# Patient Record
Sex: MALE | Race: WHITE | NOT HISPANIC OR LATINO | Employment: OTHER | URBAN - NONMETROPOLITAN AREA
[De-identification: names, ages, dates, MRNs, and addresses within clinical notes are randomized per-mention and may not be internally consistent; named-entity substitution may affect disease eponyms.]

---

## 2017-01-04 RX ORDER — CARVEDILOL 12.5 MG/1
12.5 TABLET ORAL 2 TIMES DAILY WITH MEALS
Qty: 60 TABLET | Refills: 3 | Status: SHIPPED | OUTPATIENT
Start: 2017-01-04 | End: 2017-04-30 | Stop reason: SDUPTHER

## 2017-01-12 RX ORDER — LINAGLIPTIN 5 MG/1
TABLET, FILM COATED ORAL
Qty: 30 TABLET | Refills: 5 | Status: SHIPPED | OUTPATIENT
Start: 2017-01-12 | End: 2017-01-24 | Stop reason: ALTCHOICE

## 2017-01-13 RX ORDER — PROBENECID 500 MG/1
TABLET, FILM COATED ORAL
Qty: 60 TABLET | Refills: 5 | Status: SHIPPED | OUTPATIENT
Start: 2017-01-13 | End: 2018-01-03 | Stop reason: SDUPTHER

## 2017-01-13 RX ORDER — FENOFIBRATE 145 MG/1
TABLET, COATED ORAL
Qty: 30 TABLET | Refills: 5 | Status: SHIPPED | OUTPATIENT
Start: 2017-01-13 | End: 2017-03-30 | Stop reason: SDUPTHER

## 2017-01-13 RX ORDER — GLIPIZIDE 5 MG/1
TABLET ORAL
Qty: 60 TABLET | Refills: 5 | Status: SHIPPED | OUTPATIENT
Start: 2017-01-13 | End: 2017-01-24 | Stop reason: SDUPTHER

## 2017-01-13 RX ORDER — CLOPIDOGREL BISULFATE 75 MG/1
TABLET ORAL
Qty: 30 TABLET | Refills: 5 | Status: SHIPPED | OUTPATIENT
Start: 2017-01-13 | End: 2018-01-04 | Stop reason: SDUPTHER

## 2017-01-16 RX ORDER — LOVASTATIN 20 MG/1
20 TABLET ORAL NIGHTLY
Qty: 30 TABLET | Refills: 5 | Status: SHIPPED | OUTPATIENT
Start: 2017-01-16 | End: 2017-03-30 | Stop reason: SINTOL

## 2017-01-27 ENCOUNTER — TELEPHONE (OUTPATIENT)
Dept: FAMILY MEDICINE CLINIC | Facility: CLINIC | Age: 72
End: 2017-01-27

## 2017-01-27 NOTE — TELEPHONE ENCOUNTER
Requested Refills Sent    ----- Message from Elif Reno sent at 1/27/2017 11:19 AM CST -----  Contact: [phone]  Eileen from [facility] [address] called and left message that patient needs new script for his Levemir because patient said the units increased. Pharmacy's number is [phone].

## 2017-02-27 RX ORDER — PEN NEEDLE, DIABETIC 30 GX5/16"
100 NEEDLE, DISPOSABLE MISCELLANEOUS DAILY
Qty: 100 EACH | Refills: 3 | Status: SHIPPED | OUTPATIENT
Start: 2017-02-27 | End: 2020-04-21 | Stop reason: SDUPTHER

## 2017-03-22 ENCOUNTER — LAB (OUTPATIENT)
Dept: LAB | Facility: HOSPITAL | Age: 72
End: 2017-03-22

## 2017-03-22 DIAGNOSIS — E11.8 TYPE 2 DIABETES MELLITUS WITH COMPLICATION, WITH LONG-TERM CURRENT USE OF INSULIN (HCC): Chronic | ICD-10-CM

## 2017-03-22 DIAGNOSIS — Z79.4 TYPE 2 DIABETES MELLITUS WITH COMPLICATION, WITH LONG-TERM CURRENT USE OF INSULIN (HCC): Chronic | ICD-10-CM

## 2017-03-22 DIAGNOSIS — E78.2 MIXED HYPERLIPIDEMIA: Chronic | ICD-10-CM

## 2017-03-22 LAB
ALBUMIN SERPL-MCNC: 4.4 G/DL (ref 3.4–4.8)
ALBUMIN/GLOB SERPL: 1.5 G/DL (ref 1.1–1.8)
ALP SERPL-CCNC: 79 U/L (ref 38–126)
ALT SERPL W P-5'-P-CCNC: 49 U/L (ref 21–72)
ANION GAP SERPL CALCULATED.3IONS-SCNC: 12 MMOL/L (ref 5–15)
ARTICHOKE IGE QN: 140 MG/DL (ref 1–129)
AST SERPL-CCNC: 48 U/L (ref 17–59)
BILIRUB SERPL-MCNC: 1 MG/DL (ref 0.2–1.3)
BUN BLD-MCNC: 26 MG/DL (ref 7–21)
BUN/CREAT SERPL: 19 (ref 7–25)
CALCIUM SPEC-SCNC: 9.3 MG/DL (ref 8.4–10.2)
CHLORIDE SERPL-SCNC: 105 MMOL/L (ref 95–110)
CHOLEST SERPL-MCNC: 236 MG/DL (ref 0–199)
CO2 SERPL-SCNC: 26 MMOL/L (ref 22–31)
CREAT BLD-MCNC: 1.37 MG/DL (ref 0.7–1.3)
GFR SERPL CREATININE-BSD FRML MDRD: 51 ML/MIN/1.73 (ref 42–98)
GLOBULIN UR ELPH-MCNC: 2.9 GM/DL (ref 2.3–3.5)
GLUCOSE BLD-MCNC: 195 MG/DL (ref 60–100)
HBA1C MFR BLD: 8.6 % (ref 4–5.6)
HDLC SERPL-MCNC: 25 MG/DL (ref 60–200)
LDLC/HDLC SERPL: 5.66 {RATIO} (ref 0–3.55)
POTASSIUM BLD-SCNC: 4.5 MMOL/L (ref 3.5–5.1)
PROT SERPL-MCNC: 7.3 G/DL (ref 6.3–8.6)
SODIUM BLD-SCNC: 143 MMOL/L (ref 137–145)
TRIGL SERPL-MCNC: 348 MG/DL (ref 20–199)

## 2017-03-22 PROCEDURE — 80061 LIPID PANEL: CPT | Performed by: GENERAL PRACTICE

## 2017-03-22 PROCEDURE — 36415 COLL VENOUS BLD VENIPUNCTURE: CPT

## 2017-03-22 PROCEDURE — 80053 COMPREHEN METABOLIC PANEL: CPT | Performed by: GENERAL PRACTICE

## 2017-03-22 PROCEDURE — 83036 HEMOGLOBIN GLYCOSYLATED A1C: CPT | Performed by: GENERAL PRACTICE

## 2017-03-27 RX ORDER — PRAVASTATIN SODIUM 40 MG
TABLET ORAL
Qty: 90 TABLET | Refills: 1 | Status: SHIPPED | OUTPATIENT
Start: 2017-03-27 | End: 2017-03-30 | Stop reason: SINTOL

## 2017-03-30 ENCOUNTER — OFFICE VISIT (OUTPATIENT)
Dept: FAMILY MEDICINE CLINIC | Facility: CLINIC | Age: 72
End: 2017-03-30

## 2017-03-30 VITALS
HEIGHT: 74 IN | SYSTOLIC BLOOD PRESSURE: 132 MMHG | WEIGHT: 199.3 LBS | HEART RATE: 74 BPM | DIASTOLIC BLOOD PRESSURE: 64 MMHG | OXYGEN SATURATION: 94 % | BODY MASS INDEX: 25.58 KG/M2

## 2017-03-30 DIAGNOSIS — E78.2 MIXED HYPERLIPIDEMIA: ICD-10-CM

## 2017-03-30 DIAGNOSIS — I10 ESSENTIAL HYPERTENSION: ICD-10-CM

## 2017-03-30 DIAGNOSIS — E11.8 TYPE 2 DIABETES MELLITUS WITH COMPLICATION, WITH LONG-TERM CURRENT USE OF INSULIN (HCC): Primary | ICD-10-CM

## 2017-03-30 DIAGNOSIS — Z79.4 TYPE 2 DIABETES MELLITUS WITH COMPLICATION, WITH LONG-TERM CURRENT USE OF INSULIN (HCC): Primary | ICD-10-CM

## 2017-03-30 DIAGNOSIS — N28.9 RENAL IMPAIRMENT: ICD-10-CM

## 2017-03-30 PROCEDURE — 99214 OFFICE O/P EST MOD 30 MIN: CPT | Performed by: GENERAL PRACTICE

## 2017-03-30 NOTE — PROGRESS NOTES
Subjective   Zachary Galindo is a 71 y.o. male.     Chief Complaint   Patient presents with   • Diabetes   • Hypertension     Diabetes   He presents for his follow-up diabetic visit. He has type 2 diabetes mellitus. His disease course has been improving. There are no hypoglycemic associated symptoms. Pertinent negatives for hypoglycemia include no dizziness, headaches or nervousness/anxiousness. There are no diabetic associated symptoms. Pertinent negatives for diabetes include no chest pain, no fatigue and no weakness. There are no hypoglycemic complications. Diabetic complications include nephropathy. Risk factors for coronary artery disease include diabetes mellitus, dyslipidemia, hypertension and male sex. Current diabetic treatment includes oral agent (dual therapy) and insulin injections. He is compliant with treatment all of the time. His weight is stable. He is following a generally healthy diet. Meal planning includes ADA exchanges. He participates in exercise intermittently. There is no change in his home blood glucose trend. An ACE inhibitor/angiotensin II receptor blocker is being taken. Eye exam is current.   Hypertension   The current episode started more than 1 year ago. The problem is unchanged. The problem is controlled. Pertinent negatives include no chest pain, headaches, neck pain, palpitations or shortness of breath. Risk factors for coronary artery disease include diabetes mellitus and dyslipidemia. Past treatments include angiotensin blockers. The current treatment provides significant improvement. There are no compliance problems.    Hyperlipidemia   This is a chronic problem. The current episode started more than 1 year ago. The problem is uncontrolled. Recent lipid tests were reviewed and are high. Exacerbating diseases include diabetes. There are no known factors aggravating his hyperlipidemia. Pertinent negatives include no chest pain, myalgias or shortness of breath. The current  "treatment provides mild improvement of lipids. There are no compliance problems.       The following portions of the patient's history were reviewed and updated as appropriate: allergies, current medications, past social history and problem list.    Current Outpatient Prescriptions:   •  amLODIPine (NORVASC) 10 MG tablet, Take 10 mg by mouth Daily., Disp: , Rfl:   •  aspirin 325 MG tablet, Take 325 mg by mouth every day., Disp: , Rfl:   •  carvedilol (COREG) 12.5 MG tablet, Take 1 tablet by mouth 2 (Two) Times a Day With Meals., Disp: 60 tablet, Rfl: 3  •  clopidogrel (PLAVIX) 75 MG tablet, TAKE 1 TABLET BY MOUTH EVERY DAY, Disp: 30 tablet, Rfl: 5  •  glipiZIDE (GLUCOTROL) 5 MG tablet, Take 1 tablet by mouth 2 (Two) Times a Day Before Meals., Disp: 180 tablet, Rfl: 3  •  glucose blood test strip, OneTouch Ultra Test strips  -  Test sugars 3-4 times a day as directed by provider., Disp: , Rfl:   •  insulin detemir (LEVEMIR FLEXTOUCH) 100 UNIT/ML injection, Inject 40 Units under the skin Every Night., Disp: 15 mL, Rfl: 5  •  Insulin Pen Needle (PEN NEEDLES 3/16\") 31G X 5 MM misc, 100 each Daily., Disp: 100 each, Rfl: 3  •  Lancets (ONETOUCH ULTRASOFT) lancets, Test sugars 3-4 times daily as instructed by physician., Disp: , Rfl:   •  losartan (COZAAR) 50 MG tablet, Take 50 mg by mouth Daily., Disp: , Rfl:   •  magnesium oxide (MAG-OX) 400 MG tablet, Take 1 tablet by mouth 2 (Two) Times a Day., Disp: 180 tablet, Rfl: 3  •  probenecid (BENEMID) 500 MG tablet, TAKE 1 TABLET BY MOUTH TWICE DAILY, Disp: 60 tablet, Rfl: 5  •  SITagliptin (JANUVIA) 100 MG tablet, Take 1 tablet by mouth Daily., Disp: 90 tablet, Rfl: 3  •  Unable to find, Pharmacy, Disp: , Rfl:     Review of Systems   Constitutional: Negative.  Negative for activity change, appetite change, chills, fatigue, fever and unexpected weight change.   HENT: Negative.  Negative for congestion, ear pain, hearing loss, nosebleeds, rhinorrhea, sinus pressure, sneezing, " "sore throat, tinnitus and trouble swallowing.    Eyes: Negative.  Negative for pain, discharge, redness, itching and visual disturbance.   Respiratory: Negative.  Negative for apnea, cough, chest tightness, shortness of breath and wheezing.    Cardiovascular: Negative.  Negative for chest pain, palpitations and leg swelling.   Gastrointestinal: Negative.  Negative for abdominal distention, abdominal pain, constipation, diarrhea, nausea and vomiting.   Endocrine: Negative.    Genitourinary: Negative.  Negative for dysuria, frequency and urgency.   Musculoskeletal: Negative.  Negative for arthralgias, back pain, gait problem, joint swelling, myalgias, neck pain and neck stiffness.   Skin: Negative.  Negative for color change and rash.   Allergic/Immunologic: Negative.    Neurological: Negative.  Negative for dizziness, weakness, light-headedness, numbness and headaches.   Hematological: Negative.  Negative for adenopathy.   Psychiatric/Behavioral: Negative.  Negative for dysphoric mood and sleep disturbance. The patient is not nervous/anxious.      Objective     Visit Vitals   • /64   • Pulse 74   • Ht 74\" (188 cm)   • Wt 199 lb 4.8 oz (90.4 kg)   • SpO2 94%   • BMI 25.59 kg/m2     Physical Exam   Constitutional: He is oriented to person, place, and time. He appears well-developed and well-nourished. No distress.   HENT:   Head: Normocephalic.   Nose: Nose normal.   Mouth/Throat: Oropharynx is clear and moist.   Eyes: Conjunctivae and EOM are normal. Pupils are equal, round, and reactive to light. Right eye exhibits no discharge. Left eye exhibits no discharge.   Neck: No thyromegaly present.   Cardiovascular: Normal rate, regular rhythm, normal heart sounds and intact distal pulses.    No murmur heard.  Pulmonary/Chest: Effort normal and breath sounds normal.   Musculoskeletal: He exhibits no edema.   Lymphadenopathy:     He has no cervical adenopathy.   Neurological: He is alert and oriented to person, place, " and time.   Skin: Skin is warm and dry.   Psychiatric: He has a normal mood and affect.   Nursing note and vitals reviewed.    Assessment/Plan     Problem List Items Addressed This Visit        Cardiovascular and Mediastinum    Essential hypertension    Hyperlipidemia    Relevant Orders    Comprehensive Metabolic Panel    Lipid Panel       Endocrine    Type 2 diabetes mellitus - Primary    Relevant Orders    Comprehensive Metabolic Panel    Hemoglobin A1c       Genitourinary    Renal impairment        Increase Levemir by 3 units.    No orders of the defined types were placed in this encounter.

## 2017-04-14 ENCOUNTER — TRANSCRIBE ORDERS (OUTPATIENT)
Dept: LAB | Facility: HOSPITAL | Age: 72
End: 2017-04-14

## 2017-04-14 ENCOUNTER — APPOINTMENT (OUTPATIENT)
Dept: LAB | Facility: HOSPITAL | Age: 72
End: 2017-04-14

## 2017-04-14 DIAGNOSIS — M25.512 LEFT SHOULDER PAIN, UNSPECIFIED CHRONICITY: ICD-10-CM

## 2017-04-14 DIAGNOSIS — I10 ESSENTIAL HYPERTENSION, MALIGNANT: ICD-10-CM

## 2017-04-14 DIAGNOSIS — I25.9 CHRONIC ISCHEMIC HEART DISEASE, UNSPECIFIED: ICD-10-CM

## 2017-04-14 DIAGNOSIS — E78.5 HYPERLIPIDEMIA, UNSPECIFIED HYPERLIPIDEMIA TYPE: ICD-10-CM

## 2017-04-14 DIAGNOSIS — E11.9 TYPE 2 DIABETES MELLITUS WITHOUT COMPLICATION, UNSPECIFIED LONG TERM INSULIN USE STATUS: ICD-10-CM

## 2017-04-14 DIAGNOSIS — N18.30 CHRONIC KIDNEY DISEASE, STAGE III (MODERATE) (HCC): Primary | ICD-10-CM

## 2017-04-14 DIAGNOSIS — M10.9 GOUT, UNSPECIFIED: ICD-10-CM

## 2017-04-14 DIAGNOSIS — M54.2 NECK PAIN: ICD-10-CM

## 2017-04-14 LAB
ALBUMIN SERPL-MCNC: 4.5 G/DL (ref 3.4–4.8)
ALBUMIN/GLOB SERPL: 1.4 G/DL (ref 1.1–1.8)
ALP SERPL-CCNC: 107 U/L (ref 38–126)
ALT SERPL W P-5'-P-CCNC: 44 U/L (ref 21–72)
ANION GAP SERPL CALCULATED.3IONS-SCNC: 14 MMOL/L (ref 5–15)
AST SERPL-CCNC: 40 U/L (ref 17–59)
BILIRUB SERPL-MCNC: 0.7 MG/DL (ref 0.2–1.3)
BUN BLD-MCNC: 25 MG/DL (ref 7–21)
BUN/CREAT SERPL: 20.2 (ref 7–25)
CALCIUM SPEC-SCNC: 9.8 MG/DL (ref 8.4–10.2)
CHLORIDE SERPL-SCNC: 101 MMOL/L (ref 95–110)
CO2 SERPL-SCNC: 26 MMOL/L (ref 22–31)
CREAT BLD-MCNC: 1.24 MG/DL (ref 0.7–1.3)
GFR SERPL CREATININE-BSD FRML MDRD: 57 ML/MIN/1.73 (ref 42–98)
GLOBULIN UR ELPH-MCNC: 3.2 GM/DL (ref 2.3–3.5)
GLUCOSE BLD-MCNC: 215 MG/DL (ref 60–100)
POTASSIUM BLD-SCNC: 4.2 MMOL/L (ref 3.5–5.1)
PROT SERPL-MCNC: 7.7 G/DL (ref 6.3–8.6)
SODIUM BLD-SCNC: 141 MMOL/L (ref 137–145)

## 2017-04-14 PROCEDURE — 36415 COLL VENOUS BLD VENIPUNCTURE: CPT | Performed by: INTERNAL MEDICINE

## 2017-04-14 PROCEDURE — 80053 COMPREHEN METABOLIC PANEL: CPT | Performed by: INTERNAL MEDICINE

## 2017-05-01 RX ORDER — CARVEDILOL 12.5 MG/1
TABLET ORAL
Qty: 60 TABLET | Refills: 5 | Status: SHIPPED | OUTPATIENT
Start: 2017-05-01 | End: 2017-10-02 | Stop reason: SDUPTHER

## 2017-06-26 ENCOUNTER — LAB (OUTPATIENT)
Dept: LAB | Facility: HOSPITAL | Age: 72
End: 2017-06-26

## 2017-06-26 DIAGNOSIS — E78.2 MIXED HYPERLIPIDEMIA: ICD-10-CM

## 2017-06-26 DIAGNOSIS — E11.8 TYPE 2 DIABETES MELLITUS WITH COMPLICATION, WITH LONG-TERM CURRENT USE OF INSULIN (HCC): ICD-10-CM

## 2017-06-26 DIAGNOSIS — Z79.4 TYPE 2 DIABETES MELLITUS WITH COMPLICATION, WITH LONG-TERM CURRENT USE OF INSULIN (HCC): ICD-10-CM

## 2017-06-26 LAB
ALBUMIN SERPL-MCNC: 4.3 G/DL (ref 3.4–4.8)
ALBUMIN/GLOB SERPL: 1.3 G/DL (ref 1.1–1.8)
ALP SERPL-CCNC: 95 U/L (ref 38–126)
ALT SERPL W P-5'-P-CCNC: 56 U/L (ref 21–72)
ANION GAP SERPL CALCULATED.3IONS-SCNC: 11 MMOL/L (ref 5–15)
ARTICHOKE IGE QN: 175 MG/DL (ref 1–129)
AST SERPL-CCNC: 30 U/L (ref 17–59)
BILIRUB SERPL-MCNC: 0.7 MG/DL (ref 0.2–1.3)
BUN BLD-MCNC: 21 MG/DL (ref 7–21)
BUN/CREAT SERPL: 17.2 (ref 7–25)
CALCIUM SPEC-SCNC: 9.5 MG/DL (ref 8.4–10.2)
CHLORIDE SERPL-SCNC: 103 MMOL/L (ref 95–110)
CHOLEST SERPL-MCNC: 233 MG/DL (ref 0–199)
CO2 SERPL-SCNC: 27 MMOL/L (ref 22–31)
CREAT BLD-MCNC: 1.22 MG/DL (ref 0.7–1.3)
GFR SERPL CREATININE-BSD FRML MDRD: 59 ML/MIN/1.73 (ref 42–98)
GLOBULIN UR ELPH-MCNC: 3.4 GM/DL (ref 2.3–3.5)
GLUCOSE BLD-MCNC: 162 MG/DL (ref 60–100)
HBA1C MFR BLD: 8.75 % (ref 4–5.6)
HDLC SERPL-MCNC: 32 MG/DL (ref 60–200)
LDLC/HDLC SERPL: 4.76 {RATIO} (ref 0–3.55)
POTASSIUM BLD-SCNC: 5 MMOL/L (ref 3.5–5.1)
PROT SERPL-MCNC: 7.7 G/DL (ref 6.3–8.6)
SODIUM BLD-SCNC: 141 MMOL/L (ref 137–145)
TRIGL SERPL-MCNC: 243 MG/DL (ref 20–199)

## 2017-06-26 PROCEDURE — 83036 HEMOGLOBIN GLYCOSYLATED A1C: CPT | Performed by: GENERAL PRACTICE

## 2017-06-26 PROCEDURE — 36415 COLL VENOUS BLD VENIPUNCTURE: CPT

## 2017-06-26 PROCEDURE — 80061 LIPID PANEL: CPT | Performed by: GENERAL PRACTICE

## 2017-06-26 PROCEDURE — 80053 COMPREHEN METABOLIC PANEL: CPT | Performed by: GENERAL PRACTICE

## 2017-07-03 ENCOUNTER — OFFICE VISIT (OUTPATIENT)
Dept: FAMILY MEDICINE CLINIC | Facility: CLINIC | Age: 72
End: 2017-07-03

## 2017-07-03 VITALS
HEIGHT: 74 IN | BODY MASS INDEX: 24.68 KG/M2 | WEIGHT: 192.3 LBS | SYSTOLIC BLOOD PRESSURE: 132 MMHG | HEART RATE: 62 BPM | OXYGEN SATURATION: 94 % | DIASTOLIC BLOOD PRESSURE: 70 MMHG

## 2017-07-03 DIAGNOSIS — Z79.4 TYPE 2 DIABETES MELLITUS WITH COMPLICATION, WITH LONG-TERM CURRENT USE OF INSULIN (HCC): Chronic | ICD-10-CM

## 2017-07-03 DIAGNOSIS — I10 ESSENTIAL HYPERTENSION: Chronic | ICD-10-CM

## 2017-07-03 DIAGNOSIS — E11.8 TYPE 2 DIABETES MELLITUS WITH COMPLICATION, WITH LONG-TERM CURRENT USE OF INSULIN (HCC): Chronic | ICD-10-CM

## 2017-07-03 DIAGNOSIS — E78.2 MIXED HYPERLIPIDEMIA: Chronic | ICD-10-CM

## 2017-07-03 DIAGNOSIS — Z00.00 MEDICARE ANNUAL WELLNESS VISIT, INITIAL: Primary | ICD-10-CM

## 2017-07-03 DIAGNOSIS — Z13.6 SCREENING FOR ABDOMINAL AORTIC ANEURYSM: ICD-10-CM

## 2017-07-03 PROCEDURE — 99214 OFFICE O/P EST MOD 30 MIN: CPT | Performed by: GENERAL PRACTICE

## 2017-07-03 PROCEDURE — G0438 PPPS, INITIAL VISIT: HCPCS | Performed by: GENERAL PRACTICE

## 2017-07-03 RX ORDER — ATORVASTATIN CALCIUM 10 MG/1
10 TABLET, FILM COATED ORAL 3 TIMES WEEKLY
Qty: 40 TABLET | Refills: 3 | Status: SHIPPED | OUTPATIENT
Start: 2017-07-03 | End: 2018-01-09 | Stop reason: SDUPTHER

## 2017-07-03 NOTE — PATIENT INSTRUCTIONS
Check on tetanus vaccine at pharmacy or with insurance.      Decrease glipizide to 1/2 tab twice daily.    Continue levemir same dose for 3 days then if fasting sugar is above 150 then increase 3 units, if after 3 days your fasting sugar is still above 150 then increase another 3 units. Keep doing same until fasting sugar is below 150.     Medicare Wellness  Personal Prevention Plan of Service     Date of Office Visit:  2017  Encounter Provider:  Halley Raymond MD  Place of Service:  [facility]  Patient Name: Zachary Gailndo  :  1945    As part of the Medicare Wellness portion of your visit today, we are providing you with this personalized preventive plan of services (PPPS). This plan is based upon recommendations of the [place] Preventive Services Task Force (USPSTF) and the Advisory Committee on Immunization Practices (ACIP).    This lists the preventive care services that should be considered, and provides dates of when you are due. Items listed as completed are up-to-date and do not require any further intervention.    Health Maintenance   Topic Date Due   • INFLUENZA VACCINE  2017   • DIABETIC FOOT EXAM  2017   • DIABETIC EYE EXAM  2017   • URINE MICROALBUMIN  2017   • HEMOGLOBIN A1C  2017   • LIPID PANEL  2018   • MEDICARE ANNUAL WELLNESS  2018   • COLONOSCOPY  2020   • TDAP/TD VACCINES (2 - Td) 2027   • HEPATITIS C SCREENING  Completed   • PNEUMOCOCCAL VACCINES (65+ LOW/MEDIUM RISK)  Completed   • AAA SCREEN (ONE-TIME)  Addressed   • ZOSTER VACCINE  Addressed       Orders Placed This Encounter   Procedures   • US AAA Screen Limited     Standing Status:   Future     Standing Expiration Date:   7/3/2018     Order Specific Question:   Reason for Exam:     Answer:   screen       Return in about 3 months (around 10/3/2017) for Recheck.

## 2017-07-03 NOTE — PROGRESS NOTES
Subjective   Zachary Galindo is a 71 y.o. male.   Chief Complaint   Patient presents with   • Diabetes     labs MEDICARE WELLNESS   • Hypertension     For review and evaluation of management of chronic medical problems. Labs reviewed.   Diabetes   He presents for his follow-up diabetic visit. He has type 2 diabetes mellitus. His disease course has been improving. There are no hypoglycemic associated symptoms. Pertinent negatives for hypoglycemia include no dizziness, headaches or nervousness/anxiousness. There are no diabetic associated symptoms. Pertinent negatives for diabetes include no chest pain, no fatigue and no weakness. There are no hypoglycemic complications. Diabetic complications include nephropathy. Risk factors for coronary artery disease include diabetes mellitus, dyslipidemia, hypertension and male sex. Current diabetic treatment includes oral agent (dual therapy) and insulin injections. He is compliant with treatment all of the time. His weight is stable. He is following a generally healthy diet. Meal planning includes ADA exchanges. He participates in exercise intermittently. There is no change in his home blood glucose trend. An ACE inhibitor/angiotensin II receptor blocker is being taken. Eye exam is current.   Hypertension   The current episode started more than 1 year ago. The problem is unchanged. The problem is controlled. Pertinent negatives include no chest pain, headaches, neck pain, palpitations or shortness of breath. Risk factors for coronary artery disease include diabetes mellitus and dyslipidemia. Past treatments include angiotensin blockers. The current treatment provides significant improvement. There are no compliance problems.    Hyperlipidemia   This is a chronic problem. The current episode started more than 1 year ago. The problem is uncontrolled. Recent lipid tests were reviewed and are high. Exacerbating diseases include diabetes. There are no known factors  "aggravating his hyperlipidemia. Pertinent negatives include no chest pain, myalgias or shortness of breath. The current treatment provides mild improvement of lipids. There are no compliance problems.       The following portions of the patient's history were reviewed and updated as appropriate: allergies, current medications, past social history and problem list.    Outpatient Medications Prior to Visit   Medication Sig Dispense Refill   • amLODIPine (NORVASC) 10 MG tablet Take 10 mg by mouth Daily.     • aspirin 325 MG tablet Take 325 mg by mouth every day.     • carvedilol (COREG) 12.5 MG tablet TAKE 1 TABLET BY MOUTH TWICE DAILY WITH MEALS 60 tablet 5   • clopidogrel (PLAVIX) 75 MG tablet TAKE 1 TABLET BY MOUTH EVERY DAY 30 tablet 5   • glipiZIDE (GLUCOTROL) 5 MG tablet Take 1 tablet by mouth 2 (Two) Times a Day Before Meals. 180 tablet 3   • glucose blood test strip OneTouch Ultra Test strips  -  Test sugars 3-4 times a day as directed by provider.     • insulin detemir (LEVEMIR FLEXTOUCH) 100 UNIT/ML injection Inject 40 Units under the skin Every Night. 15 mL 5   • Insulin Pen Needle (PEN NEEDLES 3/16\") 31G X 5 MM misc 100 each Daily. 100 each 3   • Lancets (ONETOUCH ULTRASOFT) lancets Test sugars 3-4 times daily as instructed by physician.     • magnesium oxide (MAG-OX) 400 MG tablet Take 1 tablet by mouth 2 (Two) Times a Day. 180 tablet 3   • probenecid (BENEMID) 500 MG tablet TAKE 1 TABLET BY MOUTH TWICE DAILY 60 tablet 5   • SITagliptin (JANUVIA) 100 MG tablet Take 1 tablet by mouth Daily. 90 tablet 3   • losartan (COZAAR) 50 MG tablet Take 50 mg by mouth Daily.     • Unable to find Pharmacy       No facility-administered medications prior to visit.      Review of Systems   Constitutional: Negative.  Negative for activity change, appetite change, chills, fatigue, fever and unexpected weight change.   HENT: Negative.  Negative for congestion, ear pain, hearing loss, nosebleeds, rhinorrhea, sinus pressure, " "sneezing, sore throat, tinnitus and trouble swallowing.    Eyes: Negative.  Negative for pain, discharge, redness, itching and visual disturbance.   Respiratory: Negative.  Negative for apnea, cough, chest tightness, shortness of breath and wheezing.    Cardiovascular: Negative.  Negative for chest pain, palpitations and leg swelling.   Gastrointestinal: Negative.  Negative for abdominal distention, abdominal pain, constipation, diarrhea, nausea and vomiting.   Endocrine: Negative.    Genitourinary: Negative.  Negative for dysuria, frequency and urgency.   Musculoskeletal: Negative.  Negative for arthralgias, back pain, gait problem, joint swelling, myalgias, neck pain and neck stiffness.   Skin: Negative.  Negative for color change and rash.   Allergic/Immunologic: Negative.    Neurological: Negative.  Negative for dizziness, weakness, light-headedness, numbness and headaches.   Hematological: Negative.  Negative for adenopathy.   Psychiatric/Behavioral: Negative.  Negative for dysphoric mood and sleep disturbance. The patient is not nervous/anxious.      Objective   Visit Vitals   • /70   • Pulse 62   • Ht 74\" (188 cm)   • Wt 192 lb 4.8 oz (87.2 kg)   • SpO2 94%   • BMI 24.69 kg/m2     Physical Exam   Constitutional: He is oriented to person, place, and time. He appears well-developed and well-nourished. No distress.   HENT:   Head: Normocephalic.   Nose: Nose normal.   Mouth/Throat: Oropharynx is clear and moist.   Eyes: Conjunctivae and EOM are normal. Pupils are equal, round, and reactive to light. Right eye exhibits no discharge. Left eye exhibits no discharge.   Neck: No thyromegaly present.   Cardiovascular: Normal rate, regular rhythm, normal heart sounds and intact distal pulses.    No murmur heard.  Pulmonary/Chest: Effort normal and breath sounds normal.   Musculoskeletal: He exhibits no edema.   Lymphadenopathy:     He has no cervical adenopathy.   Neurological: He is alert and oriented to person, " place, and time.   Skin: Skin is warm and dry.   Psychiatric: He has a normal mood and affect.   Nursing note and vitals reviewed.    Assessment/Plan   Problem List Items Addressed This Visit        Cardiovascular and Mediastinum    Hyperlipidemia (Chronic)    Relevant Medications    atorvastatin (LIPITOR) 10 MG tablet    Essential hypertension       Endocrine    Type 2 diabetes mellitus (Chronic)      Other Visit Diagnoses     Medicare annual wellness visit, initial    -  Primary        Decrease glipizide to 1/2 tab bid. Increase levemir. Start atorvastatin 3 times a week.     New Medications Ordered This Visit   Medications   • atorvastatin (LIPITOR) 10 MG tablet     Sig: Take 1 tablet by mouth 3 (Three) Times a Week.     Dispense:  40 tablet     Refill:  3     Return in about 3 months (around 10/3/2017) for Recheck.

## 2017-07-03 NOTE — PROGRESS NOTES
QUICK REFERENCE INFORMATION:  The ABCs of the Annual Wellness Visit    Initial Medicare Wellness Visit    HEALTH RISK ASSESSMENT    1945    Recent Hospitalizations:  No hospitalization(s) within the last year..    Current Medical Providers:  Patient Care Team:  Halley Raymond MD as PCP - General  ROCCO Krause as PCP - Claims Attributed  Aime Stiles MD as Consulting Physician (Cardiology)  Vicente Hsu MD as Consulting Physician (Nephrology)    Smoking Status:  History   Smoking Status   • Former Smoker   Smokeless Tobacco   • Never Used     Comment: Quit 1979     Alcohol Consumption:  History   Alcohol Use No     Depression Screen:   PHQ-9 Depression Screening 7/3/2017   Little interest or pleasure in doing things 0   Feeling down, depressed, or hopeless 0   Trouble falling or staying asleep, or sleeping too much 0   Feeling tired or having little energy 0   Poor appetite or overeating 0   Feeling bad about yourself - or that you are a failure or have let yourself or your family down 0   Trouble concentrating on things, such as reading the newspaper or watching television 0   Moving or speaking so slowly that other people could have noticed. Or the opposite - being so fidgety or restless that you have been moving around a lot more than usual 0   Thoughts that you would be better off dead, or of hurting yourself in some way 0   PHQ-9 Total Score 0     Health Habits and Functional and Cognitive Screening:  Functional & Cognitive Status 7/3/2017   Do you have difficulty preparing food and eating? No   Do you have difficulty bathing yourself? No   Do you have difficulty getting dressed? No   Do you have difficulty using the toilet? No   Do you have difficulty moving around from place to place? No   In the past year have you fallen or experienced a near fall? No   Do you need help using the phone?  No   Are you deaf or do you have serious difficulty hearing?  No   Do you need help with  transportation? No   Do you need help shopping? No   Do you need help preparing meals?  No   Do you need help with housework?  No   Do you need help with laundry? No   Do you need help taking your medications? No   Do you need help managing money? No   Do you have difficulty concentrating, remembering or making decisions? No     Health Habits  Current Diet: Well Balanced Diet  Dental Exam: Up to date  Eye Exam: Up to date  Exercise (times per week): 0 times per week  Current Exercise Activities Include: None    Does the patient have evidence of cognitive impairment? No    Asiprin use counseling: Taking ASA appropriately as indicated    Recent Lab Results:    Visual Acuity:  No exam data present    Age-appropriate Screening Schedule:  Refer to the list below for future screening recommendations based on patient's age, sex and/or medical conditions. Orders for these recommended tests are listed in the plan section. The patient has been provided with a written plan.    Health Maintenance   Topic Date Due   • INFLUENZA VACCINE  08/01/2017   • DIABETIC FOOT EXAM  09/29/2017   • DIABETIC EYE EXAM  12/13/2017   • URINE MICROALBUMIN  12/20/2017   • HEMOGLOBIN A1C  12/26/2017   • LIPID PANEL  06/26/2018   • COLONOSCOPY  04/07/2020   • TDAP/TD VACCINES (2 - Td) 07/03/2027   • PNEUMOCOCCAL VACCINES (65+ LOW/MEDIUM RISK)  Completed   • ZOSTER VACCINE  Addressed        Subjective   History of Present Illness    Zachary Galindo is a 71 y.o. male who presents for an Annual Wellness Visit.    The following portions of the patient's history were reviewed and updated as appropriate: allergies, current medications, past family history, past medical history, past social history, past surgical history and problem list.    Outpatient Medications Prior to Visit   Medication Sig Dispense Refill   • amLODIPine (NORVASC) 10 MG tablet Take 10 mg by mouth Daily.     • aspirin 325 MG tablet Take 325 mg by mouth every day.     •  "carvedilol (COREG) 12.5 MG tablet TAKE 1 TABLET BY MOUTH TWICE DAILY WITH MEALS 60 tablet 5   • clopidogrel (PLAVIX) 75 MG tablet TAKE 1 TABLET BY MOUTH EVERY DAY 30 tablet 5   • glipiZIDE (GLUCOTROL) 5 MG tablet Take 1 tablet by mouth 2 (Two) Times a Day Before Meals. 180 tablet 3   • glucose blood test strip OneTouch Ultra Test strips  -  Test sugars 3-4 times a day as directed by provider.     • insulin detemir (LEVEMIR FLEXTOUCH) 100 UNIT/ML injection Inject 40 Units under the skin Every Night. 15 mL 5   • Insulin Pen Needle (PEN NEEDLES 3/16\") 31G X 5 MM misc 100 each Daily. 100 each 3   • Lancets (ONETOUCH ULTRASOFT) lancets Test sugars 3-4 times daily as instructed by physician.     • magnesium oxide (MAG-OX) 400 MG tablet Take 1 tablet by mouth 2 (Two) Times a Day. 180 tablet 3   • probenecid (BENEMID) 500 MG tablet TAKE 1 TABLET BY MOUTH TWICE DAILY 60 tablet 5   • SITagliptin (JANUVIA) 100 MG tablet Take 1 tablet by mouth Daily. 90 tablet 3   • losartan (COZAAR) 50 MG tablet Take 50 mg by mouth Daily.     • Unable to find Pharmacy       No facility-administered medications prior to visit.        Patient Active Problem List   Diagnosis   • Essential hypertension   • Gout   • Hyperlipidemia   • Type 2 diabetes mellitus   • Renal impairment       Advance Care Planning:  has NO advance directive - not interested in additional information    Identification of Risk Factors:  Risk factors include: unhealthy diet.    Review of Systems   Constitutional: Negative.  Negative for activity change, appetite change, chills, fatigue, fever and unexpected weight change.   HENT: Negative.  Negative for congestion, ear pain, hearing loss, nosebleeds, rhinorrhea, sinus pressure, sneezing, sore throat, tinnitus and trouble swallowing.    Eyes: Negative.  Negative for pain, discharge, redness, itching and visual disturbance.   Respiratory: Negative.  Negative for apnea, cough, chest tightness, shortness of breath and wheezing.  "   Cardiovascular: Negative.  Negative for chest pain, palpitations and leg swelling.   Gastrointestinal: Negative.  Negative for abdominal distention, abdominal pain, constipation, diarrhea, nausea and vomiting.   Endocrine: Negative.    Genitourinary: Negative.  Negative for dysuria, frequency and urgency.   Musculoskeletal: Negative.  Negative for arthralgias, back pain, gait problem, joint swelling, myalgias, neck pain and neck stiffness.   Skin: Negative.  Negative for color change and rash.   Allergic/Immunologic: Negative.    Neurological: Negative.  Negative for dizziness, weakness, light-headedness, numbness and headaches.   Hematological: Negative.  Negative for adenopathy.   Psychiatric/Behavioral: Negative.  Negative for dysphoric mood and sleep disturbance. The patient is not nervous/anxious.        Compared to one year ago, the patient feels his physical health is better.  Compared to one year ago, the patient feels his mental health is the same.    Objective     Physical Exam   Constitutional: He is oriented to person, place, and time. He appears well-developed and well-nourished. No distress.   HENT:   Head: Normocephalic.   Nose: Nose normal.   Mouth/Throat: Oropharynx is clear and moist.   Eyes: Conjunctivae and EOM are normal. Pupils are equal, round, and reactive to light. Right eye exhibits no discharge. Left eye exhibits no discharge.   Neck: No thyromegaly present.   Cardiovascular: Normal rate, regular rhythm, normal heart sounds and intact distal pulses.    No murmur heard.  Pulmonary/Chest: Effort normal and breath sounds normal.   Musculoskeletal: He exhibits no edema.   Lymphadenopathy:     He has no cervical adenopathy.   Neurological: He is alert and oriented to person, place, and time.   Skin: Skin is warm and dry.   Psychiatric: He has a normal mood and affect.   Nursing note and vitals reviewed.      Vitals:    07/03/17 0833   BP: 132/70   Pulse: 62   SpO2: 94%   Weight: 192 lb 4.8 oz  "(87.2 kg)   Height: 74\" (188 cm)   PainSc: 0-No pain       Body mass index is 24.69 kg/(m^2).  Discussed the patient's BMI with him. The BMI is in the acceptable range.    Assessment/Plan   Patient Self-Management and Personalized Health Advice  The patient has been provided with information about: diet and exercise and preventive services including:   · Fall Risk assessment done, Screening for AAA, referral for ultrasound placed, TdaP vaccine.    Visit Diagnoses:    ICD-10-CM ICD-9-CM   1. Medicare annual wellness visit, initial Z00.00 V70.0   2. Type 2 diabetes mellitus with complication, with long-term current use of insulin E11.8 250.90    Z79.4 V58.67   3. Mixed hyperlipidemia E78.2 272.2   4. Essential hypertension I10 401.9   5. Screening for abdominal aortic aneurysm Z13.6 V81.2       Orders Placed This Encounter   Procedures   • US AAA Screen Limited     Standing Status:   Future     Standing Expiration Date:   7/3/2018     Order Specific Question:   Reason for Exam:     Answer:   screen       Outpatient Encounter Prescriptions as of 7/3/2017   Medication Sig Dispense Refill   • amLODIPine (NORVASC) 10 MG tablet Take 10 mg by mouth Daily.     • aspirin 325 MG tablet Take 325 mg by mouth every day.     • carvedilol (COREG) 12.5 MG tablet TAKE 1 TABLET BY MOUTH TWICE DAILY WITH MEALS 60 tablet 5   • clopidogrel (PLAVIX) 75 MG tablet TAKE 1 TABLET BY MOUTH EVERY DAY 30 tablet 5   • glipiZIDE (GLUCOTROL) 5 MG tablet Take 1 tablet by mouth 2 (Two) Times a Day Before Meals. 180 tablet 3   • glucose blood test strip OneTouch Ultra Test strips  -  Test sugars 3-4 times a day as directed by provider.     • insulin detemir (LEVEMIR FLEXTOUCH) 100 UNIT/ML injection Inject 40 Units under the skin Every Night. 15 mL 5   • Insulin Pen Needle (PEN NEEDLES 3/16\") 31G X 5 MM misc 100 each Daily. 100 each 3   • Lancets (ONETOUCH ULTRASOFT) lancets Test sugars 3-4 times daily as instructed by physician.     • magnesium oxide " (MAG-OX) 400 MG tablet Take 1 tablet by mouth 2 (Two) Times a Day. 180 tablet 3   • probenecid (BENEMID) 500 MG tablet TAKE 1 TABLET BY MOUTH TWICE DAILY 60 tablet 5   • SITagliptin (JANUVIA) 100 MG tablet Take 1 tablet by mouth Daily. 90 tablet 3   • atorvastatin (LIPITOR) 10 MG tablet Take 1 tablet by mouth 3 (Three) Times a Week. 40 tablet 3   • losartan (COZAAR) 50 MG tablet Take 50 mg by mouth Daily.     • Unable to find Pharmacy       No facility-administered encounter medications on file as of 7/3/2017.        Reviewed use of high risk medication in the elderly: yes  Reviewed for potential of harmful drug interactions in the elderly: not applicable    Follow Up:  Return in about 3 months (around 10/3/2017) for Recheck.     An After Visit Summary and PPPS with all of these plans were given to the patient.   Information has been scanned into chart.  Discussed importance of taking medications as prescribed. Encouraged healthy eating habits with low fat, low salt choices and working towards maintaining a healthy weight. Recommended regular exercise if able as well as care to prevent falls.

## 2017-07-06 ENCOUNTER — HOSPITAL ENCOUNTER (OUTPATIENT)
Dept: ULTRASOUND IMAGING | Facility: HOSPITAL | Age: 72
Discharge: HOME | End: 2017-07-06
Admitting: GENERAL PRACTICE

## 2017-07-06 DIAGNOSIS — Z13.6 SCREENING FOR ABDOMINAL AORTIC ANEURYSM: ICD-10-CM

## 2017-07-06 PROCEDURE — 76706 US ABDL AORTA SCREEN AAA: CPT

## 2017-08-10 RX ORDER — INSULIN DETEMIR 100 [IU]/ML
INJECTION, SOLUTION SUBCUTANEOUS
Qty: 15 ML | Refills: 5 | Status: SHIPPED | OUTPATIENT
Start: 2017-08-10 | End: 2017-09-15 | Stop reason: SDUPTHER

## 2017-09-28 ENCOUNTER — LAB (OUTPATIENT)
Dept: LAB | Facility: HOSPITAL | Age: 72
End: 2017-09-28

## 2017-09-28 DIAGNOSIS — Z79.4 TYPE 2 DIABETES MELLITUS WITH COMPLICATION, WITH LONG-TERM CURRENT USE OF INSULIN (HCC): Primary | Chronic | ICD-10-CM

## 2017-09-28 DIAGNOSIS — Z79.4 TYPE 2 DIABETES MELLITUS WITH COMPLICATION, WITH LONG-TERM CURRENT USE OF INSULIN (HCC): Chronic | ICD-10-CM

## 2017-09-28 DIAGNOSIS — E11.8 TYPE 2 DIABETES MELLITUS WITH COMPLICATION, WITH LONG-TERM CURRENT USE OF INSULIN (HCC): Primary | Chronic | ICD-10-CM

## 2017-09-28 DIAGNOSIS — E11.8 TYPE 2 DIABETES MELLITUS WITH COMPLICATION, WITH LONG-TERM CURRENT USE OF INSULIN (HCC): Chronic | ICD-10-CM

## 2017-09-28 LAB
ALBUMIN SERPL-MCNC: 4.4 G/DL (ref 3.4–4.8)
ALBUMIN/GLOB SERPL: 1.4 G/DL (ref 1.1–1.8)
ALP SERPL-CCNC: 78 U/L (ref 38–126)
ALT SERPL W P-5'-P-CCNC: 54 U/L (ref 21–72)
ANION GAP SERPL CALCULATED.3IONS-SCNC: 14 MMOL/L (ref 5–15)
AST SERPL-CCNC: 44 U/L (ref 17–59)
BILIRUB SERPL-MCNC: 0.9 MG/DL (ref 0.2–1.3)
BUN BLD-MCNC: 28 MG/DL (ref 7–21)
BUN/CREAT SERPL: 21.1 (ref 7–25)
CALCIUM SPEC-SCNC: 9.3 MG/DL (ref 8.4–10.2)
CHLORIDE SERPL-SCNC: 104 MMOL/L (ref 95–110)
CO2 SERPL-SCNC: 25 MMOL/L (ref 22–31)
CREAT BLD-MCNC: 1.33 MG/DL (ref 0.7–1.3)
GFR SERPL CREATININE-BSD FRML MDRD: 53 ML/MIN/1.73 (ref 42–98)
GLOBULIN UR ELPH-MCNC: 3.2 GM/DL (ref 2.3–3.5)
GLUCOSE BLD-MCNC: 139 MG/DL (ref 60–100)
HBA1C MFR BLD: 8.6 % (ref 4–5.6)
POTASSIUM BLD-SCNC: 4.3 MMOL/L (ref 3.5–5.1)
PROT SERPL-MCNC: 7.6 G/DL (ref 6.3–8.6)
SODIUM BLD-SCNC: 143 MMOL/L (ref 137–145)

## 2017-09-28 PROCEDURE — 83036 HEMOGLOBIN GLYCOSYLATED A1C: CPT | Performed by: GENERAL PRACTICE

## 2017-09-28 PROCEDURE — 80053 COMPREHEN METABOLIC PANEL: CPT | Performed by: GENERAL PRACTICE

## 2017-09-28 PROCEDURE — 36415 COLL VENOUS BLD VENIPUNCTURE: CPT

## 2017-10-02 ENCOUNTER — OFFICE VISIT (OUTPATIENT)
Dept: CARDIOLOGY | Facility: CLINIC | Age: 72
End: 2017-10-02

## 2017-10-02 VITALS
HEART RATE: 50 BPM | HEIGHT: 74 IN | WEIGHT: 194 LBS | SYSTOLIC BLOOD PRESSURE: 128 MMHG | BODY MASS INDEX: 24.9 KG/M2 | DIASTOLIC BLOOD PRESSURE: 70 MMHG

## 2017-10-02 DIAGNOSIS — I10 ESSENTIAL HYPERTENSION: ICD-10-CM

## 2017-10-02 DIAGNOSIS — I25.810 CORONARY ARTERY DISEASE INVOLVING CORONARY BYPASS GRAFT OF NATIVE HEART WITHOUT ANGINA PECTORIS: Primary | ICD-10-CM

## 2017-10-02 DIAGNOSIS — E78.00 PURE HYPERCHOLESTEROLEMIA: ICD-10-CM

## 2017-10-02 DIAGNOSIS — I20.9 ANGINA PECTORIS (HCC): ICD-10-CM

## 2017-10-02 DIAGNOSIS — E11.9 TYPE 2 DIABETES MELLITUS WITHOUT COMPLICATION, WITH LONG-TERM CURRENT USE OF INSULIN (HCC): ICD-10-CM

## 2017-10-02 DIAGNOSIS — R42 DIZZINESS: ICD-10-CM

## 2017-10-02 DIAGNOSIS — Z79.4 TYPE 2 DIABETES MELLITUS WITHOUT COMPLICATION, WITH LONG-TERM CURRENT USE OF INSULIN (HCC): ICD-10-CM

## 2017-10-02 PROCEDURE — 93000 ELECTROCARDIOGRAM COMPLETE: CPT | Performed by: INTERNAL MEDICINE

## 2017-10-02 PROCEDURE — 99204 OFFICE O/P NEW MOD 45 MIN: CPT | Performed by: INTERNAL MEDICINE

## 2017-10-02 RX ORDER — CARVEDILOL 6.25 MG/1
6.25 TABLET ORAL 2 TIMES DAILY WITH MEALS
Qty: 180 TABLET | Refills: 6 | Status: SHIPPED | OUTPATIENT
Start: 2017-10-02 | End: 2017-11-13 | Stop reason: SDUPTHER

## 2017-10-02 NOTE — PROGRESS NOTES
[facility]  OFFICE NOTE    Zachary Galindo  71 y.o. male    10/02/2017  1. Coronary artery disease involving coronary bypass graft of native heart without angina pectoris    2. Type 2 diabetes mellitus without complication, with long-term current use of insulin    3. Pure hypercholesterolemia    4. Essential hypertension    5. Dizziness    6. Angina pectoris         Chief complaint -History of CAD has been having some dizziness      History of present Illness- 71-year-old gentleman with history of coronary disease status post CABG in 1998 subsequently had stent placement about 3 years ago.  He has been feeling dizziness occasionally.  He is able to exercise reasonably well without any problems.  He is been a diabetic since the age of 35.  He denies any smoking he exercises regularly.  He denies any headache or visual complaints.  He had his labs checked by Dr. Radha Raymond.              Allergies   Allergen Reactions   • Advicor [Niacin-Lovastatin Er]      Swelling   • Lisinopril      Cough         Past Medical History:   Diagnosis Date   • Allergic rhinitis    • Ankle joint pain    • Artificial lens present     Left   • Astigmatism    • Benign prostatic hyperplasia    • Cataract    • Closed fracture of medial malleolus    • Coronary arteriosclerosis    • Diabetes mellitus     no retinopathy   • Elevated levels of transaminase & lactic acid dehydrogenase     improving    • Encounter for health maintenance examination     Individual health examination     • Encounter for health maintenance examination in adult     Adult health examination    • Essential hypertension    • Essential hypertension     Unspecified   • Flank pain     probably muscular    • GERD (gastroesophageal reflux disease)    • Gout    • Gout     unspecified     • Hemorrhoids    • History of artificial eye lens     Artificial lens in position - right    • History of colonoscopy 04/04/2010    Colon endoscopy   (77339)  (1)    • History of kidney stones    • Hyperlipidemia    • Hypermetropia    • Low blood pressure    • Malaise and fatigue    • Need for influenza vaccination     Needs influenza immunization    • Nuclear cataract of left eye    • Posterior subcapsular polar senile cataract     Left   • Renal impairment    • Special screening for malignant neoplasm of prostate    • Type 2 diabetes mellitus     improving   • 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 edema     without macular edema - mild OS    • Upper respiratory infection    • Urticaria     Drug-aggravated angioedema-urticaria   • Urticaria          Past Surgical History:   Procedure Laterality Date   • CARDIAC CATHETERIZATION  03/24/2014    (46255)  (2)  Reduction of stenoisis from 95% to less than 0% stenosis with evidence of good JENNY 3-flow. Distal RCA beyond the touchdown was seen filling the circumflex system   • CATARACT EXTRACTION  10/2015    Remove cataract, insert lens (2)    • CORONARY ARTERY BYPASS GRAFT      Arterial, two  (1)   -  3 - 12/1998   • HERNIA REPAIR      w/mesh  (1)   • INJECTION OF MEDICATION  04/11/2013    B12  (1)  - RAYMOND Raymond   • INJECTION OF MEDICATION  07/03/2013    Benadryl  (1) - RAYMOND Raymond   • INJECTION OF MEDICATION  10/22/2014    Kenalog  (2)   • OTHER SURGICAL HISTORY  07/10/2002    Fragmenting of kidney stone  -   ESWL, 2010 shocks. 1cm UP stone,right. Diabetes mellitus         Family History   Problem Relation Age of Onset   • Diabetes Other    • Hypertension Other    • Cancer Mother    • Heart disease Mother    • Hypertension Mother    • Hyperlipidemia Mother    • Diabetes Brother    • Diabetes Maternal Aunt          Social History     Social History   • Marital status:      Spouse name: N/A   • Number of children: N/A   • Years of education: N/A     Occupational History   • Not on file.     Social History Main Topics   • Smoking status: Former Smoker   • Smokeless  "tobacco: Never Used      Comment: Quit 1979   • Alcohol use No   • Drug use: Not on file   • Sexual activity: Not on file     Other Topics Concern   • Not on file     Social History Narrative         Current Outpatient Prescriptions   Medication Sig Dispense Refill   • amLODIPine (NORVASC) 10 MG tablet Take 10 mg by mouth Daily.     • aspirin 325 MG tablet Take 325 mg by mouth every day.     • atorvastatin (LIPITOR) 10 MG tablet Take 1 tablet by mouth 3 (Three) Times a Week. 40 tablet 3   • carvedilol (COREG) 6.25 MG tablet Take 1 tablet by mouth 2 (Two) Times a Day With Meals. 180 tablet 6   • clopidogrel (PLAVIX) 75 MG tablet TAKE 1 TABLET BY MOUTH EVERY DAY 30 tablet 5   • glipiZIDE (GLUCOTROL) 5 MG tablet Take 1 tablet by mouth 2 (Two) Times a Day Before Meals. 180 tablet 3   • glucose blood test strip OneTouch Ultra Test strips  -  Test sugars 3-4 times a day as directed by provider.     • insulin detemir (LEVEMIR FLEXTOUCH) 100 UNIT/ML injection Inject 78 Units under the skin Every Night. 30 mL 5   • Insulin Pen Needle (PEN NEEDLES 3/16\") 31G X 5 MM misc 100 each Daily. 100 each 3   • Lancets (ONETOUCH ULTRASOFT) lancets Test sugars 3-4 times daily as instructed by physician.     • losartan (COZAAR) 50 MG tablet Take 50 mg by mouth Daily.     • magnesium oxide (MAG-OX) 400 MG tablet Take 1 tablet by mouth 2 (Two) Times a Day. 180 tablet 3   • probenecid (BENEMID) 500 MG tablet TAKE 1 TABLET BY MOUTH TWICE DAILY 60 tablet 5   • SITagliptin (JANUVIA) 100 MG tablet Take 1 tablet by mouth Daily. 90 tablet 3   • Unable to find Pharmacy       No current facility-administered medications for this visit.          Review of Systems     Constitution: Denies any fatigue, fever or chills    HENT: Denies any headache, hearing impairment,     Eyes: Denies any blurring of vision, or photophobia     Cardivascular - As per history of present illness     Respiratory system-denies any COPD, shortness of breath,   sleep apnea.   " "  Endocrine:   history of hyperlipidemia, diabetes,                             Musculoskeletal:  history of arthritis with musculoskeletal problems    Gastrointestinal: No nausea, vomiting, or melena    Genitourinary: No dysuria or hematuria    Neurological:   No history of seizure disorder, stroke, memory problems    Psychiatric/Behavioral:        No history of depression,  No history of bipolar disorder or schizophrenia     Hematological- no history of easy bruising or any bleeding diathesis            OBJECTIVE    /70  Pulse 50  Ht 74\" (188 cm)  Wt 194 lb (88 kg)  BMI 24.91 kg/m2      Physical Exam     Constitutional: is oriented to person, place, and time.     Skin-warm and dry    Well developed and nourished in no acute distress      Head: Normocephalic and atraumatic.     Eyes: Pupils are equal, round, and reactive to light.     Neck: Neck supple. No bruit in the carotids    Cardiovascular: [place] in the fifth intercostal space   Regular rate, and  Rhythm,    S1 greater than S2,    Pulmonary/Chest:   Air  Entry is equal on both sides  No wheezing or crackles,      Abdominal: Soft.  No hepatosplenomegaly,     Musculoskeletal: No kyphoscoliosis, no significant thickening of the joints    Neurological: is alert and oriented to person, place, and time.    cranial nerve are intact .   No motor or sensory deficit    Extremities-no edema, no radial femoral delay      Psychiatric: He has a normal mood and affect.                  His behavior is normal.             ECG 12 Lead  Date/Time: 10/2/2017 9:19 AM  Performed by: TING MORGAN  Authorized by: TING MORGAN   Comparison: not compared with previous ECG   Rhythm: sinus bradycardia  Clinical impression: non-specific ECG              Lab Results   Component Value Date    WBC 3.8 12/29/2015    HGB 14.9 10/10/2016    HCT 45.0 10/10/2016    MCV 87.8 12/29/2015     12/29/2015     Lab Results   Component Value Date    GLUCOSE 139 (H) " 09/28/2017    BUN 28 (H) 09/28/2017    CREATININE 1.33 (H) 09/28/2017    EGFRIFNONA 53 09/28/2017    BCR 21.1 09/28/2017    CO2 25.0 09/28/2017    CALCIUM 9.3 09/28/2017    ALBUMIN 4.40 09/28/2017    LABIL2 1.4 09/28/2017    AST 44 09/28/2017    ALT 54 09/28/2017     Lab Results   Component Value Date    CHOL 233 (H) 06/26/2017    CHOL 236 (H) 03/22/2017     Lab Results   Component Value Date    TRIG 243 (H) 06/26/2017    TRIG 348 (H) 03/22/2017    TRIG 277 (H) 12/20/2016     Lab Results   Component Value Date    HDL 32 (L) 06/26/2017    HDL 25 (L) 03/22/2017    HDL 28 (L) 12/20/2016     Lab Results   Component Value Date    LDLCALC 182 (H) 12/20/2016    LDLCALC 113 12/29/2015    LDLCALC 105 12/05/2014     No results found for: LDL  No results found for: HDLLDLRATIO  No components found for: CHOLHDL  Lab Results   Component Value Date    HGBA1C 8.6 (H) 09/28/2017     No results found for: TSH, [id number]               A/P    CAD status post CABG with prior stent placement to native arteries, doing well he had stent placement 3 years ago.  Denies any angina he does have some shortness of breath with activity.    Bradycardia with some dizziness, I decreased the dose of Coreg to 6.25 mg twice a day and will check a carotid duplex.  He had no abdominal aortic aneurysm by ultrasound recently.    Diabetes on insulin and other medications followed by Dr. Radha Raymond.    Hyperlipidemia on atorvastatin 10 mg doing okay.    Follow-up in 6 months will do exercise Cardilate stress test 1-2 weeks prior to that            This document has been electronically signed by Khadar Ruiz MD on October 2, 2017 9:17 AM       EMR Dragon/Transcription disclaimer:   Some of this note may be an electronic transcription/translation of spoken language to printed text. The electronic translation of spoken language may permit erroneous, or at times, nonsensical words or phrases to be inadvertently transcribed; Although I have reviewed  the note for such errors, some may still exist.

## 2017-10-03 ENCOUNTER — OFFICE VISIT (OUTPATIENT)
Dept: FAMILY MEDICINE CLINIC | Facility: CLINIC | Age: 72
End: 2017-10-03

## 2017-10-03 VITALS
OXYGEN SATURATION: 99 % | HEIGHT: 74 IN | DIASTOLIC BLOOD PRESSURE: 80 MMHG | HEART RATE: 57 BPM | BODY MASS INDEX: 25.08 KG/M2 | WEIGHT: 195.4 LBS | SYSTOLIC BLOOD PRESSURE: 150 MMHG

## 2017-10-03 DIAGNOSIS — Z79.4 TYPE 2 DIABETES MELLITUS WITH COMPLICATION, WITH LONG-TERM CURRENT USE OF INSULIN (HCC): Primary | Chronic | ICD-10-CM

## 2017-10-03 DIAGNOSIS — Z23 NEED FOR VACCINATION: ICD-10-CM

## 2017-10-03 DIAGNOSIS — Z12.5 ENCOUNTER FOR PROSTATE CANCER SCREENING: ICD-10-CM

## 2017-10-03 DIAGNOSIS — E11.8 TYPE 2 DIABETES MELLITUS WITH COMPLICATION, WITH LONG-TERM CURRENT USE OF INSULIN (HCC): Primary | Chronic | ICD-10-CM

## 2017-10-03 DIAGNOSIS — I10 ESSENTIAL HYPERTENSION: Chronic | ICD-10-CM

## 2017-10-03 DIAGNOSIS — E78.2 MIXED HYPERLIPIDEMIA: Chronic | ICD-10-CM

## 2017-10-03 DIAGNOSIS — I25.810 CORONARY ARTERY DISEASE INVOLVING CORONARY BYPASS GRAFT OF NATIVE HEART WITHOUT ANGINA PECTORIS: ICD-10-CM

## 2017-10-03 PROCEDURE — 99214 OFFICE O/P EST MOD 30 MIN: CPT | Performed by: GENERAL PRACTICE

## 2017-10-03 PROCEDURE — 90662 IIV NO PRSV INCREASED AG IM: CPT | Performed by: GENERAL PRACTICE

## 2017-10-03 PROCEDURE — G0008 ADMIN INFLUENZA VIRUS VAC: HCPCS | Performed by: GENERAL PRACTICE

## 2017-10-03 RX ORDER — AMLODIPINE BESYLATE 5 MG/1
5 TABLET ORAL DAILY
COMMUNITY
End: 2018-01-03 | Stop reason: SDUPTHER

## 2017-10-03 NOTE — PROGRESS NOTES
Subjective   Zachary Galindo is a 71 y.o. male.   Chief Complaint   Patient presents with   • Follow-up   • Hyperlipidemia   • Hypertension   • Diabetes     For review and evaluation of management of chronic medical problems. Labs reviewed. Carvedilol was decreased by Dr. Mendez as was bradycardic.   Hypertension   This is a chronic problem. The current episode started more than 1 year ago. The problem is unchanged. The problem is controlled. Pertinent negatives include no headaches, neck pain or palpitations. There are no associated agents to hypertension. Risk factors for coronary artery disease include diabetes mellitus and dyslipidemia. Past treatments include angiotensin blockers, calcium channel blockers and beta blockers. The current treatment provides significant improvement. There are no compliance problems.    Diabetes   He presents for his follow-up diabetic visit. He has type 2 diabetes mellitus. His disease course has been improving. There are no hypoglycemic associated symptoms. Pertinent negatives for hypoglycemia include no dizziness, headaches or nervousness/anxiousness. There are no diabetic associated symptoms. Pertinent negatives for diabetes include no fatigue and no weakness. There are no hypoglycemic complications. Diabetic complications include nephropathy. Risk factors for coronary artery disease include diabetes mellitus, dyslipidemia, hypertension and male sex. Current diabetic treatment includes oral agent (dual therapy) and insulin injections. He is compliant with treatment all of the time. His weight is stable. He is following a generally healthy diet. Meal planning includes ADA exchanges. He participates in exercise intermittently. There is no change in his home blood glucose trend. An ACE inhibitor/angiotensin II receptor blocker is being taken. Eye exam is current.      The following portions of the patient's history were reviewed and updated as appropriate: allergies,  "current medications, past social history and problem list.    Outpatient Medications Prior to Visit   Medication Sig Dispense Refill   • aspirin 325 MG tablet Take 325 mg by mouth every day.     • atorvastatin (LIPITOR) 10 MG tablet Take 1 tablet by mouth 3 (Three) Times a Week. 40 tablet 3   • carvedilol (COREG) 6.25 MG tablet Take 1 tablet by mouth 2 (Two) Times a Day With Meals. 180 tablet 6   • clopidogrel (PLAVIX) 75 MG tablet TAKE 1 TABLET BY MOUTH EVERY DAY 30 tablet 5   • glipiZIDE (GLUCOTROL) 5 MG tablet Take 1 tablet by mouth 2 (Two) Times a Day Before Meals. 180 tablet 3   • glucose blood test strip OneTouch Ultra Test strips  -  Test sugars 3-4 times a day as directed by provider.     • insulin detemir (LEVEMIR FLEXTOUCH) 100 UNIT/ML injection Inject 78 Units under the skin Every Night. 30 mL 5   • Insulin Pen Needle (PEN NEEDLES 3/16\") 31G X 5 MM misc 100 each Daily. 100 each 3   • Lancets (ONETOUCH ULTRASOFT) lancets Test sugars 3-4 times daily as instructed by physician.     • losartan (COZAAR) 50 MG tablet Take 50 mg by mouth Daily.     • magnesium oxide (MAG-OX) 400 MG tablet Take 1 tablet by mouth 2 (Two) Times a Day. 180 tablet 3   • probenecid (BENEMID) 500 MG tablet TAKE 1 TABLET BY MOUTH TWICE DAILY 60 tablet 5   • SITagliptin (JANUVIA) 100 MG tablet Take 1 tablet by mouth Daily. 90 tablet 3   • Unable to find Pharmacy     • amLODIPine (NORVASC) 10 MG tablet Take 10 mg by mouth Daily.       No facility-administered medications prior to visit.        Review of Systems   Constitutional: Negative.  Negative for chills, fatigue, fever and unexpected weight change.   HENT: Negative.  Negative for congestion, ear pain, hearing loss, nosebleeds, rhinorrhea, sneezing, sore throat and tinnitus.    Eyes: Negative.  Negative for discharge.   Respiratory: Negative.  Negative for cough and wheezing.    Cardiovascular: Negative.  Negative for palpitations.   Gastrointestinal: Negative.  Negative for " "abdominal pain, constipation, diarrhea, nausea and vomiting.   Endocrine: Negative.    Genitourinary: Negative.  Negative for dysuria, frequency and urgency.   Musculoskeletal: Negative.  Negative for arthralgias, back pain, joint swelling and neck pain.   Skin: Negative.  Negative for rash.   Allergic/Immunologic: Negative.    Neurological: Negative.  Negative for dizziness, weakness, numbness and headaches.   Hematological: Negative.  Negative for adenopathy.   Psychiatric/Behavioral: Negative.  Negative for dysphoric mood and sleep disturbance. The patient is not nervous/anxious.        Objective   Visit Vitals   • /80 (BP Location: Left arm, Patient Position: Sitting, Cuff Size: Adult)   • Pulse 57   • Ht 74\" (188 cm)   • Wt 195 lb 6.4 oz (88.6 kg)   • SpO2 99%   • BMI 25.09 kg/m2     Physical Exam   Constitutional: He is oriented to person, place, and time. He appears well-developed and well-nourished. No distress.   HENT:   Head: Normocephalic.   Nose: Nose normal.   Mouth/Throat: Oropharynx is clear and moist.   Eyes: Conjunctivae and EOM are normal. Pupils are equal, round, and reactive to light. Right eye exhibits no discharge. Left eye exhibits no discharge.   Neck: No thyromegaly present.   Cardiovascular: Normal rate, regular rhythm, normal heart sounds and intact distal pulses.    No murmur heard.  Pulmonary/Chest: Effort normal and breath sounds normal.   Musculoskeletal: He exhibits no edema.    Zachary had a diabetic foot exam performed today.   During the foot exam he had a monofilament test performed (normal).    Vascular Status -  His exam exhibits right foot vasculature normal. His exam exhibits no right foot edema. His exam exhibits left foot vasculature normal. His exam exhibits no left foot edema.   Skin Integrity  -  His right foot skin is intact.     Zachary 's left foot skin is intact. .  Lymphadenopathy:     He has no cervical adenopathy.   Neurological: He is alert and oriented to " person, place, and time.   Skin: Skin is warm and dry.   Psychiatric: He has a normal mood and affect.   Nursing note and vitals reviewed.    Results for orders placed or performed in visit on 09/28/17   Hemoglobin A1c   Result Value Ref Range    Hemoglobin A1C 8.6 (H) 4 - 5.6 %   Comprehensive Metabolic Panel   Result Value Ref Range    Glucose 139 (H) 60 - 100 mg/dL    BUN 28 (H) 7 - 21 mg/dL    Creatinine 1.33 (H) 0.70 - 1.30 mg/dL    Sodium 143 137 - 145 mmol/L    Potassium 4.3 3.5 - 5.1 mmol/L    Chloride 104 95 - 110 mmol/L    CO2 25.0 22.0 - 31.0 mmol/L    Calcium 9.3 8.4 - 10.2 mg/dL    Total Protein 7.6 6.3 - 8.6 g/dL    Albumin 4.40 3.40 - 4.80 g/dL    ALT (SGPT) 54 21 - 72 U/L    AST (SGOT) 44 17 - 59 U/L    Alkaline Phosphatase 78 38 - 126 U/L    Total Bilirubin 0.9 0.2 - 1.3 mg/dL    eGFR Non African Amer 53 42 - 98 mL/min/1.73    Globulin 3.2 2.3 - 3.5 gm/dL    A/G Ratio 1.4 1.1 - 1.8 g/dL    BUN/Creatinine Ratio 21.1 7.0 - 25.0    Anion Gap 14.0 5.0 - 15.0 mmol/L        Assessment/Plan   Problem List Items Addressed This Visit        Cardiovascular and Mediastinum    Essential hypertension (Chronic)    Relevant Medications    amLODIPine (NORVASC) 5 MG tablet    Other Relevant Orders    Comprehensive Metabolic Panel    Urinalysis With Microscopic - Urine, Clean Catch    Hyperlipidemia (Chronic)    Relevant Orders    Comprehensive Metabolic Panel    Lipid Panel    Coronary artery disease involving coronary bypass graft of native heart without angina pectoris    Relevant Orders    Comprehensive Metabolic Panel    Lipid Panel    CBC & Differential       Endocrine    Type 2 diabetes mellitus - Primary (Chronic)    Relevant Orders    Comprehensive Metabolic Panel    Hemoglobin A1c    Lipid Panel    MicroAlbumin, Urine, Random - Urine, Clean Catch    Urinalysis With Microscopic - Urine, Clean Catch      Other Visit Diagnoses     Need for vaccination        Relevant Orders    Flu Vaccine High Dose PF 65YR+  (Completed)    Encounter for prostate cancer screening        Relevant Orders    PSA Screen         Increase levemir 2 units    New Medications Ordered This Visit   Medications   • amLODIPine (NORVASC) 5 MG tablet     Sig: Take 5 mg by mouth Daily.     Return in about 3 months (around 1/3/2018) for Annual physical.

## 2017-10-09 ENCOUNTER — HOSPITAL ENCOUNTER (OUTPATIENT)
Dept: NUCLEAR MEDICINE | Facility: HOSPITAL | Age: 72
Discharge: HOME | End: 2017-10-09

## 2017-10-09 ENCOUNTER — HOSPITAL ENCOUNTER (OUTPATIENT)
Dept: CARDIOLOGY | Facility: HOSPITAL | Age: 72
Discharge: HOME | End: 2017-10-09

## 2017-10-09 DIAGNOSIS — I25.810 CORONARY ARTERY DISEASE INVOLVING CORONARY BYPASS GRAFT OF NATIVE HEART WITHOUT ANGINA PECTORIS: ICD-10-CM

## 2017-10-09 DIAGNOSIS — Z79.4 TYPE 2 DIABETES MELLITUS WITHOUT COMPLICATION, WITH LONG-TERM CURRENT USE OF INSULIN (HCC): ICD-10-CM

## 2017-10-09 DIAGNOSIS — I10 ESSENTIAL HYPERTENSION: ICD-10-CM

## 2017-10-09 DIAGNOSIS — E78.00 PURE HYPERCHOLESTEROLEMIA: ICD-10-CM

## 2017-10-09 DIAGNOSIS — R42 DIZZINESS: ICD-10-CM

## 2017-10-09 DIAGNOSIS — I20.9 ANGINA PECTORIS (HCC): ICD-10-CM

## 2017-10-09 DIAGNOSIS — E11.9 TYPE 2 DIABETES MELLITUS WITHOUT COMPLICATION, WITH LONG-TERM CURRENT USE OF INSULIN (HCC): ICD-10-CM

## 2017-10-09 LAB
BH CV STRESS BP STAGE 1: NORMAL
BH CV STRESS BP STAGE 2: NORMAL
BH CV STRESS BP STAGE 3: NORMAL
BH CV STRESS DURATION MIN STAGE 1: 3
BH CV STRESS DURATION MIN STAGE 2: 3
BH CV STRESS DURATION MIN STAGE 3: 2
BH CV STRESS DURATION SEC STAGE 1: 0
BH CV STRESS DURATION SEC STAGE 2: 0
BH CV STRESS DURATION SEC STAGE 3: 31
BH CV STRESS GRADE STAGE 1: 10
BH CV STRESS GRADE STAGE 2: 12
BH CV STRESS GRADE STAGE 3: 14
BH CV STRESS HR STAGE 1: 94
BH CV STRESS HR STAGE 2: 113
BH CV STRESS HR STAGE 3: 127
BH CV STRESS METS STAGE 1: 5
BH CV STRESS METS STAGE 2: 7.5
BH CV STRESS METS STAGE 3: 10
BH CV STRESS PROTOCOL 1: NORMAL
BH CV STRESS RECOVERY BP: NORMAL MMHG
BH CV STRESS RECOVERY HR: 73 BPM
BH CV STRESS SPEED STAGE 1: 1.7
BH CV STRESS SPEED STAGE 2: 2.5
BH CV STRESS SPEED STAGE 3: 3.4
BH CV STRESS STAGE 1: 1
BH CV STRESS STAGE 2: 2
BH CV STRESS STAGE 3: 3
LV EF NUC BP: 52 %
MAXIMAL PREDICTED HEART RATE: 149 BPM
PERCENT MAX PREDICTED HR: 86.58 %
STRESS BASELINE BP: NORMAL MMHG
STRESS BASELINE HR: 62 BPM
STRESS PERCENT HR: 102 %
STRESS POST ESTIMATED WORKLOAD: 10.4 METS
STRESS POST EXERCISE DUR MIN: 8 MIN
STRESS POST EXERCISE DUR SEC: 30 SEC
STRESS POST PEAK BP: NORMAL MMHG
STRESS POST PEAK HR: 129 BPM
STRESS TARGET HR: 127 BPM

## 2017-10-09 PROCEDURE — A9500 TC99M SESTAMIBI: HCPCS | Performed by: INTERNAL MEDICINE

## 2017-10-09 PROCEDURE — 93017 CV STRESS TEST TRACING ONLY: CPT

## 2017-10-09 PROCEDURE — 0 TECHNETIUM SESTAMIBI: Performed by: INTERNAL MEDICINE

## 2017-10-09 PROCEDURE — 93016 CV STRESS TEST SUPVJ ONLY: CPT | Performed by: INTERNAL MEDICINE

## 2017-10-09 PROCEDURE — 78452 HT MUSCLE IMAGE SPECT MULT: CPT

## 2017-10-09 PROCEDURE — 78452 HT MUSCLE IMAGE SPECT MULT: CPT | Performed by: INTERNAL MEDICINE

## 2017-10-09 PROCEDURE — 93018 CV STRESS TEST I&R ONLY: CPT | Performed by: INTERNAL MEDICINE

## 2017-10-09 RX ADMIN — TECHNETIUM TC-99M SESTAMIBI 1 DOSE: 1 INJECTION INTRAVENOUS at 08:48

## 2017-10-09 RX ADMIN — TECHNETIUM TC-99M SESTAMIBI 1 DOSE: 1 INJECTION INTRAVENOUS at 07:35

## 2017-10-11 ENCOUNTER — TELEPHONE (OUTPATIENT)
Dept: CARDIOLOGY | Facility: CLINIC | Age: 72
End: 2017-10-11

## 2017-10-16 LAB
BH CV XLRA MEAS LEFT BULB EDV: -36.9 CM/SEC
BH CV XLRA MEAS LEFT BULB PSV: -150 CM/SEC
BH CV XLRA MEAS LEFT CCA RATIO VEL: -113 CM/SEC
BH CV XLRA MEAS LEFT DIST CCA EDV: -22.8 CM/SEC
BH CV XLRA MEAS LEFT DIST CCA PSV: -113 CM/SEC
BH CV XLRA MEAS LEFT DIST ICA EDV: -26.2 CM/SEC
BH CV XLRA MEAS LEFT DIST ICA PSV: -81 CM/SEC
BH CV XLRA MEAS LEFT ICA RATIO VEL: -109 CM/SEC
BH CV XLRA MEAS LEFT ICA/CCA RATIO: 0.96
BH CV XLRA MEAS LEFT MID CCA EDV: 15.7 CM/SEC
BH CV XLRA MEAS LEFT MID CCA PSV: 64.4 CM/SEC
BH CV XLRA MEAS LEFT MID ICA EDV: -26.2 CM/SEC
BH CV XLRA MEAS LEFT MID ICA PSV: -96 CM/SEC
BH CV XLRA MEAS LEFT PROX CCA EDV: 13.4 CM/SEC
BH CV XLRA MEAS LEFT PROX CCA PSV: 70.8 CM/SEC
BH CV XLRA MEAS LEFT PROX ECA EDV: -10.2 CM/SEC
BH CV XLRA MEAS LEFT PROX ECA PSV: -94.4 CM/SEC
BH CV XLRA MEAS LEFT PROX ICA EDV: -35.5 CM/SEC
BH CV XLRA MEAS LEFT PROX ICA PSV: -109 CM/SEC
BH CV XLRA MEAS LEFT PROX SCLA PSV: 119 CM/SEC
BH CV XLRA MEAS LEFT VERTEBRAL A EDV: 12.6 CM/SEC
BH CV XLRA MEAS LEFT VERTEBRAL A PSV: 48.7 CM/SEC
BH CV XLRA MEAS RIGHT BULB EDV: -15.7 CM/SEC
BH CV XLRA MEAS RIGHT BULB PSV: -45.6 CM/SEC
BH CV XLRA MEAS RIGHT CCA RATIO VEL: -76.2 CM/SEC
BH CV XLRA MEAS RIGHT DIST CCA EDV: -14.1 CM/SEC
BH CV XLRA MEAS RIGHT DIST CCA PSV: -52.6 CM/SEC
BH CV XLRA MEAS RIGHT DIST ICA EDV: -22.3 CM/SEC
BH CV XLRA MEAS RIGHT DIST ICA PSV: -60.5 CM/SEC
BH CV XLRA MEAS RIGHT ICA RATIO VEL: -67.6 CM/SEC
BH CV XLRA MEAS RIGHT ICA/CCA RATIO: 0.89
BH CV XLRA MEAS RIGHT MID CCA EDV: 14.1 CM/SEC
BH CV XLRA MEAS RIGHT MID CCA PSV: 58.1 CM/SEC
BH CV XLRA MEAS RIGHT MID ICA EDV: -24.4 CM/SEC
BH CV XLRA MEAS RIGHT MID ICA PSV: -67.6 CM/SEC
BH CV XLRA MEAS RIGHT PROX CCA EDV: -13.4 CM/SEC
BH CV XLRA MEAS RIGHT PROX CCA PSV: -76.2 CM/SEC
BH CV XLRA MEAS RIGHT PROX ECA EDV: -12.6 CM/SEC
BH CV XLRA MEAS RIGHT PROX ECA PSV: -87.2 CM/SEC
BH CV XLRA MEAS RIGHT PROX ICA EDV: -14.1 CM/SEC
BH CV XLRA MEAS RIGHT PROX ICA PSV: -51.9 CM/SEC
BH CV XLRA MEAS RIGHT PROX SCLA PSV: -89.6 CM/SEC
BH CV XLRA MEAS RIGHT VERTEBRAL A EDV: 13.4 CM/SEC
BH CV XLRA MEAS RIGHT VERTEBRAL A PSV: 47.9 CM/SEC

## 2017-10-16 RX ORDER — CARVEDILOL 12.5 MG/1
TABLET ORAL
Qty: 60 TABLET | Refills: 0 | Status: SHIPPED | OUTPATIENT
Start: 2017-10-16 | End: 2017-11-13 | Stop reason: DRUGHIGH

## 2017-10-20 ENCOUNTER — TELEPHONE (OUTPATIENT)
Dept: CARDIOLOGY | Facility: CLINIC | Age: 72
End: 2017-10-20

## 2017-11-13 RX ORDER — CARVEDILOL 12.5 MG/1
TABLET ORAL
Qty: 60 TABLET | Refills: 0 | OUTPATIENT
Start: 2017-11-13

## 2017-11-13 RX ORDER — CARVEDILOL 6.25 MG/1
6.25 TABLET ORAL 2 TIMES DAILY WITH MEALS
Qty: 180 TABLET | Refills: 6 | Status: SHIPPED | OUTPATIENT
Start: 2017-11-13 | End: 2018-01-09 | Stop reason: SDUPTHER

## 2017-11-28 RX ORDER — GLIPIZIDE 5 MG/1
TABLET ORAL
Qty: 180 TABLET | Refills: 2 | Status: SHIPPED | OUTPATIENT
Start: 2017-11-28 | End: 2018-01-09 | Stop reason: SDUPTHER

## 2017-12-07 RX ORDER — SITAGLIPTIN 100 MG/1
TABLET, FILM COATED ORAL
Qty: 90 TABLET | Refills: 1 | Status: SHIPPED | OUTPATIENT
Start: 2017-12-07 | End: 2018-01-09 | Stop reason: SDUPTHER

## 2017-12-26 RX ORDER — CLOPIDOGREL BISULFATE 75 MG/1
TABLET ORAL
Qty: 90 TABLET | Refills: 0 | Status: SHIPPED | OUTPATIENT
Start: 2017-12-26 | End: 2018-01-04 | Stop reason: SDUPTHER

## 2018-01-02 ENCOUNTER — LAB (OUTPATIENT)
Dept: LAB | Facility: HOSPITAL | Age: 73
End: 2018-01-02

## 2018-01-02 DIAGNOSIS — I25.810 CORONARY ARTERY DISEASE INVOLVING CORONARY BYPASS GRAFT OF NATIVE HEART WITHOUT ANGINA PECTORIS: ICD-10-CM

## 2018-01-02 DIAGNOSIS — E78.2 MIXED HYPERLIPIDEMIA: Chronic | ICD-10-CM

## 2018-01-02 DIAGNOSIS — I10 ESSENTIAL HYPERTENSION: ICD-10-CM

## 2018-01-02 DIAGNOSIS — Z12.5 ENCOUNTER FOR PROSTATE CANCER SCREENING: ICD-10-CM

## 2018-01-02 DIAGNOSIS — E11.8 TYPE 2 DIABETES MELLITUS WITH COMPLICATION, WITH LONG-TERM CURRENT USE OF INSULIN (HCC): ICD-10-CM

## 2018-01-02 DIAGNOSIS — Z79.4 TYPE 2 DIABETES MELLITUS WITH COMPLICATION, WITH LONG-TERM CURRENT USE OF INSULIN (HCC): ICD-10-CM

## 2018-01-02 LAB
ALBUMIN SERPL-MCNC: 4.3 G/DL (ref 3.4–4.8)
ALBUMIN UR-MCNC: 7.9 MG/L
ALBUMIN/GLOB SERPL: 1.2 G/DL (ref 1.1–1.8)
ALP SERPL-CCNC: 99 U/L (ref 38–126)
ALT SERPL W P-5'-P-CCNC: 51 U/L (ref 21–72)
ANION GAP SERPL CALCULATED.3IONS-SCNC: 10 MMOL/L (ref 5–15)
ARTICHOKE IGE QN: 119 MG/DL (ref 1–129)
AST SERPL-CCNC: 45 U/L (ref 17–59)
BACTERIA UR QL AUTO: ABNORMAL /HPF
BASOPHILS # BLD AUTO: 0.01 10*3/MM3 (ref 0–0.2)
BASOPHILS NFR BLD AUTO: 0.3 % (ref 0–2)
BILIRUB SERPL-MCNC: 0.5 MG/DL (ref 0.2–1.3)
BILIRUB UR QL STRIP: NEGATIVE
BUN BLD-MCNC: 24 MG/DL (ref 7–21)
BUN/CREAT SERPL: 20.7 (ref 7–25)
CALCIUM SPEC-SCNC: 9.5 MG/DL (ref 8.4–10.2)
CHLORIDE SERPL-SCNC: 103 MMOL/L (ref 95–110)
CHOLEST SERPL-MCNC: 169 MG/DL (ref 0–199)
CLARITY UR: ABNORMAL
CO2 SERPL-SCNC: 28 MMOL/L (ref 22–31)
COLOR UR: YELLOW
CREAT BLD-MCNC: 1.16 MG/DL (ref 0.7–1.3)
DEPRECATED RDW RBC AUTO: 42.3 FL (ref 35.1–43.9)
EOSINOPHIL # BLD AUTO: 0.15 10*3/MM3 (ref 0–0.7)
EOSINOPHIL NFR BLD AUTO: 3.8 % (ref 0–7)
ERYTHROCYTE [DISTWIDTH] IN BLOOD BY AUTOMATED COUNT: 13.4 % (ref 11.5–14.5)
GFR SERPL CREATININE-BSD FRML MDRD: 62 ML/MIN/1.73 (ref 42–98)
GLOBULIN UR ELPH-MCNC: 3.5 GM/DL (ref 2.3–3.5)
GLUCOSE BLD-MCNC: 240 MG/DL (ref 60–100)
GLUCOSE UR STRIP-MCNC: ABNORMAL MG/DL
HCT VFR BLD AUTO: 45.2 % (ref 39–49)
HDLC SERPL-MCNC: 28 MG/DL (ref 60–200)
HGB BLD-MCNC: 15.3 G/DL (ref 13.7–17.3)
HGB UR QL STRIP.AUTO: ABNORMAL
HYALINE CASTS UR QL AUTO: ABNORMAL /LPF
IMM GRANULOCYTES # BLD: 0 10*3/MM3 (ref 0–0.02)
IMM GRANULOCYTES NFR BLD: 0 % (ref 0–0.5)
KETONES UR QL STRIP: NEGATIVE
LDLC/HDLC SERPL: 3.5 {RATIO} (ref 0–3.55)
LEUKOCYTE ESTERASE UR QL STRIP.AUTO: NEGATIVE
LYMPHOCYTES # BLD AUTO: 0.96 10*3/MM3 (ref 0.6–4.2)
LYMPHOCYTES NFR BLD AUTO: 24.2 % (ref 10–50)
MCH RBC QN AUTO: 29.2 PG (ref 26.5–34)
MCHC RBC AUTO-ENTMCNC: 33.8 G/DL (ref 31.5–36.3)
MCV RBC AUTO: 86.3 FL (ref 80–98)
MONOCYTES # BLD AUTO: 0.33 10*3/MM3 (ref 0–0.9)
MONOCYTES NFR BLD AUTO: 8.3 % (ref 0–12)
NEUTROPHILS # BLD AUTO: 2.51 10*3/MM3 (ref 2–8.6)
NEUTROPHILS NFR BLD AUTO: 63.4 % (ref 37–80)
NITRITE UR QL STRIP: NEGATIVE
PH UR STRIP.AUTO: <=5 [PH] (ref 5–9)
PLATELET # BLD AUTO: 131 10*3/MM3 (ref 150–450)
PMV BLD AUTO: 12 FL (ref 8–12)
POTASSIUM BLD-SCNC: 4.5 MMOL/L (ref 3.5–5.1)
PROT SERPL-MCNC: 7.8 G/DL (ref 6.3–8.6)
PROT UR QL STRIP: ABNORMAL
PSA SERPL-MCNC: 0.8 NG/ML (ref 0–4)
RBC # BLD AUTO: 5.24 10*6/MM3 (ref 4.37–5.74)
RBC # UR: ABNORMAL /HPF
REF LAB TEST METHOD: ABNORMAL
SODIUM BLD-SCNC: 141 MMOL/L (ref 137–145)
SP GR UR STRIP: 1.03 (ref 1–1.03)
SQUAMOUS #/AREA URNS HPF: ABNORMAL /HPF
TRIGL SERPL-MCNC: 215 MG/DL (ref 20–199)
UROBILINOGEN UR QL STRIP: ABNORMAL
WBC NRBC COR # BLD: 3.96 10*3/MM3 (ref 3.2–9.8)
WBC UR QL AUTO: ABNORMAL /HPF

## 2018-01-02 PROCEDURE — 82043 UR ALBUMIN QUANTITATIVE: CPT

## 2018-01-02 PROCEDURE — 80061 LIPID PANEL: CPT

## 2018-01-02 PROCEDURE — G0103 PSA SCREENING: HCPCS

## 2018-01-02 PROCEDURE — 36415 COLL VENOUS BLD VENIPUNCTURE: CPT

## 2018-01-02 PROCEDURE — 85025 COMPLETE CBC W/AUTO DIFF WBC: CPT

## 2018-01-02 PROCEDURE — 83036 HEMOGLOBIN GLYCOSYLATED A1C: CPT

## 2018-01-02 PROCEDURE — 80053 COMPREHEN METABOLIC PANEL: CPT

## 2018-01-02 PROCEDURE — 81001 URINALYSIS AUTO W/SCOPE: CPT

## 2018-01-03 LAB — HBA1C MFR BLD: 9.5 % (ref 4–5.6)

## 2018-01-03 RX ORDER — PROBENECID 500 MG/1
500 TABLET, FILM COATED ORAL 2 TIMES DAILY
Qty: 60 TABLET | Refills: 5 | Status: SHIPPED | OUTPATIENT
Start: 2018-01-03 | End: 2018-01-09 | Stop reason: SDUPTHER

## 2018-01-03 RX ORDER — LOSARTAN POTASSIUM 50 MG/1
50 TABLET ORAL DAILY
Qty: 30 TABLET | Refills: 5 | Status: SHIPPED | OUTPATIENT
Start: 2018-01-03 | End: 2018-01-09 | Stop reason: SDUPTHER

## 2018-01-03 RX ORDER — AMLODIPINE BESYLATE 5 MG/1
5 TABLET ORAL DAILY
Qty: 30 TABLET | Refills: 5 | Status: SHIPPED | OUTPATIENT
Start: 2018-01-03 | End: 2018-01-09 | Stop reason: SDUPTHER

## 2018-01-04 RX ORDER — CLOPIDOGREL BISULFATE 75 MG/1
75 TABLET ORAL DAILY
Qty: 90 TABLET | Refills: 2 | Status: SHIPPED | OUTPATIENT
Start: 2018-01-04 | End: 2018-01-09 | Stop reason: SDUPTHER

## 2018-01-09 ENCOUNTER — OFFICE VISIT (OUTPATIENT)
Dept: FAMILY MEDICINE CLINIC | Facility: CLINIC | Age: 73
End: 2018-01-09

## 2018-01-09 VITALS
OXYGEN SATURATION: 98 % | BODY MASS INDEX: 25.29 KG/M2 | WEIGHT: 197.1 LBS | SYSTOLIC BLOOD PRESSURE: 125 MMHG | HEART RATE: 57 BPM | HEIGHT: 74 IN | DIASTOLIC BLOOD PRESSURE: 70 MMHG

## 2018-01-09 DIAGNOSIS — E11.8 TYPE 2 DIABETES MELLITUS WITH COMPLICATION, WITH LONG-TERM CURRENT USE OF INSULIN (HCC): Primary | ICD-10-CM

## 2018-01-09 DIAGNOSIS — E78.2 MIXED HYPERLIPIDEMIA: ICD-10-CM

## 2018-01-09 DIAGNOSIS — Z79.4 TYPE 2 DIABETES MELLITUS WITH COMPLICATION, WITH LONG-TERM CURRENT USE OF INSULIN (HCC): Primary | ICD-10-CM

## 2018-01-09 DIAGNOSIS — I10 ESSENTIAL HYPERTENSION: ICD-10-CM

## 2018-01-09 PROCEDURE — 99214 OFFICE O/P EST MOD 30 MIN: CPT | Performed by: GENERAL PRACTICE

## 2018-01-09 RX ORDER — PROBENECID 500 MG/1
500 TABLET, FILM COATED ORAL 2 TIMES DAILY
Qty: 180 TABLET | Refills: 3 | Status: SHIPPED | OUTPATIENT
Start: 2018-01-09 | End: 2019-02-21 | Stop reason: SDUPTHER

## 2018-01-09 RX ORDER — CARVEDILOL 6.25 MG/1
6.25 TABLET ORAL 2 TIMES DAILY WITH MEALS
Qty: 180 TABLET | Refills: 3 | Status: SHIPPED | OUTPATIENT
Start: 2018-01-09 | End: 2019-02-18 | Stop reason: SDUPTHER

## 2018-01-09 RX ORDER — CLOPIDOGREL BISULFATE 75 MG/1
75 TABLET ORAL DAILY
Qty: 90 TABLET | Refills: 3 | Status: SHIPPED | OUTPATIENT
Start: 2018-01-09 | End: 2019-02-21 | Stop reason: SDUPTHER

## 2018-01-09 RX ORDER — GLIPIZIDE 5 MG/1
5 TABLET ORAL
Qty: 180 TABLET | Refills: 3 | Status: SHIPPED | OUTPATIENT
Start: 2018-01-09 | End: 2019-02-21 | Stop reason: SDUPTHER

## 2018-01-09 RX ORDER — AMLODIPINE BESYLATE 5 MG/1
5 TABLET ORAL DAILY
Qty: 90 TABLET | Refills: 3 | Status: SHIPPED | OUTPATIENT
Start: 2018-01-09 | End: 2018-04-09

## 2018-01-09 RX ORDER — ATORVASTATIN CALCIUM 10 MG/1
10 TABLET, FILM COATED ORAL NIGHTLY
Qty: 90 TABLET | Refills: 3 | Status: SHIPPED | OUTPATIENT
Start: 2018-01-09 | End: 2019-02-21 | Stop reason: SDUPTHER

## 2018-01-09 RX ORDER — LOSARTAN POTASSIUM 50 MG/1
50 TABLET ORAL DAILY
Qty: 90 TABLET | Refills: 3 | Status: SHIPPED | OUTPATIENT
Start: 2018-01-09 | End: 2019-02-21 | Stop reason: SDUPTHER

## 2018-01-09 NOTE — PROGRESS NOTES
Subjective   Zachary Galindo is a 72 y.o. male.     Chief Complaint   Patient presents with   • Annual Exam   • Hypertension   • Hyperlipidemia   • Diabetes     For review and evaluation of management of chronic medical problems. Labs reviewed.   Hypertension   The current episode started more than 1 year ago. The problem is unchanged. The problem is controlled. Pertinent negatives include no chest pain, headaches, neck pain, palpitations or shortness of breath. Risk factors for coronary artery disease include diabetes mellitus and dyslipidemia. Past treatments include angiotensin blockers. The current treatment provides significant improvement. There are no compliance problems.    Hyperlipidemia   This is a chronic problem. The current episode started more than 1 year ago. The problem is uncontrolled. Recent lipid tests were reviewed and are high. Exacerbating diseases include diabetes. There are no known factors aggravating his hyperlipidemia. Pertinent negatives include no chest pain, myalgias or shortness of breath. The current treatment provides mild improvement of lipids. There are no compliance problems.    Diabetes   He presents for his follow-up diabetic visit. He has type 2 diabetes mellitus. His disease course has been improving. There are no hypoglycemic associated symptoms. Pertinent negatives for hypoglycemia include no dizziness, headaches or nervousness/anxiousness. There are no diabetic associated symptoms. Pertinent negatives for diabetes include no chest pain, no fatigue and no weakness. There are no hypoglycemic complications. Diabetic complications include nephropathy. Risk factors for coronary artery disease include diabetes mellitus, dyslipidemia, hypertension and male sex. Current diabetic treatment includes oral agent (dual therapy) and insulin injections. He is compliant with treatment all of the time. His weight is stable. He is following a generally healthy diet. Meal planning  "includes ADA exchanges. He participates in exercise intermittently. There is no change in his home blood glucose trend. An ACE inhibitor/angiotensin II receptor blocker is being taken. Eye exam is current.        The following portions of the patient's history were reviewed and updated as appropriate: allergies, current medications, past family and social history and problem list.    Outpatient Medications Prior to Visit   Medication Sig Dispense Refill   • aspirin 325 MG tablet Take 325 mg by mouth every day.     • glucose blood test strip OneTouch Ultra Test strips  -  Test sugars 3-4 times a day as directed by provider.     • Insulin Pen Needle (PEN NEEDLES 3/16\") 31G X 5 MM misc 100 each Daily. 100 each 3   • Lancets (ONETOUCH ULTRASOFT) lancets Test sugars 3-4 times daily as instructed by physician.     • magnesium oxide (MAG-OX) 400 MG tablet Take 1 tablet by mouth 2 (Two) Times a Day. 180 tablet 3   • Unable to find Pharmacy     • amLODIPine (NORVASC) 5 MG tablet Take 1 tablet by mouth Daily. 30 tablet 5   • atorvastatin (LIPITOR) 10 MG tablet Take 1 tablet by mouth 3 (Three) Times a Week. 40 tablet 3   • carvedilol (COREG) 6.25 MG tablet Take 1 tablet by mouth 2 (Two) Times a Day With Meals. 180 tablet 6   • clopidogrel (PLAVIX) 75 MG tablet Take 1 tablet by mouth Daily. 90 tablet 2   • glipiZIDE (GLUCOTROL) 5 MG tablet TAKE 1 TABLET BY MOUTH TWICE DAILY BEFORE MEALS 180 tablet 2   • insulin detemir (LEVEMIR FLEXTOUCH) 100 UNIT/ML injection Inject 78 Units under the skin Every Night. 30 mL 5   • JANUVIA 100 MG tablet TAKE 1 TABLET BY MOUTH DAILY 90 tablet 1   • losartan (COZAAR) 50 MG tablet Take 1 tablet by mouth Daily. 30 tablet 5   • probenecid (BENEMID) 500 MG tablet Take 1 tablet by mouth 2 (Two) Times a Day. 60 tablet 5   • SITagliptin (JANUVIA) 100 MG tablet Take 1 tablet by mouth Daily. 28 tablet 0     No facility-administered medications prior to visit.        Review of Systems   Constitutional: " "Negative.  Negative for chills, fatigue, fever and unexpected weight change.   HENT: Negative.  Negative for congestion, ear pain, hearing loss, nosebleeds, rhinorrhea, sneezing, sore throat and tinnitus.    Eyes: Negative.  Negative for discharge.   Respiratory: Negative.  Negative for cough, shortness of breath and wheezing.    Cardiovascular: Negative.  Negative for chest pain and palpitations.   Gastrointestinal: Negative.  Negative for abdominal pain, constipation, diarrhea, nausea and vomiting.   Endocrine: Negative.    Genitourinary: Negative.  Negative for dysuria, frequency and urgency.   Musculoskeletal: Negative.  Negative for arthralgias, back pain, joint swelling, myalgias and neck pain.   Skin: Negative.  Negative for rash.   Allergic/Immunologic: Negative.    Neurological: Negative.  Negative for dizziness, weakness, numbness and headaches.   Hematological: Negative.  Negative for adenopathy.   Psychiatric/Behavioral: Negative.  Negative for dysphoric mood and sleep disturbance. The patient is not nervous/anxious.        Objective     Visit Vitals   • /70 (BP Location: Left arm, Patient Position: Sitting, Cuff Size: Adult)   • Pulse 57   • Ht 188 cm (74\")   • Wt 89.4 kg (197 lb 1.6 oz)   • SpO2 98%   • BMI 25.31 kg/m2     Physical Exam   Constitutional: He is oriented to person, place, and time. He appears well-developed and well-nourished. No distress.   HENT:   Head: Normocephalic and atraumatic.   Nose: Nose normal.   Mouth/Throat: Oropharynx is clear and moist.   Eyes: Conjunctivae and EOM are normal. Pupils are equal, round, and reactive to light. Right eye exhibits no discharge. Left eye exhibits no discharge.   Neck: Normal range of motion. No thyromegaly present.   Cardiovascular: Normal rate, regular rhythm, normal heart sounds and intact distal pulses.    No murmur heard.  Pulmonary/Chest: Effort normal and breath sounds normal. No respiratory distress. He has no wheezes. He has no " rales. He exhibits no tenderness.   Abdominal: Soft. Bowel sounds are normal. He exhibits no distension and no mass. There is no tenderness. No hernia.   Musculoskeletal: Normal range of motion. He exhibits no deformity.   Lymphadenopathy:     He has no cervical adenopathy.   Neurological: He is alert and oriented to person, place, and time. He has normal reflexes.   Skin: Skin is warm and dry. No rash noted. No pallor.   Psychiatric: He has a normal mood and affect. His behavior is normal. Judgment and thought content normal.     Results for orders placed or performed in visit on 01/02/18   Comprehensive Metabolic Panel   Result Value Ref Range    Glucose 240 (H) 60 - 100 mg/dL    BUN 24 (H) 7 - 21 mg/dL    Creatinine 1.16 0.70 - 1.30 mg/dL    Sodium 141 137 - 145 mmol/L    Potassium 4.5 3.5 - 5.1 mmol/L    Chloride 103 95 - 110 mmol/L    CO2 28.0 22.0 - 31.0 mmol/L    Calcium 9.5 8.4 - 10.2 mg/dL    Total Protein 7.8 6.3 - 8.6 g/dL    Albumin 4.30 3.40 - 4.80 g/dL    ALT (SGPT) 51 21 - 72 U/L    AST (SGOT) 45 17 - 59 U/L    Alkaline Phosphatase 99 38 - 126 U/L    Total Bilirubin 0.5 0.2 - 1.3 mg/dL    eGFR Non African Amer 62 42 - 98 mL/min/1.73    Globulin 3.5 2.3 - 3.5 gm/dL    A/G Ratio 1.2 1.1 - 1.8 g/dL    BUN/Creatinine Ratio 20.7 7.0 - 25.0    Anion Gap 10.0 5.0 - 15.0 mmol/L   Hemoglobin A1c   Result Value Ref Range    Hemoglobin A1C 9.5 (H) 4 - 5.6 %   Lipid Panel   Result Value Ref Range    Total Cholesterol 169 0 - 199 mg/dL    Triglycerides 215 (H) 20 - 199 mg/dL    HDL Cholesterol 28 (L) 60 - 200 mg/dL    LDL Cholesterol  119 1 - 129 mg/dL    LDL/HDL Ratio 3.50 0.00 - 3.55   MicroAlbumin, Urine, Random - Urine, Clean Catch   Result Value Ref Range    Microalbumin, Urine 7.9 mg/L   PSA Screen   Result Value Ref Range    PSA 0.800 0.000 - 4.000 ng/mL   Urinalysis - Urine, Clean Catch   Result Value Ref Range    Color, UA Yellow Yellow, Straw, Dark Yellow, Brianda    Appearance, UA Cloudy (A) Clear    pH,  UA <=5.0 5.0 - 9.0    Specific Gravity, UA 1.027 1.003 - 1.030    Glucose, UA >=1000 mg/dL (3+) (A) Negative    Ketones, UA Negative Negative    Bilirubin, UA Negative Negative    Blood, UA Moderate (2+) (A) Negative    Protein, UA Trace (A) Negative    Leuk Esterase, UA Negative Negative    Nitrite, UA Negative Negative    Urobilinogen, UA 0.2 E.U./dL 0.2 - 1.0 E.U./dL   Urinalysis, Microscopic Only - Urine, Clean Catch   Result Value Ref Range    RBC, UA Too Numerous to Count (A) None Seen /HPF    WBC, UA 3-5 None Seen, 0-2, 3-5 /HPF    Bacteria, UA None Seen None Seen /HPF    Squamous Epithelial Cells, UA None Seen None Seen, 0-2 /HPF    Hyaline Casts, UA None Seen None Seen /LPF    Methodology Automated Microscopy    CBC Auto Differential   Result Value Ref Range    WBC 3.96 3.20 - 9.80 10*3/mm3    RBC 5.24 4.37 - 5.74 10*6/mm3    Hemoglobin 15.3 13.7 - 17.3 g/dL    Hematocrit 45.2 39.0 - 49.0 %    MCV 86.3 80.0 - 98.0 fL    MCH 29.2 26.5 - 34.0 pg    MCHC 33.8 31.5 - 36.3 g/dL    RDW 13.4 11.5 - 14.5 %    RDW-SD 42.3 35.1 - 43.9 fl    MPV 12.0 8.0 - 12.0 fL    Platelets 131 (L) 150 - 450 10*3/mm3    Neutrophil % 63.4 37.0 - 80.0 %    Lymphocyte % 24.2 10.0 - 50.0 %    Monocyte % 8.3 0.0 - 12.0 %    Eosinophil % 3.8 0.0 - 7.0 %    Basophil % 0.3 0.0 - 2.0 %    Immature Grans % 0.0 0.0 - 0.5 %    Neutrophils, Absolute 2.51 2.00 - 8.60 10*3/mm3    Lymphocytes, Absolute 0.96 0.60 - 4.20 10*3/mm3    Monocytes, Absolute 0.33 0.00 - 0.90 10*3/mm3    Eosinophils, Absolute 0.15 0.00 - 0.70 10*3/mm3    Basophils, Absolute 0.01 0.00 - 0.20 10*3/mm3    Immature Grans, Absolute 0.00 0.00 - 0.02 10*3/mm3      Assessment/Plan   Problem List Items Addressed This Visit        Cardiovascular and Mediastinum    Essential hypertension (Chronic)    Relevant Medications    amLODIPine (NORVASC) 5 MG tablet    carvedilol (COREG) 6.25 MG tablet    losartan (COZAAR) 50 MG tablet       Endocrine    Type 2 diabetes mellitus - Primary  (Chronic)    Relevant Medications    insulin detemir (LEVEMIR FLEXTOUCH) 100 UNIT/ML injection    glipiZIDE (GLUCOTROL) 5 MG tablet    SITagliptin (JANUVIA) 100 MG tablet    SITagliptin (JANUVIA) 100 MG tablet    Other Relevant Orders    Comprehensive Metabolic Panel    Hemoglobin A1c    LDL Cholesterol, Direct      Other Visit Diagnoses     Mixed hyperlipidemia        Relevant Medications    atorvastatin (LIPITOR) 10 MG tablet         Increase levemir.     New Medications Ordered This Visit   Medications   • insulin detemir (LEVEMIR FLEXTOUCH) 100 UNIT/ML injection     Sig: 10 units qam and 70 units qhs     Dispense:  90 mL     Refill:  3   • amLODIPine (NORVASC) 5 MG tablet     Sig: Take 1 tablet by mouth Daily.     Dispense:  90 tablet     Refill:  3   • atorvastatin (LIPITOR) 10 MG tablet     Sig: Take 1 tablet by mouth Every Night.     Dispense:  90 tablet     Refill:  3   • carvedilol (COREG) 6.25 MG tablet     Sig: Take 1 tablet by mouth 2 (Two) Times a Day With Meals.     Dispense:  180 tablet     Refill:  3   • clopidogrel (PLAVIX) 75 MG tablet     Sig: Take 1 tablet by mouth Daily.     Dispense:  90 tablet     Refill:  3   • glipiZIDE (GLUCOTROL) 5 MG tablet     Sig: Take 1 tablet by mouth 2 (Two) Times a Day Before Meals.     Dispense:  180 tablet     Refill:  3   • losartan (COZAAR) 50 MG tablet     Sig: Take 1 tablet by mouth Daily.     Dispense:  90 tablet     Refill:  3   • probenecid (BENEMID) 500 MG tablet     Sig: Take 1 tablet by mouth 2 (Two) Times a Day.     Dispense:  180 tablet     Refill:  3   • SITagliptin (JANUVIA) 100 MG tablet     Sig: Take 1 tablet by mouth Daily.     Dispense:  90 tablet     Refill:  3   • SITagliptin (JANUVIA) 100 MG tablet     Sig: Take 1 tablet by mouth Daily.     Dispense:  90 tablet     Refill:  3     Return in about 3 months (around 4/9/2018) for Recheck.

## 2018-01-17 ENCOUNTER — APPOINTMENT (OUTPATIENT)
Dept: LAB | Facility: HOSPITAL | Age: 73
End: 2018-01-17

## 2018-01-17 ENCOUNTER — TRANSCRIBE ORDERS (OUTPATIENT)
Dept: LAB | Facility: HOSPITAL | Age: 73
End: 2018-01-17

## 2018-01-17 DIAGNOSIS — M10.9 GOUT, UNSPECIFIED CAUSE, UNSPECIFIED CHRONICITY, UNSPECIFIED SITE: ICD-10-CM

## 2018-01-17 DIAGNOSIS — E11.9 DIABETES MELLITUS WITHOUT COMPLICATION (HCC): ICD-10-CM

## 2018-01-17 DIAGNOSIS — M25.512 LEFT SHOULDER PAIN, UNSPECIFIED CHRONICITY: ICD-10-CM

## 2018-01-17 DIAGNOSIS — N18.30 CHRONIC KIDNEY DISEASE, STAGE III (MODERATE) (HCC): Primary | ICD-10-CM

## 2018-01-17 DIAGNOSIS — M54.2 NECK PAIN: ICD-10-CM

## 2018-01-17 DIAGNOSIS — I25.9 CHRONIC ISCHEMIC HEART DISEASE: ICD-10-CM

## 2018-01-17 DIAGNOSIS — E78.5 HYPERLIPIDEMIA, UNSPECIFIED HYPERLIPIDEMIA TYPE: ICD-10-CM

## 2018-01-17 DIAGNOSIS — I10 ESSENTIAL (PRIMARY) HYPERTENSION: ICD-10-CM

## 2018-01-17 LAB
25(OH)D3 SERPL-MCNC: 37.6 NG/ML (ref 30–100)
ALBUMIN SERPL-MCNC: 4.1 G/DL (ref 3.4–4.8)
ANION GAP SERPL CALCULATED.3IONS-SCNC: 9 MMOL/L (ref 5–15)
BUN BLD-MCNC: 26 MG/DL (ref 7–21)
BUN/CREAT SERPL: 19.3 (ref 7–25)
CALCIUM SPEC-SCNC: 9.5 MG/DL (ref 8.4–10.2)
CHLORIDE SERPL-SCNC: 104 MMOL/L (ref 95–110)
CO2 SERPL-SCNC: 27 MMOL/L (ref 22–31)
CREAT BLD-MCNC: 1.35 MG/DL (ref 0.7–1.3)
GFR SERPL CREATININE-BSD FRML MDRD: 52 ML/MIN/1.73 (ref 42–98)
GLUCOSE BLD-MCNC: 165 MG/DL (ref 60–100)
HCT VFR BLD AUTO: 47.1 % (ref 39–49)
HGB BLD-MCNC: 16.1 G/DL (ref 13.7–17.3)
PHOSPHATE SERPL-MCNC: 3.1 MG/DL (ref 2.4–4.4)
POTASSIUM BLD-SCNC: 4.8 MMOL/L (ref 3.5–5.1)
PTH-INTACT SERPL-MCNC: 45.8 PG/ML (ref 10–65)
SODIUM BLD-SCNC: 140 MMOL/L (ref 137–145)

## 2018-01-17 PROCEDURE — 80069 RENAL FUNCTION PANEL: CPT | Performed by: INTERNAL MEDICINE

## 2018-01-17 PROCEDURE — 36415 COLL VENOUS BLD VENIPUNCTURE: CPT | Performed by: INTERNAL MEDICINE

## 2018-01-17 PROCEDURE — 85014 HEMATOCRIT: CPT | Performed by: INTERNAL MEDICINE

## 2018-01-17 PROCEDURE — 82306 VITAMIN D 25 HYDROXY: CPT | Performed by: INTERNAL MEDICINE

## 2018-01-17 PROCEDURE — 85018 HEMOGLOBIN: CPT | Performed by: INTERNAL MEDICINE

## 2018-01-17 PROCEDURE — 83970 ASSAY OF PARATHORMONE: CPT | Performed by: INTERNAL MEDICINE

## 2018-02-05 ENCOUNTER — OFFICE VISIT (OUTPATIENT)
Dept: FAMILY MEDICINE CLINIC | Facility: CLINIC | Age: 73
End: 2018-02-05

## 2018-02-05 VITALS
SYSTOLIC BLOOD PRESSURE: 140 MMHG | DIASTOLIC BLOOD PRESSURE: 70 MMHG | WEIGHT: 198 LBS | BODY MASS INDEX: 25.41 KG/M2 | HEIGHT: 74 IN | HEART RATE: 61 BPM | OXYGEN SATURATION: 96 %

## 2018-02-05 DIAGNOSIS — Z79.4 TYPE 2 DIABETES MELLITUS WITH COMPLICATION, WITH LONG-TERM CURRENT USE OF INSULIN (HCC): Primary | ICD-10-CM

## 2018-02-05 DIAGNOSIS — E11.8 TYPE 2 DIABETES MELLITUS WITH COMPLICATION, WITH LONG-TERM CURRENT USE OF INSULIN (HCC): Primary | ICD-10-CM

## 2018-02-05 PROCEDURE — 99213 OFFICE O/P EST LOW 20 MIN: CPT | Performed by: GENERAL PRACTICE

## 2018-02-05 NOTE — PROGRESS NOTES
Subjective   Zachary Galindo is a 72 y.o. male.   Chief Complaint   Patient presents with   • Diabetes     sugars running high 272 this mornling     Diabetes   He presents for his follow-up diabetic visit. He has type 2 diabetes mellitus. His disease course has been improving. There are no hypoglycemic associated symptoms. Pertinent negatives for hypoglycemia include no dizziness, headaches or nervousness/anxiousness. There are no diabetic associated symptoms. Pertinent negatives for diabetes include no chest pain, no fatigue and no weakness. There are no hypoglycemic complications. Diabetic complications include nephropathy. Risk factors for coronary artery disease include diabetes mellitus, dyslipidemia, hypertension and male sex. Current diabetic treatment includes oral agent (dual therapy) and insulin injections. He is compliant with treatment all of the time. His weight is stable. He is following a generally healthy diet. Meal planning includes ADA exchanges. He participates in exercise intermittently. There is no change in his home blood glucose trend. An ACE inhibitor/angiotensin II receptor blocker is being taken. Eye exam is current.   Fasting sugars are running in the mid-high 200's.    The following portions of the patient's history were reviewed and updated as appropriate: allergies, current medications, past social history and problem list.    Outpatient Medications Prior to Visit   Medication Sig Dispense Refill   • amLODIPine (NORVASC) 5 MG tablet Take 1 tablet by mouth Daily. 90 tablet 3   • aspirin 325 MG tablet Take 325 mg by mouth every day.     • atorvastatin (LIPITOR) 10 MG tablet Take 1 tablet by mouth Every Night. 90 tablet 3   • carvedilol (COREG) 6.25 MG tablet Take 1 tablet by mouth 2 (Two) Times a Day With Meals. 180 tablet 3   • clopidogrel (PLAVIX) 75 MG tablet Take 1 tablet by mouth Daily. 90 tablet 3   • glipiZIDE (GLUCOTROL) 5 MG tablet Take 1 tablet by mouth 2 (Two) Times a  "Day Before Meals. 180 tablet 3   • glucose blood test strip OneTouch Ultra Test strips  -  Test sugars 3-4 times a day as directed by provider.     • Lancets (ONETOUCH ULTRASOFT) lancets Test sugars 3-4 times daily as instructed by physician.     • losartan (COZAAR) 50 MG tablet Take 1 tablet by mouth Daily. 90 tablet 3   • magnesium oxide (MAG-OX) 400 MG tablet Take 1 tablet by mouth 2 (Two) Times a Day. 180 tablet 3   • probenecid (BENEMID) 500 MG tablet Take 1 tablet by mouth 2 (Two) Times a Day. 180 tablet 3   • SITagliptin (JANUVIA) 100 MG tablet Take 1 tablet by mouth Daily. 90 tablet 3   • insulin detemir (LEVEMIR FLEXTOUCH) 100 UNIT/ML injection 10 units qam and 70 units qhs 90 mL 3   • Insulin Pen Needle (PEN NEEDLES 3/16\") 31G X 5 MM misc 100 each Daily. 100 each 3   • SITagliptin (JANUVIA) 100 MG tablet Take 1 tablet by mouth Daily. 90 tablet 3   • Unable to find Pharmacy       No facility-administered medications prior to visit.        Review of Systems   Constitutional: Negative.  Negative for chills, fatigue, fever and unexpected weight change.   HENT: Negative.  Negative for congestion, ear pain, hearing loss, nosebleeds, rhinorrhea, sneezing, sore throat and tinnitus.    Eyes: Negative.  Negative for discharge.   Respiratory: Negative.  Negative for cough, shortness of breath and wheezing.    Cardiovascular: Negative.  Negative for chest pain and palpitations.   Gastrointestinal: Negative.  Negative for abdominal pain, constipation, diarrhea, nausea and vomiting.   Endocrine: Negative.    Genitourinary: Negative.  Negative for dysuria, frequency and urgency.   Musculoskeletal: Negative.  Negative for arthralgias, back pain, joint swelling, myalgias and neck pain.   Skin: Negative.  Negative for rash.   Allergic/Immunologic: Negative.    Neurological: Negative.  Negative for dizziness, weakness, numbness and headaches.   Hematological: Negative.  Negative for adenopathy.   Psychiatric/Behavioral: " "Negative.  Negative for dysphoric mood and sleep disturbance. The patient is not nervous/anxious.      Objective   Visit Vitals   • /70   • Pulse 61   • Ht 188 cm (74\")   • Wt 89.8 kg (198 lb)   • SpO2 96%   • BMI 25.42 kg/m2     Physical Exam   Constitutional: He is oriented to person, place, and time. He appears well-developed and well-nourished. No distress.   HENT:   Head: Normocephalic.   Nose: Nose normal.   Mouth/Throat: Oropharynx is clear and moist.   Eyes: Conjunctivae and EOM are normal. Pupils are equal, round, and reactive to light. Right eye exhibits no discharge. Left eye exhibits no discharge.   Neck: No thyromegaly present.   Cardiovascular: Normal rate, regular rhythm, normal heart sounds and intact distal pulses.    No murmur heard.  Pulmonary/Chest: Effort normal and breath sounds normal.   Musculoskeletal: He exhibits no edema.   Lymphadenopathy:     He has no cervical adenopathy.   Neurological: He is alert and oriented to person, place, and time.   Skin: Skin is warm and dry.   Psychiatric: He has a normal mood and affect.   Nursing note and vitals reviewed.    Assessment/Plan   Problem List Items Addressed This Visit     None      Visit Diagnoses     Type 2 diabetes mellitus with complication, with long-term current use of insulin    -  Primary    Relevant Medications    insulin detemir (LEVEMIR FLEXTOUCH) 100 UNIT/ML injection          Increase Levimir in morning 3 units every 3 days if fasting sugar is not below 150     New Medications Ordered This Visit   Medications   • insulin detemir (LEVEMIR FLEXTOUCH) 100 UNIT/ML injection     Si units qam and 80 units qhs     Dispense:  100 mL     Refill:  3     To replace previous prescription     Return for Next scheduled follow up.  "

## 2018-03-23 RX ORDER — CLOPIDOGREL BISULFATE 75 MG/1
TABLET ORAL
Qty: 90 TABLET | Refills: 3 | Status: SHIPPED | OUTPATIENT
Start: 2018-03-23 | End: 2018-04-09 | Stop reason: SDUPTHER

## 2018-04-05 ENCOUNTER — LAB (OUTPATIENT)
Dept: LAB | Facility: HOSPITAL | Age: 73
End: 2018-04-05

## 2018-04-05 DIAGNOSIS — E11.8 TYPE 2 DIABETES MELLITUS WITH COMPLICATION, WITH LONG-TERM CURRENT USE OF INSULIN (HCC): ICD-10-CM

## 2018-04-05 DIAGNOSIS — Z79.4 TYPE 2 DIABETES MELLITUS WITH COMPLICATION, WITH LONG-TERM CURRENT USE OF INSULIN (HCC): ICD-10-CM

## 2018-04-05 LAB
ALBUMIN SERPL-MCNC: 4.2 G/DL (ref 3.4–4.8)
ALBUMIN/GLOB SERPL: 1.3 G/DL (ref 1.1–1.8)
ALP SERPL-CCNC: 86 U/L (ref 38–126)
ALT SERPL W P-5'-P-CCNC: 49 U/L (ref 21–72)
ANION GAP SERPL CALCULATED.3IONS-SCNC: 14 MMOL/L (ref 5–15)
ARTICHOKE IGE QN: 95 MG/DL (ref 1–129)
AST SERPL-CCNC: 49 U/L (ref 17–59)
BILIRUB SERPL-MCNC: 0.5 MG/DL (ref 0.2–1.3)
BUN BLD-MCNC: 25 MG/DL (ref 7–21)
BUN/CREAT SERPL: 20.2 (ref 7–25)
CALCIUM SPEC-SCNC: 9.1 MG/DL (ref 8.4–10.2)
CHLORIDE SERPL-SCNC: 103 MMOL/L (ref 95–110)
CO2 SERPL-SCNC: 29 MMOL/L (ref 22–31)
CREAT BLD-MCNC: 1.24 MG/DL (ref 0.7–1.3)
GFR SERPL CREATININE-BSD FRML MDRD: 57 ML/MIN/1.73 (ref 42–98)
GLOBULIN UR ELPH-MCNC: 3.2 GM/DL (ref 2.3–3.5)
GLUCOSE BLD-MCNC: 182 MG/DL (ref 60–100)
HBA1C MFR BLD: 9.2 % (ref 4–5.6)
POTASSIUM BLD-SCNC: 4.8 MMOL/L (ref 3.5–5.1)
PROT SERPL-MCNC: 7.4 G/DL (ref 6.3–8.6)
SODIUM BLD-SCNC: 146 MMOL/L (ref 137–145)

## 2018-04-05 PROCEDURE — 83036 HEMOGLOBIN GLYCOSYLATED A1C: CPT

## 2018-04-05 PROCEDURE — 83721 ASSAY OF BLOOD LIPOPROTEIN: CPT

## 2018-04-05 PROCEDURE — 80053 COMPREHEN METABOLIC PANEL: CPT

## 2018-04-05 PROCEDURE — 36415 COLL VENOUS BLD VENIPUNCTURE: CPT

## 2018-04-09 ENCOUNTER — OFFICE VISIT (OUTPATIENT)
Dept: FAMILY MEDICINE CLINIC | Facility: CLINIC | Age: 73
End: 2018-04-09

## 2018-04-09 VITALS
WEIGHT: 202 LBS | DIASTOLIC BLOOD PRESSURE: 80 MMHG | HEART RATE: 67 BPM | SYSTOLIC BLOOD PRESSURE: 150 MMHG | BODY MASS INDEX: 25.93 KG/M2 | HEIGHT: 74 IN | OXYGEN SATURATION: 94 %

## 2018-04-09 DIAGNOSIS — E78.2 MIXED HYPERLIPIDEMIA: Chronic | ICD-10-CM

## 2018-04-09 DIAGNOSIS — I10 ESSENTIAL HYPERTENSION: Chronic | ICD-10-CM

## 2018-04-09 DIAGNOSIS — E11.8 TYPE 2 DIABETES MELLITUS WITH COMPLICATION, WITH LONG-TERM CURRENT USE OF INSULIN (HCC): Primary | Chronic | ICD-10-CM

## 2018-04-09 DIAGNOSIS — Z79.4 TYPE 2 DIABETES MELLITUS WITH COMPLICATION, WITH LONG-TERM CURRENT USE OF INSULIN (HCC): Primary | Chronic | ICD-10-CM

## 2018-04-09 PROCEDURE — 99214 OFFICE O/P EST MOD 30 MIN: CPT | Performed by: GENERAL PRACTICE

## 2018-04-09 RX ORDER — AMLODIPINE BESYLATE 10 MG/1
10 TABLET ORAL DAILY
Qty: 90 TABLET | Refills: 3 | Status: SHIPPED | OUTPATIENT
Start: 2018-04-09 | End: 2018-05-10 | Stop reason: DRUGHIGH

## 2018-04-09 NOTE — PROGRESS NOTES
Subjective   Zachary Galindo is a 72 y.o. male.   Chief Complaint   Patient presents with   • Diabetes     labs   • Hypertension     For review and evaluation of management of chronic medical problems. Labs reviewed.   Diabetes   He presents for his follow-up diabetic visit. He has type 2 diabetes mellitus. His disease course has been improving. There are no hypoglycemic associated symptoms. Pertinent negatives for hypoglycemia include no dizziness, headaches or nervousness/anxiousness. There are no diabetic associated symptoms. Pertinent negatives for diabetes include no chest pain, no fatigue and no weakness. There are no hypoglycemic complications. Diabetic complications include nephropathy. Risk factors for coronary artery disease include diabetes mellitus, dyslipidemia, hypertension and male sex. Current diabetic treatment includes oral agent (dual therapy) and insulin injections. He is compliant with treatment all of the time. His weight is stable. He is following a generally healthy diet. Meal planning includes ADA exchanges. He participates in exercise intermittently. There is no change in his home blood glucose trend. An ACE inhibitor/angiotensin II receptor blocker is being taken. Eye exam is current.   Hypertension   This is a chronic problem. The current episode started more than 1 year ago. The problem is unchanged. The problem is controlled. Pertinent negatives include no chest pain, headaches, neck pain, palpitations or shortness of breath. There are no associated agents to hypertension. Risk factors for coronary artery disease include diabetes mellitus and dyslipidemia. Current antihypertension treatment includes angiotensin blockers, calcium channel blockers and beta blockers. The current treatment provides significant improvement. There are no compliance problems.    Hyperlipidemia   This is a chronic problem. The current episode started more than 1 year ago. The problem is uncontrolled.  "Recent lipid tests were reviewed and are high. Exacerbating diseases include diabetes. There are no known factors aggravating his hyperlipidemia. Pertinent negatives include no chest pain, myalgias or shortness of breath. The current treatment provides mild improvement of lipids. There are no compliance problems.       The following portions of the patient's history were reviewed and updated as appropriate: allergies, current medications, past social history and problem list.    Outpatient Medications Prior to Visit   Medication Sig Dispense Refill   • aspirin 325 MG tablet Take 325 mg by mouth every day.     • atorvastatin (LIPITOR) 10 MG tablet Take 1 tablet by mouth Every Night. 90 tablet 3   • carvedilol (COREG) 6.25 MG tablet Take 1 tablet by mouth 2 (Two) Times a Day With Meals. 180 tablet 3   • clopidogrel (PLAVIX) 75 MG tablet Take 1 tablet by mouth Daily. 90 tablet 3   • glipiZIDE (GLUCOTROL) 5 MG tablet Take 1 tablet by mouth 2 (Two) Times a Day Before Meals. 180 tablet 3   • glucose blood test strip OneTouch Ultra Test strips  -  Test sugars 3-4 times a day as directed by provider.     • insulin detemir (LEVEMIR FLEXTOUCH) 100 UNIT/ML injection 20 units qam and 80 units qhs 100 mL 3   • Insulin Pen Needle (PEN NEEDLES 3/16\") 31G X 5 MM misc 100 each Daily. 100 each 3   • Lancets (ONETOUCH ULTRASOFT) lancets Test sugars 3-4 times daily as instructed by physician.     • losartan (COZAAR) 50 MG tablet Take 1 tablet by mouth Daily. 90 tablet 3   • magnesium oxide (MAG-OX) 400 MG tablet Take 1 tablet by mouth 2 (Two) Times a Day. 180 tablet 3   • probenecid (BENEMID) 500 MG tablet Take 1 tablet by mouth 2 (Two) Times a Day. 180 tablet 3   • SITagliptin (JANUVIA) 100 MG tablet Take 1 tablet by mouth Daily. 90 tablet 3   • amLODIPine (NORVASC) 5 MG tablet Take 1 tablet by mouth Daily. 90 tablet 3   • clopidogrel (PLAVIX) 75 MG tablet TAKE 1 TABLET BY MOUTH DAILY 90 tablet 3     No facility-administered " "medications prior to visit.        Review of Systems   Constitutional: Negative.  Negative for chills, fatigue, fever and unexpected weight change.   HENT: Negative.  Negative for congestion, ear pain, hearing loss, nosebleeds, rhinorrhea, sneezing, sore throat and tinnitus.    Eyes: Negative.  Negative for discharge.   Respiratory: Negative.  Negative for cough, shortness of breath and wheezing.    Cardiovascular: Negative.  Negative for chest pain and palpitations.   Gastrointestinal: Negative.  Negative for abdominal pain, constipation, diarrhea, nausea and vomiting.   Endocrine: Negative.    Genitourinary: Negative.  Negative for dysuria, frequency and urgency.   Musculoskeletal: Negative.  Negative for arthralgias, back pain, joint swelling, myalgias and neck pain.   Skin: Negative.  Negative for rash.   Allergic/Immunologic: Negative.    Neurological: Negative.  Negative for dizziness, weakness, numbness and headaches.   Hematological: Negative.  Negative for adenopathy.   Psychiatric/Behavioral: Negative.  Negative for dysphoric mood and sleep disturbance. The patient is not nervous/anxious.        Objective   Visit Vitals  /80   Pulse 67   Ht 188 cm (74\")   Wt 91.6 kg (202 lb)   SpO2 94%   BMI 25.94 kg/m²     Physical Exam   Constitutional: He is oriented to person, place, and time. He appears well-developed and well-nourished. No distress.   HENT:   Head: Normocephalic.   Nose: Nose normal.   Mouth/Throat: Oropharynx is clear and moist.   Eyes: Conjunctivae and EOM are normal. Pupils are equal, round, and reactive to light. Right eye exhibits no discharge. Left eye exhibits no discharge.   Neck: No thyromegaly present.   Cardiovascular: Normal rate, regular rhythm, normal heart sounds and intact distal pulses.    No murmur heard.  Pulmonary/Chest: Effort normal and breath sounds normal.   Musculoskeletal: He exhibits no edema.   Lymphadenopathy:     He has no cervical adenopathy.   Neurological: He is " alert and oriented to person, place, and time.   Skin: Skin is warm and dry.   Psychiatric: He has a normal mood and affect.   Nursing note and vitals reviewed.    Results for orders placed or performed in visit on 04/05/18   Comprehensive Metabolic Panel   Result Value Ref Range    Glucose 182 (H) 60 - 100 mg/dL    BUN 25 (H) 7 - 21 mg/dL    Creatinine 1.24 0.70 - 1.30 mg/dL    Sodium 146 (H) 137 - 145 mmol/L    Potassium 4.8 3.5 - 5.1 mmol/L    Chloride 103 95 - 110 mmol/L    CO2 29.0 22.0 - 31.0 mmol/L    Calcium 9.1 8.4 - 10.2 mg/dL    Total Protein 7.4 6.3 - 8.6 g/dL    Albumin 4.20 3.40 - 4.80 g/dL    ALT (SGPT) 49 21 - 72 U/L    AST (SGOT) 49 17 - 59 U/L    Alkaline Phosphatase 86 38 - 126 U/L    Total Bilirubin 0.5 0.2 - 1.3 mg/dL    eGFR Non African Amer 57 42 - 98 mL/min/1.73    Globulin 3.2 2.3 - 3.5 gm/dL    A/G Ratio 1.3 1.1 - 1.8 g/dL    BUN/Creatinine Ratio 20.2 7.0 - 25.0    Anion Gap 14.0 5.0 - 15.0 mmol/L   Hemoglobin A1c   Result Value Ref Range    Hemoglobin A1C 9.2 (H) 4 - 5.6 %   LDL Cholesterol, Direct   Result Value Ref Range    LDL Cholesterol  95 1 - 129 mg/dL      Assessment/Plan   Problem List Items Addressed This Visit        Cardiovascular and Mediastinum    Essential hypertension (Chronic)    Relevant Medications    amLODIPine (NORVASC) 10 MG tablet    Hyperlipidemia (Chronic)       Endocrine    Type 2 diabetes mellitus with complication, with long-term current use of insulin - Primary (Chronic)    Relevant Orders    Comprehensive Metabolic Panel    Hemoglobin A1c      Other Visit Diagnoses    None.         Increase levemir to 25 units in morning  Increase amlodipine to 10 mg   Bring blood pressure monitor to next visit     New Medications Ordered This Visit   Medications   • amLODIPine (NORVASC) 10 MG tablet     Sig: Take 1 tablet by mouth Daily.     Dispense:  90 tablet     Refill:  3     Return in about 3 months (around 7/9/2018) for Recheck.

## 2018-04-09 NOTE — PATIENT INSTRUCTIONS
Increase levemir to 25 units in morning  Increase amlodipine to 10 mg   Bring blood pressure monitor to next visit

## 2018-05-09 ENCOUNTER — TELEPHONE (OUTPATIENT)
Dept: FAMILY MEDICINE CLINIC | Facility: CLINIC | Age: 73
End: 2018-05-09

## 2018-05-10 RX ORDER — AMLODIPINE BESYLATE 5 MG/1
5 TABLET ORAL DAILY
Qty: 90 TABLET | Refills: 1 | Status: SHIPPED | OUTPATIENT
Start: 2018-05-10 | End: 2019-02-21 | Stop reason: SDUPTHER

## 2018-06-08 ENCOUNTER — OFFICE VISIT (OUTPATIENT)
Dept: CARDIOLOGY | Facility: CLINIC | Age: 73
End: 2018-06-08

## 2018-06-08 VITALS
OXYGEN SATURATION: 98 % | SYSTOLIC BLOOD PRESSURE: 140 MMHG | WEIGHT: 197 LBS | HEIGHT: 74 IN | HEART RATE: 73 BPM | DIASTOLIC BLOOD PRESSURE: 72 MMHG | BODY MASS INDEX: 25.28 KG/M2

## 2018-06-08 DIAGNOSIS — E78.2 MIXED HYPERLIPIDEMIA: Chronic | ICD-10-CM

## 2018-06-08 DIAGNOSIS — E11.9 TYPE 2 DIABETES MELLITUS WITHOUT COMPLICATION, WITH LONG-TERM CURRENT USE OF INSULIN (HCC): ICD-10-CM

## 2018-06-08 DIAGNOSIS — Z79.4 TYPE 2 DIABETES MELLITUS WITHOUT COMPLICATION, WITH LONG-TERM CURRENT USE OF INSULIN (HCC): ICD-10-CM

## 2018-06-08 DIAGNOSIS — I25.810 CORONARY ARTERY DISEASE INVOLVING CORONARY BYPASS GRAFT OF NATIVE HEART WITHOUT ANGINA PECTORIS: Primary | ICD-10-CM

## 2018-06-08 DIAGNOSIS — I10 ESSENTIAL HYPERTENSION: Chronic | ICD-10-CM

## 2018-06-08 PROCEDURE — 99214 OFFICE O/P EST MOD 30 MIN: CPT | Performed by: INTERNAL MEDICINE

## 2018-06-08 NOTE — PROGRESS NOTES
[facility]  OFFICE NOTE    Zachary Galindo  72 y.o. male    06/08/2018  1. Coronary artery disease involving coronary bypass graft of native heart without angina pectoris    2. Mixed hyperlipidemia    3. Essential hypertension    4. Type 2 diabetes mellitus without complication, with long-term current use of insulin        Chief complaint -History of CAD       History of present Illness- 72-year-old gentleman with history of coronary disease status post CABG in 1998 subsequently had stent placement about 3 years ago.    He is able to exercise reasonably well without any problems.  He is been a diabetic since the age of 35.  He denies any smoking ,  He denies any headache or visual complaints.  He had his labs checked by Dr. Radha Raymond.His most recent A1c was 9.3 and he is not exercising much asked him to exercise regularly walking for 45 minutes daily.  He had carotid duplex which was unremarkable and he had a nuclear stress test last year that was also unremarkable.  He denies any GI symptoms or CNS symptoms.  His lipids are okay.              Allergies   Allergen Reactions   • Advicor [Niacin-Lovastatin Er]      Swelling   • Lisinopril      Cough         Past Medical History:   Diagnosis Date   • Allergic rhinitis    • Ankle joint pain    • Artificial lens present     Left   • Astigmatism    • Benign prostatic hyperplasia    • Cataract    • Closed fracture of medial malleolus    • Coronary arteriosclerosis    • Diabetes mellitus     no retinopathy   • Elevated levels of transaminase & lactic acid dehydrogenase     improving    • Encounter for health maintenance examination     Individual health examination     • Encounter for health maintenance examination in adult     Adult health examination    • Essential hypertension    • Essential hypertension     Unspecified   • Flank pain     probably muscular    • GERD (gastroesophageal reflux disease)    • Gout    • Gout      unspecified     • Hemorrhoids    • History of artificial eye lens     Artificial lens in position - right    • History of colonoscopy 04/04/2010    Colon endoscopy  ([id number])  (1)    • History of kidney stones    • Hyperlipidemia    • Hypermetropia    • Low blood pressure    • Malaise and fatigue    • Need for influenza vaccination     Needs influenza immunization    • Nuclear cataract of left eye    • Posterior subcapsular polar senile cataract     Left   • Renal impairment    • Special screening for malignant neoplasm of prostate    • Type 2 diabetes mellitus     improving   • 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 Distal RCA beyond the touchdown was seen filling the circumflex system   • CATARACT EXTRACTION  10/2015    Remove cataract, insert lens (2)    • CORONARY ARTERY BYPASS GRAFT      Arterial, two  (1)   -  3 - 12/1998   • HERNIA REPAIR      w/mesh  (1)   • INJECTION OF MEDICATION  04/11/2013    B12  (1)  - RAYMOND Raymond   • INJECTION OF MEDICATION  07/03/2013    Benadryl  (1) - DMajor Raymond   • INJECTION OF MEDICATION  10/22/2014    Kenalog  (2)   • OTHER SURGICAL HISTORY  07/10/2002    Fragmenting of kidney stone  -   ES[facility], 2010 shocks. 1cm UP stone,right. Diabetes mellitus         Family History   Problem Relation Age of Onset   • Diabetes Other    • Hypertension Other    • Cancer Mother    • Heart disease Mother    • Hypertension Mother    • Hyperlipidemia Mother    • Diabetes Brother    • Diabetes Maternal Aunt          Social History     Social History   • Marital status:      Spouse name: N/A   • Number  "of children: N/A   • Years of education: N/A     Occupational History   • Not on file.     Social History Main Topics   • Smoking status: Former Smoker   • Smokeless tobacco: Never Used      Comment: Quit 1979   • Alcohol use No   • Drug use: Unknown   • Sexual activity: Not on file     Other Topics Concern   • Not on file     Social History Narrative   • No narrative on file         Current Outpatient Prescriptions   Medication Sig Dispense Refill   • amLODIPine (NORVASC) 5 MG tablet Take 1 tablet by mouth Daily. 90 tablet 1   • atorvastatin (LIPITOR) 10 MG tablet Take 1 tablet by mouth Every Night. 90 tablet 3   • carvedilol (COREG) 6.25 MG tablet Take 1 tablet by mouth 2 (Two) Times a Day With Meals. 180 tablet 3   • clopidogrel (PLAVIX) 75 MG tablet Take 1 tablet by mouth Daily. 90 tablet 3   • glipiZIDE (GLUCOTROL) 5 MG tablet Take 1 tablet by mouth 2 (Two) Times a Day Before Meals. 180 tablet 3   • glucose blood test strip OneTouch Ultra Test strips  -  Test sugars 3-4 times a day as directed by provider.     • insulin detemir (LEVEMIR) 100 UNIT/ML injection Inject 25 Units under the skin Daily. 25 units bid     • Insulin Pen Needle (PEN NEEDLES 3/16\") 31G X 5 MM misc 100 each Daily. 100 each 3   • Lancets (ONETOUCH ULTRASOFT) lancets Test sugars 3-4 times daily as instructed by physician.     • losartan (COZAAR) 50 MG tablet Take 1 tablet by mouth Daily. 90 tablet 3   • magnesium oxide (MAG-OX) 400 MG tablet Take 1 tablet by mouth 2 (Two) Times a Day. 180 tablet 3   • probenecid (BENEMID) 500 MG tablet Take 1 tablet by mouth 2 (Two) Times a Day. 180 tablet 3   • SITagliptin (JANUVIA) 100 MG tablet Take 1 tablet by mouth Daily. 90 tablet 3   • aspirin 81 MG tablet Take 1 tablet by mouth Daily. 30 tablet 11     No current facility-administered medications for this visit.          Review of Systems     Constitution: Denies any fatigue, fever or chills    HENT: Denies any headache, hearing impairment,     Eyes: " "Denies any blurring of vision, or photophobia     Cardivascular - As per history of present illness     Respiratory system-denies any COPD, shortness of breath,   sleep apnea.     Endocrine:   history of hyperlipidemia, diabetes,                             Musculoskeletal:  history of arthritis with musculoskeletal problems    Gastrointestinal: No nausea, vomiting, or melena    Genitourinary: No dysuria or hematuria    Neurological:   No history of seizure disorder, stroke, memory problems    Psychiatric/Behavioral:        No history of depression,      Hematological- no history of easy bruising             OBJECTIVE    /72   Pulse 73   Ht 188 cm (74.02\")   Wt 89.4 kg (197 lb)   SpO2 98%   BMI 25.28 kg/m²       Physical Exam     Constitutional: is oriented to person, place, and time.     Skin-warm and dry    Well developed and nourished in no acute distress      Head: Normocephalic and atraumatic.     Eyes: Pupils are equal, round, and reactive to light.     Neck: Neck supple. No bruit in the carotids    Cardiovascular: Hematite in the fifth intercostal space   Regular rate, and  Rhythm,    S1 greater than S2,    Pulmonary/Chest:   Air  Entry is equal on both sides  No wheezing or crackles,      Abdominal: Soft.  No hepatosplenomegaly,     Musculoskeletal: No kyphoscoliosis, no significant thickening of the joints    Neurological: is alert and oriented to person, place, and time.    cranial nerve are intact .   No motor or sensory deficit    Extremities-no edema, no radial femoral delay      Psychiatric: He has a normal mood and affect.                  His behavior is normal.           Procedures      Lab Results   Component Value Date    WBC 3.96 01/02/2018    HGB 16.1 01/17/2018    HCT 47.1 01/17/2018    MCV 86.3 01/02/2018     (L) 01/02/2018     Lab Results   Component Value Date    GLUCOSE 182 (H) 04/05/2018    BUN 25 (H) 04/05/2018    CREATININE 1.24 04/05/2018    EGFRIFNONA 57 04/05/2018    BCR " 20.2 04/05/2018    CO2 29.0 04/05/2018    CALCIUM 9.1 04/05/2018    ALBUMIN 4.20 04/05/2018    LABIL2 1.3 04/05/2018    AST 49 04/05/2018    ALT 49 04/05/2018     Lab Results   Component Value Date    CHOL 169 01/02/2018    CHOL 233 (H) 06/26/2017    CHOL 236 (H) 03/22/2017     Lab Results   Component Value Date    TRIG 215 (H) 01/02/2018    TRIG 243 (H) 06/26/2017    TRIG 348 (H) 03/22/2017     Lab Results   Component Value Date    HDL 28 (L) 01/02/2018    HDL 32 (L) 06/26/2017    HDL 25 (L) 03/22/2017     No components found for: LDLCALC  Lab Results   Component Value Date    LDL 95 04/05/2018     01/02/2018     (H) 06/26/2017     No results found for: HDLLDLRATIO  No components found for: CHOLHDL  Lab Results   Component Value Date    HGBA1C 9.2 (H) 04/05/2018     No results found for: TSH, [id number]               A/P    CAD status post CABG with prior stent placement to native arteries, doing well he had stent placement 3 years ago.  Denies any angina Talked to him about regular exercise at least 45 minutes daily 6 days a week and is on dual antiplatelet therapy with aspirin and Plavix    Bradycardia - resolved after decreasing the dose of Coreg to 6.25 mg twice a day and he does not have any more dizziness    Diabetes on insulin and other medications followed by Dr. Radha Raymond.  His most recent A1c was 9.3    Hyperlipidemia on atorvastatin 10 mg doing okay.    Follow-up in 8 months             This document has been electronically signed by Khadar Ruiz MD on June 8, 2018 9:30 AM       EMR Dragon/Transcription disclaimer:   Some of this note may be an electronic transcription/translation of spoken language to printed text. The electronic translation of spoken language may permit erroneous, or at times, nonsensical words or phrases to be inadvertently transcribed; Although I have reviewed the note for such errors, some may still exist.

## 2018-06-26 ENCOUNTER — HOSPITAL ENCOUNTER (EMERGENCY)
Facility: HOSPITAL | Age: 73
Discharge: HOME | End: 2018-06-26
Attending: FAMILY MEDICINE | Admitting: FAMILY MEDICINE

## 2018-06-26 ENCOUNTER — APPOINTMENT (OUTPATIENT)
Dept: CT IMAGING | Facility: HOSPITAL | Age: 73
End: 2018-06-26

## 2018-06-26 VITALS
TEMPERATURE: 98.4 F | HEIGHT: 73 IN | WEIGHT: 195 LBS | DIASTOLIC BLOOD PRESSURE: 80 MMHG | OXYGEN SATURATION: 98 % | SYSTOLIC BLOOD PRESSURE: 163 MMHG | RESPIRATION RATE: 16 BRPM | HEART RATE: 68 BPM | BODY MASS INDEX: 25.84 KG/M2

## 2018-06-26 DIAGNOSIS — N20.1 URETEROLITHIASIS: Primary | ICD-10-CM

## 2018-06-26 DIAGNOSIS — N40.0 PROSTATIC HYPERTROPHY: ICD-10-CM

## 2018-06-26 LAB
ALBUMIN SERPL-MCNC: 4.5 G/DL (ref 3.4–4.8)
ALBUMIN/GLOB SERPL: 1.4 G/DL (ref 1.1–1.8)
ALP SERPL-CCNC: 95 U/L (ref 38–126)
ALT SERPL W P-5'-P-CCNC: 51 U/L (ref 21–72)
AMYLASE SERPL-CCNC: 83 U/L (ref 50–130)
ANION GAP SERPL CALCULATED.3IONS-SCNC: 11 MMOL/L (ref 5–15)
AST SERPL-CCNC: 35 U/L (ref 17–59)
BACTERIA UR QL AUTO: ABNORMAL /HPF
BASOPHILS # BLD AUTO: 0.02 10*3/MM3 (ref 0–0.2)
BASOPHILS NFR BLD AUTO: 0.4 % (ref 0–2)
BILIRUB SERPL-MCNC: 0.6 MG/DL (ref 0.2–1.3)
BILIRUB UR QL STRIP: NEGATIVE
BUN BLD-MCNC: 29 MG/DL (ref 7–21)
BUN/CREAT SERPL: 24.2 (ref 7–25)
CALCIUM SPEC-SCNC: 9.5 MG/DL (ref 8.4–10.2)
CHLORIDE SERPL-SCNC: 103 MMOL/L (ref 95–110)
CLARITY UR: CLEAR
CO2 SERPL-SCNC: 26 MMOL/L (ref 22–31)
COLOR UR: YELLOW
CREAT BLD-MCNC: 1.2 MG/DL (ref 0.7–1.3)
DEPRECATED RDW RBC AUTO: 44 FL (ref 35.1–43.9)
EOSINOPHIL # BLD AUTO: 0.13 10*3/MM3 (ref 0–0.7)
EOSINOPHIL NFR BLD AUTO: 2.7 % (ref 0–7)
ERYTHROCYTE [DISTWIDTH] IN BLOOD BY AUTOMATED COUNT: 13.4 % (ref 11.5–14.5)
GFR SERPL CREATININE-BSD FRML MDRD: 60 ML/MIN/1.73 (ref 42–98)
GLOBULIN UR ELPH-MCNC: 3.2 GM/DL (ref 2.3–3.5)
GLUCOSE BLD-MCNC: 251 MG/DL (ref 60–100)
GLUCOSE UR STRIP-MCNC: ABNORMAL MG/DL
HCT VFR BLD AUTO: 45.2 % (ref 39–49)
HGB BLD-MCNC: 15.6 G/DL (ref 13.7–17.3)
HGB UR QL STRIP.AUTO: ABNORMAL
HOLD SPECIMEN: NORMAL
HOLD SPECIMEN: NORMAL
HYALINE CASTS UR QL AUTO: ABNORMAL /LPF
IMM GRANULOCYTES # BLD: 0.02 10*3/MM3 (ref 0–0.02)
IMM GRANULOCYTES NFR BLD: 0.4 % (ref 0–0.5)
KETONES UR QL STRIP: NEGATIVE
LEUKOCYTE ESTERASE UR QL STRIP.AUTO: NEGATIVE
LIPASE SERPL-CCNC: 231 U/L (ref 23–300)
LYMPHOCYTES # BLD AUTO: 0.81 10*3/MM3 (ref 0.6–4.2)
LYMPHOCYTES NFR BLD AUTO: 17.1 % (ref 10–50)
MCH RBC QN AUTO: 30.6 PG (ref 26.5–34)
MCHC RBC AUTO-ENTMCNC: 34.5 G/DL (ref 31.5–36.3)
MCV RBC AUTO: 88.6 FL (ref 80–98)
MONOCYTES # BLD AUTO: 0.36 10*3/MM3 (ref 0–0.9)
MONOCYTES NFR BLD AUTO: 7.6 % (ref 0–12)
NEUTROPHILS # BLD AUTO: 3.39 10*3/MM3 (ref 2–8.6)
NEUTROPHILS NFR BLD AUTO: 71.8 % (ref 37–80)
NITRITE UR QL STRIP: NEGATIVE
PH UR STRIP.AUTO: 5.5 [PH] (ref 5–9)
PLATELET # BLD AUTO: 115 10*3/MM3 (ref 150–450)
PMV BLD AUTO: 12.4 FL (ref 8–12)
POTASSIUM BLD-SCNC: 4.7 MMOL/L (ref 3.5–5.1)
PROT SERPL-MCNC: 7.7 G/DL (ref 6.3–8.6)
PROT UR QL STRIP: ABNORMAL
RBC # BLD AUTO: 5.1 10*6/MM3 (ref 4.37–5.74)
RBC # UR: ABNORMAL /HPF
REF LAB TEST METHOD: ABNORMAL
SODIUM BLD-SCNC: 140 MMOL/L (ref 137–145)
SP GR UR STRIP: 1.02 (ref 1–1.03)
SQUAMOUS #/AREA URNS HPF: ABNORMAL /HPF
UROBILINOGEN UR QL STRIP: ABNORMAL
WBC NRBC COR # BLD: 4.73 10*3/MM3 (ref 3.2–9.8)
WBC UR QL AUTO: ABNORMAL /HPF
WHOLE BLOOD HOLD SPECIMEN: NORMAL
WHOLE BLOOD HOLD SPECIMEN: NORMAL

## 2018-06-26 PROCEDURE — 96374 THER/PROPH/DIAG INJ IV PUSH: CPT

## 2018-06-26 PROCEDURE — 25010000002 ONDANSETRON PER 1 MG: Performed by: FAMILY MEDICINE

## 2018-06-26 PROCEDURE — 81001 URINALYSIS AUTO W/SCOPE: CPT | Performed by: FAMILY MEDICINE

## 2018-06-26 PROCEDURE — 82150 ASSAY OF AMYLASE: CPT | Performed by: FAMILY MEDICINE

## 2018-06-26 PROCEDURE — 0 DIATRIZOATE MEGLUMINE & SODIUM PER 1 ML: Performed by: FAMILY MEDICINE

## 2018-06-26 PROCEDURE — 96361 HYDRATE IV INFUSION ADD-ON: CPT

## 2018-06-26 PROCEDURE — 80053 COMPREHEN METABOLIC PANEL: CPT | Performed by: FAMILY MEDICINE

## 2018-06-26 PROCEDURE — 99284 EMERGENCY DEPT VISIT MOD MDM: CPT

## 2018-06-26 PROCEDURE — 74177 CT ABD & PELVIS W/CONTRAST: CPT

## 2018-06-26 PROCEDURE — 83690 ASSAY OF LIPASE: CPT | Performed by: FAMILY MEDICINE

## 2018-06-26 PROCEDURE — 25010000002 MORPHINE PER 10 MG: Performed by: FAMILY MEDICINE

## 2018-06-26 PROCEDURE — 0 IOPAMIDOL PER 1 ML: Performed by: FAMILY MEDICINE

## 2018-06-26 PROCEDURE — 96375 TX/PRO/DX INJ NEW DRUG ADDON: CPT

## 2018-06-26 PROCEDURE — 96376 TX/PRO/DX INJ SAME DRUG ADON: CPT

## 2018-06-26 PROCEDURE — 85025 COMPLETE CBC W/AUTO DIFF WBC: CPT | Performed by: FAMILY MEDICINE

## 2018-06-26 PROCEDURE — 25010000002 HYDRALAZINE PER 20 MG: Performed by: FAMILY MEDICINE

## 2018-06-26 RX ORDER — HYDROCODONE BITARTRATE AND ACETAMINOPHEN 10; 325 MG/1; MG/1
1 TABLET ORAL EVERY 6 HOURS PRN
Qty: 12 TABLET | Refills: 0 | Status: SHIPPED | OUTPATIENT
Start: 2018-06-26 | End: 2018-07-09

## 2018-06-26 RX ORDER — SODIUM CHLORIDE 0.9 % (FLUSH) 0.9 %
10 SYRINGE (ML) INJECTION AS NEEDED
Status: DISCONTINUED | OUTPATIENT
Start: 2018-06-26 | End: 2018-06-26 | Stop reason: HOSPADM

## 2018-06-26 RX ORDER — ONDANSETRON 2 MG/ML
4 INJECTION INTRAMUSCULAR; INTRAVENOUS ONCE
Status: COMPLETED | OUTPATIENT
Start: 2018-06-26 | End: 2018-06-26

## 2018-06-26 RX ORDER — CEPHALEXIN 500 MG/1
500 CAPSULE ORAL 4 TIMES DAILY
Qty: 28 CAPSULE | Refills: 0 | Status: SHIPPED | OUTPATIENT
Start: 2018-06-26 | End: 2018-07-09

## 2018-06-26 RX ORDER — TAMSULOSIN HYDROCHLORIDE 0.4 MG/1
1 CAPSULE ORAL DAILY
Qty: 7 CAPSULE | Refills: 0 | Status: SHIPPED | OUTPATIENT
Start: 2018-06-26 | End: 2018-11-20

## 2018-06-26 RX ORDER — HYDRALAZINE HYDROCHLORIDE 20 MG/ML
20 INJECTION INTRAMUSCULAR; INTRAVENOUS ONCE
Status: COMPLETED | OUTPATIENT
Start: 2018-06-26 | End: 2018-06-26

## 2018-06-26 RX ORDER — HYDROCODONE BITARTRATE AND ACETAMINOPHEN 7.5; 325 MG/1; MG/1
1 TABLET ORAL EVERY 6 HOURS PRN
Qty: 12 TABLET | Refills: 0 | Status: SHIPPED | OUTPATIENT
Start: 2018-06-26 | End: 2018-06-26

## 2018-06-26 RX ORDER — ONDANSETRON 4 MG/1
4 TABLET, ORALLY DISINTEGRATING ORAL EVERY 6 HOURS PRN
Qty: 10 TABLET | Refills: 0 | Status: SHIPPED | OUTPATIENT
Start: 2018-06-26 | End: 2019-07-08 | Stop reason: SDUPTHER

## 2018-06-26 RX ORDER — TAMSULOSIN HYDROCHLORIDE 0.4 MG/1
0.4 CAPSULE ORAL ONCE
Status: COMPLETED | OUTPATIENT
Start: 2018-06-26 | End: 2018-06-26

## 2018-06-26 RX ADMIN — MORPHINE SULFATE 4 MG: 4 INJECTION INTRAVENOUS at 08:05

## 2018-06-26 RX ADMIN — Medication 10 ML: at 08:05

## 2018-06-26 RX ADMIN — SODIUM CHLORIDE 1000 ML: 900 INJECTION, SOLUTION INTRAVENOUS at 08:08

## 2018-06-26 RX ADMIN — HYDRALAZINE HYDROCHLORIDE 20 MG: 20 INJECTION INTRAMUSCULAR; INTRAVENOUS at 11:27

## 2018-06-26 RX ADMIN — DIATRIZOATE MEGLUMINE AND DIATRIZOATE SODIUM 30 ML: 660; 100 LIQUID ORAL; RECTAL at 08:00

## 2018-06-26 RX ADMIN — IOPAMIDOL 90 ML: 755 INJECTION, SOLUTION INTRAVENOUS at 09:48

## 2018-06-26 RX ADMIN — MORPHINE SULFATE 4 MG: 4 INJECTION INTRAVENOUS at 13:17

## 2018-06-26 RX ADMIN — TAMSULOSIN HYDROCHLORIDE 0.4 MG: 0.4 CAPSULE ORAL at 13:12

## 2018-06-26 RX ADMIN — ONDANSETRON 4 MG: 2 INJECTION INTRAMUSCULAR; INTRAVENOUS at 08:05

## 2018-06-26 RX ADMIN — MORPHINE SULFATE 4 MG: 4 INJECTION INTRAVENOUS at 10:08

## 2018-07-03 ENCOUNTER — LAB (OUTPATIENT)
Dept: LAB | Facility: HOSPITAL | Age: 73
End: 2018-07-03

## 2018-07-03 DIAGNOSIS — E11.8 TYPE 2 DIABETES MELLITUS WITH COMPLICATION, WITH LONG-TERM CURRENT USE OF INSULIN (HCC): Chronic | ICD-10-CM

## 2018-07-03 DIAGNOSIS — Z79.4 TYPE 2 DIABETES MELLITUS WITH COMPLICATION, WITH LONG-TERM CURRENT USE OF INSULIN (HCC): Chronic | ICD-10-CM

## 2018-07-03 LAB
ALBUMIN SERPL-MCNC: 4.2 G/DL (ref 3.4–4.8)
ALBUMIN/GLOB SERPL: 1.2 G/DL (ref 1.1–1.8)
ALP SERPL-CCNC: 85 U/L (ref 38–126)
ALT SERPL W P-5'-P-CCNC: 39 U/L (ref 21–72)
ANION GAP SERPL CALCULATED.3IONS-SCNC: 11 MMOL/L (ref 5–15)
AST SERPL-CCNC: 40 U/L (ref 17–59)
BILIRUB SERPL-MCNC: 0.4 MG/DL (ref 0.2–1.3)
BUN BLD-MCNC: 23 MG/DL (ref 7–21)
BUN/CREAT SERPL: 20.7 (ref 7–25)
CALCIUM SPEC-SCNC: 9.3 MG/DL (ref 8.4–10.2)
CHLORIDE SERPL-SCNC: 103 MMOL/L (ref 95–110)
CO2 SERPL-SCNC: 27 MMOL/L (ref 22–31)
CREAT BLD-MCNC: 1.11 MG/DL (ref 0.7–1.3)
GFR SERPL CREATININE-BSD FRML MDRD: 65 ML/MIN/1.73 (ref 42–98)
GLOBULIN UR ELPH-MCNC: 3.4 GM/DL (ref 2.3–3.5)
GLUCOSE BLD-MCNC: 241 MG/DL (ref 60–100)
HBA1C MFR BLD: 7.9 % (ref 4–5.6)
POTASSIUM BLD-SCNC: 5.3 MMOL/L (ref 3.5–5.1)
PROT SERPL-MCNC: 7.6 G/DL (ref 6.3–8.6)
SODIUM BLD-SCNC: 141 MMOL/L (ref 137–145)

## 2018-07-03 PROCEDURE — 80053 COMPREHEN METABOLIC PANEL: CPT

## 2018-07-03 PROCEDURE — 36415 COLL VENOUS BLD VENIPUNCTURE: CPT

## 2018-07-03 PROCEDURE — 83036 HEMOGLOBIN GLYCOSYLATED A1C: CPT

## 2018-07-09 ENCOUNTER — OFFICE VISIT (OUTPATIENT)
Dept: FAMILY MEDICINE CLINIC | Facility: CLINIC | Age: 73
End: 2018-07-09

## 2018-07-09 VITALS
DIASTOLIC BLOOD PRESSURE: 70 MMHG | HEIGHT: 73 IN | BODY MASS INDEX: 25.18 KG/M2 | WEIGHT: 190 LBS | SYSTOLIC BLOOD PRESSURE: 140 MMHG | HEART RATE: 76 BPM | OXYGEN SATURATION: 97 %

## 2018-07-09 DIAGNOSIS — I10 ESSENTIAL HYPERTENSION: Chronic | ICD-10-CM

## 2018-07-09 DIAGNOSIS — E11.9 TYPE 2 DIABETES MELLITUS WITHOUT COMPLICATION, WITH LONG-TERM CURRENT USE OF INSULIN (HCC): Primary | Chronic | ICD-10-CM

## 2018-07-09 DIAGNOSIS — Z79.4 TYPE 2 DIABETES MELLITUS WITHOUT COMPLICATION, WITH LONG-TERM CURRENT USE OF INSULIN (HCC): Primary | Chronic | ICD-10-CM

## 2018-07-09 PROCEDURE — 99213 OFFICE O/P EST LOW 20 MIN: CPT | Performed by: GENERAL PRACTICE

## 2018-07-09 NOTE — PROGRESS NOTES
Subjective   Zachary Galindo is a 72 y.o. male.   Chief Complaint   Patient presents with   • Diabetes     For review and evaluation of management of chronic medical problems. Labs reviewed.   Diabetes   He presents for his follow-up diabetic visit. He has type 2 diabetes mellitus. His disease course has been improving. There are no hypoglycemic associated symptoms. Pertinent negatives for hypoglycemia include no dizziness, headaches or nervousness/anxiousness. There are no diabetic associated symptoms. Pertinent negatives for diabetes include no chest pain, no fatigue and no weakness. There are no hypoglycemic complications. Diabetic complications include nephropathy. Risk factors for coronary artery disease include diabetes mellitus, dyslipidemia, hypertension and male sex. Current diabetic treatment includes oral agent (dual therapy) and insulin injections. He is compliant with treatment all of the time. His weight is stable. He is following a generally healthy diet. Meal planning includes ADA exchanges. He participates in exercise intermittently. There is no change in his home blood glucose trend. An ACE inhibitor/angiotensin II receptor blocker is being taken. Eye exam is current.   Hypertension   This is a chronic problem. The current episode started more than 1 year ago. The problem is unchanged. The problem is controlled. Pertinent negatives include no chest pain, headaches, neck pain, palpitations or shortness of breath. There are no associated agents to hypertension. Risk factors for coronary artery disease include diabetes mellitus and dyslipidemia. Current antihypertension treatment includes angiotensin blockers, calcium channel blockers and beta blockers. The current treatment provides significant improvement. There are no compliance problems.       The following portions of the patient's history were reviewed and updated as appropriate: allergies, current medications, past social history and  "problem list.    Outpatient Medications Prior to Visit   Medication Sig Dispense Refill   • amLODIPine (NORVASC) 5 MG tablet Take 1 tablet by mouth Daily. 90 tablet 1   • aspirin 81 MG tablet Take 1 tablet by mouth Daily. 30 tablet 11   • atorvastatin (LIPITOR) 10 MG tablet Take 1 tablet by mouth Every Night. 90 tablet 3   • carvedilol (COREG) 6.25 MG tablet Take 1 tablet by mouth 2 (Two) Times a Day With Meals. 180 tablet 3   • clopidogrel (PLAVIX) 75 MG tablet Take 1 tablet by mouth Daily. 90 tablet 3   • glipiZIDE (GLUCOTROL) 5 MG tablet Take 1 tablet by mouth 2 (Two) Times a Day Before Meals. 180 tablet 3   • glucose blood test strip OneTouch Ultra Test strips  -  Test sugars 3-4 times a day as directed by provider.     • insulin detemir (LEVEMIR) 100 UNIT/ML injection Inject 25 Units under the skin Daily. 25 units bid     • Insulin Pen Needle (PEN NEEDLES 3/16\") 31G X 5 MM misc 100 each Daily. 100 each 3   • Lancets (ONETOUCH ULTRASOFT) lancets Test sugars 3-4 times daily as instructed by physician.     • losartan (COZAAR) 50 MG tablet Take 1 tablet by mouth Daily. 90 tablet 3   • magnesium oxide (MAG-OX) 400 MG tablet Take 1 tablet by mouth 2 (Two) Times a Day. 180 tablet 3   • ondansetron ODT (ZOFRAN-ODT) 4 MG disintegrating tablet Take 1 tablet by mouth Every 6 (Six) Hours As Needed for Nausea or Vomiting. 10 tablet 0   • probenecid (BENEMID) 500 MG tablet Take 1 tablet by mouth 2 (Two) Times a Day. 180 tablet 3   • SITagliptin (JANUVIA) 100 MG tablet Take 1 tablet by mouth Daily. 90 tablet 3   • tamsulosin (FLOMAX) 0.4 MG capsule 24 hr capsule Take 1 capsule by mouth Daily. 7 capsule 0   • cephalexin (KEFLEX) 500 MG capsule Take 1 capsule by mouth 4 (Four) Times a Day. 28 capsule 0   • HYDROcodone-acetaminophen (NORCO)  MG per tablet Take 1 tablet by mouth Every 6 (Six) Hours As Needed for Moderate Pain . 12 tablet 0     No facility-administered medications prior to visit.        Review of Systems " "  Constitutional: Negative.  Negative for chills, fatigue, fever and unexpected weight change.   HENT: Negative.  Negative for congestion, ear pain, hearing loss, nosebleeds, rhinorrhea, sneezing, sore throat and tinnitus.    Eyes: Negative.  Negative for discharge.   Respiratory: Negative.  Negative for cough, shortness of breath and wheezing.    Cardiovascular: Negative.  Negative for chest pain and palpitations.   Gastrointestinal: Negative.  Negative for abdominal pain, constipation, diarrhea, nausea and vomiting.   Endocrine: Negative.    Genitourinary: Negative.  Negative for dysuria, frequency and urgency.   Musculoskeletal: Negative.  Negative for arthralgias, back pain, joint swelling, myalgias and neck pain.   Skin: Negative.  Negative for rash.   Allergic/Immunologic: Negative.    Neurological: Negative.  Negative for dizziness, weakness, numbness and headaches.   Hematological: Negative.  Negative for adenopathy.   Psychiatric/Behavioral: Negative.  Negative for dysphoric mood and sleep disturbance. The patient is not nervous/anxious.        Objective   Visit Vitals  /70   Pulse 76   Ht 185.4 cm (73\")   Wt 86.2 kg (190 lb)   SpO2 97%   BMI 25.07 kg/m²     Physical Exam   Constitutional: He is oriented to person, place, and time. He appears well-developed and well-nourished. No distress.   HENT:   Head: Normocephalic.   Nose: Nose normal.   Mouth/Throat: Oropharynx is clear and moist.   Eyes: Conjunctivae and EOM are normal. Pupils are equal, round, and reactive to light. Right eye exhibits no discharge. Left eye exhibits no discharge.   Neck: No thyromegaly present.   Cardiovascular: Normal rate, regular rhythm, normal heart sounds and intact distal pulses.    No murmur heard.  Pulmonary/Chest: Effort normal and breath sounds normal.   Musculoskeletal: He exhibits no edema.   Lymphadenopathy:     He has no cervical adenopathy.   Neurological: He is alert and oriented to person, place, and time. "   Skin: Skin is warm and dry.   Psychiatric: He has a normal mood and affect.   Nursing note and vitals reviewed.    Results for orders placed or performed in visit on 07/03/18   Comprehensive Metabolic Panel   Result Value Ref Range    Glucose 241 (H) 60 - 100 mg/dL    BUN 23 (H) 7 - 21 mg/dL    Creatinine 1.11 0.70 - 1.30 mg/dL    Sodium 141 137 - 145 mmol/L    Potassium 5.3 (H) 3.5 - 5.1 mmol/L    Chloride 103 95 - 110 mmol/L    CO2 27.0 22.0 - 31.0 mmol/L    Calcium 9.3 8.4 - 10.2 mg/dL    Total Protein 7.6 6.3 - 8.6 g/dL    Albumin 4.20 3.40 - 4.80 g/dL    ALT (SGPT) 39 21 - 72 U/L    AST (SGOT) 40 17 - 59 U/L    Alkaline Phosphatase 85 38 - 126 U/L    Total Bilirubin 0.4 0.2 - 1.3 mg/dL    eGFR Non African Amer 65 42 - 98 mL/min/1.73    Globulin 3.4 2.3 - 3.5 gm/dL    A/G Ratio 1.2 1.1 - 1.8 g/dL    BUN/Creatinine Ratio 20.7 7.0 - 25.0    Anion Gap 11.0 5.0 - 15.0 mmol/L   Hemoglobin A1c   Result Value Ref Range    Hemoglobin A1C 7.9 (H) 4 - 5.6 %      Assessment/Plan   Problem List Items Addressed This Visit        Cardiovascular and Mediastinum    Essential hypertension (Chronic)       Endocrine    Type 2 diabetes mellitus without complication, with long-term current use of insulin (CMS/[facility]) - Primary (Chronic)          Continue current treatment.     No orders of the defined types were placed in this encounter.    Return in about 3 months (around 10/9/2018) for Recheck, medicare wellness visit.

## 2018-11-12 ENCOUNTER — LAB (OUTPATIENT)
Dept: LAB | Facility: HOSPITAL | Age: 73
End: 2018-11-12

## 2018-11-12 DIAGNOSIS — Z79.4 TYPE 2 DIABETES MELLITUS WITHOUT COMPLICATION, WITH LONG-TERM CURRENT USE OF INSULIN (HCC): Chronic | ICD-10-CM

## 2018-11-12 DIAGNOSIS — E11.9 TYPE 2 DIABETES MELLITUS WITHOUT COMPLICATION, WITH LONG-TERM CURRENT USE OF INSULIN (HCC): Chronic | ICD-10-CM

## 2018-11-12 LAB
ALBUMIN SERPL-MCNC: 4.5 G/DL (ref 3.4–4.8)
ALBUMIN/GLOB SERPL: 1.6 G/DL (ref 1.1–1.8)
ALP SERPL-CCNC: 105 U/L (ref 38–126)
ALT SERPL W P-5'-P-CCNC: 45 U/L (ref 21–72)
ANION GAP SERPL CALCULATED.3IONS-SCNC: 6 MMOL/L (ref 5–15)
ARTICHOKE IGE QN: 109 MG/DL (ref 1–129)
AST SERPL-CCNC: 51 U/L (ref 17–59)
BILIRUB SERPL-MCNC: 0.6 MG/DL (ref 0.2–1.3)
BUN BLD-MCNC: 30 MG/DL (ref 7–21)
BUN/CREAT SERPL: 22.4 (ref 7–25)
CALCIUM SPEC-SCNC: 9.3 MG/DL (ref 8.4–10.2)
CHLORIDE SERPL-SCNC: 103 MMOL/L (ref 95–110)
CO2 SERPL-SCNC: 29 MMOL/L (ref 22–31)
CREAT BLD-MCNC: 1.34 MG/DL (ref 0.7–1.3)
GFR SERPL CREATININE-BSD FRML MDRD: 52 ML/MIN/1.73 (ref 42–98)
GLOBULIN UR ELPH-MCNC: 2.9 GM/DL (ref 2.3–3.5)
GLUCOSE BLD-MCNC: 264 MG/DL (ref 60–100)
HBA1C MFR BLD: 9.7 % (ref 4–5.6)
POTASSIUM BLD-SCNC: 4.9 MMOL/L (ref 3.5–5.1)
PROT SERPL-MCNC: 7.4 G/DL (ref 6.3–8.6)
SODIUM BLD-SCNC: 138 MMOL/L (ref 137–145)

## 2018-11-12 PROCEDURE — 83721 ASSAY OF BLOOD LIPOPROTEIN: CPT

## 2018-11-12 PROCEDURE — 80053 COMPREHEN METABOLIC PANEL: CPT

## 2018-11-12 PROCEDURE — 36415 COLL VENOUS BLD VENIPUNCTURE: CPT

## 2018-11-12 PROCEDURE — 83036 HEMOGLOBIN GLYCOSYLATED A1C: CPT

## 2018-11-14 ENCOUNTER — TELEPHONE (OUTPATIENT)
Dept: FAMILY MEDICINE CLINIC | Facility: CLINIC | Age: 73
End: 2018-11-14

## 2018-11-20 ENCOUNTER — OFFICE VISIT (OUTPATIENT)
Dept: FAMILY MEDICINE CLINIC | Facility: CLINIC | Age: 73
End: 2018-11-20

## 2018-11-20 VITALS
SYSTOLIC BLOOD PRESSURE: 135 MMHG | HEART RATE: 63 BPM | HEIGHT: 73 IN | WEIGHT: 197.6 LBS | OXYGEN SATURATION: 98 % | DIASTOLIC BLOOD PRESSURE: 72 MMHG | BODY MASS INDEX: 26.19 KG/M2

## 2018-11-20 DIAGNOSIS — E11.9 TYPE 2 DIABETES MELLITUS WITHOUT COMPLICATION, WITH LONG-TERM CURRENT USE OF INSULIN (HCC): Chronic | ICD-10-CM

## 2018-11-20 DIAGNOSIS — Z23 NEED FOR INFLUENZA VACCINATION: ICD-10-CM

## 2018-11-20 DIAGNOSIS — E78.2 MIXED HYPERLIPIDEMIA: Chronic | ICD-10-CM

## 2018-11-20 DIAGNOSIS — I10 ESSENTIAL HYPERTENSION: Chronic | ICD-10-CM

## 2018-11-20 DIAGNOSIS — Z00.00 MEDICARE ANNUAL WELLNESS VISIT, SUBSEQUENT: Primary | ICD-10-CM

## 2018-11-20 DIAGNOSIS — N18.30 STAGE 3 CHRONIC KIDNEY DISEASE (HCC): Chronic | ICD-10-CM

## 2018-11-20 DIAGNOSIS — Z12.5 ENCOUNTER FOR PROSTATE CANCER SCREENING: ICD-10-CM

## 2018-11-20 DIAGNOSIS — Z79.4 TYPE 2 DIABETES MELLITUS WITHOUT COMPLICATION, WITH LONG-TERM CURRENT USE OF INSULIN (HCC): Chronic | ICD-10-CM

## 2018-11-20 PROCEDURE — 99214 OFFICE O/P EST MOD 30 MIN: CPT | Performed by: GENERAL PRACTICE

## 2018-11-20 PROCEDURE — G0008 ADMIN INFLUENZA VIRUS VAC: HCPCS | Performed by: GENERAL PRACTICE

## 2018-11-20 PROCEDURE — G0439 PPPS, SUBSEQ VISIT: HCPCS | Performed by: GENERAL PRACTICE

## 2018-11-20 PROCEDURE — 90662 IIV NO PRSV INCREASED AG IM: CPT | Performed by: GENERAL PRACTICE

## 2018-11-20 RX ORDER — EZETIMIBE 10 MG/1
10 TABLET ORAL DAILY
Qty: 90 TABLET | Refills: 3 | Status: SHIPPED | OUTPATIENT
Start: 2018-11-20 | End: 2019-02-21 | Stop reason: SDUPTHER

## 2018-11-20 RX ORDER — MELOXICAM 15 MG/1
15 TABLET ORAL DAILY
COMMUNITY
End: 2018-11-20

## 2018-11-20 NOTE — PATIENT INSTRUCTIONS
Check at pharmacy on Shingrix shingles vaccine    Medicare Wellness  Personal Prevention Plan of Service     Date of Office Visit:  2018  Encounter Provider:  Halley Raymond MD  Place of Service:  [facility]  Patient Name: Zachary Galindo  :  1945    As part of the Medicare Wellness portion of your visit today, we are providing you with this personalized preventive plan of services (PPPS). This plan is based upon recommendations of the [place] Preventive Services Task Force (USPSTF) and the Advisory Committee on Immunization Practices (ACIP).    This lists the preventive care services that should be considered, and provides dates of when you are due. Items listed as completed are up-to-date and do not require any further intervention.    Health Maintenance   Topic Date Due   • ZOSTER VACCINE (2 of 2) 2016   • MEDICARE ANNUAL WELLNESS  2018   • INFLUENZA VACCINE  2018   • DIABETIC FOOT EXAM  10/03/2018   • URINE MICROALBUMIN  2019   • DIABETIC EYE EXAM  2019   • HEMOGLOBIN A1C  2019   • LIPID PANEL  2019   • COLONOSCOPY  2020   • TDAP/TD VACCINES (2 - Td) 2027   • HEPATITIS C SCREENING  Completed   • PNEUMOCOCCAL VACCINES (65+ LOW/MEDIUM RISK)  Completed   • AAA SCREEN (ONE-TIME)  Addressed       Orders Placed This Encounter   Procedures   • Flu Vaccine High Dose PF 65YR+ ([id number])       No Follow-up on file.

## 2018-11-20 NOTE — PROGRESS NOTES
QUICK REFERENCE INFORMATION:  The ABCs of the Annual Wellness Visit    Subsequent Medicare Wellness Visit    HEALTH RISK ASSESSMENT    1945    Recent Hospitalizations:  No hospitalization(s) within the last year..        Current Medical Providers:  Patient Care Team:  Halley Raymond MD as PCP - General  Halley Raymond MD as PCP - Claims Attributed  Aime Stiles MD as Consulting Physician (Cardiology)  Vicente Hsu MD as Consulting Physician (Nephrology)  Khadar Ruiz MD as Consulting Physician (Cardiology)  Brodie Patterson DMD as Consulting Physician (Dental General Practice)        Smoking Status:  Social History     Tobacco Use   Smoking Status Former Smoker   Smokeless Tobacco Never Used   Tobacco Comment    Quit 1979       Alcohol Consumption:  Social History     Substance and Sexual Activity   Alcohol Use No       Depression Screen:   PHQ-2/PHQ-9 Depression Screening 11/20/2018   Little interest or pleasure in doing things 0   Feeling down, depressed, or hopeless 0   Trouble falling or staying asleep, or sleeping too much 0   Feeling tired or having little energy 1   Poor appetite or overeating 0   Feeling bad about yourself - or that you are a failure or have let yourself or your family down 0   Trouble concentrating on things, such as reading the newspaper or watching television 0   Moving or speaking so slowly that other people could have noticed. Or the opposite - being so fidgety or restless that you have been moving around a lot more than usual 0   Thoughts that you would be better off dead, or of hurting yourself in some way 0   Total Score 1   If you checked off any problems, how difficult have these problems made it for you to do your work, take care of things at home, or get along with other people? Not difficult at all       Health Habits and Functional and Cognitive Screening:  Functional & Cognitive Status 11/20/2018   Do you have difficulty preparing food and  eating? No   Do you have difficulty bathing yourself, getting dressed or grooming yourself? No   Do you have difficulty using the toilet? No   Do you have difficulty moving around from place to place? No   Do you have trouble with steps or getting out of a bed or a chair? No   In the past year have you fallen or experienced a near fall? No   Current Diet Well Balanced Diet   Dental Exam Up to date   Eye Exam Up to date   Exercise (times per week) 0 times per week   Current Exercise Activities Include -   Do you need help using the phone?  No   Are you deaf or do you have serious difficulty hearing?  No   Do you need help with transportation? No   Do you need help shopping? No   Do you need help preparing meals?  No   Do you need help with housework?  No   Do you need help with laundry? No   Do you need help taking your medications? No   Do you need help managing money? No   Do you ever drive or ride in a car without wearing a seat belt? No   Have you felt unusual stress, anger or loneliness in the last month? No   Who do you live with? Spouse   If you need help, do you have trouble finding someone available to you? No   Have you been bothered in the last four weeks by sexual problems? No   Do you have difficulty concentrating, remembering or making decisions? No           Does the patient have evidence of cognitive impairment? No    Aspirin use counseling: Taking ASA appropriately as indicated      Recent Lab Results:  CMP:  Lab Results   Component Value Date    BUN 30 (H) 11/12/2018    CREATININE 1.34 (H) 11/12/2018    EGFRIFNONA 52 11/12/2018    BCR 22.4 11/12/2018     11/12/2018    K 4.9 11/12/2018    CO2 29.0 11/12/2018    CALCIUM 9.3 11/12/2018    ALBUMIN 4.50 11/12/2018    BILITOT 0.6 11/12/2018    ALKPHOS 105 11/12/2018    AST 51 11/12/2018    ALT 45 11/12/2018     Lipid Panel:  Lab Results   Component Value Date    CHOL 169 01/02/2018    TRIG 215 (H) 01/02/2018    HDL 28 (L) 01/02/2018    LDLHDL 3.50  01/02/2018     HbA1c:  Lab Results   Component Value Date    HGBA1C 9.7 (H) 11/12/2018       Visual Acuity:  No exam data present    Age-appropriate Screening Schedule:  Refer to the list below for future screening recommendations based on patient's age, sex and/or medical conditions. Orders for these recommended tests are listed in the plan section. The patient has been provided with a written plan.    Health Maintenance   Topic Date Due   • ZOSTER VACCINE (2 of 2) 11/24/2016   • INFLUENZA VACCINE  08/01/2018   • DIABETIC FOOT EXAM  10/03/2018   • URINE MICROALBUMIN  01/02/2019   • DIABETIC EYE EXAM  03/19/2019   • HEMOGLOBIN A1C  05/12/2019   • LIPID PANEL  11/12/2019   • COLONOSCOPY  04/07/2020   • TDAP/TD VACCINES (2 - Td) 07/03/2027   • PNEUMOCOCCAL VACCINES (65+ LOW/MEDIUM RISK)  Completed        Subjective   History of Present Illness    Zachary Galindo is a 72 y.o. male who presents for an Subsequent Wellness Visit.    The following portions of the patient's history were reviewed and updated as appropriate: allergies, current medications, past family history, past medical history, past social history, past surgical history and problem list.    Outpatient Medications Prior to Visit   Medication Sig Dispense Refill   • amLODIPine (NORVASC) 5 MG tablet Take 1 tablet by mouth Daily. 90 tablet 1   • aspirin 81 MG tablet Take 1 tablet by mouth Daily. 30 tablet 11   • atorvastatin (LIPITOR) 10 MG tablet Take 1 tablet by mouth Every Night. 90 tablet 3   • carvedilol (COREG) 6.25 MG tablet Take 1 tablet by mouth 2 (Two) Times a Day With Meals. 180 tablet 3   • clopidogrel (PLAVIX) 75 MG tablet Take 1 tablet by mouth Daily. 90 tablet 3   • glipiZIDE (GLUCOTROL) 5 MG tablet Take 1 tablet by mouth 2 (Two) Times a Day Before Meals. 180 tablet 3   • glucose blood test strip OneTouch Ultra Test strips  -  Test sugars 3-4 times a day as directed by provider.     • insulin detemir (LEVEMIR) 100 UNIT/ML injection  "Inject 63 Units under the skin into the appropriate area as directed Every Night. 25 units bid      • Insulin Pen Needle (PEN NEEDLES 3/16\") 31G X 5 MM misc 100 each Daily. 100 each 3   • Lancets (ONETOUCH ULTRASOFT) lancets Test sugars 3-4 times daily as instructed by physician.     • losartan (COZAAR) 50 MG tablet Take 1 tablet by mouth Daily. 90 tablet 3   • magnesium oxide (MAG-OX) 400 MG tablet Take 1 tablet by mouth 2 (Two) Times a Day. 180 tablet 3   • ondansetron ODT (ZOFRAN-ODT) 4 MG disintegrating tablet Take 1 tablet by mouth Every 6 (Six) Hours As Needed for Nausea or Vomiting. 10 tablet 0   • probenecid (BENEMID) 500 MG tablet Take 1 tablet by mouth 2 (Two) Times a Day. 180 tablet 3   • SITagliptin (JANUVIA) 100 MG tablet Take 1 tablet by mouth Daily. 90 tablet 3   • meloxicam (MOBIC) 15 MG tablet Take 15 mg by mouth Daily.     • tamsulosin (FLOMAX) 0.4 MG capsule 24 hr capsule Take 1 capsule by mouth Daily. 7 capsule 0     No facility-administered medications prior to visit.        Patient Active Problem List   Diagnosis   • Essential hypertension   • Gout   • Hyperlipidemia   • Type 2 diabetes mellitus without complication, with long-term current use of insulin (CMS/[facility])   • Coronary artery disease involving coronary bypass graft of native heart without angina pectoris   • Pure hypercholesterolemia   • Stage 3 chronic kidney disease (CMS/[facility])       Advance Care Planning:  has NO advance directive - information provided to the patient today    Identification of Risk Factors:  Risk factors include: weight , unhealthy diet, inactivity and increased fall risk.    Review of Systems    Compared to one year ago, the patient feels his physical health is the same.  Compared to one year ago, the patient feels his mental health is the same.    Objective     Physical Exam   Constitutional: He is oriented to person, place, and time. He appears well-developed and well-nourished. No distress.   HENT:   Head: " "Normocephalic.   Nose: Nose normal.   Mouth/Throat: Oropharynx is clear and moist.   Eyes: Conjunctivae and EOM are normal. Pupils are equal, round, and reactive to light. Right eye exhibits no discharge. Left eye exhibits no discharge.   Neck: No thyromegaly present.   Cardiovascular: Normal rate, regular rhythm, normal heart sounds and intact distal pulses.   No murmur heard.  Pulmonary/Chest: Effort normal and breath sounds normal.   Musculoskeletal: He exhibits no edema.   Lymphadenopathy:     He has no cervical adenopathy.   Neurological: He is alert and oriented to person, place, and time.   Skin: Skin is warm and dry.   Psychiatric: He has a normal mood and affect.   Nursing note and vitals reviewed.      Vitals:    11/20/18 1051   BP: 135/72   BP Location: Left arm   Patient Position: Sitting   Cuff Size: Adult   Pulse: 63   SpO2: 98%   Weight: 89.6 kg (197 lb 9.6 oz)   Height: 185.4 cm (73\")   PainSc: 0-No pain       Patient's Body mass index is 26.07 kg/m². BMI is above normal parameters. Recommendations include: exercise counseling and nutrition counseling.      Assessment/Plan   Patient Self-Management and Personalized Health Advice  The patient has been provided with information about: diet, exercise, weight management and fall prevention and preventive services including:   · Advance directive, Exercise counseling provided, Fall Risk assessment done, Fall Risk plan of care done, Influenza vaccine, Zostavax vaccine (Herpes Zoster).    Visit Diagnoses:    ICD-10-CM ICD-9-CM   1. Medicare annual wellness visit, subsequent Z00.00 V70.0   2. Need for influenza vaccination Z23 V04.81   3. Type 2 diabetes mellitus without complication, with long-term current use of insulin (CMS/[facility]) E11.9 250.00    Z79.4 V58.67   4. Essential hypertension I10 401.9   5. Mixed hyperlipidemia E78.2 272.2   6. Stage 3 chronic kidney disease (CMS/[facility]) N18.3 585.3   7. Encounter for prostate cancer screening Z12.5 V76.44 " "      Orders Placed This Encounter   Procedures   • Flu Vaccine High Dose PF 65YR+ ([id number])       Outpatient Encounter Medications as of 11/20/2018   Medication Sig Dispense Refill   • amLODIPine (NORVASC) 5 MG tablet Take 1 tablet by mouth Daily. 90 tablet 1   • aspirin 81 MG tablet Take 1 tablet by mouth Daily. 30 tablet 11   • atorvastatin (LIPITOR) 10 MG tablet Take 1 tablet by mouth Every Night. 90 tablet 3   • carvedilol (COREG) 6.25 MG tablet Take 1 tablet by mouth 2 (Two) Times a Day With Meals. 180 tablet 3   • clopidogrel (PLAVIX) 75 MG tablet Take 1 tablet by mouth Daily. 90 tablet 3   • glipiZIDE (GLUCOTROL) 5 MG tablet Take 1 tablet by mouth 2 (Two) Times a Day Before Meals. 180 tablet 3   • glucose blood test strip OneTouch Ultra Test strips  -  Test sugars 3-4 times a day as directed by provider.     • insulin detemir (LEVEMIR) 100 UNIT/ML injection Inject 63 Units under the skin into the appropriate area as directed Every Night. 25 units bid      • Insulin Pen Needle (PEN NEEDLES 3/16\") 31G X 5 MM misc 100 each Daily. 100 each 3   • Lancets (ONETOUCH ULTRASOFT) lancets Test sugars 3-4 times daily as instructed by physician.     • losartan (COZAAR) 50 MG tablet Take 1 tablet by mouth Daily. 90 tablet 3   • magnesium oxide (MAG-OX) 400 MG tablet Take 1 tablet by mouth 2 (Two) Times a Day. 180 tablet 3   • ondansetron ODT (ZOFRAN-ODT) 4 MG disintegrating tablet Take 1 tablet by mouth Every 6 (Six) Hours As Needed for Nausea or Vomiting. 10 tablet 0   • probenecid (BENEMID) 500 MG tablet Take 1 tablet by mouth 2 (Two) Times a Day. 180 tablet 3   • SITagliptin (JANUVIA) 100 MG tablet Take 1 tablet by mouth Daily. 90 tablet 3   • [DISCONTINUED] meloxicam (MOBIC) 15 MG tablet Take 15 mg by mouth Daily.     • [DISCONTINUED] tamsulosin (FLOMAX) 0.4 MG capsule 24 hr capsule Take 1 capsule by mouth Daily. 7 capsule 0   • ezetimibe (ZETIA) 10 MG tablet Take 1 tablet by mouth Daily. 90 tablet 3     No " facility-administered encounter medications on file as of 11/20/2018.        Reviewed use of high risk medication in the elderly: yes  Reviewed for potential of harmful drug interactions in the elderly: yes    Follow Up:  Return in about 3 months (around 2/20/2019) for Annual physical.     An After Visit Summary and PPPS with all of these plans were given to the patient.    Information has been scanned into chart.  Discussed importance of taking medications as prescribed. Encouraged healthy eating habits with low fat, low salt choices and working towards maintaining a healthy weight. Recommended regular exercise if able as well as care to prevent falls.

## 2018-11-20 NOTE — PROGRESS NOTES
Subjective   Zachary Galindo is a 72 y.o. male.   Chief Complaint   Patient presents with   • Follow-up     M[place]   • Diabetes   • Hypertension     For review and evaluation of management of chronic medical problems. Labs reviewed. Had knee pain and went to [facility], given meloxicam, strongly advised once again never to take nsaids due to his kidney disease. Cut back on his Levemir as did not think it was helping his sugars. Hemoglobin A1c is not up to 9.7.  Diabetes   He presents for his follow-up diabetic visit. He has type 2 diabetes mellitus. His disease course has been improving. There are no hypoglycemic associated symptoms. Pertinent negatives for hypoglycemia include no dizziness, headaches or nervousness/anxiousness. There are no diabetic associated symptoms. Pertinent negatives for diabetes include no chest pain, no fatigue and no weakness. There are no hypoglycemic complications. Diabetic complications include nephropathy. Risk factors for coronary artery disease include diabetes mellitus, dyslipidemia, hypertension and male sex. Current diabetic treatment includes oral agent (dual therapy) and insulin injections. He is compliant with treatment all of the time. His weight is stable. He is following a generally healthy diet. Meal planning includes ADA exchanges. He participates in exercise intermittently. There is no change in his home blood glucose trend. An ACE inhibitor/angiotensin II receptor blocker is being taken. Eye exam is current.   Hypertension   This is a chronic problem. The current episode started more than 1 year ago. The problem is unchanged. The problem is controlled. Pertinent negatives include no chest pain, headaches, neck pain, palpitations or shortness of breath. There are no associated agents to hypertension. Risk factors for coronary artery disease include diabetes mellitus and dyslipidemia. Current antihypertension treatment includes angiotensin blockers, calcium channel  "blockers and beta blockers. The current treatment provides significant improvement. There are no compliance problems.    Hyperlipidemia   This is a chronic problem. The current episode started more than 1 year ago. The problem is uncontrolled. Recent lipid tests were reviewed and are high. Exacerbating diseases include diabetes. There are no known factors aggravating his hyperlipidemia. Pertinent negatives include no chest pain, myalgias or shortness of breath. The current treatment provides mild improvement of lipids. There are no compliance problems.       The following portions of the patient's history were reviewed and updated as appropriate: allergies, current medications, past social history and problem list.    Outpatient Medications Prior to Visit   Medication Sig Dispense Refill   • amLODIPine (NORVASC) 5 MG tablet Take 1 tablet by mouth Daily. 90 tablet 1   • aspirin 81 MG tablet Take 1 tablet by mouth Daily. 30 tablet 11   • atorvastatin (LIPITOR) 10 MG tablet Take 1 tablet by mouth Every Night. 90 tablet 3   • carvedilol (COREG) 6.25 MG tablet Take 1 tablet by mouth 2 (Two) Times a Day With Meals. 180 tablet 3   • clopidogrel (PLAVIX) 75 MG tablet Take 1 tablet by mouth Daily. 90 tablet 3   • glipiZIDE (GLUCOTROL) 5 MG tablet Take 1 tablet by mouth 2 (Two) Times a Day Before Meals. 180 tablet 3   • glucose blood test strip OneTouch Ultra Test strips  -  Test sugars 3-4 times a day as directed by provider.     • insulin detemir (LEVEMIR) 100 UNIT/ML injection Inject 63 Units under the skin into the appropriate area as directed Every Night. 25 units bid      • Insulin Pen Needle (PEN NEEDLES 3/16\") 31G X 5 MM misc 100 each Daily. 100 each 3   • Lancets (ONETOUCH ULTRASOFT) lancets Test sugars 3-4 times daily as instructed by physician.     • losartan (COZAAR) 50 MG tablet Take 1 tablet by mouth Daily. 90 tablet 3   • magnesium oxide (MAG-OX) 400 MG tablet Take 1 tablet by mouth 2 (Two) Times a Day. 180 " "tablet 3   • ondansetron ODT (ZOFRAN-ODT) 4 MG disintegrating tablet Take 1 tablet by mouth Every 6 (Six) Hours As Needed for Nausea or Vomiting. 10 tablet 0   • probenecid (BENEMID) 500 MG tablet Take 1 tablet by mouth 2 (Two) Times a Day. 180 tablet 3   • SITagliptin (JANUVIA) 100 MG tablet Take 1 tablet by mouth Daily. 90 tablet 3   • meloxicam (MOBIC) 15 MG tablet Take 15 mg by mouth Daily.     • tamsulosin (FLOMAX) 0.4 MG capsule 24 hr capsule Take 1 capsule by mouth Daily. 7 capsule 0     No facility-administered medications prior to visit.        Review of Systems   Constitutional: Negative.  Negative for chills, fatigue, fever and unexpected weight change.   HENT: Negative.  Negative for congestion, ear pain, hearing loss, nosebleeds, rhinorrhea, sneezing, sore throat and tinnitus.    Eyes: Negative.  Negative for discharge.   Respiratory: Negative.  Negative for cough, shortness of breath and wheezing.    Cardiovascular: Negative.  Negative for chest pain and palpitations.   Gastrointestinal: Negative.  Negative for abdominal pain, constipation, diarrhea, nausea and vomiting.   Endocrine: Negative.    Genitourinary: Negative.  Negative for dysuria, frequency and urgency.   Musculoskeletal: Negative.  Negative for arthralgias, back pain, joint swelling, myalgias and neck pain.   Skin: Negative.  Negative for rash.   Allergic/Immunologic: Negative.    Neurological: Negative.  Negative for dizziness, weakness, numbness and headaches.   Hematological: Negative.  Negative for adenopathy.   Psychiatric/Behavioral: Negative.  Negative for dysphoric mood and sleep disturbance. The patient is not nervous/anxious.        Objective   Visit Vitals  /72 (BP Location: Left arm, Patient Position: Sitting, Cuff Size: Adult)   Pulse 63   Ht 185.4 cm (73\")   Wt 89.6 kg (197 lb 9.6 oz)   SpO2 98%   BMI 26.07 kg/m²     Physical Exam   Constitutional: He is oriented to person, place, and time. He appears well-developed " and well-nourished. No distress.   HENT:   Head: Normocephalic.   Nose: Nose normal.   Mouth/Throat: Oropharynx is clear and moist.   Eyes: Conjunctivae and EOM are normal. Pupils are equal, round, and reactive to light. Right eye exhibits no discharge. Left eye exhibits no discharge.   Neck: No thyromegaly present.   Cardiovascular: Normal rate, regular rhythm, normal heart sounds and intact distal pulses.   No murmur heard.  Pulmonary/Chest: Effort normal and breath sounds normal.   Musculoskeletal: He exhibits no edema.   Lymphadenopathy:     He has no cervical adenopathy.   Neurological: He is alert and oriented to person, place, and time.   Skin: Skin is warm and dry.   Psychiatric: He has a normal mood and affect.   Nursing note and vitals reviewed.    Results for orders placed or performed in visit on 11/12/18   LDL Cholesterol, Direct   Result Value Ref Range    LDL Cholesterol  109 1 - 129 mg/dL   Hemoglobin A1c   Result Value Ref Range    Hemoglobin A1C 9.7 (H) 4 - 5.6 %   Comprehensive Metabolic Panel   Result Value Ref Range    Glucose 264 (H) 60 - 100 mg/dL    BUN 30 (H) 7 - 21 mg/dL    Creatinine 1.34 (H) 0.70 - 1.30 mg/dL    Sodium 138 137 - 145 mmol/L    Potassium 4.9 3.5 - 5.1 mmol/L    Chloride 103 95 - 110 mmol/L    CO2 29.0 22.0 - 31.0 mmol/L    Calcium 9.3 8.4 - 10.2 mg/dL    Total Protein 7.4 6.3 - 8.6 g/dL    Albumin 4.50 3.40 - 4.80 g/dL    ALT (SGPT) 45 21 - 72 U/L    AST (SGOT) 51 17 - 59 U/L    Alkaline Phosphatase 105 38 - 126 U/L    Total Bilirubin 0.6 0.2 - 1.3 mg/dL    eGFR Non African Amer 52 42 - 98 mL/min/1.73    Globulin 2.9 2.3 - 3.5 gm/dL    A/G Ratio 1.6 1.1 - 1.8 g/dL    BUN/Creatinine Ratio 22.4 7.0 - 25.0    Anion Gap 6.0 5.0 - 15.0 mmol/L       Assessment/Plan   Problem List Items Addressed This Visit        Cardiovascular and Mediastinum    Essential hypertension (Chronic)    Hyperlipidemia (Chronic)    Relevant Medications    ezetimibe (ZETIA) 10 MG tablet       Endocrine     Type 2 diabetes mellitus without complication, with long-term current use of insulin (CMS/HCC) (Chronic)       Genitourinary    Stage 3 chronic kidney disease (CMS/[facility]) (Chronic)      Other Visit Diagnoses     Medicare annual wellness visit, subsequent    -  Primary    Need for influenza vaccination        Relevant Orders    Flu Vaccine High Dose PF 65YR+ ([id number]) (Completed)    Encounter for prostate cancer screening             Absolutely avoid nsaids!!! Start zetia. Increase Levemir.     New Medications Ordered This Visit   Medications   • ezetimibe (ZETIA) 10 MG tablet     Sig: Take 1 tablet by mouth Daily.     Dispense:  90 tablet     Refill:  3     Return in about 3 months (around 2/20/2019) for Annual physical.

## 2018-12-27 ENCOUNTER — OFFICE VISIT (OUTPATIENT)
Dept: ORTHOPEDIC SURGERY | Facility: CLINIC | Age: 73
End: 2018-12-27

## 2018-12-27 VITALS — WEIGHT: 195 LBS | HEIGHT: 73 IN | BODY MASS INDEX: 25.84 KG/M2

## 2018-12-27 DIAGNOSIS — M25.561 RIGHT KNEE PAIN, UNSPECIFIED CHRONICITY: ICD-10-CM

## 2018-12-27 DIAGNOSIS — M17.11 PRIMARY OSTEOARTHRITIS OF RIGHT KNEE: Primary | ICD-10-CM

## 2018-12-27 DIAGNOSIS — R52 PAIN: Primary | ICD-10-CM

## 2018-12-27 PROCEDURE — 99214 OFFICE O/P EST MOD 30 MIN: CPT | Performed by: NURSE PRACTITIONER

## 2018-12-27 NOTE — PROGRESS NOTES
Zachary Galindo is a 73 y.o. male   Primary provider:  Halley Raymond MD       Chief Complaint   Patient presents with   • Right Knee - Pain       HISTORY OF PRESENT ILLNESS: new patient with right knee pain, patient had xrays done today, patient states that there is a lot of stiffness in the knee, patient states that there is pain in the right knee, patient wants to know if there is another type of shot that will not raise blood sugar levels,patient states that pain depends on movement for pain,   Pain   This is a new (3 months ) problem. The current episode started more than 1 month ago. The problem occurs constantly. Associated symptoms include joint swelling. Associated symptoms comments: Aching, clicking, popping. The symptoms are aggravated by walking and standing. He has tried ice, heat and rest for the symptoms. The treatment provided mild relief.        CONCURRENT MEDICAL HISTORY:    Past Medical History:   Diagnosis Date   • Allergic rhinitis    • Ankle joint pain    • Artificial lens present     Left   • Astigmatism    • Benign prostatic hyperplasia    • Cataract    • Closed fracture of medial malleolus    • Coronary arteriosclerosis    • Diabetes mellitus (CMS/HCC)     no retinopathy   • Elevated levels of transaminase & lactic acid dehydrogenase     improving    • Encounter for health maintenance examination     Individual health examination     • Encounter for health maintenance examination in adult     Adult health examination    • Essential hypertension    • Essential hypertension     Unspecified   • Flank pain     probably muscular    • GERD (gastroesophageal reflux disease)    • Gout    • Gout     unspecified     • Hemorrhoids    • History of artificial eye lens     Artificial lens in position - right    • History of colonoscopy 04/04/2010    Colon endoscopy  (84012)  (1)    • History of kidney stones    • Hyperlipidemia    • Hypermetropia    • Low blood pressure    • Malaise and fatigue  "   • Need for influenza vaccination     Needs influenza immunization    • Nuclear cataract of left eye    • Posterior subcapsular polar senile cataract     Left   • Renal impairment    • Special screening for malignant neoplasm of prostate    • Type 2 diabetes mellitus (CMS/HCC)     improving   • 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 edema (CMS/HCC)     without macular edema - mild OS    • Upper respiratory infection    • Urticaria     Drug-aggravated angioedema-urticaria   • Urticaria        Allergies   Allergen Reactions   • Nsaids Other (See Comments)     Kidney disease   • Advicor [Niacin-Lovastatin Er]      Swelling   • Lisinopril      Cough         Current Outpatient Medications:   •  amLODIPine (NORVASC) 5 MG tablet, Take 1 tablet by mouth Daily., Disp: 90 tablet, Rfl: 1  •  aspirin 81 MG tablet, Take 1 tablet by mouth Daily., Disp: 30 tablet, Rfl: 11  •  atorvastatin (LIPITOR) 10 MG tablet, Take 1 tablet by mouth Every Night., Disp: 90 tablet, Rfl: 3  •  carvedilol (COREG) 6.25 MG tablet, Take 1 tablet by mouth 2 (Two) Times a Day With Meals., Disp: 180 tablet, Rfl: 3  •  clopidogrel (PLAVIX) 75 MG tablet, Take 1 tablet by mouth Daily., Disp: 90 tablet, Rfl: 3  •  ezetimibe (ZETIA) 10 MG tablet, Take 1 tablet by mouth Daily., Disp: 90 tablet, Rfl: 3  •  glipiZIDE (GLUCOTROL) 5 MG tablet, Take 1 tablet by mouth 2 (Two) Times a Day Before Meals., Disp: 180 tablet, Rfl: 3  •  glucose blood test strip, OneTouch Ultra Test strips  -  Test sugars 3-4 times a day as directed by provider., Disp: , Rfl:   •  insulin detemir (LEVEMIR) 100 UNIT/ML injection, Inject 63 Units under the skin into the appropriate area as directed Every Night. 25 units bid , Disp: , Rfl:   •  Insulin Pen Needle (PEN NEEDLES 3/16\") 31G X 5 MM misc, 100 each Daily., Disp: 100 each, Rfl: 3  •  Lancets (ONETOUCH ULTRASOFT) lancets, Test sugars 3-4 times daily as instructed by physician., Disp: " , Rfl:   •  losartan (COZAAR) 50 MG tablet, Take 1 tablet by mouth Daily., Disp: 90 tablet, Rfl: 3  •  magnesium oxide (MAG-OX) 400 MG tablet, Take 1 tablet by mouth 2 (Two) Times a Day., Disp: 180 tablet, Rfl: 3  •  ondansetron ODT (ZOFRAN-ODT) 4 MG disintegrating tablet, Take 1 tablet by mouth Every 6 (Six) Hours As Needed for Nausea or Vomiting., Disp: 10 tablet, Rfl: 0  •  probenecid (BENEMID) 500 MG tablet, Take 1 tablet by mouth 2 (Two) Times a Day., Disp: 180 tablet, Rfl: 3  •  SITagliptin (JANUVIA) 100 MG tablet, Take 1 tablet by mouth Daily., Disp: 90 tablet, Rfl: 3    Past Surgical History:   Procedure Laterality Date   • CARDIAC CATHETERIZATION  03/24/2014    (06032)  (2)  Reduction of stenoisis from 95% to less than 0% stenosis with evidence of good JENNY 3-flow. Distal RCA beyond the touchdown was seen filling the circumflex system   • CATARACT EXTRACTION  10/2015    Remove cataract, insert lens (2)    • CORONARY ARTERY BYPASS GRAFT      Arterial, two  (1)   -  3 - 12/1998   • HERNIA REPAIR      w/mesh  (1)   • INJECTION OF MEDICATION  04/11/2013    B12  (1)  - RAYMOND Raymond   • INJECTION OF MEDICATION  07/03/2013    Benadryl  (1) - RAYMOND Raymond   • INJECTION OF MEDICATION  10/22/2014    Kenalog  (2)   • OTHER SURGICAL HISTORY  07/10/2002    Fragmenting of kidney stone  -   [facility], 2010 shocks. 1cm UP stone,right. Diabetes mellitus       Family History   Problem Relation Age of Onset   • Diabetes Other    • Hypertension Other    • Cancer Mother    • Heart disease Mother    • Hypertension Mother    • Hyperlipidemia Mother    • Diabetes Brother    • Diabetes Maternal Aunt        Social History     Socioeconomic History   • Marital status:      Spouse name: Not on file   • Number of children: Not on file   • Years of education: Not on file   • Highest education level: Not on file   Social Needs   • Financial resource strain: Not on file   • Food insecurity - worry: Not on file   • Food insecurity -  "inability: Not on file   • Transportation needs - medical: Not on file   • Transportation needs - non-medical: Not on file   Occupational History   • Not on file   Tobacco Use   • Smoking status: Former Smoker   • Smokeless tobacco: Never Used   • Tobacco comment: Quit 1979   Substance and Sexual Activity   • Alcohol use: No   • Drug use: No   • Sexual activity: Not on file   Other Topics Concern   • Not on file   Social History Narrative   • Not on file        Review of Systems   Musculoskeletal: Positive for joint swelling.   All other systems reviewed and are negative.      PHYSICAL EXAMINATION:       Ht 185.4 cm (73\")   Wt 88.5 kg (195 lb)   BMI 25.73 kg/m²     Physical Exam   Constitutional: He is oriented to person, place, and time. Vital signs are normal. He appears well-developed and well-nourished. He is cooperative.   HENT:   Head: Normocephalic and atraumatic.   Neck: Trachea normal and phonation normal.   Pulmonary/Chest: Effort normal. No respiratory distress.   Abdominal: Soft. Normal appearance. He exhibits no distension.   Musculoskeletal:        Right knee: He exhibits no effusion.   Neurological: He is alert and oriented to person, place, and time. GCS eye subscore is 4. GCS verbal subscore is 5. GCS motor subscore is 6.   Skin: Skin is warm, dry and intact. Capillary refill takes less than 2 seconds.   Psychiatric: He has a normal mood and affect. His speech is normal and behavior is normal. Judgment and thought content normal. Cognition and memory are normal.   Vitals reviewed.      GAIT:     []  Normal  [x]  Antalgic    Assistive device: []  None  []  Walker     []  Crutches  []  Cane     []  Wheelchair  []  Stretcher    Right Knee Exam     Tenderness   The patient is experiencing tenderness in the lateral joint line and medial joint line.    Range of Motion   Extension: normal   Flexion: abnormal     Tests   Dee:  Medial - positive Lateral - positive    Other   Erythema: absent  Scars: " absent  Sensation: normal  Pulse: present  Swelling: mild  Effusion: no effusion present      Left Knee Exam   Left knee exam is normal.    Muscle Strength   The patient has normal left knee strength.                  No results found.        ASSESSMENT:    Diagnoses and all orders for this visit:    Primary osteoarthritis of right knee  -     MRI Knee Right Without Contrast; Future    Right knee pain, unspecified chronicity  -     MRI Knee Right Without Contrast; Future          PLAN  Recommend MRI of the right knee for further evaluation of his symptoms.  No Follow-up on file.    Jones Mccracken, APRN

## 2019-01-07 ENCOUNTER — HOSPITAL ENCOUNTER (OUTPATIENT)
Dept: MRI IMAGING | Facility: HOSPITAL | Age: 74
Discharge: HOME | End: 2019-01-07
Admitting: NURSE PRACTITIONER

## 2019-01-07 DIAGNOSIS — M25.561 RIGHT KNEE PAIN, UNSPECIFIED CHRONICITY: ICD-10-CM

## 2019-01-07 DIAGNOSIS — M17.11 PRIMARY OSTEOARTHRITIS OF RIGHT KNEE: ICD-10-CM

## 2019-01-07 PROCEDURE — 73721 MRI JNT OF LWR EXTRE W/O DYE: CPT

## 2019-01-23 RX ORDER — AMLODIPINE BESYLATE 5 MG/1
5 TABLET ORAL DAILY
Qty: 30 TABLET | Refills: 5 | Status: SHIPPED | OUTPATIENT
Start: 2019-01-23 | End: 2019-02-21 | Stop reason: SDUPTHER

## 2019-01-25 ENCOUNTER — TRANSCRIBE ORDERS (OUTPATIENT)
Dept: LAB | Facility: HOSPITAL | Age: 74
End: 2019-01-25

## 2019-01-25 ENCOUNTER — APPOINTMENT (OUTPATIENT)
Dept: LAB | Facility: HOSPITAL | Age: 74
End: 2019-01-25

## 2019-01-25 DIAGNOSIS — M25.512 LEFT SHOULDER PAIN, UNSPECIFIED CHRONICITY: ICD-10-CM

## 2019-01-25 DIAGNOSIS — I25.9 CHRONIC ISCHEMIC HEART DISEASE, UNSPECIFIED: ICD-10-CM

## 2019-01-25 DIAGNOSIS — E11.9 TYPE 2 DIABETES MELLITUS WITHOUT COMPLICATION, UNSPECIFIED WHETHER LONG TERM INSULIN USE (HCC): ICD-10-CM

## 2019-01-25 DIAGNOSIS — E78.5 HYPERLIPIDEMIA, UNSPECIFIED HYPERLIPIDEMIA TYPE: ICD-10-CM

## 2019-01-25 DIAGNOSIS — M10.9 GOUT, UNSPECIFIED CAUSE, UNSPECIFIED CHRONICITY, UNSPECIFIED SITE: ICD-10-CM

## 2019-01-25 DIAGNOSIS — N18.30 CHRONIC KIDNEY DISEASE, STAGE III (MODERATE) (HCC): Primary | ICD-10-CM

## 2019-01-25 DIAGNOSIS — M54.2 CERVICALGIA: ICD-10-CM

## 2019-01-25 DIAGNOSIS — I10 ESSENTIAL (PRIMARY) HYPERTENSION: ICD-10-CM

## 2019-01-25 LAB
ALBUMIN SERPL-MCNC: 4.3 G/DL (ref 3.4–4.8)
ANION GAP SERPL CALCULATED.3IONS-SCNC: 10 MMOL/L (ref 5–15)
BUN BLD-MCNC: 30 MG/DL (ref 7–21)
BUN/CREAT SERPL: 20.3 (ref 7–25)
CALCIUM SPEC-SCNC: 9.5 MG/DL (ref 8.4–10.2)
CHLORIDE SERPL-SCNC: 99 MMOL/L (ref 95–110)
CO2 SERPL-SCNC: 30 MMOL/L (ref 22–31)
CREAT BLD-MCNC: 1.48 MG/DL (ref 0.7–1.3)
CREAT UR-MCNC: 205.8 MG/DL
GFR SERPL CREATININE-BSD FRML MDRD: 47 ML/MIN/1.73 (ref 42–98)
GLUCOSE BLD-MCNC: 263 MG/DL (ref 60–100)
PHOSPHATE SERPL-MCNC: 3.5 MG/DL (ref 2.4–4.4)
POTASSIUM BLD-SCNC: 5.1 MMOL/L (ref 3.5–5.1)
PROT UR-MCNC: 17.5 MG/DL
PROT/CREAT UR: 85 MG/G CREA (ref 0–200)
SODIUM BLD-SCNC: 139 MMOL/L (ref 137–145)

## 2019-01-25 PROCEDURE — 80069 RENAL FUNCTION PANEL: CPT | Performed by: INTERNAL MEDICINE

## 2019-01-25 PROCEDURE — 36415 COLL VENOUS BLD VENIPUNCTURE: CPT | Performed by: INTERNAL MEDICINE

## 2019-01-25 PROCEDURE — 84156 ASSAY OF PROTEIN URINE: CPT | Performed by: INTERNAL MEDICINE

## 2019-01-25 PROCEDURE — 82570 ASSAY OF URINE CREATININE: CPT | Performed by: INTERNAL MEDICINE

## 2019-01-28 ENCOUNTER — OFFICE VISIT (OUTPATIENT)
Dept: ORTHOPEDIC SURGERY | Facility: CLINIC | Age: 74
End: 2019-01-28

## 2019-01-28 VITALS — HEIGHT: 73 IN | BODY MASS INDEX: 26.11 KG/M2 | WEIGHT: 197 LBS

## 2019-01-28 DIAGNOSIS — M17.11 PRIMARY OSTEOARTHRITIS OF RIGHT KNEE: ICD-10-CM

## 2019-01-28 DIAGNOSIS — M25.561 RIGHT KNEE PAIN, UNSPECIFIED CHRONICITY: Primary | ICD-10-CM

## 2019-01-28 PROCEDURE — 99213 OFFICE O/P EST LOW 20 MIN: CPT | Performed by: NURSE PRACTITIONER

## 2019-01-28 NOTE — PROGRESS NOTES
"Zachary Galindo is a 73 y.o. male returns for     Chief Complaint   Patient presents with   • Right Knee - Follow-up, Pain   • Results       HISTORY OF PRESENT ILLNESS: fu right knee pain, mri done on 1/9/2019       CONCURRENT MEDICAL HISTORY:    The following portions of the patient's history were reviewed and updated as appropriate: allergies, current medications, past family history, past medical history, past social history, past surgical history and problem list.     ROS  No fevers or chills.  No chest pain or shortness of air.  No GI or  disturbances.    PHYSICAL EXAMINATION:       Ht 185.4 cm (73\")   Wt 89.4 kg (197 lb)   BMI 25.99 kg/m²     Physical Exam   Constitutional: He is oriented to person, place, and time. Vital signs are normal. He appears well-developed and well-nourished. He is cooperative.   HENT:   Head: Normocephalic and atraumatic.   Neck: Trachea normal and phonation normal.   Pulmonary/Chest: Effort normal. No respiratory distress.   Abdominal: Soft. Normal appearance. He exhibits no distension.   Musculoskeletal:        Right knee: He exhibits no effusion.   Neurological: He is alert and oriented to person, place, and time. GCS eye subscore is 4. GCS verbal subscore is 5. GCS motor subscore is 6.   Skin: Skin is warm, dry and intact.   Psychiatric: He has a normal mood and affect. His speech is normal and behavior is normal. Judgment and thought content normal. Cognition and memory are normal.   Vitals reviewed.      GAIT:     []  Normal  []  Antalgic    Assistive device: [x]  None  []  Walker     []  Crutches  []  Cane     []  Wheelchair  []  Stretcher    Right Knee Exam     Tenderness   The patient is experiencing tenderness in the lateral joint line and medial joint line.    Range of Motion   Extension: normal   Flexion: abnormal     Tests   Dee:  Medial - positive Lateral - positive    Other   Erythema: absent  Scars: absent  Sensation: normal  Pulse: present  Swelling: " mild  Effusion: no effusion present      Left Knee Exam   Left knee exam is normal.    Muscle Strength   The patient has normal left knee strength.              Mri Knee Right Without Contrast    Result Date: 1/9/2019  Narrative: MRI right knee without contrast on 1/7/2019 CLINICAL INDICATION: Right knee pain that is worsening TECHNIQUE: Multiplanar, multisequence MR images are obtained throughout the right knee without the administration of contrast. This examination was performed on a 1.5 Keli magnet. COMPARISON: Right knee x-ray from 12/27/2018 FINDINGS: There is a small to moderate-sized joint effusion. There is a miniscule Baker's cyst. Popliteus tendon is intact and unremarkable. There is chronic spur formation and calcifications along the superior and inferior aspect of the patella at the quadriceps and patellar tendon insertions. The patellar and quadriceps tendons are intact. The ACL and PCL are intact and unremarkable. There is mild joint space narrowing in the medial compartment. There is mild chondromalacia with underlying reactive marrow change in the medial femoral condyle and mildly in the anterior medial tibial plateau. There is some degenerative signal in the posterior horn of the medial meniscus without extension to the surface to suggest a definite tear. The lateral meniscus is intact and unremarkable. There is a small amount of fluid adjacent to the MCL consistent with a mild grade 1 sprain of the MCL. The MCL is intact. The lateral collateral ligament complex is intact and unremarkable. Bone marrow signal is otherwise unremarkable. No other cartilage abnormality is noted.     Impression: 1. Small to moderate-sized joint effusion with a miniscule Baker's cyst. 2. Mild grade 1 sprain of the MCL. 3. Mild chondromalacia with underlying reactive marrow change in the medial compartment with some degenerative signal in the medial meniscus without definite meniscal tear. Electronically signed by:   Dago Holliday  1/9/2019 8:55 AM [facility] Workstation: [id number]            ASSESSMENT:    Diagnoses and all orders for this visit:    Right knee pain, unspecified chronicity    Primary osteoarthritis of right knee          PLAN  I reviewed MRI and discussed the findings in detail with the patient's wife and himself.  Recommend continued low impact exercising, modified weightbearing and activity based on pain and Tylenol as needed for pain.  Patient was placed in a hinged knee brace today in office I recommended that he come back in 4-6 weeks for recheck.  No Follow-up on file.    Jones Mccracken, APRN

## 2019-01-29 DIAGNOSIS — M17.11 PRIMARY OSTEOARTHRITIS OF RIGHT KNEE: Primary | ICD-10-CM

## 2019-02-07 ENCOUNTER — APPOINTMENT (OUTPATIENT)
Dept: PHYSICAL THERAPY | Facility: HOSPITAL | Age: 74
End: 2019-02-07

## 2019-02-07 ENCOUNTER — HOSPITAL ENCOUNTER (OUTPATIENT)
Dept: PHYSICAL THERAPY | Facility: HOSPITAL | Age: 74
Setting detail: THERAPIES SERIES
Discharge: HOME | End: 2019-02-07

## 2019-02-07 DIAGNOSIS — M17.11 OSTEOARTHRITIS OF RIGHT KNEE, UNSPECIFIED OSTEOARTHRITIS TYPE: Primary | ICD-10-CM

## 2019-02-07 PROCEDURE — 97110 THERAPEUTIC EXERCISES: CPT

## 2019-02-07 PROCEDURE — 97161 PT EVAL LOW COMPLEX 20 MIN: CPT

## 2019-02-07 NOTE — THERAPY EVALUATION
Outpatient Physical Therapy Ortho Initial Evaluation  [facility]     Patient Name: Zachary Galindo  : 1945  MRN: [id number]  Today's Date: 2019      Visit Date: 2019    Patient Active Problem List   Diagnosis   • Essential hypertension   • Gout   • Hyperlipidemia   • Type 2 diabetes mellitus without complication, with long-term current use of insulin (CMS/HCC)   • Coronary artery disease involving coronary bypass graft of native heart without angina pectoris   • Pure hypercholesterolemia   • Stage 3 chronic kidney disease (CMS/HCC)   • Right knee pain   • Primary osteoarthritis of right knee        Past Medical History:   Diagnosis Date   • Allergic rhinitis    • Ankle joint pain    • Artificial lens present     Left   • Astigmatism    • Benign prostatic hyperplasia    • Cataract    • Closed fracture of medial malleolus    • Coronary arteriosclerosis    • Diabetes mellitus (CMS/HCC)     no retinopathy   • Elevated levels of transaminase & lactic acid dehydrogenase     improving    • Encounter for health maintenance examination     Individual health examination     • Encounter for health maintenance examination in adult     Adult health examination    • Essential hypertension    • Essential hypertension     Unspecified   • Flank pain     probably muscular    • GERD (gastroesophageal reflux disease)    • Gout    • Gout     unspecified     • Hemorrhoids    • History of artificial eye lens     Artificial lens in position - right    • History of colonoscopy 2010    Colon endoscopy  (03328)  (1)    • History of kidney stones    • Hyperlipidemia    • Hypermetropia    • Low blood pressure    • Malaise and fatigue    • Need for influenza vaccination     Needs influenza immunization    • Nuclear cataract of left eye    • Posterior subcapsular polar senile cataract     Left   • Renal impairment    • Special screening for malignant neoplasm of prostate    • Type 2 diabetes mellitus  (CMS/[facility])     improving   • 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 Distal RCA beyond the touchdown was seen filling the circumflex system   • CATARACT EXTRACTION  10/2015    Remove cataract, insert lens (2)    • CORONARY ARTERY BYPASS GRAFT      Arterial, two  (1)   -  3 - 12/1998   • HERNIA REPAIR      w/mesh  (1)   • INJECTION OF MEDICATION  04/11/2013    B12  (1)  - RAYMOND Raymond   • INJECTION OF MEDICATION  07/03/2013    Benadryl  (1) - RAYMOND Raymond   • INJECTION OF MEDICATION  10/22/2014    Kenalog  (2)   • OTHER SURGICAL HISTORY  07/10/2002    Fragmenting of kidney stone  -   ESWL, 2010 shocks. 1cm UP stone,right. Diabetes mellitus       Visit Dx:     ICD-10-CM ICD-9-CM   1. Osteoarthritis of right knee, unspecified osteoarthritis type M17.11 715.96       Patient History     Row Name 02/07/19 1400             History    Chief Complaint  Pain  -DH      Type of Pain  Knee pain  -      Date Current Problem(s) Began  -- 1 year  -      Brief Description of Current Complaint  Dull pain, intermittant, worse with WB  -DH      Patient/Caregiver Goals  Relieve pain;Return to prior level of function  -      Occupation/sports/leisure activities  -- doesn't work all the time, runs back hoe  -      What clinical tests have you had for this problem?  MRI;X-ray  -      Results of Clinical Tests  -- see chart  -         Pain     Pain Location  Knee  -      Pain at Present  2  -DH      Pain at Best  1  -DH      Pain at Worst  7 worst when bending  -      Pain Frequency  Intermittent happens every time bend it   -      Pain Description  Dull;Aching  -      What Performance Factors Make the Current Problem(s) WORSE?  -- WB, bending  -      What Performance Factors Make the Current Problem(s) BETTER?  -- rest, ice  -      Difficulties with ADL's?  -- can't get down, has hard time getting back up  -         Fall Risk Assessment    Any falls in the past year:  No  -        User Key  (r) = Recorded By, (t) = Taken By, (c) = Cosigned By    Initials Name Provider Type     Carmita Linda, PT Physical Therapist          PT Ortho     Row Name 02/07/19 1400       Subjective Comments    Subjective Comments  Patient reports he was given a brace by his MD, wore it for several days but it seemed like it made his knee pain worse  -       Precautions and Contraindications    Precautions/Limitations  no known precautions/limitations  -       Subjective Pain    Able to rate subjective pain?  yes  -    Pre-Treatment Pain Level  2  -    Post-Treatment Pain Level  1  -    Subjective Pain Comment  -- It feels better  -       Posture/Observations    Posture- WNL  Posture is WNL  -    Observations  Other (commet)  -       Special Tests/Palpation    Special Tests/Palpation  Knee  -       Knee Palpation    Knee Palpation?  Yes  -    Medial Joint Line  Tender;Swollen  -    Quads  Tender distal medial quad  -       Knee Special Tests    Valgus stress (MCL lesion)  Positive;Right:  -       General ROM    RT Lower Ext  Rt Knee Extension/Flexion  -       Right Lower Ext    Rt Knee Extension/Flexion AROM  -- WFL  -    RT Lower Extremity Comments  R knee flexion limited and painful at end range. Limited to 128 degrees  -       MMT (Manual Muscle Testing)    Rt Lower Ext  --  -       MMT Right Lower Ext    Rt Knee Extension MMT, Gross Movement  (4/5) good  -    Rt Knee Flexion MMT, Gross Movement  (4/5) good  -       Sensation    Sensation WNL?  WFL  -    Light Touch  No apparent deficits  -       Girth     Girth Measured?  Right Lower Extremity  -       RLE Quick Girth (cm)    Mid patella  -- 39.5 cm  -       Gait/Stairs Assessment/Training    Comment (Gait/Stairs)  Antalgia, pain R knee with WB  -      User Key  (r) = Recorded By, (t) = Taken By, (c) = Cosigned By    Initials Name Provider Type     Carmita Linda, PT Physical Therapist                      Therapy Education  Education Details: (Instruction in quad sets and SAQ for home. Patient instructed to discontinue exercise if his condition should worsen after doing. Patient alos instructed to use stationary bicycle at home.)  Given: HEP, Edema management  Program: New  How Provided: Verbal, Demonstration, Written  Provided to: Patient  Level of Understanding: Verbalized, Demonstrated     PT OP Goals     Row Name 02/07/19 1400          PT Short Term Goals    STG Date to Achieve  02/21/19  -     STG 1  -- Patient will report no pain with walking and/or bending  -     STG 1 Progress  New  -     STG 2  -- Reduce R knee edema by .5 cm  -     STG 2 Progress  New  -     STG 3  Patient will demonstrate independence with HEP  -     STG 3 Progress  New  -     STG 3 Progress Comments  HEP will be  modified as indicated each visit  -     STG 4  Increase R knee flexion by 10 degrees  -     STG 4 Progress  New  [facility]        Long Term Goals    LTG Date to Achieve  02/28/19  -     LTG 1  Patient will achieve 5/5 muscle strength R knee flexion/extension  -     LTG 1 Progress  New  -     LTG 2  Patient will report no pain R knee.  -     LTG 2 Progress  New  [facility]        Time Calculation    PT Goal Re-Cert Due Date  02/28/19  -       User Key  (r) = Recorded By, (t) = Taken By, (c) = Cosigned By    Initials Name Provider Type     Carmita Linda, JOE Physical Therapist          PT Assessment/Plan     Row Name 02/07/19 1550 02/07/19 1458       PT Assessment    Functional Limitations  --  Impaired gait;Limitations in community  activities;Performance in leisure activities;Performance in work activities Decreased ability to participate in activities requiring WB on RLE and bending of R knee due to pain which is 7/10 at worst.  -    Impairments  --  Edema;Gait;Pain;Range of motion;Muscle strength  -    Assessment Comments  --  -  Patient presents with 2-7/10 pain, edema and limitation of R knee flexion with antalgia. Pain is worse with WB activities and with flexion. Patient may benefit from physical therapy to assist in managing pain, restoring ROM, decreasing edema and restoring prior functional level.  -    Please refer to paper survey for additional self-reported information  --  --  -    Rehab Potential  --  Excellent  -    Patient/caregiver participated in establishment of treatment plan and goals  --  Yes  -    Patient would benefit from skilled therapy intervention  --  Yes  -       PT Plan    PT Frequency  --  2x/week  -    Predicted Duration of Therapy Intervention (Therapy Eval)  --  -- 4 to 6 weeks  -    Planned CPT's?  --  PT EVAL LOW COMPLEXITY: 52664  -    Physical Therapy Interventions (Optional Details)  --  aquatics exercise;home exercise program;manual therapy techniques;modalities;patient/family education;strengthening;ROM (Range of Motion)  -    PT Plan Comments  --  Plan to address pain and edema through manual therapy, estim and cryotherapy. Will progress exercises as tolerated for LE flexibility and strengthening exercises.  -      User Key  (r) = Recorded By, (t) = Taken By, (c) = Cosigned By    Initials Name Provider Type     Carmita Linda, PT Physical Therapist            Exercises     Row Name 02/07/19 1400             Subjective Comments    Subjective Comments  Patient reports he was given a brace by his MD, wore it for several days but it seemed like it made his knee pain worse  -         Subjective Pain    Able to rate subjective pain?  yes  -      Pre-Treatment Pain Level  2   -DH      Post-Treatment Pain Level  1  -      Subjective Pain Comment  -- It feels better  -         Exercise 1    Exercise Name 1  Pro 2, L1, seat 17  -         Exercise 2    Exercise Name 2  quad set  -      Sets 2  3  -      Reps 2  10  -DH      Additional Comments  over pillow  -         Exercise 3    Exercise Name 3  SAQ  -DH      Sets 3  3  -DH      Reps 3  10  -DH        User Key  (r) = Recorded By, (t) = Taken By, (c) = Cosigned By    Initials Name Provider Type     Carmita Linda PT Physical Therapist                        Outcome Measure Options: Lower Extremity Functional Scale (LEFS)  Lower Extremity Functional Index  Any of your usual work, housework or school activities: Moderate difficulty  Your usual hobbies, recreational or sporting activities: Moderate difficulty  Getting into or out of the bath: Moderate difficulty  Walking between rooms: Moderate difficulty  Putting on your shoes or socks: Moderate difficulty  Squatting: Quite a bit of difficulty  Lifting an object, like a bag of groceries from the floor: Quite a bit of difficulty  Performing light activities around your home: Moderate difficulty  Performing heavy activities around your home: Moderate difficulty  Getting into or out of a car: Moderate difficulty  Walking 2 blocks: Moderate difficulty  Walking a mile: Quite a bit of difficulty  Going up or down 10 stairs (about 1 flight of stairs): Moderate difficulty  Standing for 1 hour: Quite a bit of difficulty  Sitting for 1 hour: Quite a bit of difficulty  Running on even ground: Quite a bit of difficulty  Running on uneven ground: Quite a bit of difficulty  Making sharp turns while running fast: Extreme difficulty or unable to perform activity  Hopping: Extreme difficulty or unable to perform activity  Rolling over in bed: Moderate difficulty  Total: 29      Time Calculation:     Therapy Suggested Charges     Code   Minutes Charges    None             Start Time: 1359  Stop  Time: 1448  Time Calculation (min): 49 min  Total Timed Code Minutes- PT: 15 minute(s)     Therapy Charges for Today     Code Description Service Date Service Provider Modifiers Qty    86713326559 HC PT THER PROC EA 15 MIN 2/7/2019 Carmita Linda, PT GP 1    58902846054 HC PT EVAL LOW COMPLEXITY 2 2/7/2019 Carmita Linda, PT GP 1          PT G-Codes  Outcome Measure Options: Lower Extremity Functional Scale (LEFS)  Total: 29         Carmita Linda, PT  2/7/2019

## 2019-02-11 ENCOUNTER — LAB (OUTPATIENT)
Dept: LAB | Facility: HOSPITAL | Age: 74
End: 2019-02-11

## 2019-02-11 DIAGNOSIS — Z12.5 ENCOUNTER FOR PROSTATE CANCER SCREENING: ICD-10-CM

## 2019-02-11 DIAGNOSIS — I10 ESSENTIAL HYPERTENSION: ICD-10-CM

## 2019-02-11 DIAGNOSIS — E11.9 TYPE 2 DIABETES MELLITUS WITHOUT COMPLICATION, WITH LONG-TERM CURRENT USE OF INSULIN (HCC): ICD-10-CM

## 2019-02-11 DIAGNOSIS — Z79.4 TYPE 2 DIABETES MELLITUS WITHOUT COMPLICATION, WITH LONG-TERM CURRENT USE OF INSULIN (HCC): ICD-10-CM

## 2019-02-11 DIAGNOSIS — E78.2 MIXED HYPERLIPIDEMIA: ICD-10-CM

## 2019-02-11 LAB
ALBUMIN SERPL-MCNC: 4.6 G/DL (ref 3.4–4.8)
ALBUMIN UR-MCNC: 12.2 MG/L
ALBUMIN/GLOB SERPL: 1.5 G/DL (ref 1.1–1.8)
ALP SERPL-CCNC: 87 U/L (ref 38–126)
ALT SERPL W P-5'-P-CCNC: 32 U/L (ref 21–72)
ANION GAP SERPL CALCULATED.3IONS-SCNC: 9 MMOL/L (ref 5–15)
ARTICHOKE IGE QN: 100 MG/DL (ref 1–129)
AST SERPL-CCNC: 57 U/L (ref 17–59)
BACTERIA UR QL AUTO: ABNORMAL /HPF
BASOPHILS # BLD AUTO: 0.03 10*3/MM3 (ref 0–0.2)
BASOPHILS NFR BLD AUTO: 0.8 % (ref 0–1.5)
BILIRUB SERPL-MCNC: 0.7 MG/DL (ref 0.2–1.3)
BILIRUB UR QL STRIP: NEGATIVE
BUN BLD-MCNC: 21 MG/DL (ref 7–21)
BUN/CREAT SERPL: 17.5 (ref 7–25)
CALCIUM SPEC-SCNC: 9.7 MG/DL (ref 8.4–10.2)
CHLORIDE SERPL-SCNC: 101 MMOL/L (ref 95–110)
CHOLEST SERPL-MCNC: 159 MG/DL (ref 0–199)
CLARITY UR: CLEAR
CO2 SERPL-SCNC: 30 MMOL/L (ref 22–31)
COLOR UR: YELLOW
CREAT BLD-MCNC: 1.2 MG/DL (ref 0.7–1.3)
DEPRECATED RDW RBC AUTO: 42.5 FL (ref 37–54)
EOSINOPHIL # BLD AUTO: 0.16 10*3/MM3 (ref 0–0.4)
EOSINOPHIL NFR BLD AUTO: 4.1 % (ref 0.3–6.2)
ERYTHROCYTE [DISTWIDTH] IN BLOOD BY AUTOMATED COUNT: 13 % (ref 12.3–15.4)
GFR SERPL CREATININE-BSD FRML MDRD: 59 ML/MIN/1.73 (ref 42–98)
GLOBULIN UR ELPH-MCNC: 3.1 GM/DL (ref 2.3–3.5)
GLUCOSE BLD-MCNC: 168 MG/DL (ref 60–100)
GLUCOSE UR STRIP-MCNC: NEGATIVE MG/DL
HBA1C MFR BLD: 9.5 % (ref 4–5.6)
HCT VFR BLD AUTO: 44.7 % (ref 37.5–51)
HDLC SERPL-MCNC: 27 MG/DL (ref 60–200)
HGB BLD-MCNC: 15.2 G/DL (ref 13–17.7)
HGB UR QL STRIP.AUTO: ABNORMAL
HYALINE CASTS UR QL AUTO: ABNORMAL /LPF
IMM GRANULOCYTES # BLD AUTO: 0.01 10*3/MM3 (ref 0–0.05)
IMM GRANULOCYTES NFR BLD AUTO: 0.3 % (ref 0–0.5)
KETONES UR QL STRIP: NEGATIVE
LDLC/HDLC SERPL: 3.5 {RATIO} (ref 0–3.55)
LEUKOCYTE ESTERASE UR QL STRIP.AUTO: NEGATIVE
LYMPHOCYTES # BLD AUTO: 0.9 10*3/MM3 (ref 0.7–3.1)
LYMPHOCYTES NFR BLD AUTO: 23.1 % (ref 19.6–45.3)
MCH RBC QN AUTO: 30.4 PG (ref 26.6–33)
MCHC RBC AUTO-ENTMCNC: 34 G/DL (ref 31.5–35.7)
MCV RBC AUTO: 89.4 FL (ref 79–97)
MONOCYTES # BLD AUTO: 0.33 10*3/MM3 (ref 0.1–0.9)
MONOCYTES NFR BLD AUTO: 8.5 % (ref 5–12)
NEUTROPHILS # BLD AUTO: 2.47 10*3/MM3 (ref 1.4–7)
NEUTROPHILS NFR BLD AUTO: 63.2 % (ref 42.7–76)
NITRITE UR QL STRIP: NEGATIVE
NRBC BLD AUTO-RTO: 0 /100 WBC (ref 0–0)
PH UR STRIP.AUTO: 6 [PH] (ref 5–9)
PLATELET # BLD AUTO: 169 10*3/MM3 (ref 140–450)
PMV BLD AUTO: 11.7 FL (ref 6–12)
POTASSIUM BLD-SCNC: 4.4 MMOL/L (ref 3.5–5.1)
PROT SERPL-MCNC: 7.7 G/DL (ref 6.3–8.6)
PROT UR QL STRIP: ABNORMAL
PSA SERPL-MCNC: 0.97 NG/ML (ref 0–4)
RBC # BLD AUTO: 5 10*6/MM3 (ref 4.14–5.8)
RBC # UR: ABNORMAL /HPF
REF LAB TEST METHOD: ABNORMAL
SODIUM BLD-SCNC: 140 MMOL/L (ref 137–145)
SP GR UR STRIP: 1.01 (ref 1–1.03)
SQUAMOUS #/AREA URNS HPF: ABNORMAL /HPF
TRIGL SERPL-MCNC: 187 MG/DL (ref 20–199)
UROBILINOGEN UR QL STRIP: ABNORMAL
WBC NRBC COR # BLD: 3.9 10*3/MM3 (ref 3.4–10.8)
WBC UR QL AUTO: ABNORMAL /HPF

## 2019-02-11 PROCEDURE — 80053 COMPREHEN METABOLIC PANEL: CPT

## 2019-02-11 PROCEDURE — 82043 UR ALBUMIN QUANTITATIVE: CPT

## 2019-02-11 PROCEDURE — 36415 COLL VENOUS BLD VENIPUNCTURE: CPT

## 2019-02-11 PROCEDURE — 80061 LIPID PANEL: CPT

## 2019-02-11 PROCEDURE — 81001 URINALYSIS AUTO W/SCOPE: CPT

## 2019-02-11 PROCEDURE — 85025 COMPLETE CBC W/AUTO DIFF WBC: CPT

## 2019-02-11 PROCEDURE — 83036 HEMOGLOBIN GLYCOSYLATED A1C: CPT

## 2019-02-11 PROCEDURE — G0103 PSA SCREENING: HCPCS

## 2019-02-12 ENCOUNTER — HOSPITAL ENCOUNTER (OUTPATIENT)
Dept: PHYSICAL THERAPY | Facility: HOSPITAL | Age: 74
Setting detail: THERAPIES SERIES
Discharge: HOME | End: 2019-02-12

## 2019-02-12 DIAGNOSIS — M17.11 OSTEOARTHRITIS OF RIGHT KNEE, UNSPECIFIED OSTEOARTHRITIS TYPE: Primary | ICD-10-CM

## 2019-02-12 PROCEDURE — 97110 THERAPEUTIC EXERCISES: CPT

## 2019-02-12 PROCEDURE — G0283 ELEC STIM OTHER THAN WOUND: HCPCS

## 2019-02-12 NOTE — THERAPY TREATMENT NOTE
Outpatient Physical Therapy Ortho Treatment Note  [facility]     Patient Name: Zachary Galindo  : 1945  MRN: [id number]  Today's Date: 2019      Visit Date: 2019  Subjective Improvement: 25%  Visit Number:     Recert Date:   19  MD Visit:   19  Total Approved Visits:  Medicare      Visit Dx:    ICD-10-CM ICD-9-CM   1. Osteoarthritis of right knee, unspecified osteoarthritis type M17.11 715.96       Patient Active Problem List   Diagnosis   • Essential hypertension   • Gout   • Hyperlipidemia   • Type 2 diabetes mellitus without complication, with long-term current use of insulin (CMS/[facility])   • Coronary artery disease involving coronary bypass graft of native heart without angina pectoris   • Pure hypercholesterolemia   • Stage 3 chronic kidney disease (CMS/[facility])   • Right knee pain   • Primary osteoarthritis of right knee        Past Medical History:   Diagnosis Date   • Allergic rhinitis    • Ankle joint pain    • Artificial lens present     Left   • Astigmatism    • Benign prostatic hyperplasia    • Cataract    • Closed fracture of medial malleolus    • Coronary arteriosclerosis    • Diabetes mellitus (CMS/[facility])     no retinopathy   • Elevated levels of transaminase & lactic acid dehydrogenase     improving    • Encounter for health maintenance examination     Individual health examination     • Encounter for health maintenance examination in adult     Adult health examination    • Essential hypertension    • Essential hypertension     Unspecified   • Flank pain     probably muscular    • GERD (gastroesophageal reflux disease)    • Gout    • Gout     unspecified     • Hemorrhoids    • History of artificial eye lens     Artificial lens in position - right    • History of colonoscopy 2010    Colon endoscopy  (54659)  (1)    • History of kidney stones    • Hyperlipidemia    • Hypermetropia    • Low blood pressure    • Malaise and fatigue    • Need for influenza  vaccination     Needs influenza immunization    • Nuclear cataract of left eye    • Posterior subcapsular polar senile cataract     Left   • Renal impairment    • Special screening for malignant neoplasm of prostate    • Type 2 diabetes mellitus (CMS/HCC)     improving   • 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 Distal RCA beyond the touchdown was seen filling the circumflex system   • CATARACT EXTRACTION  10/2015    Remove cataract, insert lens (2)    • CORONARY ARTERY BYPASS GRAFT      Arterial, two  (1)   -  3 - 12/1998   • HERNIA REPAIR      w/mesh  (1)   • INJECTION OF MEDICATION  04/11/2013    B12  (1)  - RAYMOND Raymond   • INJECTION OF MEDICATION  07/03/2013    Benadryl  (1) - RAYMOND Raymond   • INJECTION OF MEDICATION  10/22/2014    Kenalog  (2)   • OTHER SURGICAL HISTORY  07/10/2002    Fragmenting of kidney stone  -   ESWL, 2010 shocks. 1cm UP stone,right. Diabetes mellitus       PT Ortho     Row Name 02/12/19 1500       Precautions and Contraindications    Precautions/Limitations  no known precautions/limitations  -BB       Posture/Observations    Posture/Observations Comments  NAG, 0-135 with no pain.  Min edema noted surrounding patella.   -BB      User Key  (r) = Recorded By, (t) = Taken By, (c) = Cosigned By    Initials Name Provider Type    Alicia King PTA Physical Therapy Assistant                      PT Assessment/Plan     Row Name 02/12/19 0128          PT Assessment    Assessment Comments  Initiated IFC and ice to aid in swelling based on primary PT POC.  Provided HEP sheets. Compliant with  HEP provided upon eval.   -BB        PT Plan    PT Frequency  2x/week  -BB     Predicted Duration of Therapy Intervention (Therapy Eval)  x 4-6 weeks  -BB     PT Plan Comments  Will initiate land 1 treatment and pool 1 treatment next week unless otherwise advised. Standing CKC next for HEP advancement.  Continue per POC.   -BB       User Key  (r) = Recorded By, (t) = Taken By, (c) = Cosigned By    Initials Name Provider Type    Alicia King PTA Physical Therapy Assistant          Modalities     Row Name 02/12/19 1500             Ice    Ice Applied  Yes  -BB      Location  R knee with IFC and elevation   -BB      Rx Minutes  -- 20 min  -BB      Ice S/P Rx  Yes  -BB         ELECTRICAL STIMULATION    Attended/Unattended  Unattended  -BB      Stimulation Type  IFC  -BB      Location/Electrode Placement/Other  20 min with ice and elevation for swelling  -BB        User Key  (r) = Recorded By, (t) = Taken By, (c) = Cosigned By    Initials Name Provider Type    Alicia King PTA Physical Therapy Assistant          Exercises     Row Name 02/12/19 1500             Subjective Comments    Subjective Comments  Doing HEP regularly. Reports no pain at moment. States he has more of an ache and soreness when it hurts.  States he is using TM about 8 minutes but stops when it starts bothering him.   Hasn't bothered him the last few nights and feels he is 25%.  -BB         Subjective Pain    Able to rate subjective pain?  yes  -BB      Pre-Treatment Pain Level  0  -BB      Post-Treatment Pain Level  0  -BB         Exercise 1    Exercise Name 1  QS   -BB      Cueing 1  Verbal  -BB      Reps 1  2  -BB      Time 1  10  -BB         Exercise 2    Exercise Name 2  Pro II seat 14 level 2  -BB      Sets 2  2  -BB      Reps 2  10  -BB         Exercise 3    Exercise Name 3  SLR flexion  -BB      Cueing 3  Verbal  -BB      Reps 3  10  -BB      Additional Comments  fatigued with SLR  -BB         Exercise 4    Exercise Name 4   Incline Stretch  -BB      Reps 4  3  -BB      Time 4  30  -BB         Exercise 5    Exercise Name 5  HS stretch  -BB      Reps 5  3  -BB      Time 5  30  -BB         Exercise 6    Exercise Name 6  Heel slides with strap  -BB      Reps 6  3  -BB      Time 6  30  -BB        User Key  (r) = Recorded By, (t) = Taken By, (c) = Cosigned By    Initials Name Provider Type    BB Alicia Raygoza PTA Physical Therapy Assistant                         PT OP Goals     Row Name 02/12/19 1500          PT Short Term Goals    STG Date to Achieve  02/21/19  -BB     STG 1  -- Patient will report no pain with walking and/or bending  -BB     STG 1 Progress  Progressing  -BB     STG 2  -- Reduce R knee edema by .5 cm  -BB     STG 2 Progress  Not Met  -BB     STG 3  Patient will demonstrate independence with HEP  -BB     STG 3 Progress  Progressing  -BB     STG 4  Increase R knee flexion by 10 degrees  -BB     STG 4 Progress  Not Met  -BB        Long Term Goals    LTG Date to Achieve  02/28/19  -BB     LTG 1  Patient will achieve 5/5 muscle strength R knee flexion/extension  -BB     LTG 1 Progress  Not Met  -BB     LTG 2  Patient will report no pain R knee.  -BB     LTG 2 Progress  Progressing  -BB        Time Calculation    PT Goal Re-Cert Due Date  02/28/19  -BB       User Key  (r) = Recorded By, (t) = Taken By, (c) = Cosigned By    Initials Name Provider Type    Alicia King PTA Physical Therapy Assistant          Therapy Education  Education Details: SLR flexion  Given: HEP  Program: New, Reinforced              Time Calculation:   Start Time: 1520  Stop Time: 1620  Time Calculation (min): 60 min  Total Timed Code Minutes- PT: 40 minute(s)  Therapy Suggested Charges     Code   Minutes Charges    None           Therapy Charges for Today     Code Description Service Date Service Provider Modifiers Qty    09420151504 HC PT THER PROC EA 15 MIN 2/12/2019 Alicia Raygoza PTA GP 3    42663783131 HC PT ELECTRICAL STIM  UNATTENDED 2/12/2019 Alicia Raygoza, PTA  1     SD TENS SUPPL 2 LEAD PER MONTH 2/12/2019 Alicia Raygoza, PTA  1                    Alicia Raygoza, REJI  2/12/2019

## 2019-02-14 ENCOUNTER — HOSPITAL ENCOUNTER (OUTPATIENT)
Dept: PHYSICAL THERAPY | Facility: HOSPITAL | Age: 74
Setting detail: THERAPIES SERIES
Discharge: HOME | End: 2019-02-14

## 2019-02-14 DIAGNOSIS — M17.11 OSTEOARTHRITIS OF RIGHT KNEE, UNSPECIFIED OSTEOARTHRITIS TYPE: Primary | ICD-10-CM

## 2019-02-14 PROCEDURE — 97110 THERAPEUTIC EXERCISES: CPT

## 2019-02-14 PROCEDURE — 97140 MANUAL THERAPY 1/> REGIONS: CPT

## 2019-02-14 NOTE — THERAPY TREATMENT NOTE
Outpatient Physical Therapy Ortho Treatment Note  [facility]     Patient Name: Zachary Galindo  : 1945  MRN: [id number]  Today's Date: 2019      Visit Date: 2019    Visit Dx:    ICD-10-CM ICD-9-CM   1. Osteoarthritis of right knee, unspecified osteoarthritis type M17.11 715.96       Patient Active Problem List   Diagnosis   • Essential hypertension   • Gout   • Hyperlipidemia   • Type 2 diabetes mellitus without complication, with long-term current use of insulin (CMS/[facility])   • Coronary artery disease involving coronary bypass graft of native heart without angina pectoris   • Pure hypercholesterolemia   • Stage 3 chronic kidney disease (CMS/HCC)   • Right knee pain   • Primary osteoarthritis of right knee        Past Medical History:   Diagnosis Date   • Allergic rhinitis    • Ankle joint pain    • Artificial lens present     Left   • Astigmatism    • Benign prostatic hyperplasia    • Cataract    • Closed fracture of medial malleolus    • Coronary arteriosclerosis    • Diabetes mellitus (CMS/[facility])     no retinopathy   • Elevated levels of transaminase & lactic acid dehydrogenase     improving    • Encounter for health maintenance examination     Individual health examination     • Encounter for health maintenance examination in adult     Adult health examination    • Essential hypertension    • Essential hypertension     Unspecified   • Flank pain     probably muscular    • GERD (gastroesophageal reflux disease)    • Gout    • Gout     unspecified     • Hemorrhoids    • History of artificial eye lens     Artificial lens in position - right    • History of colonoscopy 2010    Colon endoscopy  (83651)  (1)    • History of kidney stones    • Hyperlipidemia    • Hypermetropia    • Low blood pressure    • Malaise and fatigue    • Need for influenza vaccination     Needs influenza immunization    • Nuclear cataract of left eye    • Posterior subcapsular polar senile cataract      Left   • Renal impairment    • Special screening for malignant neoplasm of prostate    • Type 2 diabetes mellitus (CMS/HCC)     improving   • 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 edema (CMS/HCC)     without macular edema - mild OS    • Upper respiratory infection    • Urticaria     Drug-aggravated angioedema-urticaria   • Urticaria         Past Surgical History:   Procedure Laterality Date   • CARDIAC CATHETERIZATION  03/24/2014    (51056)  (2)  Reduction of stenoisis from 95% to less than 0% stenosis with evidence of good JENNY 3-flow. Distal RCA beyond the touchdown was seen filling the circumflex system   • CATARACT EXTRACTION  10/2015    Remove cataract, insert lens (2)    • CORONARY ARTERY BYPASS GRAFT      Arterial, two  (1)   -  3 - 12/1998   • HERNIA REPAIR      w/mesh  (1)   • INJECTION OF MEDICATION  04/11/2013    B12  (1)  - RAYMOND Raymond   • INJECTION OF MEDICATION  07/03/2013    Benadryl  (1) - RAYMOND Raymond   • INJECTION OF MEDICATION  10/22/2014    Kenalog  (2)   • OTHER SURGICAL HISTORY  07/10/2002    Fragmenting of kidney stone  -   ESWL, 2010 shocks. 1cm UP stone,right. Diabetes mellitus       PT Ortho     Row Name 02/14/19 1300       Subjective Pain    Able to rate subjective pain?  yes  -    Pre-Treatment Pain Level  0  -DH    Subjective Pain Comment  it depends on how I twist it  -DH       Posture/Observations    Posture- WNL  Posture is WNL  -DH       Special Tests/Palpation    Special Tests/Palpation  Knee  -       Knee Palpation    Knee Palpation?  Yes  -    Row Name 02/12/19 1500       Precautions and Contraindications    Precautions/Limitations  no known precautions/limitations  -       Posture/Observations    Posture/Observations Comments  NAG, 0-135 with no pain.  Min edema noted surrounding patella.   -BB      User Key  (r) = Recorded By, (t) = Taken By, (c) = Cosigned By    Initials Name Provider Type    Alicia King PTA  "Physical Therapy Assistant     Carmita Linda, PT Physical Therapist                      PT Assessment/Plan     Row Name 02/14/19 1401 02/14/19 1300       PT Assessment    Impairments  Impaired muscle length;Pain;Range of motion  -  --    Assessment Comments  Mild tenderness elicited inferior medial aspect of R knee which resolved following therapy session. Appears to have tightness of the hamstrings which may be contributing to the pain.  -  --       PT Plan    PT Frequency  --  2x/week  -    Predicted Duration of Therapy Intervention (Therapy Eval)  --  4 to 6 weeks  -      User Key  (r) = Recorded By, (t) = Taken By, (c) = Cosigned By    Initials Name Provider Type     Carmita Linda, PT Physical Therapist              Exercises     Row Name 02/14/19 1349 02/14/19 1300          Subjective Pain    Able to rate subjective pain?  --  yes  -     Pre-Treatment Pain Level  --  0  -DH     Subjective Pain Comment  --  it depends on how I twist it  -        Total Minutes    14280 - PT Therapeutic Exercise Minutes  --  -DH  30  -DH     76413 - PT Manual Therapy Minutes  --  12  -DH        Exercise 1    Exercise Name 1  --  Pro 2, L2, seat 16   -DH     Time 1  --  15  -DH        Exercise 2    Exercise Name 2  --  Biodex TKE R  -DH     Sets 2  --  3  -DH     Reps 2  --  10  -DH     Additional Comments  --  Plate 5  -DH        Exercise 3    Exercise Name 3  --  Cybex [place]   -DH     Cueing 3  --  Verbal  -DH     Sets 3  --  3  -DH     Reps 3  --  10  -DH     Additional Comments  --  70 #  -DH        Exercise 4    Exercise Name 4  --  SLR with toe out  -DH     Cueing 4  --  Verbal;Tactile  -DH     Sets 4  --  2  -DH     Reps 4  --  10  -DH     Additional Comments  --  3# ankle weight  -DH        Exercise 5    Exercise Name 5  --  SAQ 8\" roll  -DH     Cueing 5  --  Verbal  -DH     Sets 5  --  1  -DH     Reps 5  --  15  -DH     Additional Comments  --  Stopped after 15 reps due to onset of pain  -DH        " Exercise 6    Exercise Name 6  --  Double knee to chest with ball  -     Cueing 6  --  Verbal;Tactile  -DH     Sets 6  --  1  -DH     Reps 6  --  10  -DH     Additional Comments  --  mild discomfort towards end of set  -DH        Exercise 7    Exercise Name 7  --  bridge on ball  -     Cueing 7  --  Verbal;Tactile  -DH     Sets 7  --  1  -DH     Reps 7  --  10  -DH     Additional Comments  --  Some discomfort at end, mostly fatigue  -DH       User Key  (r) = Recorded By, (t) = Taken By, (c) = Cosigned By    Initials Name Provider Type     Carmita Linda PT Physical Therapist                        Manual Rx (last 36 hours)      Manual Treatments     Row Name 02/14/19 1300             Total Minutes    62309 - PT Manual Therapy Minutes  12  -DH         Manual Rx 1    Manual Rx 1 Location  R knee  -DH      Manual Rx 1 Type  soft tissue mobilization  -      Manual Rx 1 Duration  12 No tenderness elicited, mild soft tissue restrictions  -        User Key  (r) = Recorded By, (t) = Taken By, (c) = Cosigned By    Initials Name Provider Type     Carmita Linda PT Physical Therapist              Therapy Education  Education Details: Instructed to perform SLR at home with toe out for VMO strengthening  Given: HEP  Program: Progressed  How Provided: Verbal  Provided to: Patient  Level of Understanding: Verbalized, Demonstrated              Time Calculation:   Start Time: 1300  Stop Time: 1343  Time Calculation (min): 43 min  Total Timed Code Minutes- PT: 43 minute(s)  Therapy Suggested Charges     Code   Minutes Charges    34694 (CPT®) Hc Pt Neuromusc Re Education Ea 15 Min      38969 (CPT®) Hc Pt Ther Proc Ea 15 Min 30 2    72272 (CPT®) Hc Gait Training Ea 15 Min      77557 (CPT®) Hc Pt Therapeutic Act Ea 15 Min      10142 (CPT®) Hc Pt Manual Therapy Ea 15 Min 12 1    05282 (CPT®) Hc Pt Ther Massage- Per 15 Min      43021 (CPT®) Hc Pt Iontophoresis Ea 15 Min      73535 (CPT®) Hc Pt Elec Stim Ea-Per 15 Min 12 1     76578 (CPT®) Hc Pt Ultrasound Ea 15 Min      91256 (CPT®) Hc Pt Self Care/Mgmt/Train Ea 15 Min      66927 (CPT®) Hc Pt Prosthetic (S) Train Initial Encounter, Each 15 Min      70666 (CPT®) Hc Orthotic(S) Mgmt/Train Initial Encounter, Each 15min      68275 (CPT®) Hc Pt Aquatic Therapy Ea 15 Min      03555 (CPT®) Hc Pt Orthotic(S)/Prosthetic(S) Encounter, Each 15 Min       (CPT®) Hc Pt Electrical Stim Unattended      Total  54 4        Therapy Charges for Today     Code Description Service Date Service Provider Modifiers Qty    26952274073 HC PT THER PROC EA 15 MIN 2/14/2019 Carmita Linda, PT GP 2    25096048110 HC PT MANUAL THERAPY EA 15 MIN 2/14/2019 Carmita Linda, PT GP 1                    Carmita Linda, PT  2/14/2019

## 2019-02-18 RX ORDER — CARVEDILOL 6.25 MG/1
TABLET ORAL
Qty: 180 TABLET | Refills: 3 | Status: SHIPPED | OUTPATIENT
Start: 2019-02-18 | End: 2019-02-21 | Stop reason: SDUPTHER

## 2019-02-19 ENCOUNTER — HOSPITAL ENCOUNTER (OUTPATIENT)
Dept: PHYSICAL THERAPY | Facility: HOSPITAL | Age: 74
Setting detail: THERAPIES SERIES
Discharge: HOME | End: 2019-02-19

## 2019-02-19 DIAGNOSIS — M17.11 PRIMARY OSTEOARTHRITIS OF RIGHT KNEE: ICD-10-CM

## 2019-02-19 PROCEDURE — 97110 THERAPEUTIC EXERCISES: CPT

## 2019-02-19 NOTE — THERAPY TREATMENT NOTE
Outpatient Physical Therapy Ortho Treatment Note  [facility]     Patient Name: Zachary Galindo  : 1945  MRN: [id number]  Today's Date: 2019      Visit Date: 2019    Visit Dx:    ICD-10-CM ICD-9-CM   1. Primary osteoarthritis of right knee M17.11 715.16       Patient Active Problem List   Diagnosis   • Essential hypertension   • Gout   • Hyperlipidemia   • Type 2 diabetes mellitus without complication, with long-term current use of insulin (CMS/[facility])   • Coronary artery disease involving coronary bypass graft of native heart without angina pectoris   • Pure hypercholesterolemia   • Stage 3 chronic kidney disease (CMS/HCC)   • Right knee pain   • Primary osteoarthritis of right knee        Past Medical History:   Diagnosis Date   • Allergic rhinitis    • Ankle joint pain    • Artificial lens present     Left   • Astigmatism    • Benign prostatic hyperplasia    • Cataract    • Closed fracture of medial malleolus    • Coronary arteriosclerosis    • Diabetes mellitus (CMS/[facility])     no retinopathy   • Elevated levels of transaminase & lactic acid dehydrogenase     improving    • Encounter for health maintenance examination     Individual health examination     • Encounter for health maintenance examination in adult     Adult health examination    • Essential hypertension    • Essential hypertension     Unspecified   • Flank pain     probably muscular    • GERD (gastroesophageal reflux disease)    • Gout    • Gout     unspecified     • Hemorrhoids    • History of artificial eye lens     Artificial lens in position - right    • History of colonoscopy 2010    Colon endoscopy  (51016)  (1)    • History of kidney stones    • Hyperlipidemia    • Hypermetropia    • Low blood pressure    • Malaise and fatigue    • Need for influenza vaccination     Needs influenza immunization    • Nuclear cataract of left eye    • Posterior subcapsular polar senile cataract     Left   • Renal impairment     • Special screening for malignant neoplasm of prostate    • Type 2 diabetes mellitus (CMS/HCC)     improving   • 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 Distal RCA beyond the touchdown was seen filling the circumflex system   • CATARACT EXTRACTION  10/2015    Remove cataract, insert lens (2)    • CORONARY ARTERY BYPASS GRAFT      Arterial, two  (1)   -  3 - 12/1998   • HERNIA REPAIR      w/mesh  (1)   • INJECTION OF MEDICATION  04/11/2013    B12  (1)  - RAYMOND Raymond   • INJECTION OF MEDICATION  07/03/2013    Benadryl  (1) - RAYMOND Raymond   • INJECTION OF MEDICATION  10/22/2014    Kenalog  (2)   • OTHER SURGICAL HISTORY  07/10/2002    Fragmenting of kidney stone  -   ESWL, 2010 shocks. 1cm UP stone,right. Diabetes mellitus       PT Ortho     Row Name 02/19/19 1100       Posture/Observations    Posture- WNL  Posture is WNL  -DH       Special Tests/Palpation    Special Tests/Palpation  Knee Tenderness resolving  -DH       Knee Palpation    Knee Palpation?  Yes  -DH    Medial Joint Line  Right: tenderness resolving  -DH      User Key  (r) = Recorded By, (t) = Taken By, (c) = Cosigned By    Initials Name Provider Type     Carmita Linda, PT Physical Therapist                      PT Assessment/Plan     Row Name 02/19/19 1100          PT Assessment    Functional Limitations  Impaired gait  -DH     Assessment Comments  Tenderness resolving  -DH     Rehab Potential  Excellent  -DH     Patient/caregiver participated in establishment of treatment plan and goals  Yes  -DH     Patient would benefit from skilled therapy  "intervention  Yes  -        PT Plan    PT Frequency  2x/week  -     Predicted Duration of Therapy Intervention (Therapy Eval)  -- 4 weeks  -     PT Plan Comments  Patient progressing well towards goals will plan to discharge [facility] if progress continues  -       User Key  (r) = Recorded By, (t) = Taken By, (c) = Cosigned By    Initials Name Provider Type     Carmita Linda, PT Physical Therapist              Exercises     Row Name 02/19/19 1100             Precautions    Existing Precautions/Restrictions  no known precautions/restrictions  -         Subjective Comments    Subjective Comments  -- doing home program 2 x day  -DH         Subjective Pain    Able to rate subjective pain?  yes  -DH      Pre-Treatment Pain Level  0  -DH      Post-Treatment Pain Level  0  -DH      Subjective Pain Comment  -- If I do a lot of walking, it is a little sore  -         Total Minutes    55689 - PT Therapeutic Exercise Minutes  45  -DH         Exercise 1    Exercise Name 1  Pro 2, L2 seat 13  -DH      Additional Comments  5 min forward/5 back/ 5 forward  -DH         Exercise 2    Exercise Name 2  Biodex TKE R   -DH      Sets 2  3  -DH      Reps 2  10  -DH      Additional Comments  Plate 5  -DH         Exercise 3    Exercise Name 3  Cybex hip abd  -DH      Cueing 3  Verbal;Tactile  -DH      Sets 3  3  -DH      Reps 3  10  -DH      Additional Comments  35# [place] not available  -DH         Exercise 4    Exercise Name 4  SLR toe out  -DH      Sets 4  3  -DH      Reps 4  10  -DH      Additional Comments  3# ankle weight  -DH         Exercise 5    Exercise Name 5  SAQ 8\" roll  -DH      Cueing 5  Verbal  -DH      Sets 5  3  -DH      Reps 5  10  -DH      Additional Comments  Not as difficult today  -DH         Exercise 6    Exercise Name 6  Double knee to chest  -DH      Sets 6  1  -DH      Reps 6  10  -DH      Additional Comments  no discomfort noted  -DH         Exercise 7    Exercise Name 7  bridge on ball  -DH      " "Sets 7  1  -      Reps 7  10  -      Additional Comments  no discomfort  -         Exercise 8    Exercise Name 8  Resisted walking red band  -      Sets 8  1 (ea forward/back, side to side  -      Additional Comments  \"surprising how difficult that is.\"  -        User Key  (r) = Recorded By, (t) = Taken By, (c) = Cosigned By    Initials Name Provider Type     Carmita Linda, PT Physical Therapist                         PT OP Goals     Row Name 02/19/19 1200 02/19/19 1100       PT Short Term Goals    STG Date to Achieve  --  02/21/19  -    STG 1  --  -- Patient will report no pain with walking and/or bending  -    STG 1 Progress  --  Progressing  -    STG 2  --  -- Reduce R knee edema by .5 cm  -    STG 3  --  Patient will demonstrate independence with HEP  -    STG 3 Progress  --  Progressing  -    STG 4  --  Increase R knee flexion by 10 degrees  -    STG 4 Progress  --  Partially Met;Not Met  -       Long Term Goals    LTG Date to Achieve  --  02/28/19  -    LTG 1  --  Patient will achieve 5/5 muscle strength R knee flexion/extension  -    LTG 1 Progress  --  Not Met  -    LTG 2  --  Patient will report no pain R knee.  -    LTG 2 Progress  --  Progressing  -       Time Calculation    PT Goal Re-Cert Due Date  02/28/19  -  --      User Key  (r) = Recorded By, (t) = Taken By, (c) = Cosigned By    Initials Name Provider Type     Carmita Linda, PT Physical Therapist                         Time Calculation:   Start Time: 1100  Stop Time: 1145  Time Calculation (min): 45 min    Therapy Charges for Today     Code Description Service Date Service Provider Modifiers Qty    [id number]  PT THER PROC EA 15 MIN 2/19/2019 Carmita Linda, PT GP 3                    Carmita Linda, PT  2/19/2019     "

## 2019-02-21 ENCOUNTER — HOSPITAL ENCOUNTER (OUTPATIENT)
Dept: PHYSICAL THERAPY | Facility: HOSPITAL | Age: 74
Setting detail: THERAPIES SERIES
Discharge: HOME | End: 2019-02-21

## 2019-02-21 ENCOUNTER — OFFICE VISIT (OUTPATIENT)
Dept: FAMILY MEDICINE CLINIC | Facility: CLINIC | Age: 74
End: 2019-02-21

## 2019-02-21 VITALS
SYSTOLIC BLOOD PRESSURE: 132 MMHG | OXYGEN SATURATION: 95 % | WEIGHT: 195.4 LBS | BODY MASS INDEX: 25.9 KG/M2 | DIASTOLIC BLOOD PRESSURE: 64 MMHG | HEART RATE: 74 BPM | HEIGHT: 73 IN

## 2019-02-21 DIAGNOSIS — I10 ESSENTIAL HYPERTENSION: Chronic | ICD-10-CM

## 2019-02-21 DIAGNOSIS — E78.2 MIXED HYPERLIPIDEMIA: Chronic | ICD-10-CM

## 2019-02-21 DIAGNOSIS — E11.8 TYPE 2 DIABETES MELLITUS WITH COMPLICATION, WITH LONG-TERM CURRENT USE OF INSULIN (HCC): Primary | ICD-10-CM

## 2019-02-21 DIAGNOSIS — M17.11 PRIMARY OSTEOARTHRITIS OF RIGHT KNEE: Primary | ICD-10-CM

## 2019-02-21 DIAGNOSIS — Z12.5 ENCOUNTER FOR PROSTATE CANCER SCREENING: ICD-10-CM

## 2019-02-21 DIAGNOSIS — N18.30 STAGE 3 CHRONIC KIDNEY DISEASE (HCC): Chronic | ICD-10-CM

## 2019-02-21 DIAGNOSIS — Z79.4 TYPE 2 DIABETES MELLITUS WITH COMPLICATION, WITH LONG-TERM CURRENT USE OF INSULIN (HCC): Primary | ICD-10-CM

## 2019-02-21 PROCEDURE — 99214 OFFICE O/P EST MOD 30 MIN: CPT | Performed by: GENERAL PRACTICE

## 2019-02-21 PROCEDURE — 97110 THERAPEUTIC EXERCISES: CPT

## 2019-02-21 RX ORDER — ATORVASTATIN CALCIUM 10 MG/1
10 TABLET, FILM COATED ORAL NIGHTLY
Qty: 90 TABLET | Refills: 3 | Status: SHIPPED | OUTPATIENT
Start: 2019-02-21 | End: 2019-12-02 | Stop reason: SDUPTHER

## 2019-02-21 RX ORDER — EZETIMIBE 10 MG/1
10 TABLET ORAL DAILY
Qty: 90 TABLET | Refills: 3 | Status: SHIPPED | OUTPATIENT
Start: 2019-02-21 | End: 2020-01-23 | Stop reason: SDUPTHER

## 2019-02-21 RX ORDER — LOSARTAN POTASSIUM 50 MG/1
50 TABLET ORAL DAILY
Qty: 90 TABLET | Refills: 3 | Status: SHIPPED | OUTPATIENT
Start: 2019-02-21 | End: 2019-12-02 | Stop reason: SDUPTHER

## 2019-02-21 RX ORDER — CLOPIDOGREL BISULFATE 75 MG/1
75 TABLET ORAL DAILY
Qty: 90 TABLET | Refills: 3 | Status: SHIPPED | OUTPATIENT
Start: 2019-02-21 | End: 2019-12-02 | Stop reason: SDUPTHER

## 2019-02-21 RX ORDER — GLIPIZIDE 5 MG/1
5 TABLET ORAL
Qty: 180 TABLET | Refills: 3 | Status: SHIPPED | OUTPATIENT
Start: 2019-02-21 | End: 2019-07-08

## 2019-02-21 RX ORDER — PROBENECID 500 MG/1
500 TABLET, FILM COATED ORAL 2 TIMES DAILY
Qty: 180 TABLET | Refills: 3 | Status: SHIPPED | OUTPATIENT
Start: 2019-02-21 | End: 2019-12-02 | Stop reason: SDUPTHER

## 2019-02-21 RX ORDER — CARVEDILOL 6.25 MG/1
6.25 TABLET ORAL 2 TIMES DAILY WITH MEALS
Qty: 180 TABLET | Refills: 3 | Status: SHIPPED | OUTPATIENT
Start: 2019-02-21 | End: 2019-06-11 | Stop reason: DRUGHIGH

## 2019-02-21 RX ORDER — AMLODIPINE BESYLATE 5 MG/1
5 TABLET ORAL DAILY
Qty: 90 TABLET | Refills: 3 | Status: SHIPPED | OUTPATIENT
Start: 2019-02-21 | End: 2020-01-23 | Stop reason: SDUPTHER

## 2019-02-21 NOTE — THERAPY TREATMENT NOTE
Outpatient Physical Therapy Ortho Treatment Note  [facility]     Patient Name: Zachary Galindo  : 1945  MRN: [id number]  Today's Date: 2019      Visit Date: 2019  Subjective Improvement: 80%  Visit Number:     Recert Date:   19  MD Visit:   19  Total Approved Visits:  Medicare      Visit Dx:    ICD-10-CM ICD-9-CM   1. Primary osteoarthritis of right knee M17.11 715.16       Patient Active Problem List   Diagnosis   • Essential hypertension   • Gout   • Hyperlipidemia   • Type 2 diabetes mellitus without complication, with long-term current use of insulin (CMS/HCC)   • Coronary artery disease involving coronary bypass graft of native heart without angina pectoris   • Pure hypercholesterolemia   • Stage 3 chronic kidney disease (CMS/HCC)   • Right knee pain   • Primary osteoarthritis of right knee        Past Medical History:   Diagnosis Date   • Allergic rhinitis    • Ankle joint pain    • Artificial lens present     Left   • Astigmatism    • Benign prostatic hyperplasia    • Cataract    • Closed fracture of medial malleolus    • Coronary arteriosclerosis    • Diabetes mellitus (CMS/HCC)     no retinopathy   • Elevated levels of transaminase & lactic acid dehydrogenase     improving    • Encounter for health maintenance examination     Individual health examination     • Encounter for health maintenance examination in adult     Adult health examination    • Essential hypertension    • Essential hypertension     Unspecified   • Flank pain     probably muscular    • GERD (gastroesophageal reflux disease)    • Gout    • Gout     unspecified     • Hemorrhoids    • History of artificial eye lens     Artificial lens in position - right    • History of colonoscopy 2010    Colon endoscopy  (78170)  (1)    • History of kidney stones    • Hyperlipidemia    • Hypermetropia    • Low blood pressure    • Malaise and fatigue    • Need for influenza vaccination     Needs  influenza immunization    • Nuclear cataract of left eye    • Posterior subcapsular polar senile cataract     Left   • Renal impairment    • Special screening for malignant neoplasm of prostate    • Type 2 diabetes mellitus (CMS/HCC)     improving   • 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 Distal RCA beyond the touchdown was seen filling the circumflex system   • CATARACT EXTRACTION  10/2015    Remove cataract, insert lens (2)    • CORONARY ARTERY BYPASS GRAFT      Arterial, two  (1)   -  3 - 12/1998   • HERNIA REPAIR      w/mesh  (1)   • INJECTION OF MEDICATION  04/11/2013    B12  (1)  - RAYMOND Raymond   • INJECTION OF MEDICATION  07/03/2013    Benadryl  (1) - RAYMOND Raymond   • INJECTION OF MEDICATION  10/22/2014    Kenalog  (2)   • OTHER SURGICAL HISTORY  07/10/2002    Fragmenting of kidney stone  -   ESWL, 2010 shocks. 1cm UP stone,right. Diabetes mellitus       PT Ortho     Row Name 02/21/19 1600       Precautions and Contraindications    Precautions/Limitations  no known precautions/limitations  -BB       Posture/Observations    Posture/Observations Comments  No distress in aquatics.  Able to ambulate without use of UE for assistance.  Used 1 HR to go step over step into and out of pool steps.  -BB    Row Name 02/19/19 1100       Posture/Observations    Posture- WNL  Posture is WNL  -DH       Special Tests/Palpation    Special Tests/Palpation  Knee Tenderness resolving  -DH       Knee Palpation    Knee Palpation?  Yes  -DH    Medial Joint Line  Right: tenderness resolving  -DH      User Key  (r) = Recorded By,  (t) = Taken By, (c) = Cosigned By    Initials Name Provider Type    BB Alicia Raygoza, REJI Physical Therapy Assistant     Carmita Linda, PT Physical Therapist                      PT Assessment/Plan     Row Name 02/21/19 1606          PT Assessment    Assessment Comments  Good tolerance and reduced soreness after aquatics. Pt independent with current HEP given.   -BB        PT Plan    PT Frequency  2x/week  -BB     Predicted Duration of Therapy Intervention (Therapy Eval)  x 4 weeks  -BB     PT Plan Comments  Continue with 1x land and 1x pool unless otherwise advised.   -BB       User Key  (r) = Recorded By, (t) = Taken By, (c) = Cosigned By    Initials Name Provider Type    BB Alicia Raygoza, REJI Physical Therapy Assistant              Exercises     Row Name 02/21/19 1600             Precautions    Existing Precautions/Restrictions  no known precautions/restrictions  -BB         Subjective Comments    Subjective Comments  Continues to do HEP regularly.  Pain is more of a soreness. Still feels it if he turns on it the wrong way. Doing better.  80% better  -BB         Subjective Pain    Able to rate subjective pain?  yes  -BB      Pre-Treatment Pain Level  3  -BB      Post-Treatment Pain Level  0  -BB         Aquatics    Aquatics performed?  Yes  -BB         Exercise 1    Exercise Name 1  AQU FWD/LAT/BWD WALK  -BB      Time 1  5 MIN EA  -BB         Exercise 2    Exercise Name 2  AQU CR/TR  -BB      Reps 2  20  -BB         Exercise 3    Exercise Name 3  AQU MS  -BB      Reps 3  20  -BB         Exercise 4    Exercise Name 4  AQU SLR 3 WAY BILAT  -BB      Reps 4  20  -BB         Exercise 5    Exercise Name 5  AQU HS STRETCH  -BB      Reps 5  3  -BB      Time 5  30  -BB      Additional Comments  R ONLY  -BB         Exercise 6    Exercise Name 6  AQU STEP UP FWD R  -BB      Reps 6  20  -BB         Exercise 7    Exercise Name 7  AQU DEEP BIKE  -BB      Time 7  5 MIN  -BB         Exercise 8    Exercise Name 8  AQU  DEEP HANG  -BB      Time 8  3 MIN  -BB        User Key  (r) = Recorded By, (t) = Taken By, (c) = Cosigned By    Initials Name Provider Type    Alicia King PTA Physical Therapy Assistant                         PT OP Goals     Row Name 02/21/19 1400          PT Short Term Goals    STG Date to Achieve  02/21/19  -BB     STG 1  -- Patient will report no pain with walking and/or bending  -BB     STG 1 Progress  Progressing  -BB     STG 2  -- Reduce R knee edema by .5 cm  -BB     STG 2 Progress  Progressing  -BB     STG 3  Patient will demonstrate independence with HEP  -BB     STG 3 Progress  Partially Met  -BB     STG 4  Increase R knee flexion by 10 degrees  -BB     STG 4 Progress  Partially Met;Not Met  -BB     STG 4 Progress Comments  not measured this date due to pool only  -BB        Long Term Goals    LTG Date to Achieve  02/28/19  -BB     LTG 1  Patient will achieve 5/5 muscle strength R knee flexion/extension  -BB     LTG 1 Progress  Not Met  -BB     LTG 2  Patient will report no pain R knee.  -BB     LTG 2 Progress  Progressing  -BB        Time Calculation    PT Goal Re-Cert Due Date  02/28/19  -BB       User Key  (r) = Recorded By, (t) = Taken By, (c) = Cosigned By    Initials Name Provider Type    Alicia King PTA Physical Therapy Assistant                         Time Calculation:   Start Time: 1433  Stop Time: 1513  Time Calculation (min): 40 min  Total Timed Code Minutes- PT: 40 minute(s)  Therapy Suggested Charges     Code   Minutes Charges    None           Therapy Charges for Today     Code Description Service Date Service Provider Modifiers Qty    [id number] HC PT THER PROC EA 15 MIN 2/21/2019 Alicia Raygoza PTA GP 3                    Alicia Raygoza PTA  2/21/2019

## 2019-02-21 NOTE — PROGRESS NOTES
Subjective   Zachary Galindo is a 73 y.o. male.     Chief Complaint   Patient presents with   • Annual Exam     labs    • Diabetes     For review and evaluation of management of chronic medical problems. Labs reviewed.   Hypertension   The current episode started more than 1 year ago. The problem is unchanged. The problem is controlled. Pertinent negatives include no chest pain, headaches, neck pain, palpitations or shortness of breath. Risk factors for coronary artery disease include diabetes mellitus and dyslipidemia. Current antihypertension treatment includes angiotensin blockers, calcium channel blockers and beta blockers. The current treatment provides significant improvement. There are no compliance problems.    Hyperlipidemia   This is a chronic problem. The current episode started more than 1 year ago. The problem is uncontrolled. Recent lipid tests were reviewed and are high. Exacerbating diseases include diabetes. There are no known factors aggravating his hyperlipidemia. Pertinent negatives include no chest pain, myalgias or shortness of breath. Current antihyperlipidemic treatment includes statins. The current treatment provides significant improvement of lipids. There are no compliance problems.    Diabetes   He presents for his follow-up diabetic visit. He has type 2 diabetes mellitus. His disease course has been improving. There are no hypoglycemic associated symptoms. Pertinent negatives for hypoglycemia include no dizziness, headaches or nervousness/anxiousness. There are no diabetic associated symptoms. Pertinent negatives for diabetes include no chest pain, no fatigue and no weakness. There are no hypoglycemic complications. Diabetic complications include heart disease and nephropathy. Risk factors for coronary artery disease include diabetes mellitus, dyslipidemia, hypertension and male sex. Current diabetic treatment includes oral agent (dual therapy) and insulin injections. He is  "compliant with treatment all of the time. His weight is stable. He is following a generally healthy diet. Meal planning includes ADA exchanges. He participates in exercise intermittently. There is no change in his home blood glucose trend. An ACE inhibitor/angiotensin II receptor blocker is being taken. Eye exam is current.   Increased insulin recently.      The following portions of the patient's history were reviewed and updated as appropriate: allergies, current medications, past family and social history and problem list.    Outpatient Medications Prior to Visit   Medication Sig Dispense Refill   • aspirin 81 MG tablet Take 1 tablet by mouth Daily. 30 tablet 11   • glucose blood test strip OneTouch Ultra Test strips  -  Test sugars 3-4 times a day as directed by provider.     • insulin detemir (LEVEMIR) 100 UNIT/ML injection Inject 63 Units under the skin into the appropriate area as directed 2 (Two) Times a Day. 20 units in am and 68 units an pm     • Insulin Pen Needle (PEN NEEDLES 3/16\") 31G X 5 MM misc 100 each Daily. 100 each 3   • Lancets (ONETOUCH ULTRASOFT) lancets Test sugars 3-4 times daily as instructed by physician.     • magnesium oxide (MAG-OX) 400 MG tablet Take 1 tablet by mouth 2 (Two) Times a Day. 180 tablet 3   • ondansetron ODT (ZOFRAN-ODT) 4 MG disintegrating tablet Take 1 tablet by mouth Every 6 (Six) Hours As Needed for Nausea or Vomiting. 10 tablet 0   • amLODIPine (NORVASC) 5 MG tablet TAKE 1 TABLET BY MOUTH DAILY. 30 tablet 5   • atorvastatin (LIPITOR) 10 MG tablet Take 1 tablet by mouth Every Night. 90 tablet 3   • carvedilol (COREG) 6.25 MG tablet TAKE 1 TABLET BY MOUTH WITH A MEAL TWICE DAILY 180 tablet 3   • clopidogrel (PLAVIX) 75 MG tablet Take 1 tablet by mouth Daily. 90 tablet 3   • ezetimibe (ZETIA) 10 MG tablet Take 1 tablet by mouth Daily. 90 tablet 3   • glipiZIDE (GLUCOTROL) 5 MG tablet Take 1 tablet by mouth 2 (Two) Times a Day Before Meals. 180 tablet 3   • losartan " "(COZAAR) 50 MG tablet Take 1 tablet by mouth Daily. 90 tablet 3   • probenecid (BENEMID) 500 MG tablet Take 1 tablet by mouth 2 (Two) Times a Day. 180 tablet 3   • SITagliptin (JANUVIA) 100 MG tablet Take 1 tablet by mouth Daily. 90 tablet 3   • amLODIPine (NORVASC) 5 MG tablet Take 1 tablet by mouth Daily. 90 tablet 1     No facility-administered medications prior to visit.        Review of Systems   Constitutional: Negative.  Negative for chills, fatigue, fever and unexpected weight change.   HENT: Negative.  Negative for congestion, ear pain, hearing loss, nosebleeds, rhinorrhea, sneezing, sore throat and tinnitus.    Eyes: Negative.  Negative for discharge.   Respiratory: Negative.  Negative for cough, shortness of breath and wheezing.    Cardiovascular: Negative.  Negative for chest pain and palpitations.   Gastrointestinal: Negative.  Negative for abdominal pain, constipation, diarrhea, nausea and vomiting.   Endocrine: Negative.    Genitourinary: Negative.  Negative for dysuria, frequency and urgency.   Musculoskeletal: Negative.  Negative for arthralgias, back pain, joint swelling, myalgias and neck pain.   Skin: Negative.  Negative for rash.   Allergic/Immunologic: Negative.    Neurological: Negative.  Negative for dizziness, weakness, numbness and headaches.   Hematological: Negative.  Negative for adenopathy.   Psychiatric/Behavioral: Negative.  Negative for dysphoric mood and sleep disturbance. The patient is not nervous/anxious.        Objective     Visit Vitals  /64   Pulse 74   Ht 185.4 cm (73\")   Wt 88.6 kg (195 lb 6.4 oz)   SpO2 95%   BMI 25.78 kg/m²     Physical Exam   Constitutional: He is oriented to person, place, and time. He appears well-developed and well-nourished. No distress.   HENT:   Head: Normocephalic.   Nose: Nose normal.   Mouth/Throat: Oropharynx is clear and moist.   Eyes: Conjunctivae and EOM are normal. Pupils are equal, round, and reactive to light. Right eye exhibits no " discharge. Left eye exhibits no discharge.   Neck: No thyromegaly present.   Cardiovascular: Normal rate, regular rhythm, normal heart sounds and intact distal pulses.   No murmur heard.  Pulmonary/Chest: Effort normal and breath sounds normal.   Musculoskeletal: He exhibits no edema.    Zachary had a diabetic foot exam performed today.  Vascular Status -  His right foot exhibits normal foot vasculature  and no edema. His left foot exhibits normal foot vasculature  and no edema.  Skin Integrity  -  His right foot skin is intact.His left foot skin is intact..  Lymphadenopathy:     He has no cervical adenopathy.   Neurological: He is alert and oriented to person, place, and time.   Skin: Skin is warm and dry.   Psychiatric: He has a normal mood and affect.   Nursing note and vitals reviewed.    Results for orders placed or performed in visit on 02/11/19   Comprehensive Metabolic Panel   Result Value Ref Range    Glucose 168 (H) 60 - 100 mg/dL    BUN 21 7 - 21 mg/dL    Creatinine 1.20 0.70 - 1.30 mg/dL    Sodium 140 137 - 145 mmol/L    Potassium 4.4 3.5 - 5.1 mmol/L    Chloride 101 95 - 110 mmol/L    CO2 30.0 22.0 - 31.0 mmol/L    Calcium 9.7 8.4 - 10.2 mg/dL    Total Protein 7.7 6.3 - 8.6 g/dL    Albumin 4.60 3.40 - 4.80 g/dL    ALT (SGPT) 32 21 - 72 U/L    AST (SGOT) 57 17 - 59 U/L    Alkaline Phosphatase 87 38 - 126 U/L    Total Bilirubin 0.7 0.2 - 1.3 mg/dL    eGFR Non African Amer 59 42 - 98 mL/min/1.73    Globulin 3.1 2.3 - 3.5 gm/dL    A/G Ratio 1.5 1.1 - 1.8 g/dL    BUN/Creatinine Ratio 17.5 7.0 - 25.0    Anion Gap 9.0 5.0 - 15.0 mmol/L   Hemoglobin A1c   Result Value Ref Range    Hemoglobin A1C 9.5 (H) 4 - 5.6 %   Lipid Panel   Result Value Ref Range    Total Cholesterol 159 0 - 199 mg/dL    Triglycerides 187 20 - 199 mg/dL    HDL Cholesterol 27 (L) 60 - 200 mg/dL    LDL Cholesterol  100 1 - 129 mg/dL    LDL/HDL Ratio 3.50 0.00 - 3.55   MicroAlbumin, Urine, Random - Urine, Clean Catch   Result Value Ref Range     Microalbumin, Urine 12.2 mg/L   PSA Screen   Result Value Ref Range    PSA 0.967 0.000 - 4.000 ng/mL   CBC Auto Differential   Result Value Ref Range    WBC 3.90 3.40 - 10.80 10*3/mm3    RBC 5.00 4.14 - 5.80 10*6/mm3    Hemoglobin 15.2 13.0 - 17.7 g/dL    Hematocrit 44.7 37.5 - 51.0 %    MCV 89.4 79.0 - 97.0 fL    MCH 30.4 26.6 - 33.0 pg    MCHC 34.0 31.5 - 35.7 g/dL    RDW 13.0 12.3 - 15.4 %    RDW-SD 42.5 37.0 - 54.0 fl    MPV 11.7 6.0 - 12.0 fL    Platelets 169 140 - 450 10*3/mm3    Neutrophil % 63.2 42.7 - 76.0 %    Lymphocyte % 23.1 19.6 - 45.3 %    Monocyte % 8.5 5.0 - 12.0 %    Eosinophil % 4.1 0.3 - 6.2 %    Basophil % 0.8 0.0 - 1.5 %    Immature Grans % 0.3 0.0 - 0.5 %    Neutrophils, Absolute 2.47 1.40 - 7.00 10*3/mm3    Lymphocytes, Absolute 0.90 0.70 - 3.10 10*3/mm3    Monocytes, Absolute 0.33 0.10 - 0.90 10*3/mm3    Eosinophils, Absolute 0.16 0.00 - 0.40 10*3/mm3    Basophils, Absolute 0.03 0.00 - 0.20 10*3/mm3    Immature Grans, Absolute 0.01 0.00 - 0.05 10*3/mm3    nRBC 0.0 0.0 - 0.0 /100 WBC   Urinalysis without microscopic (no culture) - Urine, Clean Catch   Result Value Ref Range    Color, UA Yellow Yellow, Straw, Dark Yellow, Brianda    Appearance, UA Clear Clear    pH, UA 6.0 5.0 - 9.0    Specific [place], UA 1.014 1.003 - 1.030    Glucose, UA Negative Negative    Ketones, UA Negative Negative    Bilirubin, UA Negative Negative    Blood, UA Trace (A) Negative    Protein, UA Trace (A) Negative    Leuk Esterase, UA Negative Negative    Nitrite, UA Negative Negative    Urobilinogen, UA 0.2 E.U./dL 0.2 - 1.0 E.U./dL   Urinalysis, Microscopic Only - Urine, Clean Catch   Result Value Ref Range    RBC, UA 0-2 (A) None Seen /HPF    WBC, UA None Seen None Seen, 0-2, 3-5 /HPF    Bacteria, UA None Seen None Seen /HPF    Squamous Epithelial Cells, UA None Seen None Seen, 0-2 /HPF    Hyaline Casts, UA None Seen None Seen /LPF    Methodology Automated Microscopy       Assessment/Plan   Problem List Items  Addressed This Visit        Cardiovascular and Mediastinum    Essential hypertension (Chronic)    Relevant Medications    amLODIPine (NORVASC) 5 MG tablet    carvedilol (COREG) 6.25 MG tablet    losartan (COZAAR) 50 MG tablet    Hyperlipidemia (Chronic)    Relevant Medications    atorvastatin (LIPITOR) 10 MG tablet    ezetimibe (ZETIA) 10 MG tablet       Endocrine    Type 2 diabetes mellitus with complication, with long-term current use of insulin (CMS/[facility]) - Primary (Chronic)    Relevant Medications    glipiZIDE (GLUCOTROL) 5 MG tablet    SITagliptin (JANUVIA) 100 MG tablet    Other Relevant Orders    Basic Metabolic Panel    Hemoglobin A1c       Genitourinary    Stage 3 chronic kidney disease (CMS/HCC) (Chronic)      Other Visit Diagnoses     Encounter for prostate cancer screening             Continue on increased insulin.  Try to tighten up on diet.    New Medications Ordered This Visit   Medications   • amLODIPine (NORVASC) 5 MG tablet     Sig: Take 1 tablet by mouth Daily.     Dispense:  90 tablet     Refill:  3     Generic For:*NORVASC 5MG   • atorvastatin (LIPITOR) 10 MG tablet     Sig: Take 1 tablet by mouth Every Night.     Dispense:  90 tablet     Refill:  3   • carvedilol (COREG) 6.25 MG tablet     Sig: Take 1 tablet by mouth 2 (Two) Times a Day With Meals.     Dispense:  180 tablet     Refill:  3     Generic For:*COREG  6.25MG   • clopidogrel (PLAVIX) 75 MG tablet     Sig: Take 1 tablet by mouth Daily.     Dispense:  90 tablet     Refill:  3   • glipiZIDE (GLUCOTROL) 5 MG tablet     Sig: Take 1 tablet by mouth 2 (Two) Times a Day Before Meals.     Dispense:  180 tablet     Refill:  3   • ezetimibe (ZETIA) 10 MG tablet     Sig: Take 1 tablet by mouth Daily.     Dispense:  90 tablet     Refill:  3   • SITagliptin (JANUVIA) 100 MG tablet     Sig: Take 1 tablet by mouth Daily.     Dispense:  90 tablet     Refill:  3   • probenecid (BENEMID) 500 MG tablet     Sig: Take 1 tablet by mouth 2 (Two) Times a Day.      Dispense:  180 tablet     Refill:  3   • losartan (COZAAR) 50 MG tablet     Sig: Take 1 tablet by mouth Daily.     Dispense:  90 tablet     Refill:  3     Return in about 3 months (around 5/21/2019) for Recheck.

## 2019-02-26 ENCOUNTER — TELEPHONE (OUTPATIENT)
Dept: FAMILY MEDICINE CLINIC | Facility: CLINIC | Age: 74
End: 2019-02-26

## 2019-02-26 ENCOUNTER — HOSPITAL ENCOUNTER (OUTPATIENT)
Dept: PHYSICAL THERAPY | Facility: HOSPITAL | Age: 74
Setting detail: THERAPIES SERIES
Discharge: HOME | End: 2019-02-26

## 2019-02-26 DIAGNOSIS — Z79.4 TYPE 2 DIABETES MELLITUS WITH COMPLICATION, WITH LONG-TERM CURRENT USE OF INSULIN (HCC): Primary | Chronic | ICD-10-CM

## 2019-02-26 DIAGNOSIS — E11.8 TYPE 2 DIABETES MELLITUS WITH COMPLICATION, WITH LONG-TERM CURRENT USE OF INSULIN (HCC): Primary | Chronic | ICD-10-CM

## 2019-02-26 DIAGNOSIS — M17.11 PRIMARY OSTEOARTHRITIS OF RIGHT KNEE: Primary | ICD-10-CM

## 2019-02-26 PROCEDURE — 97110 THERAPEUTIC EXERCISES: CPT

## 2019-02-26 NOTE — THERAPY TREATMENT NOTE
Outpatient Physical Therapy Ortho Treatment Note  [facility]     Patient Name: Zachary Galindo  : 1945  MRN: [id number]  Today's Date: 2019      Visit Date: 2019  Subjective Improvement: 30-40%  Visit Number:   5   Recert Date:   19  MD Visit:   Canceled it for today. Will reschedule next week if needed.  Total Approved Visits:  Medicare      Visit Dx:    ICD-10-CM ICD-9-CM   1. Primary osteoarthritis of right knee M17.11 715.16       Patient Active Problem List   Diagnosis   • Essential hypertension   • Gout   • Hyperlipidemia   • Type 2 diabetes mellitus with complication, with long-term current use of insulin (CMS/[facility])   • Coronary artery disease involving coronary bypass graft of native heart without angina pectoris   • Pure hypercholesterolemia   • Stage 3 chronic kidney disease (CMS/HCC)   • Right knee pain   • Primary osteoarthritis of right knee        Past Medical History:   Diagnosis Date   • Allergic rhinitis    • Ankle joint pain    • Artificial lens present     Left   • Astigmatism    • Benign prostatic hyperplasia    • Cataract    • Closed fracture of medial malleolus    • Coronary arteriosclerosis    • Diabetes mellitus (CMS/[facility])     no retinopathy   • Elevated levels of transaminase & lactic acid dehydrogenase     improving    • Encounter for health maintenance examination     Individual health examination     • Encounter for health maintenance examination in adult     Adult health examination    • Essential hypertension    • Essential hypertension     Unspecified   • Flank pain     probably muscular    • GERD (gastroesophageal reflux disease)    • Gout    • Gout     unspecified     • Hemorrhoids    • History of artificial eye lens     Artificial lens in position - right    • History of colonoscopy 2010    Colon endoscopy  (75517)  (1)    • History of kidney stones    • Hyperlipidemia    • Hypermetropia    • Low blood pressure    • Malaise and fatigue     • Need for influenza vaccination     Needs influenza immunization    • Nuclear cataract of left eye    • Posterior subcapsular polar senile cataract     Left   • Renal impairment    • Special screening for malignant neoplasm of prostate    • Type 2 diabetes mellitus (CMS/HCC)     improving   • 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 edema (CMS/HCC)     without macular edema - mild OS    • Upper respiratory infection    • Urticaria     Drug-aggravated angioedema-urticaria   • Urticaria         Past Surgical History:   Procedure Laterality Date   • CARDIAC CATHETERIZATION  03/24/2014    (77255)  (2)  Reduction of stenoisis from 95% to less than 0% stenosis with evidence of good JENNY 3-flow. Distal RCA beyond the touchdown was seen filling the circumflex system   • CATARACT EXTRACTION  10/2015    Remove cataract, insert lens (2)    • CORONARY ARTERY BYPASS GRAFT      Arterial, two  (1)   -  3 - 12/1998   • HERNIA REPAIR      w/mesh  (1)   • INJECTION OF MEDICATION  04/11/2013    B12  (1)  - RAYMOND Raymond   • INJECTION OF MEDICATION  07/03/2013    Benadryl  (1) - RAYMOND Raymond   • INJECTION OF MEDICATION  10/22/2014    Kenalog  (2)   • OTHER SURGICAL HISTORY  07/10/2002    Fragmenting of kidney stone  -   [facility], 2010 shocks. 1cm UP stone,right. Diabetes mellitus       PT Ortho     Row Name 02/26/19 1000       Precautions and Contraindications    Precautions/Limitations  no known precautions/limitations  -BB       Posture/Observations    Posture/Observations Comments  no tenderness, R knee flexion 130, Quad lag with SLR and verbal cues to slow down but able to complete independently.   -BB       General ROM    GENERAL ROM COMMENTS  R knee AROM 130 degrees  -BB      User Key  (r) = Recorded By, (t) = Taken By, (c) = Cosigned By    Initials Name Provider Type    Alicia King PTA Physical Therapy Assistant                      PT Assessment/Plan     Row Name 02/26/19 1000           PT Assessment    Assessment Comments  Did not increase intensity or do wt machines today due to recent kidney discomfort. Pt independent and compliant with current HEP.  Good tolerance and response to aquatics last treatment.   -BB        PT Plan    PT Frequency  2x/week  -BB     Predicted Duration of Therapy Intervention (Therapy Eval)  x 4 weeks  -BB     PT Plan Comments  Continue with 1x land and 1x pool.  Recheck scheduled next week  -BB       User Key  (r) = Recorded By, (t) = Taken By, (c) = Cosigned By    Initials Name Provider Type    Alicia King PTA Physical Therapy Assistant          Modalities     Row Name 02/26/19 1000             Ice    Patient denies application of Ice  No  -BB      Patient reports will apply ice at home to involved area  Yes  -BB        User Key  (r) = Recorded By, (t) = Taken By, (c) = Cosigned By    Initials Name Provider Type    Alicia King PTA Physical Therapy Assistant          Exercises     Row Name 02/26/19 1000             Precautions    Existing Precautions/Restrictions  no known precautions/restrictions  -BB         Subjective Comments    Subjective Comments  Having kidney pain R side of low back. Seeing kidney doctor this PM.  Liked the pool and could tell it was a workout.  Walking better. Knee is sore today but was up on it a lot.   -BB         Subjective Pain    Able to rate subjective pain?  yes  -BB      Pre-Treatment Pain Level  3  -BB      Post-Treatment Pain Level  3  -BB         Exercise 1    Exercise Name 1  Pro II level 3   -BB      Time 1  10  -BB      Additional Comments  for strength and ROM  -BB         Exercise 2    Exercise Name 2  Incline stretch  -BB      Reps 2  3  -BB      Time 2  30  -BB         Exercise 3    Exercise Name 3  HS stretch  -BB      Sets 3  3  -BB      Reps 3  30  -BB         Exercise 4    Exercise Name 4  SLS SBA  -BB      Reps 4  3  -BB      Time 4  15 sec  -BB      Additional Comments  Min LOB and required  min UE assist at times for balance. m  -BB         Exercise 5    Exercise Name 5  standing CR/TR  -BB      Sets 5  2  -BB      Reps 5  10  -BB         Exercise 6    Exercise Name 6  CC TKE  -BB      Cueing 6  Verbal;Demo cues to slow down   -BB      Sets 6  3  -BB      Reps 6  10  -BB      Additional Comments  5 pl  -BB         Exercise 7    Exercise Name 7  SLR flexion  -BB      Cueing 7  Verbal  -BB      Sets 7  2  -BB      Reps 7  10  -BB      Additional Comments  cues for slow ecc  -BB         Exercise 8    Exercise Name 8  iso ham pull into ball R  -BB      Sets 8  2  -BB      Reps 8  10  -BB      Additional Comments  5 sec  -BB         Exercise 9    Exercise Name 9  ice to go.  -BB        User Key  (r) = Recorded By, (t) = Taken By, (c) = Cosigned By    Initials Name Provider Type    Alicia King, PTA Physical Therapy Assistant                         PT OP Goals     Row Name 02/26/19 1000          PT Short Term Goals    STG Date to Achieve  02/21/19  -BB     STG 1  -- Patient will report no pain with walking and/or bending  -BB     STG 1 Progress  Progressing  -BB     STG 1 Progress Comments  States he still has pain with walking and bending but it is better than it used to be.   -BB     STG 2  -- Reduce R knee edema by .5 cm  -BB     STG 2 Progress  Met  -BB     STG 2 Progress Comments  R knee circumference at joint line 38 cm, L 38 cm  -BB     STG 3  Patient will demonstrate independence with HEP  -BB     STG 3 Progress  Partially Met  -BB     STG 4  Increase R knee flexion by 10 degrees  -BB     STG 4 Progress  Partially Met;Not Met  -BB     STG 4 Progress Comments  130 degrees R knee flexion AROM  -BB        Long Term Goals    LTG Date to Achieve  02/28/19  -BB     LTG 1  Patient will achieve 5/5 muscle strength R knee flexion/extension  -BB     LTG 1 Progress  Not Met  -BB     LTG 2  Patient will report no pain R knee.  -BB     LTG 2 Progress  Progressing  -BB        Time Calculation    PT Goal  Re-Cert Due Date  02/28/19  -AKSHAT       User Key  (r) = Recorded By, (t) = Taken By, (c) = Cosigned By    Initials Name Provider Type    Alicia King PTA Physical Therapy Assistant                         Time Calculation:   Start Time: 1017  Stop Time: 1100  Time Calculation (min): 43 min  Total Timed Code Minutes- PT: 43 minute(s)  Therapy Suggested Charges     Code   Minutes Charges    None           Therapy Charges for Today     Code Description Service Date Service Provider Modifiers Qty    [id number] HC PT THER PROC EA 15 MIN 2/26/2019 Alicia Raygoza PTA GP 3                    Alicia Raygoza PTA  2/26/2019

## 2019-02-28 ENCOUNTER — HOSPITAL ENCOUNTER (OUTPATIENT)
Dept: PHYSICAL THERAPY | Facility: HOSPITAL | Age: 74
Setting detail: THERAPIES SERIES
Discharge: HOME | End: 2019-02-28

## 2019-02-28 DIAGNOSIS — M17.11 PRIMARY OSTEOARTHRITIS OF RIGHT KNEE: Primary | ICD-10-CM

## 2019-02-28 PROCEDURE — 97110 THERAPEUTIC EXERCISES: CPT

## 2019-03-05 ENCOUNTER — HOSPITAL ENCOUNTER (OUTPATIENT)
Dept: PHYSICAL THERAPY | Facility: HOSPITAL | Age: 74
Setting detail: THERAPIES SERIES
Discharge: HOME | End: 2019-03-05

## 2019-03-05 DIAGNOSIS — M17.11 PRIMARY OSTEOARTHRITIS OF RIGHT KNEE: Primary | ICD-10-CM

## 2019-03-05 DIAGNOSIS — M17.11 OSTEOARTHRITIS OF RIGHT KNEE, UNSPECIFIED OSTEOARTHRITIS TYPE: ICD-10-CM

## 2019-03-05 PROCEDURE — 97110 THERAPEUTIC EXERCISES: CPT

## 2019-03-05 NOTE — THERAPY DISCHARGE NOTE
Outpatient Physical Therapy Ortho Treatment Note/Discharge Summary  [facility]     Patient Name: Zachary Galindo  : 1945  MRN: [id number]  Today's Date: 3/5/2019      Visit Date: 2019    Visit Dx:    ICD-10-CM ICD-9-CM   1. Primary osteoarthritis of right knee M17.11 715.16   2. Osteoarthritis of right knee, unspecified osteoarthritis type M17.11 715.96       Patient Active Problem List   Diagnosis   • Essential hypertension   • Gout   • Hyperlipidemia   • Type 2 diabetes mellitus with complication, with long-term current use of insulin (CMS/[facility])   • Coronary artery disease involving coronary bypass graft of native heart without angina pectoris   • Pure hypercholesterolemia   • Stage 3 chronic kidney disease (CMS/HCC)   • Right knee pain   • Primary osteoarthritis of right knee        Past Medical History:   Diagnosis Date   • Allergic rhinitis    • Ankle joint pain    • Artificial lens present     Left   • Astigmatism    • Benign prostatic hyperplasia    • Cataract    • Closed fracture of medial malleolus    • Coronary arteriosclerosis    • Diabetes mellitus (CMS/[facility])     no retinopathy   • Elevated levels of transaminase & lactic acid dehydrogenase     improving    • Encounter for health maintenance examination     Individual health examination     • Encounter for health maintenance examination in adult     Adult health examination    • Essential hypertension    • Essential hypertension     Unspecified   • Flank pain     probably muscular    • GERD (gastroesophageal reflux disease)    • Gout    • Gout     unspecified     • Hemorrhoids    • History of artificial eye lens     Artificial lens in position - right    • History of colonoscopy 2010    Colon endoscopy  (10101)  (1)    • History of kidney stones    • Hyperlipidemia    • Hypermetropia    • Low blood pressure    • Malaise and fatigue    • Need for influenza vaccination     Needs influenza immunization    • Nuclear  cataract of left eye    • Posterior subcapsular polar senile cataract     Left   • Renal impairment    • Special screening for malignant neoplasm of prostate    • Type 2 diabetes mellitus (CMS/HCC)     improving   • 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 Distal RCA beyond the touchdown was seen filling the circumflex system   • CATARACT EXTRACTION  10/2015    Remove cataract, insert lens (2)    • CORONARY ARTERY BYPASS GRAFT      Arterial, two  (1)   -  3 - 12/1998   • HERNIA REPAIR      w/mesh  (1)   • INJECTION OF MEDICATION  04/11/2013    B12  (1)  - RAYMOND Raymond   • INJECTION OF MEDICATION  07/03/2013    Benadryl  (1) - RAYMOND Raymond   • INJECTION OF MEDICATION  10/22/2014    Kenalog  (2)   • OTHER SURGICAL HISTORY  07/10/2002    Fragmenting of kidney stone  -   ESWL, 2010 shocks. 1cm UP stone,right. Diabetes mellitus       PT Ortho     Row Name 03/05/19 1300       Subjective Pain    Pre-Treatment Pain Level  1  -    Post-Treatment Pain Level  0  -      User Key  (r) = Recorded By, (t) = Taken By, (c) = Cosigned By    Initials Name Provider Type     Carmita Linda, PT Physical Therapist                      PT Assessment/Plan     Row Name 03/05/19 1300          PT Assessment    Assessment Comments  Patient condition appears much improved, Minimal pain reports remaining which is no longer constant. Patient believes (as does this therapist) that he will be able to continue with his progress with HEP. Plans to try to join gym for continued fitness.  -        PT Plan    PT Plan Comments  Discharge from  "physical therapy with HEP 3/5/19  -       User Key  (r) = Recorded By, (t) = Taken By, (c) = Cosigned By    Initials Name Provider Type     Cramita Linda, JOE Physical Therapist              Exercises     Row Name 03/05/19 1300             Subjective Pain    Able to rate subjective pain?  yes  -DH      Pre-Treatment Pain Level  1  -DH      Post-Treatment Pain Level  0  -DH      Subjective Pain Comment  I think it is as good as it is going to get  -DH         Total Minutes    55093 - PT Therapeutic Exercise Minutes  45  -DH         Exercise 1    Exercise Name 1  Pro II level 3  -DH      Time 1  13  -DH         Exercise 2    Exercise Name 2  incline Baby Bear  -DH      Sets 2  3  -DH      Reps 2  1  -DH      Time 2  30\" ea  -DH         Exercise 3    Exercise Name 3  Biodex TKE   -DH      Sets 3  2  -DH      Reps 3  20  -DH      Additional Comments  Plate 5   -DH         Exercise 4    Exercise Name 4  Resisted walking 4 ways  -DH      Sets 4  1 ea  -DH      Reps 4  15  -DH      Additional Comments  Plate 5  -DH         Exercise 5    Exercise Name 5  SAQ  -DH      Sets 5  3  -DH      Reps 5  10  -DH         Exercise 6    Exercise Name 6  SLR with ER RLE  -DH      Sets 6  3  -DH      Reps 6  10  -DH         Exercise 7    Exercise Name 7  quad sets  -DH      Sets 7  3  -DH      Reps 7  10  -DH        User Key  (r) = Recorded By, (t) = Taken By, (c) = Cosigned By    Initials Name Provider Type     Carmita Linda, PT Physical Therapist                         PT OP Goals     Row Name 03/05/19 1300          PT Short Term Goals    STG Date to Achieve  02/21/19  -     STG 1  -- Patient will report no pain with walking and/or bending  -     STG 1 Progress  Met  -     STG 2  -- Reduce R knee edema by .5 cm  -     STG 2 Progress  Met  -     STG 3  Patient will demonstrate independence with HEP  -     STG 3 Progress  Met  -     STG 4  Increase R knee flexion by 10 degrees  -     STG 4 Progress  Met  -     "    Long Term Goals    LTG Date to Achieve  02/28/19  -     LTG 1  Patient will achieve 5/5 muscle strength R knee flexion/extension  -     LTG 1 Progress  Partially Met  -     LTG 2  Patient will report no pain R knee.  -     LTG 2 Progress  Partially Met  -       User Key  (r) = Recorded By, (t) = Taken By, (c) = Cosigned By    Initials Name Provider Type     Carmita Linda, PT Physical Therapist               Outcome Measure Options: Lower Extremity Functional Scale (LEFS)  Lower Extremity Functional Index  Any of your usual work, housework or school activities: No difficulty  Your usual hobbies, recreational or sporting activities: No difficulty  Getting into or out of the bath: No difficulty  Walking between rooms: No difficulty  Putting on your shoes or socks: No difficulty  Squatting: A little bit of difficulty  Lifting an object, like a bag of groceries from the floor: No difficulty  Performing light activities around your home: No difficulty  Performing heavy activities around your home: No difficulty  Getting into or out of a car: No difficulty  Walking 2 blocks: No difficulty  Walking a mile: No difficulty  Going up or down 10 stairs (about 1 flight of stairs): No difficulty  Standing for 1 hour: No difficulty  Sitting for 1 hour: No difficulty  Running on even ground: A little bit of difficulty  Running on uneven ground: A little bit of difficulty  Making sharp turns while running fast: A little bit of difficulty  Hopping: A little bit of difficulty  Rolling over in bed: No difficulty  Total: 75      Time Calculation:   Start Time: 1300  Stop Time: 1345  Time Calculation (min): 45 min    Therapy Charges for Today     Code Description Service Date Service Provider Modifiers Qty    [id number]  PT THER PROC EA 15 MIN 3/5/2019 Carmita Linda, PT GP 3          PT G-Codes  Outcome Measure Options: Lower Extremity Functional Scale (LEFS)  Total: 75            Carmita Linda, JOE  3/5/2019

## 2019-03-07 ENCOUNTER — APPOINTMENT (OUTPATIENT)
Dept: PHYSICAL THERAPY | Facility: HOSPITAL | Age: 74
End: 2019-03-07

## 2019-03-11 RX ORDER — SITAGLIPTIN 100 MG/1
TABLET, FILM COATED ORAL
Qty: 90 TABLET | Refills: 3 | Status: SHIPPED | OUTPATIENT
Start: 2019-03-11 | End: 2019-07-08

## 2019-03-18 ENCOUNTER — DOCUMENTATION (OUTPATIENT)
Dept: CARDIOLOGY | Facility: CLINIC | Age: 74
End: 2019-03-18

## 2019-03-18 ENCOUNTER — OFFICE VISIT (OUTPATIENT)
Dept: CARDIOLOGY | Facility: CLINIC | Age: 74
End: 2019-03-18

## 2019-03-18 VITALS
BODY MASS INDEX: 25.84 KG/M2 | WEIGHT: 195 LBS | SYSTOLIC BLOOD PRESSURE: 138 MMHG | HEART RATE: 76 BPM | OXYGEN SATURATION: 98 % | HEIGHT: 73 IN | DIASTOLIC BLOOD PRESSURE: 80 MMHG

## 2019-03-18 DIAGNOSIS — I10 ESSENTIAL HYPERTENSION: Chronic | ICD-10-CM

## 2019-03-18 DIAGNOSIS — E11.8 TYPE 2 DIABETES MELLITUS WITH COMPLICATION, WITH LONG-TERM CURRENT USE OF INSULIN (HCC): Chronic | ICD-10-CM

## 2019-03-18 DIAGNOSIS — Z79.4 TYPE 2 DIABETES MELLITUS WITH COMPLICATION, WITH LONG-TERM CURRENT USE OF INSULIN (HCC): Chronic | ICD-10-CM

## 2019-03-18 DIAGNOSIS — E78.2 MIXED HYPERLIPIDEMIA: Chronic | ICD-10-CM

## 2019-03-18 DIAGNOSIS — I25.810 CORONARY ARTERY DISEASE INVOLVING CORONARY BYPASS GRAFT OF NATIVE HEART WITHOUT ANGINA PECTORIS: Primary | ICD-10-CM

## 2019-03-18 PROCEDURE — 99214 OFFICE O/P EST MOD 30 MIN: CPT | Performed by: INTERNAL MEDICINE

## 2019-03-18 RX ORDER — ALFUZOSIN HYDROCHLORIDE 10 MG/1
10 TABLET, EXTENDED RELEASE ORAL DAILY
COMMUNITY

## 2019-03-18 NOTE — PROGRESS NOTES
[facility] Cardiology  OFFICE NOTE    Zachary Galindo  73 y.o. male    03/18/2019  1. Coronary artery disease involving coronary bypass graft of native heart without angina pectoris    2. Mixed hyperlipidemia    3. Essential hypertension    4. Type 2 diabetes mellitus with complication, with long-term current use of insulin (CMS/[facility])        Chief complaint -follow-up CAD      History of present Illness- 73-year-old gentleman with history of coronary disease status post CABG in 1998 subsequently had stent placement about 4 years ago.    He is able to exercise reasonably well without any problems.  He is been a diabetic since the age of 35.  He denies any smoking ,  He denies any headache or visual complaints.  He had his labs checked by Dr. Radha Raymond.His most recent A1c was 9.5 and has an appointment to see Dr. Juan Briggs in July 2019 and he is not exercising much asked him to exercise regularly walking for 45 minutes daily.  He denies any GI symptoms or CNS symptoms.  His lipids are okay.              Allergies   Allergen Reactions   • Advicor [Niacin-Lovastatin Er] Unknown (See Comments)     Swelling   • Lisinopril Other (See Comments)     Cough   • Nsaids Other (See Comments)     Kidney disease         Past Medical History:   Diagnosis Date   • Allergic rhinitis    • Ankle joint pain    • Artificial lens present     Left   • Astigmatism    • Benign prostatic hyperplasia    • Cataract    • Closed fracture of medial malleolus    • Coronary arteriosclerosis    • Diabetes mellitus (CMS/[facility])     no retinopathy   • Elevated levels of transaminase & lactic acid dehydrogenase     improving    • Encounter for health maintenance examination     Individual health examination     • Encounter for health maintenance examination in adult     Adult health examination    • Essential hypertension    • Essential hypertension     Unspecified   • Flank pain     probably muscular    • GERD  (gastroesophageal reflux disease)    • Gout    • Gout     unspecified     • Hemorrhoids    • History of artificial eye lens     Artificial lens in position - right    • History of colonoscopy 04/04/2010    Colon endoscopy  (02686)  (1)    • History of kidney stones    • Hyperlipidemia    • Hypermetropia    • Low blood pressure    • Malaise and fatigue    • Need for influenza vaccination     Needs influenza immunization    • Nuclear cataract of left eye    • Posterior subcapsular polar senile cataract     Left   • Renal impairment    • Special screening for malignant neoplasm of prostate    • Type 2 diabetes mellitus (CMS/HCC)     improving   • 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 Distal RCA beyond the touchdown was seen filling the circumflex system   • CATARACT EXTRACTION  10/2015    Remove cataract, insert lens (2)    • CORONARY ARTERY BYPASS GRAFT      Arterial, two  (1)   -  3 - 12/1998   • HERNIA REPAIR      w/mesh  (1)   • INJECTION OF MEDICATION  04/11/2013    B12  (1)  - RAYMOND Raymond   • INJECTION OF MEDICATION  07/03/2013    Benadryl  (1) - RAYMOND Raymond   • INJECTION OF MEDICATION  10/22/2014    Kenalog  (2)   • OTHER SURGICAL HISTORY  07/10/2002    Fragmenting of kidney stone  -   ESWL, 2010 shocks. 1cm UP stone,right. Diabetes mellitus         Family History   Problem Relation Age of Onset   • Diabetes Other    • Hypertension Other    • Cancer Mother    • Heart disease Mother    • Hypertension Mother    • Hyperlipidemia Mother    • Diabetes Brother    • Diabetes Maternal Aunt          Social History  "    Socioeconomic History   • Marital status:      Spouse name: Not on file   • Number of children: Not on file   • Years of education: Not on file   • Highest education level: Not on file   Social Needs   • Financial resource strain: Not on file   • Food insecurity - worry: Not on file   • Food insecurity - inability: Not on file   • Transportation needs - medical: Not on file   • Transportation needs - non-medical: Not on file   Occupational History   • Not on file   Tobacco Use   • Smoking status: Former Smoker   • Smokeless tobacco: Never Used   • Tobacco comment: Quit 1979   Substance and Sexual Activity   • Alcohol use: No   • Drug use: No   • Sexual activity: Not on file   Other Topics Concern   • Not on file   Social History Narrative   • Not on file         Current Outpatient Medications   Medication Sig Dispense Refill   • alfuzosin (UROXATRAL) 10 MG 24 hr tablet Take 10 mg by mouth Daily.     • amLODIPine (NORVASC) 5 MG tablet Take 1 tablet by mouth Daily. 90 tablet 3   • aspirin 81 MG tablet Take 1 tablet by mouth Daily. 30 tablet 11   • atorvastatin (LIPITOR) 10 MG tablet Take 1 tablet by mouth Every Night. 90 tablet 3   • carvedilol (COREG) 6.25 MG tablet Take 1 tablet by mouth 2 (Two) Times a Day With Meals. 180 tablet 3   • clopidogrel (PLAVIX) 75 MG tablet Take 1 tablet by mouth Daily. 90 tablet 3   • ezetimibe (ZETIA) 10 MG tablet Take 1 tablet by mouth Daily. 90 tablet 3   • glipiZIDE (GLUCOTROL) 5 MG tablet Take 1 tablet by mouth 2 (Two) Times a Day Before Meals. 180 tablet 3   • glucose blood test strip OneTouch Ultra Test strips  -  Test sugars 3-4 times a day as directed by provider.     • insulin detemir (LEVEMIR) 100 UNIT/ML injection Inject 63 Units under the skin into the appropriate area as directed 2 (Two) Times a Day. 20 units in am and 68 units an pm 120 mL 5   • Insulin Pen Needle (PEN NEEDLES 3/16\") 31G X 5 MM misc 100 each Daily. 100 each 3   • JANUVIA 100 MG tablet TAKE 1 " "TABLET BY MOUTH EVERY DAY 90 tablet 3   • Lancets (ONETOUCH ULTRASOFT) lancets Test sugars 3-4 times daily as instructed by physician.     • losartan (COZAAR) 50 MG tablet Take 1 tablet by mouth Daily. 90 tablet 3   • magnesium oxide (MAG-OX) 400 MG tablet Take 1 tablet by mouth 2 (Two) Times a Day. 180 tablet 3   • ondansetron ODT (ZOFRAN-ODT) 4 MG disintegrating tablet Take 1 tablet by mouth Every 6 (Six) Hours As Needed for Nausea or Vomiting. 10 tablet 0   • probenecid (BENEMID) 500 MG tablet Take 1 tablet by mouth 2 (Two) Times a Day. 180 tablet 3   • SITagliptin (JANUVIA) 100 MG tablet Take 1 tablet by mouth Daily. 90 tablet 3     No current facility-administered medications for this visit.          Review of Systems     Constitution: Denies any fatigue, fever or chills    HENT: Denies any headache, hearing impairment,     Eyes: Denies any blurring of vision, or photophobia     Cardivascular - As per history of present illness     Respiratory system-denies any COPD, shortness of breath,   sleep apnea.     Endocrine:   history of hyperlipidemia, diabetes,                        Musculoskeletal:  history of arthritis with musculoskeletal problems    Gastrointestinal: No nausea, vomiting, or melena    Genitourinary: No dysuria or hematuria    Neurological:   No history of seizure disorder, stroke, memory problems    Psychiatric/Behavioral:        No history of depression,      Hematological- no history of easy bruising             OBJECTIVE    /80   Pulse 76   Ht 185.4 cm (73\")   Wt 88.5 kg (195 lb)   SpO2 98%   BMI 25.73 kg/m²       Physical Exam     Constitutional: is oriented to person, place, and time.     Skin-warm and dry    Well developed and nourished in no acute distress      Head: Normocephalic and atraumatic.     Eyes: Pupils are equal, round,    Neck: Neck supple. No bruit in the carotids    Cardiovascular: [place] in the fifth intercostal space   Regular rate, and  Rhythm,    S1 greater than " S2,    Pulmonary/Chest:   Air  Entry is equal on both sides  No wheezing or crackles,      Abdominal: Soft.  No hepatosplenomegaly,     Musculoskeletal: No kyphoscoliosis, no significant thickening of the joints    Neurological: is alert and oriented to person, place, and time.    cranial nerve are intact .   No motor or sensory deficit    Extremities-no edema, no radial femoral delay      Psychiatric: He has a normal mood and affect.                  His behavior is normal.           Procedures      Lab Results   Component Value Date    WBC 3.90 02/11/2019    HGB 15.2 02/11/2019    HCT 44.7 02/11/2019    MCV 89.4 02/11/2019     02/11/2019     Lab Results   Component Value Date    GLUCOSE 168 (H) 02/11/2019    BUN 21 02/11/2019    CREATININE 1.20 02/11/2019    EGFRIFNONA 59 02/11/2019    BCR 17.5 02/11/2019    CO2 30.0 02/11/2019    CALCIUM 9.7 02/11/2019    ALBUMIN 4.60 02/11/2019    AST 57 02/11/2019    ALT 32 02/11/2019     Lab Results   Component Value Date    CHOL 159 02/11/2019    CHOL 169 01/02/2018    CHOL 233 (H) 06/26/2017     Lab Results   Component Value Date    TRIG 187 02/11/2019    TRIG 215 (H) 01/02/2018    TRIG 243 (H) 06/26/2017     Lab Results   Component Value Date    HDL 27 (L) 02/11/2019    HDL 28 (L) 01/02/2018    HDL 32 (L) 06/26/2017     No components found for: LDLCALC  Lab Results   Component Value Date     02/11/2019     11/12/2018    LDL 95 04/05/2018     No results found for: HDLLDLRATIO  No components found for: CHOLHDL  Lab Results   Component Value Date    HGBA1C 9.5 (H) 02/11/2019     No results found for: TSH, [id number]               A/P    CAD status post CABG with prior stent placement to native arteries, doing well he had stent placement 4 years ago.  Denies any angina Talked to him about regular exercise at least 45 minutes daily 6 days a week and is on dual antiplatelet therapy with aspirin and Plavix he has knee problems I asked him to use a stationary bike  to exercise    Diabetes on insulin and other medications followed by Dr. Radha Raymond.  His most recent A1c was 9.5.  Has appointment to see Dr. Juan Briggs in July    Hyperlipidemia on atorvastatin 10 mg and Zetia 10 mg daily and his lipid panel from February of this year was okay    Follow-up in 6 months       July 15, 2019    He can proceed with eye surgery with mild risk by ACC- AHA guidelines and stop the aspirin and Plavix 5 days prior to surgery and restart when okay with the surgeon.            This document has been electronically signed by Khadar Ruiz MD on March 18, 2019 8:21 AM       EMR Dragon/Transcription disclaimer:   Some of this note may be an electronic transcription/translation of spoken language to printed text. The electronic translation of spoken language may permit erroneous, or at times, nonsensical words or phrases to be inadvertently transcribed; Although I have reviewed the note for such errors, some may still exist.

## 2019-05-14 ENCOUNTER — LAB (OUTPATIENT)
Dept: LAB | Facility: HOSPITAL | Age: 74
End: 2019-05-14

## 2019-05-14 DIAGNOSIS — Z79.4 TYPE 2 DIABETES MELLITUS WITH COMPLICATION, WITH LONG-TERM CURRENT USE OF INSULIN (HCC): ICD-10-CM

## 2019-05-14 DIAGNOSIS — E11.8 TYPE 2 DIABETES MELLITUS WITH COMPLICATION, WITH LONG-TERM CURRENT USE OF INSULIN (HCC): ICD-10-CM

## 2019-05-14 LAB
ANION GAP SERPL CALCULATED.3IONS-SCNC: 12 MMOL/L
BUN BLD-MCNC: 21 MG/DL (ref 8–23)
BUN/CREAT SERPL: 17.8 (ref 7–25)
CALCIUM SPEC-SCNC: 9.7 MG/DL (ref 8.6–10.5)
CHLORIDE SERPL-SCNC: 102 MMOL/L (ref 98–107)
CO2 SERPL-SCNC: 25 MMOL/L (ref 22–29)
CREAT BLD-MCNC: 1.18 MG/DL (ref 0.76–1.27)
GFR SERPL CREATININE-BSD FRML MDRD: 61 ML/MIN/1.73
GLUCOSE BLD-MCNC: 285 MG/DL (ref 65–99)
HBA1C MFR BLD: 8.3 % (ref 4.8–5.6)
POTASSIUM BLD-SCNC: 5.4 MMOL/L (ref 3.5–5.2)
SODIUM BLD-SCNC: 139 MMOL/L (ref 136–145)

## 2019-05-14 PROCEDURE — 83036 HEMOGLOBIN GLYCOSYLATED A1C: CPT

## 2019-05-14 PROCEDURE — 80048 BASIC METABOLIC PNL TOTAL CA: CPT

## 2019-05-14 PROCEDURE — 36415 COLL VENOUS BLD VENIPUNCTURE: CPT

## 2019-05-22 ENCOUNTER — OFFICE VISIT (OUTPATIENT)
Dept: FAMILY MEDICINE CLINIC | Facility: CLINIC | Age: 74
End: 2019-05-22

## 2019-05-22 ENCOUNTER — LAB (OUTPATIENT)
Dept: LAB | Facility: HOSPITAL | Age: 74
End: 2019-05-22

## 2019-05-22 VITALS
WEIGHT: 200.6 LBS | HEIGHT: 73 IN | DIASTOLIC BLOOD PRESSURE: 64 MMHG | HEART RATE: 57 BPM | BODY MASS INDEX: 26.59 KG/M2 | SYSTOLIC BLOOD PRESSURE: 162 MMHG | OXYGEN SATURATION: 98 %

## 2019-05-22 DIAGNOSIS — E11.8 TYPE 2 DIABETES MELLITUS WITH COMPLICATION, WITH LONG-TERM CURRENT USE OF INSULIN (HCC): ICD-10-CM

## 2019-05-22 DIAGNOSIS — I10 ESSENTIAL HYPERTENSION: Chronic | ICD-10-CM

## 2019-05-22 DIAGNOSIS — Z79.4 TYPE 2 DIABETES MELLITUS WITH COMPLICATION, WITH LONG-TERM CURRENT USE OF INSULIN (HCC): ICD-10-CM

## 2019-05-22 DIAGNOSIS — R10.9 ABDOMINAL DISCOMFORT: ICD-10-CM

## 2019-05-22 DIAGNOSIS — R53.83 OTHER FATIGUE: Primary | ICD-10-CM

## 2019-05-22 DIAGNOSIS — R53.83 OTHER FATIGUE: ICD-10-CM

## 2019-05-22 PROCEDURE — 84439 ASSAY OF FREE THYROXINE: CPT | Performed by: GENERAL PRACTICE

## 2019-05-22 PROCEDURE — 85025 COMPLETE CBC W/AUTO DIFF WBC: CPT | Performed by: GENERAL PRACTICE

## 2019-05-22 PROCEDURE — 82150 ASSAY OF AMYLASE: CPT

## 2019-05-22 PROCEDURE — 80053 COMPREHEN METABOLIC PANEL: CPT | Performed by: GENERAL PRACTICE

## 2019-05-22 PROCEDURE — 36415 COLL VENOUS BLD VENIPUNCTURE: CPT

## 2019-05-22 PROCEDURE — 83690 ASSAY OF LIPASE: CPT

## 2019-05-22 PROCEDURE — 82607 VITAMIN B-12: CPT

## 2019-05-22 PROCEDURE — 84443 ASSAY THYROID STIM HORMONE: CPT | Performed by: GENERAL PRACTICE

## 2019-05-22 PROCEDURE — 99214 OFFICE O/P EST MOD 30 MIN: CPT | Performed by: GENERAL PRACTICE

## 2019-05-22 NOTE — PROGRESS NOTES
Subjective   Zachary Galindo is a 73 y.o. male.   Chief Complaint   Patient presents with   • Diabetes   • Fatigue   • Abdominal Cramping     For review and evaluation of management of chronic medical problems. Labs reviewed. Having some abdominal cramping and bloating, affecting his appetite, no weight loss.   Diabetes   He presents for his follow-up diabetic visit. He has type 2 diabetes mellitus. His disease course has been improving. There are no hypoglycemic associated symptoms. Pertinent negatives for hypoglycemia include no dizziness, headaches or nervousness/anxiousness. There are no diabetic associated symptoms. Pertinent negatives for diabetes include no chest pain, no fatigue and no weakness. There are no hypoglycemic complications. Diabetic complications include nephropathy. Risk factors for coronary artery disease include diabetes mellitus, dyslipidemia, hypertension and male sex. Current diabetic treatment includes oral agent (dual therapy) and insulin injections. He is compliant with treatment all of the time. His weight is stable. He is following a generally healthy diet. Meal planning includes ADA exchanges. He participates in exercise intermittently. There is no change in his home blood glucose trend. An ACE inhibitor/angiotensin II receptor blocker is being taken. Eye exam is current.   Hypertension   This is a chronic problem. The current episode started more than 1 year ago. The problem is unchanged. The problem is controlled. Pertinent negatives include no chest pain, headaches, neck pain, palpitations or shortness of breath. There are no associated agents to hypertension. Risk factors for coronary artery disease include diabetes mellitus and dyslipidemia. Current antihypertension treatment includes angiotensin blockers, calcium channel blockers and beta blockers. The current treatment provides significant improvement. There are no compliance problems.    Fatigue   This is a new problem.  "The current episode started more than 1 month ago. The problem occurs constantly. The problem has been unchanged. Pertinent negatives include no abdominal pain, arthralgias, chest pain, chills, congestion, coughing, fatigue, fever, headaches, joint swelling, myalgias, nausea, neck pain, numbness, rash, sore throat, vomiting or weakness. Nothing aggravates the symptoms. He has tried nothing for the symptoms.      The following portions of the patient's history were reviewed and updated as appropriate: allergies, current medications, past social history and problem list.    Outpatient Medications Prior to Visit   Medication Sig Dispense Refill   • alfuzosin (UROXATRAL) 10 MG 24 hr tablet Take 10 mg by mouth Daily.     • amLODIPine (NORVASC) 5 MG tablet Take 1 tablet by mouth Daily. 90 tablet 3   • aspirin 81 MG tablet Take 1 tablet by mouth Daily. 30 tablet 11   • atorvastatin (LIPITOR) 10 MG tablet Take 1 tablet by mouth Every Night. 90 tablet 3   • carvedilol (COREG) 6.25 MG tablet Take 1 tablet by mouth 2 (Two) Times a Day With Meals. 180 tablet 3   • clopidogrel (PLAVIX) 75 MG tablet Take 1 tablet by mouth Daily. 90 tablet 3   • ezetimibe (ZETIA) 10 MG tablet Take 1 tablet by mouth Daily. 90 tablet 3   • glipiZIDE (GLUCOTROL) 5 MG tablet Take 1 tablet by mouth 2 (Two) Times a Day Before Meals. 180 tablet 3   • glucose blood test strip OneTouch Ultra Test strips  -  Test sugars 3-4 times a day as directed by provider.     • insulin detemir (LEVEMIR) 100 UNIT/ML injection Inject 63 Units under the skin into the appropriate area as directed 2 (Two) Times a Day. 20 units in am and 68 units an pm 120 mL 5   • Insulin Pen Needle (PEN NEEDLES 3/16\") 31G X 5 MM misc 100 each Daily. 100 each 3   • JANUVIA 100 MG tablet TAKE 1 TABLET BY MOUTH EVERY DAY 90 tablet 3   • Lancets (ONETOUCH ULTRASOFT) lancets Test sugars 3-4 times daily as instructed by physician.     • losartan (COZAAR) 50 MG tablet Take 1 tablet by mouth " "Daily. 90 tablet 3   • magnesium oxide (MAG-OX) 400 MG tablet Take 1 tablet by mouth 2 (Two) Times a Day. 180 tablet 3   • ondansetron ODT (ZOFRAN-ODT) 4 MG disintegrating tablet Take 1 tablet by mouth Every 6 (Six) Hours As Needed for Nausea or Vomiting. 10 tablet 0   • probenecid (BENEMID) 500 MG tablet Take 1 tablet by mouth 2 (Two) Times a Day. 180 tablet 3   • SITagliptin (JANUVIA) 100 MG tablet Take 1 tablet by mouth Daily. 90 tablet 3     No facility-administered medications prior to visit.        Review of Systems   Constitutional: Negative.  Negative for chills, fatigue, fever and unexpected weight change.   HENT: Negative.  Negative for congestion, ear pain, hearing loss, nosebleeds, rhinorrhea, sneezing, sore throat and tinnitus.    Eyes: Negative.  Negative for discharge.   Respiratory: Negative.  Negative for cough, shortness of breath and wheezing.    Cardiovascular: Negative.  Negative for chest pain and palpitations.   Gastrointestinal: Negative.  Negative for abdominal pain, constipation, diarrhea, nausea and vomiting.   Endocrine: Negative.    Genitourinary: Negative.  Negative for dysuria, frequency and urgency.   Musculoskeletal: Negative.  Negative for arthralgias, back pain, joint swelling, myalgias and neck pain.   Skin: Negative.  Negative for rash.   Allergic/Immunologic: Negative.    Neurological: Negative.  Negative for dizziness, weakness, numbness and headaches.   Hematological: Negative.  Negative for adenopathy.   Psychiatric/Behavioral: Negative.  Negative for dysphoric mood and sleep disturbance. The patient is not nervous/anxious.        Objective   Visit Vitals  /64 (BP Location: Left arm)   Pulse 57   Ht 185.4 cm (73\")   Wt 91 kg (200 lb 9.6 oz)   SpO2 98%   BMI 26.47 kg/m²     Physical Exam   Constitutional: He is oriented to person, place, and time. He appears well-developed and well-nourished. No distress.   HENT:   Head: Normocephalic.   Nose: Nose normal. "   Mouth/Throat: Oropharynx is clear and moist.   Eyes: Conjunctivae and EOM are normal. Pupils are equal, round, and reactive to light. Right eye exhibits no discharge. Left eye exhibits no discharge.   Neck: No thyromegaly present.   Cardiovascular: Normal rate, regular rhythm, normal heart sounds and intact distal pulses.   No murmur heard.  Pulmonary/Chest: Effort normal and breath sounds normal.   Abdominal: Soft. Normal appearance and bowel sounds are normal. He exhibits no distension. There is no tenderness.   Musculoskeletal: He exhibits no edema.   Lymphadenopathy:     He has no cervical adenopathy.   Neurological: He is alert and oriented to person, place, and time.   Skin: Skin is warm and dry.   Psychiatric: He has a normal mood and affect.   Nursing note and vitals reviewed.      Results for orders placed or performed in visit on 05/14/19   Basic Metabolic Panel   Result Value Ref Range    Glucose 285 (H) 65 - 99 mg/dL    BUN 21 8 - 23 mg/dL    Creatinine 1.18 0.76 - 1.27 mg/dL    Sodium 139 136 - 145 mmol/L    Potassium 5.4 (H) 3.5 - 5.2 mmol/L    Chloride 102 98 - 107 mmol/L    CO2 25.0 22.0 - 29.0 mmol/L    Calcium 9.7 8.6 - 10.5 mg/dL    eGFR Non African Amer 61 >60 mL/min/1.73    BUN/Creatinine Ratio 17.8 7.0 - 25.0    Anion Gap 12.0 mmol/L   Hemoglobin A1c   Result Value Ref Range    Hemoglobin A1C 8.30 (H) 4.80 - 5.60 %      Assessment/Plan   Problem List Items Addressed This Visit        Cardiovascular and Mediastinum    Essential hypertension (Chronic)       Endocrine    Type 2 diabetes mellitus with complication, with long-term current use of insulin (CMS/[facility]) (Chronic)      Other Visit Diagnoses     Other fatigue    -  Primary    Relevant Orders    TSH    T4, Free    Vitamin B12    Comprehensive Metabolic Panel    CBC & Differential    Amylase    Lipase    CBC Auto Differential    Abdominal discomfort        Relevant Orders    TSH    T4, Free    Vitamin B12    Comprehensive Metabolic Panel     CBC & Differential    Amylase    Lipase    CBC Auto Differential         Will notify regarding results. May need further evaluation of abdominal symptoms. Has appt with Dr. Martinez/Hayley in July. Will monitor blood pressure at home.     No orders of the defined types were placed in this encounter.    Return in about 6 months (around 11/22/2019) for Annual physical, medicare wellness visit.

## 2019-05-23 ENCOUNTER — TELEPHONE (OUTPATIENT)
Dept: FAMILY MEDICINE CLINIC | Facility: CLINIC | Age: 74
End: 2019-05-23

## 2019-05-23 LAB
ALBUMIN SERPL-MCNC: 4.3 G/DL (ref 3.5–5.2)
ALBUMIN/GLOB SERPL: 1.2 G/DL
ALP SERPL-CCNC: 90 U/L (ref 39–117)
ALT SERPL W P-5'-P-CCNC: 34 U/L (ref 1–41)
AMYLASE SERPL-CCNC: 57 U/L (ref 28–100)
ANION GAP SERPL CALCULATED.3IONS-SCNC: 12.4 MMOL/L
AST SERPL-CCNC: 29 U/L (ref 1–40)
BASOPHILS # BLD AUTO: 0.04 10*3/MM3 (ref 0–0.2)
BASOPHILS NFR BLD AUTO: 1 % (ref 0–1.5)
BILIRUB SERPL-MCNC: 0.3 MG/DL (ref 0.2–1.2)
BUN BLD-MCNC: 25 MG/DL (ref 8–23)
BUN/CREAT SERPL: 18.2 (ref 7–25)
CALCIUM SPEC-SCNC: 9.8 MG/DL (ref 8.6–10.5)
CHLORIDE SERPL-SCNC: 102 MMOL/L (ref 98–107)
CO2 SERPL-SCNC: 25.6 MMOL/L (ref 22–29)
CREAT BLD-MCNC: 1.37 MG/DL (ref 0.76–1.27)
DEPRECATED RDW RBC AUTO: 44 FL (ref 37–54)
EOSINOPHIL # BLD AUTO: 0.12 10*3/MM3 (ref 0–0.4)
EOSINOPHIL NFR BLD AUTO: 3 % (ref 0.3–6.2)
ERYTHROCYTE [DISTWIDTH] IN BLOOD BY AUTOMATED COUNT: 13 % (ref 12.3–15.4)
GFR SERPL CREATININE-BSD FRML MDRD: 51 ML/MIN/1.73
GLOBULIN UR ELPH-MCNC: 3.7 GM/DL
GLUCOSE BLD-MCNC: 221 MG/DL (ref 65–99)
HCT VFR BLD AUTO: 44.2 % (ref 37.5–51)
HGB BLD-MCNC: 14.5 G/DL (ref 13–17.7)
IMM GRANULOCYTES # BLD AUTO: 0.01 10*3/MM3 (ref 0–0.05)
IMM GRANULOCYTES NFR BLD AUTO: 0.3 % (ref 0–0.5)
LIPASE SERPL-CCNC: 63 U/L (ref 13–60)
LYMPHOCYTES # BLD AUTO: 0.84 10*3/MM3 (ref 0.7–3.1)
LYMPHOCYTES NFR BLD AUTO: 21.3 % (ref 19.6–45.3)
MCH RBC QN AUTO: 30.3 PG (ref 26.6–33)
MCHC RBC AUTO-ENTMCNC: 32.8 G/DL (ref 31.5–35.7)
MCV RBC AUTO: 92.3 FL (ref 79–97)
MONOCYTES # BLD AUTO: 0.34 10*3/MM3 (ref 0.1–0.9)
MONOCYTES NFR BLD AUTO: 8.6 % (ref 5–12)
NEUTROPHILS # BLD AUTO: 2.6 10*3/MM3 (ref 1.7–7)
NEUTROPHILS NFR BLD AUTO: 65.8 % (ref 42.7–76)
NRBC BLD AUTO-RTO: 0 /100 WBC (ref 0–0.2)
PLATELET # BLD AUTO: 158 10*3/MM3 (ref 140–450)
PMV BLD AUTO: 12.4 FL (ref 6–12)
POTASSIUM BLD-SCNC: 4.7 MMOL/L (ref 3.5–5.2)
PROT SERPL-MCNC: 8 G/DL (ref 6–8.5)
RBC # BLD AUTO: 4.79 10*6/MM3 (ref 4.14–5.8)
SODIUM BLD-SCNC: 140 MMOL/L (ref 136–145)
T4 FREE SERPL-MCNC: 0.95 NG/DL (ref 0.93–1.7)
TSH SERPL DL<=0.05 MIU/L-ACNC: 2.47 MIU/ML (ref 0.27–4.2)
VIT B12 BLD-MCNC: 453 PG/ML (ref 211–946)
WBC NRBC COR # BLD: 3.95 10*3/MM3 (ref 3.4–10.8)

## 2019-05-23 NOTE — PROGRESS NOTES
Per Dr. Raymond, Mr. Galindo has been called with recent lab results & recommendations.  Continue current medications and follow-up as planned or sooner if any problems.

## 2019-05-23 NOTE — TELEPHONE ENCOUNTER
-Per Dr. Raymond, Mr. Galindo has been called with recent lab results & recommendations.  Continue current medications and follow-up as planned or sooner if any problems.      ---- Message from Halley Raymond MD sent at 5/23/2019  8:13 AM CDT -----  Call and tell labs ok except kidney function is up a bit, increase fluids

## 2019-06-11 ENCOUNTER — TELEPHONE (OUTPATIENT)
Dept: FAMILY MEDICINE CLINIC | Facility: CLINIC | Age: 74
End: 2019-06-11

## 2019-06-11 RX ORDER — CARVEDILOL 12.5 MG/1
12.5 TABLET ORAL 2 TIMES DAILY WITH MEALS
Qty: 180 TABLET | Refills: 1 | Status: SHIPPED | OUTPATIENT
Start: 2019-06-11 | End: 2019-12-02 | Stop reason: SDUPTHER

## 2019-06-11 NOTE — TELEPHONE ENCOUNTER
Patient has called and states that since his last visit his BP has still been running a little high. He states today it is 150/88. Wants to see if Dr Raymond wants to increase his BP meds. Call him at [phone]. [facility]

## 2019-07-08 ENCOUNTER — OFFICE VISIT (OUTPATIENT)
Dept: ENDOCRINOLOGY | Facility: CLINIC | Age: 74
End: 2019-07-08

## 2019-07-08 VITALS
WEIGHT: 196.4 LBS | SYSTOLIC BLOOD PRESSURE: 138 MMHG | HEART RATE: 61 BPM | OXYGEN SATURATION: 98 % | DIASTOLIC BLOOD PRESSURE: 72 MMHG | HEIGHT: 73 IN | BODY MASS INDEX: 26.03 KG/M2

## 2019-07-08 DIAGNOSIS — E11.65 TYPE 2 DIABETES MELLITUS WITH HYPERGLYCEMIA, WITH LONG-TERM CURRENT USE OF INSULIN (HCC): Primary | ICD-10-CM

## 2019-07-08 DIAGNOSIS — Z79.4 TYPE 2 DIABETES MELLITUS WITH HYPERGLYCEMIA, WITH LONG-TERM CURRENT USE OF INSULIN (HCC): Primary | ICD-10-CM

## 2019-07-08 PROCEDURE — 99204 OFFICE O/P NEW MOD 45 MIN: CPT | Performed by: INTERNAL MEDICINE

## 2019-07-08 RX ORDER — ONDANSETRON 4 MG/1
4 TABLET, ORALLY DISINTEGRATING ORAL EVERY 6 HOURS PRN
Qty: 45 TABLET | Refills: 5 | Status: SHIPPED | OUTPATIENT
Start: 2019-07-08 | End: 2020-12-10

## 2019-07-08 NOTE — PATIENT INSTRUCTIONS
Levemir if paid by insurance change to Tresiba  Decrease dose from 74 to only 40 units    You can compare your bedtime to your morning sugar  A bedtime reading has to be at least 3-4 hours from the last meal    If the bedtime and waking reading is within 40 points of each other --- no change    If you drop more than 40 points for 2 days -- back off 4 units    If you rise more than 40 points for 4 days - increase by 4 units     =========================================================      Add trulicity 0.75 mg weekly     Try to not eat more than 60 grams of carbs per meal    The goal for trulicity is to prevent the sugar from rising more than 50 points from before a meal to 2 hours after a meal    Ideal    Before eating 130 m g per dl     You eat 60 grams    2 hours post you are 180 mg per dl       ====    Future medicines    A. Metformin 500 mg with supper  B. We may add jardiance 10 mg daily as long as the filtration rate is more than 45

## 2019-07-08 NOTE — PROGRESS NOTES
"Zachary Galindo is a 73 y.o. male who presents for  evaluation of   Chief Complaint   Patient presents with   • Diabetes     bs 155 waking today       Referring provider    Halley Raymond MD  [address] [place] [address]    Primary Care Provider    Halley Raymond MD    Duration 10 years    Timing - Diabetes is Constant    Quality -  needs improvement    Severity -  High given complications    Complications - nephropathy    Current symptoms/problems  increase appetite     Alleviating Factors: Compliance       Side Effects  none    Current diet  in general, a \"healthy\" diet  but sometimes high carb    Current exercise not exercising    Current monitoring regimen: home blood tests - checking 1 x daily   Home blood sugar records: 50 to 250     Hypoglycemia nocturnal and if skipped meals     Past Medical History:   Diagnosis Date   • Allergic rhinitis    • Ankle joint pain    • Artificial lens present     Left   • Astigmatism    • Benign prostatic hyperplasia    • Cataract    • Closed fracture of medial malleolus    • Coronary arteriosclerosis    • Diabetes mellitus (CMS/HCC)     no retinopathy   • Elevated levels of transaminase & lactic acid dehydrogenase     improving    • Encounter for health maintenance examination     Individual health examination     • Encounter for health maintenance examination in adult     Adult health examination    • Essential hypertension    • Essential hypertension     Unspecified   • Flank pain     probably muscular    • GERD (gastroesophageal reflux disease)    • Gout    • Gout     unspecified     • Hemorrhoids    • History of artificial eye lens     Artificial lens in position - right    • History of colonoscopy 04/04/2010    Colon endoscopy  (35330)  (1)    • History of kidney stones    • Hyperlipidemia    • Hypermetropia    • Low blood pressure    • Malaise and fatigue    • Need for influenza vaccination     Needs influenza immunization    • Nuclear cataract of " left eye    • Posterior subcapsular polar senile cataract     Left   • Renal impairment    • Special screening for malignant neoplasm of prostate    • Type 2 diabetes mellitus (CMS/HCC)     improving   • 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 edema (CMS/HCC)     without macular edema - mild OS    • Upper respiratory infection    • Urticaria     Drug-aggravated angioedema-urticaria   • Urticaria      Family History   Problem Relation Age of Onset   • Diabetes Other    • Hypertension Other    • Cancer Mother    • Heart disease Mother    • Hypertension Mother    • Hyperlipidemia Mother    • Diabetes Brother    • Diabetes Maternal Aunt      Social History     Tobacco Use   • Smoking status: Former Smoker   • Smokeless tobacco: Never Used   • Tobacco comment: Quit 1979   Substance Use Topics   • Alcohol use: No   • Drug use: No         Current Outpatient Medications:   •  alfuzosin (UROXATRAL) 10 MG 24 hr tablet, Take 10 mg by mouth Daily., Disp: , Rfl:   •  amLODIPine (NORVASC) 5 MG tablet, Take 1 tablet by mouth Daily., Disp: 90 tablet, Rfl: 3  •  aspirin 81 MG tablet, Take 1 tablet by mouth Daily., Disp: 30 tablet, Rfl: 11  •  atorvastatin (LIPITOR) 10 MG tablet, Take 1 tablet by mouth Every Night., Disp: 90 tablet, Rfl: 3  •  carvedilol (COREG) 12.5 MG tablet, Take 1 tablet by mouth 2 (Two) Times a Day With Meals., Disp: 180 tablet, Rfl: 1  •  clopidogrel (PLAVIX) 75 MG tablet, Take 1 tablet by mouth Daily., Disp: 90 tablet, Rfl: 3  •  ezetimibe (ZETIA) 10 MG tablet, Take 1 tablet by mouth Daily., Disp: 90 tablet, Rfl: 3  •  glucose blood test strip, OneTouch Ultra Test strips  -  Test sugars 3-4 times a day as directed by provider., Disp: , Rfl:   •  insulin detemir (LEVEMIR) 100 UNIT/ML injection, Inject 63 Units under the skin into the appropriate area as directed 2 (Two) Times a Day. 20 units in am and 68 units an pm (Patient taking differently: Inject 74 Units  "under the skin into the appropriate area as directed Every Night. 20 units in am and 68 units an pm), Disp: 120 mL, Rfl: 5  •  Insulin Pen Needle (PEN NEEDLES 3/16\") 31G X 5 MM misc, 100 each Daily., Disp: 100 each, Rfl: 3  •  Lancets (ONETOUCH ULTRASOFT) lancets, Test sugars 3-4 times daily as instructed by physician., Disp: , Rfl:   •  losartan (COZAAR) 50 MG tablet, Take 1 tablet by mouth Daily., Disp: 90 tablet, Rfl: 3  •  magnesium oxide (MAG-OX) 400 MG tablet, Take 1 tablet by mouth 2 (Two) Times a Day., Disp: 180 tablet, Rfl: 3  •  ondansetron ODT (ZOFRAN-ODT) 4 MG disintegrating tablet, Take 1 tablet by mouth Every 6 (Six) Hours As Needed for Nausea or Vomiting., Disp: 10 tablet, Rfl: 0  •  probenecid (BENEMID) 500 MG tablet, Take 1 tablet by mouth 2 (Two) Times a Day., Disp: 180 tablet, Rfl: 3    Review of Systems    Review of Systems   Constitutional: Positive for fatigue. Negative for activity change, appetite change, chills, diaphoresis, fever and unexpected weight change.   HENT: Negative for congestion, dental problem, drooling, ear discharge, ear pain, facial swelling, mouth sores, postnasal drip, rhinorrhea, sinus pressure, sore throat, tinnitus, trouble swallowing and voice change.    Eyes: Negative for photophobia, pain, discharge, redness, itching and visual disturbance.   Respiratory: Negative for apnea, cough, choking, chest tightness, shortness of breath, wheezing and stridor.    Cardiovascular: Negative for chest pain, palpitations and leg swelling.   Gastrointestinal: Negative for abdominal distention, abdominal pain, constipation, diarrhea, nausea and vomiting.   Endocrine: Positive for polyphagia. Negative for cold intolerance, heat intolerance, polydipsia and polyuria.   Genitourinary: Negative for decreased urine volume, difficulty urinating, dysuria, flank pain, frequency, hematuria and urgency.   Musculoskeletal: Negative for arthralgias, back pain, gait problem, joint swelling, " "myalgias, neck pain and neck stiffness.   Skin: Negative for color change, pallor, rash and wound.   Allergic/Immunologic: Negative for immunocompromised state.   Neurological: Positive for weakness. Negative for dizziness, tremors, seizures, syncope, facial asymmetry, speech difficulty, light-headedness, numbness and headaches.   Hematological: Negative for adenopathy.   Psychiatric/Behavioral: Negative for agitation, behavioral problems, confusion, decreased concentration, dysphoric mood, hallucinations, self-injury, sleep disturbance and suicidal ideas. The patient is not nervous/anxious and is not hyperactive.         Objective:   /72   Pulse 61   Ht 185.4 cm (73\")   Wt 89.1 kg (196 lb 6.4 oz)   SpO2 98%   BMI 25.91 kg/m²     Physical Exam   Constitutional: He is oriented to person, place, and time. He appears well-developed and well-nourished. He is cooperative.   HENT:   Head: Normocephalic and atraumatic.   Right Ear: External ear normal.   Left Ear: External ear normal.   Nose: Nose normal.   Mouth/Throat: Oropharynx is clear and moist. No oropharyngeal exudate.   Eyes: Conjunctivae and EOM are normal. Pupils are equal, round, and reactive to light. No scleral icterus. Right eye exhibits normal extraocular motion. Left eye exhibits normal extraocular motion.   Neck: Neck supple. No JVD present. No muscular tenderness present. No tracheal deviation, no edema and no erythema present. No thyromegaly present.   Cardiovascular: Normal rate, regular rhythm, normal heart sounds and intact distal pulses. Exam reveals no gallop and no friction rub.   No murmur heard.  Pulmonary/Chest: Effort normal and breath sounds normal. No stridor. No respiratory distress. He has no decreased breath sounds. He has no wheezes. He has no rhonchi. He has no rales. He exhibits no tenderness.   Abdominal: Soft. Bowel sounds are normal. He exhibits no distension and no mass. There is no hepatomegaly. There is no tenderness. " There is no rebound and no guarding. No hernia.   Musculoskeletal: Normal range of motion. He exhibits no edema, tenderness or deformity.   Lymphadenopathy:     He has no cervical adenopathy.   Neurological: He is alert and oriented to person, place, and time. He has normal reflexes. No cranial nerve deficit. He exhibits normal muscle tone. Coordination normal.   Skin: Skin is warm. No rash noted. No erythema. No pallor.   Psychiatric: He has a normal mood and affect. His behavior is normal. Judgment and thought content normal.   Nursing note and vitals reviewed.      Lab Review    Results for orders placed or performed in visit on 05/22/19   Vitamin B12   Result Value Ref Range    Vitamin B-12 453 211 - 946 pg/mL   Amylase   Result Value Ref Range    Amylase 57 28 - 100 U/L   Lipase   Result Value Ref Range    Lipase 63 (H) 13 - 60 U/L         Assessment/Plan       ICD-10-CM ICD-9-CM   1. Type 2 diabetes mellitus with hyperglycemia, with long-term current use of insulin (CMS/[facility]) E11.65 250.00    Z79.4 790.29     V58.67         I reviewed and summarized records from Halley Raymond MD from current year  and I reviewed / ordered labs.   From review of records :    Pt has type 2 diabetes with hyperglycemia     Glycemic Management:   Lab Results   Component Value Date    HGBA1C 8.30 (H) 05/14/2019    HGBA1C 9.5 (H) 02/11/2019    HGBA1C 9.7 (H) 11/12/2018     Lab Results   Component Value Date    GLUCOSE 221 (H) 05/22/2019    BUN 25 (H) 05/22/2019    CREATININE 1.37 (H) 05/22/2019    EGFRIFNONA 51 (L) 05/22/2019    BCR 18.2 05/22/2019    K 4.7 05/22/2019    CO2 25.6 05/22/2019    CALCIUM 9.8 05/22/2019    ALBUMIN 4.30 05/22/2019    AST 29 05/22/2019    ALT 34 05/22/2019    ANIONGAP 12.4 05/22/2019     Lab Results   Component Value Date    WBC 3.95 05/22/2019    HGB 14.5 05/22/2019    HCT 44.2 05/22/2019    MCV 92.3 05/22/2019     05/22/2019       Levemir if paid by insurance change to Tresiba  Decrease dose  from 74 to only 40 units    You can compare your bedtime to your morning sugar  A bedtime reading has to be at least 3-4 hours from the last meal    If the bedtime and waking reading is within 40 points of each other --- no change    If you drop more than 40 points for 2 days -- back off 4 units    If you rise more than 40 points for 4 days - increase by 4 units     =========================================================      Add trulicity 0.75 mg weekly     Try to not eat more than 60 grams of carbs per meal    The goal for trulicity is to prevent the sugar from rising more than 50 points from before a meal to 2 hours after a meal    Ideal    Before eating 130 m g per dl     You eat 60 grams    2 hours post you are 180 mg per dl       ====    Future medicines    A. Metformin 500 mg with supper  B. We may add jardiance 10 mg daily as long as the filtration rate is more than 45           No orders of the defined types were placed in this encounter.        A copy of my note was sent to Halley Raymond MD    Please see my above opinion and suggestions.

## 2019-08-19 ENCOUNTER — OFFICE VISIT (OUTPATIENT)
Dept: ENDOCRINOLOGY | Facility: CLINIC | Age: 74
End: 2019-08-19

## 2019-08-19 VITALS
BODY MASS INDEX: 25.3 KG/M2 | WEIGHT: 190.9 LBS | HEIGHT: 73 IN | DIASTOLIC BLOOD PRESSURE: 62 MMHG | OXYGEN SATURATION: 98 % | SYSTOLIC BLOOD PRESSURE: 124 MMHG | HEART RATE: 67 BPM

## 2019-08-19 DIAGNOSIS — E11.65 TYPE 2 DIABETES MELLITUS WITH HYPERGLYCEMIA, WITH LONG-TERM CURRENT USE OF INSULIN (HCC): Primary | ICD-10-CM

## 2019-08-19 DIAGNOSIS — Z79.4 TYPE 2 DIABETES MELLITUS WITH HYPERGLYCEMIA, WITH LONG-TERM CURRENT USE OF INSULIN (HCC): Primary | ICD-10-CM

## 2019-08-19 DIAGNOSIS — I25.10 CORONARY ARTERY DISEASE INVOLVING NATIVE CORONARY ARTERY OF NATIVE HEART WITHOUT ANGINA PECTORIS: ICD-10-CM

## 2019-08-19 DIAGNOSIS — N18.30 CHRONIC KIDNEY DISEASE (CKD), STAGE III (MODERATE) (HCC): ICD-10-CM

## 2019-08-19 PROCEDURE — 99214 OFFICE O/P EST MOD 30 MIN: CPT | Performed by: INTERNAL MEDICINE

## 2019-08-19 RX ORDER — METFORMIN HYDROCHLORIDE 500 MG/1
TABLET, EXTENDED RELEASE ORAL
Qty: 60 TABLET | Refills: 11 | Status: SHIPPED | OUTPATIENT
Start: 2019-08-19 | End: 2019-11-25

## 2019-08-19 NOTE — PROGRESS NOTES
" Zachary Galindo is a 73 y.o. male who presents for  evaluation of   Chief Complaint   Patient presents with   • Diabetes       Referring provider    No referring provider defined for this encounter.    Primary Care Provider    Halley Raymond MD    Duration 10 years    Timing - Diabetes is Constant    Quality -  needs improvement    Severity -  High given complications    Complications - nephropathy and CAD     Current symptoms/problems  increase appetite     Alleviating Factors: Compliance       Side Effects  none    Current diet  in general, a \"healthy\" diet  but sometimes high carb    Current exercise not exercising    Current monitoring regimen: home blood tests - checking 1 x daily   Home blood sugar records: 50 to 250     Hypoglycemia nocturnal and if skipped meals     Past Medical History:   Diagnosis Date   • Allergic rhinitis    • Ankle joint pain    • Artificial lens present     Left   • Astigmatism    • Benign prostatic hyperplasia    • Cataract    • Closed fracture of medial malleolus    • Coronary arteriosclerosis    • Diabetes mellitus (CMS/HCC)     no retinopathy   • Elevated levels of transaminase & lactic acid dehydrogenase     improving    • Encounter for health maintenance examination     Individual health examination     • Encounter for health maintenance examination in adult     Adult health examination    • Essential hypertension    • Essential hypertension     Unspecified   • Flank pain     probably muscular    • GERD (gastroesophageal reflux disease)    • Gout    • Gout     unspecified     • Hemorrhoids    • History of artificial eye lens     Artificial lens in position - right    • History of colonoscopy 04/04/2010    Colon endoscopy  (85570)  (1)    • History of kidney stones    • Hyperlipidemia    • Hypermetropia    • Low blood pressure    • Malaise and fatigue    • Need for influenza vaccination     Needs influenza immunization    • Nuclear cataract of left eye    • Posterior " subcapsular polar senile cataract     Left   • Renal impairment    • Special screening for malignant neoplasm of prostate    • Type 2 diabetes mellitus (CMS/HCC)     improving   • 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 edema (CMS/HCC)     without macular edema - mild OS    • Upper respiratory infection    • Urticaria     Drug-aggravated angioedema-urticaria   • Urticaria      Family History   Problem Relation Age of Onset   • Diabetes Other    • Hypertension Other    • Cancer Mother    • Heart disease Mother    • Hypertension Mother    • Hyperlipidemia Mother    • Diabetes Brother    • Diabetes Maternal Aunt      Social History     Tobacco Use   • Smoking status: Former Smoker   • Smokeless tobacco: Never Used   • Tobacco comment: Quit 1979   Substance Use Topics   • Alcohol use: No   • Drug use: No         Current Outpatient Medications:   •  alfuzosin (UROXATRAL) 10 MG 24 hr tablet, Take 10 mg by mouth Daily., Disp: , Rfl:   •  amLODIPine (NORVASC) 5 MG tablet, Take 1 tablet by mouth Daily., Disp: 90 tablet, Rfl: 3  •  aspirin 81 MG tablet, Take 1 tablet by mouth Daily., Disp: 30 tablet, Rfl: 11  •  atorvastatin (LIPITOR) 10 MG tablet, Take 1 tablet by mouth Every Night., Disp: 90 tablet, Rfl: 3  •  carvedilol (COREG) 12.5 MG tablet, Take 1 tablet by mouth 2 (Two) Times a Day With Meals., Disp: 180 tablet, Rfl: 1  •  clopidogrel (PLAVIX) 75 MG tablet, Take 1 tablet by mouth Daily., Disp: 90 tablet, Rfl: 3  •  Dulaglutide 0.75 MG/0.5ML solution pen-injector, Inject 0.75 mg under the skin into the appropriate area as directed 1 (One) Time Per Week., Disp: 4 pen, Rfl: 11  •  ezetimibe (ZETIA) 10 MG tablet, Take 1 tablet by mouth Daily., Disp: 90 tablet, Rfl: 3  •  glucose blood test strip, OneTouch Ultra Test strips  -  Test sugars 3-4 times a day as directed by provider., Disp: , Rfl:   •  Insulin Degludec (TRESIBA FLEXTOUCH) 200 UNIT/ML solution pen-injector, Inject  "40 Units under the skin into the appropriate area as directed Every Night., Disp: 2 pen, Rfl: 11  •  Insulin Pen Needle (PEN NEEDLES 3/16\") 31G X 5 MM misc, 100 each Daily., Disp: 100 each, Rfl: 3  •  Lancets (ONETOUCH ULTRASOFT) lancets, Test sugars 3-4 times daily as instructed by physician., Disp: , Rfl:   •  losartan (COZAAR) 50 MG tablet, Take 1 tablet by mouth Daily., Disp: 90 tablet, Rfl: 3  •  magnesium oxide (MAG-OX) 400 MG tablet, Take 1 tablet by mouth 2 (Two) Times a Day., Disp: 180 tablet, Rfl: 3  •  ondansetron ODT (ZOFRAN-ODT) 4 MG disintegrating tablet, Take 1 tablet by mouth Every 6 (Six) Hours As Needed for Nausea or Vomiting., Disp: 45 tablet, Rfl: 5  •  probenecid (BENEMID) 500 MG tablet, Take 1 tablet by mouth 2 (Two) Times a Day., Disp: 180 tablet, Rfl: 3  •  Canagliflozin (INVOKANA) 100 MG tablet, 100 mg po daily, Disp: 30 tablet, Rfl: 11  •  metFORMIN ER (GLUCOPHAGE XR) 500 MG 24 hr tablet, 1 tab with supper for 2 weeks, then 2 tabs with supper therafter, Disp: 60 tablet, Rfl: 11    Review of Systems    Review of Systems   Constitutional: Positive for fatigue. Negative for activity change, appetite change, chills, diaphoresis, fever and unexpected weight change.   HENT: Negative for congestion, dental problem, drooling, ear discharge, ear pain, facial swelling, mouth sores, postnasal drip, rhinorrhea, sinus pressure, sore throat, tinnitus, trouble swallowing and voice change.    Eyes: Negative for photophobia, pain, discharge, redness, itching and visual disturbance.   Respiratory: Negative for apnea, cough, choking, chest tightness, shortness of breath, wheezing and stridor.    Cardiovascular: Negative for chest pain, palpitations and leg swelling.   Gastrointestinal: Negative for abdominal distention, abdominal pain, constipation, diarrhea, nausea and vomiting.   Endocrine: Positive for polyphagia. Negative for cold intolerance, heat intolerance, polydipsia and polyuria.   Genitourinary: " "Negative for decreased urine volume, difficulty urinating, dysuria, flank pain, frequency, hematuria and urgency.   Musculoskeletal: Negative for arthralgias, back pain, gait problem, joint swelling, myalgias, neck pain and neck stiffness.   Skin: Negative for color change, pallor, rash and wound.   Allergic/Immunologic: Negative for immunocompromised state.   Neurological: Positive for weakness. Negative for dizziness, tremors, seizures, syncope, facial asymmetry, speech difficulty, light-headedness, numbness and headaches.   Hematological: Negative for adenopathy.   Psychiatric/Behavioral: Negative for agitation, behavioral problems, confusion, decreased concentration, dysphoric mood, hallucinations, self-injury, sleep disturbance and suicidal ideas. The patient is not nervous/anxious and is not hyperactive.         Objective:   /62   Pulse 67   Ht 185.4 cm (73\")   Wt 86.6 kg (190 lb 14.4 oz)   SpO2 98%   BMI 25.19 kg/m²     Physical Exam   Constitutional: He is oriented to person, place, and time. He appears well-developed and well-nourished. He is cooperative.   HENT:   Head: Normocephalic and atraumatic.   Right Ear: External ear normal.   Left Ear: External ear normal.   Nose: Nose normal.   Mouth/Throat: Oropharynx is clear and moist. No oropharyngeal exudate.   Eyes: Conjunctivae and EOM are normal. Pupils are equal, round, and reactive to light. No scleral icterus. Right eye exhibits normal extraocular motion. Left eye exhibits normal extraocular motion.   Neck: Neck supple. No JVD present. No muscular tenderness present. No tracheal deviation, no edema and no erythema present. No thyromegaly present.   Cardiovascular: Normal rate, regular rhythm, normal heart sounds and intact distal pulses. Exam reveals no gallop and no friction rub.   No murmur heard.  Pulmonary/Chest: Effort normal and breath sounds normal. No stridor. No respiratory distress. He has no decreased breath sounds. He has no " wheezes. He has no rhonchi. He has no rales. He exhibits no tenderness.   Abdominal: Soft. Bowel sounds are normal. He exhibits no distension and no mass. There is no hepatomegaly. There is no tenderness. There is no rebound and no guarding. No hernia.   Musculoskeletal: Normal range of motion. He exhibits no edema, tenderness or deformity.   Lymphadenopathy:     He has no cervical adenopathy.   Neurological: He is alert and oriented to person, place, and time. He has normal reflexes. No cranial nerve deficit. He exhibits normal muscle tone. Coordination normal.   Skin: Skin is warm. No rash noted. No erythema. No pallor.   Psychiatric: He has a normal mood and affect. His behavior is normal. Judgment and thought content normal.   Nursing note and vitals reviewed.      Lab Review    Results for orders placed or performed in visit on 05/22/19   Vitamin B12   Result Value Ref Range    Vitamin B-12 453 211 - 946 pg/mL   Amylase   Result Value Ref Range    Amylase 57 28 - 100 U/L   Lipase   Result Value Ref Range    Lipase 63 (H) 13 - 60 U/L         Assessment/Plan       ICD-10-CM ICD-9-CM   1. Type 2 diabetes mellitus with hyperglycemia, with long-term current use of insulin (CMS/[facility]) E11.65 250.00    Z79.4 790.29     V58.67   2. Chronic kidney disease (CKD), stage III (moderate) (CMS/[facility]) N18.3 585.3   3. Coronary artery disease involving native coronary artery of native heart without angina pectoris I25.10 414.01         I reviewed and summarized records from Halley Raymond MD from current year  and I reviewed / ordered labs.   From review of records :    Pt has type 2 diabetes with hyperglycemia and CAD     Glycemic Management:   Lab Results   Component Value Date    HGBA1C 8.30 (H) 05/14/2019    HGBA1C 9.5 (H) 02/11/2019    HGBA1C 9.7 (H) 11/12/2018     Lab Results   Component Value Date    GLUCOSE 221 (H) 05/22/2019    BUN 25 (H) 05/22/2019    CREATININE 1.37 (H) 05/22/2019    EGFRIFNONA 51 (L) 05/22/2019     BCR 18.2 05/22/2019    K 4.7 05/22/2019    CO2 25.6 05/22/2019    CALCIUM 9.8 05/22/2019    ALBUMIN 4.30 05/22/2019    AST 29 05/22/2019    ALT 34 05/22/2019    ANIONGAP 12.4 05/22/2019     Lab Results   Component Value Date    WBC 3.95 05/22/2019    HGB 14.5 05/22/2019    HCT 44.2 05/22/2019    MCV 92.3 05/22/2019     05/22/2019       Levemir/Tresiba 40 units   Decrease to 35 if you have a sugar less than 80 mg per dl     =========================================================    Trulicity 0.75 mg weekly      ====    Plan     A. Metformin 500 mg with supper for 2 weeks then 1000 mg with supper   B. Invokana 100 mg daily - increase by 2 glasses of water per day     Addendum    Pt called and stated that metformin gives diarrhea and invokana / jardiance are expensive    Plan -- keep trulicity and levemir -- make appt to go over mealtime insulin     No orders of the defined types were placed in this encounter.        A copy of my note was sent to Halley Raymond MD    Please see my above opinion and suggestions.

## 2019-08-19 NOTE — PATIENT INSTRUCTIONS
Levemir/Tresiba 40 units   Decrease to 35 if you have a sugar less than 80 mg per dl     =========================================================    Trulicity 0.75 mg weekly      ====    Plan     A. Metformin 500 mg with supper for 2 weeks then 1000 mg with supper   B. Invokana 100 mg daily - increase by 2 glasses of water per day

## 2019-08-29 ENCOUNTER — TELEPHONE (OUTPATIENT)
Dept: ENDOCRINOLOGY | Facility: CLINIC | Age: 74
End: 2019-08-29

## 2019-09-09 ENCOUNTER — TELEPHONE (OUTPATIENT)
Dept: FAMILY MEDICINE CLINIC | Facility: CLINIC | Age: 74
End: 2019-09-09

## 2019-09-09 ENCOUNTER — OFFICE VISIT (OUTPATIENT)
Dept: ENDOCRINOLOGY | Facility: CLINIC | Age: 74
End: 2019-09-09

## 2019-09-09 VITALS
HEIGHT: 73 IN | WEIGHT: 187.7 LBS | BODY MASS INDEX: 24.88 KG/M2 | HEART RATE: 60 BPM | SYSTOLIC BLOOD PRESSURE: 118 MMHG | DIASTOLIC BLOOD PRESSURE: 64 MMHG

## 2019-09-09 DIAGNOSIS — Z79.4 TYPE 2 DIABETES MELLITUS WITH COMPLICATION, WITH LONG-TERM CURRENT USE OF INSULIN (HCC): Primary | Chronic | ICD-10-CM

## 2019-09-09 DIAGNOSIS — I10 ESSENTIAL HYPERTENSION: Chronic | ICD-10-CM

## 2019-09-09 DIAGNOSIS — E11.8 TYPE 2 DIABETES MELLITUS WITH COMPLICATION, WITH LONG-TERM CURRENT USE OF INSULIN (HCC): Primary | Chronic | ICD-10-CM

## 2019-09-09 PROCEDURE — 99214 OFFICE O/P EST MOD 30 MIN: CPT | Performed by: NURSE PRACTITIONER

## 2019-09-09 NOTE — TELEPHONE ENCOUNTER
Please call Galdino at [facility] at [phone].  He needs to talk to you regarding one of Zachary's rx;s

## 2019-09-09 NOTE — PROGRESS NOTES
Subjective     Zachary Galindo is a 73 y.o. male who presents today for a follow up for his type 2 diabetes. Patient states that his blood sugar has been well controlled since he started his new medication.     Chief Complaint   Patient presents with   • Diabetes         Patients Primary Care provider  Halley Raymond MD       Diabetes   He presents for his follow-up diabetic visit. He has type 2 diabetes mellitus. Pertinent negatives for hypoglycemia include no confusion, dizziness or nervousness/anxiousness. Associated symptoms include visual change. Pertinent negatives for diabetes include no fatigue, no foot ulcerations, no polyphagia and no polyuria. Symptoms are stable. Risk factors for coronary artery disease include dyslipidemia, male sex, sedentary lifestyle and diabetes mellitus. Current diabetic treatment includes oral agent (dual therapy). He is compliant with treatment all of the time. He is following a generally healthy diet. Eye exam is current.       The following portions of the patient's history were reviewed and updated as appropriate:     Past Medical History:   Diagnosis Date   • Allergic rhinitis    • Ankle joint pain    • Artificial lens present     Left   • Astigmatism    • Benign prostatic hyperplasia    • Cataract    • Closed fracture of medial malleolus    • Coronary arteriosclerosis    • Diabetes mellitus (CMS/HCC)     no retinopathy   • Elevated levels of transaminase & lactic acid dehydrogenase     improving    • Encounter for health maintenance examination     Individual health examination     • Encounter for health maintenance examination in adult     Adult health examination    • Essential hypertension    • Essential hypertension     Unspecified   • Flank pain     probably muscular    • GERD (gastroesophageal reflux disease)    • Gout    • Gout     unspecified     • Hemorrhoids    • History of artificial eye lens     Artificial lens in position - right    • History of  colonoscopy 04/04/2010    Colon endoscopy  (90605)  (1)    • History of kidney stones    • Hyperlipidemia    • Hypermetropia    • Low blood pressure    • Malaise and fatigue    • Need for influenza vaccination     Needs influenza immunization    • Nuclear cataract of left eye    • Posterior subcapsular polar senile cataract     Left   • Renal impairment    • Special screening for malignant neoplasm of prostate    • Type 2 diabetes mellitus (CMS/HCC)     improving   • 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 edema (CMS/HCC)     without macular edema - mild OS    • Upper respiratory infection    • Urticaria     Drug-aggravated angioedema-urticaria   • Urticaria      Past Surgical History:   Procedure Laterality Date   • CARDIAC CATHETERIZATION  03/24/2014    (79215)  (2)  Reduction of stenoisis from 95% to less than 0% stenosis with evidence of good JENNY 3-flow. Distal RCA beyond the touchdown was seen filling the circumflex system   • CATARACT EXTRACTION  10/2015    Remove cataract, insert lens (2)    • CORONARY ARTERY BYPASS GRAFT      Arterial, two  (1)   -  3 - 12/1998   • HERNIA REPAIR      w/mesh  (1)   • INJECTION OF MEDICATION  04/11/2013    B12  (1)  - RAYMOND Raymond   • INJECTION OF MEDICATION  07/03/2013    Benadryl  (1) - RAYMOND Raymond   • INJECTION OF MEDICATION  10/22/2014    Kenalog  (2)   • OTHER SURGICAL HISTORY  07/10/2002    Fragmenting of kidney stone  -   [facility], 2010 shocks. 1cm UP stone,right. Diabetes mellitus     Family History   Problem Relation Age of Onset   • Diabetes Other    • Hypertension Other    • Cancer Mother    • Heart disease Mother    • Hypertension Mother    • Hyperlipidemia Mother    • Diabetes Brother    • Diabetes Maternal Aunt          Current Outpatient Medications:   •  alfuzosin (UROXATRAL) 10 MG 24 hr tablet, Take 10 mg by mouth Daily., Disp: , Rfl:   •  amLODIPine (NORVASC) 5 MG tablet, Take 1 tablet by mouth Daily., Disp: 90  "tablet, Rfl: 3  •  aspirin 81 MG tablet, Take 1 tablet by mouth Daily., Disp: 30 tablet, Rfl: 11  •  atorvastatin (LIPITOR) 10 MG tablet, Take 1 tablet by mouth Every Night., Disp: 90 tablet, Rfl: 3  •  carvedilol (COREG) 12.5 MG tablet, Take 1 tablet by mouth 2 (Two) Times a Day With Meals., Disp: 180 tablet, Rfl: 1  •  clopidogrel (PLAVIX) 75 MG tablet, Take 1 tablet by mouth Daily., Disp: 90 tablet, Rfl: 3  •  Dulaglutide 0.75 MG/0.5ML solution pen-injector, Inject 0.75 mg under the skin into the appropriate area as directed 1 (One) Time Per Week., Disp: 4 pen, Rfl: 11  •  Empagliflozin 10 MG tablet, Take 10 mg by mouth Daily., Disp: 90 tablet, Rfl: 3  •  ezetimibe (ZETIA) 10 MG tablet, Take 1 tablet by mouth Daily., Disp: 90 tablet, Rfl: 3  •  glucose blood test strip, OneTouch Ultra Test strips  -  Test sugars 3-4 times a day as directed by provider., Disp: , Rfl:   •  Insulin Degludec (TRESIBA FLEXTOUCH) 200 UNIT/ML solution pen-injector, Inject 40 Units under the skin into the appropriate area as directed Every Night., Disp: 2 pen, Rfl: 11  •  Insulin Pen Needle (PEN NEEDLES 3/16\") 31G X 5 MM misc, 100 each Daily., Disp: 100 each, Rfl: 3  •  Lancets (ONETOUCH ULTRASOFT) lancets, Test sugars 3-4 times daily as instructed by physician., Disp: , Rfl:   •  losartan (COZAAR) 50 MG tablet, Take 1 tablet by mouth Daily., Disp: 90 tablet, Rfl: 3  •  magnesium oxide (MAG-OX) 400 MG tablet, Take 1 tablet by mouth 2 (Two) Times a Day., Disp: 180 tablet, Rfl: 3  •  metFORMIN ER (GLUCOPHAGE XR) 500 MG 24 hr tablet, 1 tab with supper for 2 weeks, then 2 tabs with supper therafter, Disp: 60 tablet, Rfl: 11  •  ondansetron ODT (ZOFRAN-ODT) 4 MG disintegrating tablet, Take 1 tablet by mouth Every 6 (Six) Hours As Needed for Nausea or Vomiting., Disp: 45 tablet, Rfl: 5  •  probenecid (BENEMID) 500 MG tablet, Take 1 tablet by mouth 2 (Two) Times a Day., Disp: 180 tablet, Rfl: 3        Allergies   Allergen Reactions   • Advicor " [Niacin-Lovastatin Er] Unknown (See Comments)     Swelling   • Lisinopril Other (See Comments)     Cough   • Nsaids Other (See Comments)     Kidney disease     Social History     Socioeconomic History   • Marital status:      Spouse name: Not on file   • Number of children: Not on file   • Years of education: Not on file   • Highest education level: Not on file   Tobacco Use   • Smoking status: Former Smoker   • Smokeless tobacco: Never Used   • Tobacco comment: Quit 1979   Substance and Sexual Activity   • Alcohol use: No   • Drug use: No       Review of Systems   Constitutional: Negative for activity change, appetite change, diaphoresis, fatigue and unexpected weight change.   HENT: Negative for congestion and ear pain.    Eyes: Negative for discharge and visual disturbance.   Respiratory: Negative for apnea, cough, choking, shortness of breath, wheezing and stridor.    Gastrointestinal: Negative for abdominal distention, constipation, diarrhea and nausea.   Endocrine: Negative for cold intolerance, heat intolerance, polyphagia and polyuria.   Genitourinary: Negative.  Negative for frequency.   Musculoskeletal: Negative for arthralgias, back pain and myalgias.   Skin: Negative for color change and wound.   Allergic/Immunologic: Negative.    Neurological: Negative for dizziness, syncope and light-headedness.   Psychiatric/Behavioral: Negative for agitation and confusion. The patient is not nervous/anxious.        Objective     Vitals:    09/09/19 1003   BP: 118/64   Pulse: 60     Physical Exam   Constitutional: He is oriented to person, place, and time. He appears well-developed and well-nourished.   HENT:   Head: Normocephalic.   Nose: Nose normal.   Eyes: Conjunctivae are normal. Pupils are equal, round, and reactive to light.   Patient had recent surgery on eye lids, stitches intact    Neck: Trachea normal and normal range of motion. No tracheal tenderness present. No tracheal deviation present. No  thyroid mass and no thyromegaly present.   Cardiovascular: Normal rate and normal heart sounds.   Pulmonary/Chest: Effort normal and breath sounds normal. No stridor. No respiratory distress. He has no wheezes. He has no rales.   Abdominal: Soft. Normal appearance.   Musculoskeletal: Normal range of motion.   Lymphadenopathy:        Head (right side): No submental, no submandibular and no tonsillar adenopathy present.        Head (left side): No submental, no submandibular and no tonsillar adenopathy present.     He has no cervical adenopathy.   Neurological: He is alert and oriented to person, place, and time.   Skin: Skin is warm, dry and intact.   Psychiatric: He has a normal mood and affect. His speech is normal and behavior is normal. Judgment and thought content normal. His mood appears not anxious. His affect is not inappropriate. He is not agitated and not hyperactive. Cognition and memory are normal.   Nursing note and vitals reviewed.      Nursing note and vitals reviewed    Lab Review    Labs reviewed from last visit     Complete labs before next visit     Results for orders placed or performed in visit on 05/22/19   Vitamin B12   Result Value Ref Range    Vitamin B-12 453 211 - 946 pg/mL   Amylase   Result Value Ref Range    Amylase 57 28 - 100 U/L   Lipase   Result Value Ref Range    Lipase 63 (H) 13 - 60 U/L         Assessment/Plan     Diagnosis Plan   1. Type 2 diabetes mellitus with complication, with long-term current use of insulin (CMS/[facility])  TSH    Vitamin D 25 Hydroxy    Comprehensive Metabolic Panel    Hemoglobin A1c    CBC & Differential    Vitamin B12   2. Essential hypertension  TSH    Vitamin D 25 Hydroxy    Comprehensive Metabolic Panel    Hemoglobin A1c    CBC & Differential    Vitamin B12       Levemir/Tresiba 40 units     Decrease to 35 if you have a sugar less than 80 mg per dl      =========================================================     Trulicity 0.75 mg weekly       ====       Metformin 500 mg with supper for 2 weeks then 1000 mg with supper ---stopped taking due to side effects      Invokana 100 mg daily - increase by 2 glasses of water per day --not taking due to cost        Pt stated that metformin gives diarrhea and invokana is expensive    Taking Jardiance 10mg     Plan -- keep trulicity and levemir -- make appt to go over mealtime insulin      Blood sugar range has been 103-179    Complete labs before next visit       Microvascular Complication Monitoring        Weight Related  Body mass index is 24.77 kg/m².      Bone Health    Lab Results   Component Value Date    PTH 45.8 01/17/2018    CALCIUM 9.8 05/22/2019    PHOS 3.5 01/25/2019    UVMR49XW 37.6 01/17/2018       Thyroid Health    Complete labs before next visit       Lab Results    Complete labs before next visit         Hyperlipidemia    Lab Results   Component Value Date    CHOL 159 02/11/2019    CHOL 169 01/02/2018    CHOL 233 (H) 06/26/2017     Lab Results   Component Value Date    TRIG 187 02/11/2019    TRIG 215 (H) 01/02/2018    TRIG 243 (H) 06/26/2017     Lab Results   Component Value Date    HDL 27 (L) 02/11/2019    HDL 28 (L) 01/02/2018    HDL 32 (L) 06/26/2017     Lab Results   Component Value Date     02/11/2019     11/12/2018    LDL 95 04/05/2018     No results found for: VLDL  Lab Results   Component Value Date    LDLHDL 3.50 02/11/2019    LDLHDL 3.50 01/02/2018    LDLHDL 4.76 (H) 06/26/2017     Complete labs before next visit       Hypertension      Vitals:    09/09/19 1003   BP: 118/64   Pulse: 60       Lab Results   Component Value Date    HGBA1C 8.30 (H) 05/14/2019    HGBA1C 9.5 (H) 02/11/2019    HGBA1C 9.7 (H) 11/12/2018     Lab Results   Component Value Date    GLUCOSE 221 (H) 05/22/2019    BUN 25 (H) 05/22/2019    CREATININE 1.37 (H) 05/22/2019    EGFRIFNONA 51 (L) 05/22/2019    BCR 18.2 05/22/2019    K 4.7 05/22/2019    CO2 25.6 05/22/2019    CALCIUM 9.8 05/22/2019    ALBUMIN 4.30 05/22/2019     AST 29 05/22/2019    ALT 34 05/22/2019    ANIONGAP 12.4 05/22/2019     Lab Results   Component Value Date    WBC 3.95 05/22/2019    HGB 14.5 05/22/2019    HCT 44.2 05/22/2019    MCV 92.3 05/22/2019     05/22/2019     Complete labs before next visit         Followup:    Return in about 3 months (around 12/9/2019), or if symptoms worsen or fail to improve, for Recheck.            This document has been electronically signed by ROCCO Newberry on September 9, 2019 10:14 AM

## 2019-10-01 ENCOUNTER — OFFICE VISIT (OUTPATIENT)
Dept: CARDIOLOGY | Facility: CLINIC | Age: 74
End: 2019-10-01

## 2019-10-01 VITALS
HEART RATE: 68 BPM | BODY MASS INDEX: 24.25 KG/M2 | SYSTOLIC BLOOD PRESSURE: 112 MMHG | HEIGHT: 73 IN | WEIGHT: 183 LBS | DIASTOLIC BLOOD PRESSURE: 60 MMHG | OXYGEN SATURATION: 98 %

## 2019-10-01 DIAGNOSIS — I25.810 CORONARY ARTERY DISEASE INVOLVING CORONARY BYPASS GRAFT OF NATIVE HEART WITHOUT ANGINA PECTORIS: Primary | ICD-10-CM

## 2019-10-01 DIAGNOSIS — E78.2 MIXED HYPERLIPIDEMIA: Chronic | ICD-10-CM

## 2019-10-01 DIAGNOSIS — I10 ESSENTIAL HYPERTENSION: Chronic | ICD-10-CM

## 2019-10-01 PROCEDURE — 99214 OFFICE O/P EST MOD 30 MIN: CPT | Performed by: INTERNAL MEDICINE

## 2019-10-01 NOTE — PROGRESS NOTES
Zachary Galindo  73 y.o. male    10/01/2019  1. Coronary artery disease involving coronary bypass graft of native heart without angina pectoris    2. Essential hypertension    3. Mixed hyperlipidemia        History of Present Illness  Mr. Galindo is a 73-year-old male who is being seen by me for the first time.  He was a patient of Dr. Ruiz in the past.  His history is remarkable for coronary artery disease status post coronary artery bypass surgery in 1998 by Dr. Marie and had subsequent PCI by Dr. Stiles.  He has followed up with Dr. Rubalcava on a regular basis and has done quite well with no recurrence of chest pain or shortness of breath.  He has been compliant with his medications.  His stress test performed in 2017 showed no reversible ischemia.  He has been physically quite active without any restriction and has followed up with both primary care and Dr. Juan Briggs on a regular basis.  History was reviewed in detail.  His blood pressure was in the normal range.  No signs of congestive heart failure was noted.    SUBJECTIVE    Allergies   Allergen Reactions   • Advicor [Niacin-Lovastatin Er] Unknown (See Comments)     Swelling   • Lisinopril Other (See Comments)     Cough   • Nsaids Other (See Comments)     Kidney disease         Past Medical History:   Diagnosis Date   • Allergic rhinitis    • Ankle joint pain    • Artificial lens present     Left   • Astigmatism    • Benign prostatic hyperplasia    • Cataract    • Closed fracture of medial malleolus    • Coronary arteriosclerosis    • Diabetes mellitus (CMS/HCC)     no retinopathy   • Elevated levels of transaminase & lactic acid dehydrogenase     improving    • Encounter for health maintenance examination     Individual health examination     • Encounter for health maintenance examination in adult     Adult health examination    • Essential hypertension    • Essential hypertension     Unspecified   • Flank pain     probably muscular    •  GERD (gastroesophageal reflux disease)    • Gout    • Gout     unspecified     • Hemorrhoids    • History of artificial eye lens     Artificial lens in position - right    • History of colonoscopy 04/04/2010    Colon endoscopy  (57696)  (1)    • History of kidney stones    • Hyperlipidemia    • Hypermetropia    • Low blood pressure    • Malaise and fatigue    • Need for influenza vaccination     Needs influenza immunization    • Nuclear cataract of left eye    • Posterior subcapsular polar senile cataract     Left   • Renal impairment    • Special screening for malignant neoplasm of prostate    • Type 2 diabetes mellitus (CMS/HCC)     improving   • 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 edema (CMS/HCC)     without macular edema - mild OS    • Upper respiratory infection    • Urticaria     Drug-aggravated angioedema-urticaria   • Urticaria          Past Surgical History:   Procedure Laterality Date   • CARDIAC CATHETERIZATION  03/24/2014    (06288)  (2)  Reduction of stenoisis from 95% to less than 0% stenosis with evidence of good JENNY 3-flow. Distal RCA beyond the touchdown was seen filling the circumflex system   • CATARACT EXTRACTION  10/2015    Remove cataract, insert lens (2)    • CORONARY ARTERY BYPASS GRAFT      Arterial, two  (1)   -  3 - 12/1998   • HERNIA REPAIR      w/mesh  (1)   • INJECTION OF MEDICATION  04/11/2013    B12  (1)  - RAYMOND Raymond   • INJECTION OF MEDICATION  07/03/2013    Benadryl  (1) - RAYMOND Raymond   • INJECTION OF MEDICATION  10/22/2014    Kenalog  (2)   • OTHER SURGICAL HISTORY  07/10/2002    Fragmenting of kidney stone  -   ESWL, 2010 shocks. 1cm UP stone,right. Diabetes mellitus         Family History   Problem Relation Age of Onset   • Diabetes Other    • Hypertension Other    • Cancer Mother    • Heart disease Mother    • Hypertension Mother    • Hyperlipidemia Mother    • Diabetes Brother    • Diabetes Maternal Aunt          Social  "History     Socioeconomic History   • Marital status:      Spouse name: Not on file   • Number of children: Not on file   • Years of education: Not on file   • Highest education level: Not on file   Tobacco Use   • Smoking status: Former Smoker   • Smokeless tobacco: Never Used   • Tobacco comment: Quit 1979   Substance and Sexual Activity   • Alcohol use: No   • Drug use: No         Current Outpatient Medications   Medication Sig Dispense Refill   • amLODIPine (NORVASC) 5 MG tablet Take 1 tablet by mouth Daily. 90 tablet 3   • aspirin 81 MG tablet Take 1 tablet by mouth Daily. 30 tablet 11   • atorvastatin (LIPITOR) 10 MG tablet Take 1 tablet by mouth Every Night. 90 tablet 3   • carvedilol (COREG) 12.5 MG tablet Take 1 tablet by mouth 2 (Two) Times a Day With Meals. 180 tablet 1   • clopidogrel (PLAVIX) 75 MG tablet Take 1 tablet by mouth Daily. 90 tablet 3   • Dulaglutide 0.75 MG/0.5ML solution pen-injector Inject 0.75 mg under the skin into the appropriate area as directed 1 (One) Time Per Week. 4 pen 11   • ezetimibe (ZETIA) 10 MG tablet Take 1 tablet by mouth Daily. 90 tablet 3   • glucose blood test strip OneTouch Ultra Test strips  -  Test sugars 3-4 times a day as directed by provider.     • Insulin Degludec (TRESIBA FLEXTOUCH) 200 UNIT/ML solution pen-injector pen injection Inject 40 Units under the skin into the appropriate area as directed Every Night. 2 pen 11   • Insulin Pen Needle (PEN NEEDLES 3/16\") 31G X 5 MM misc 100 each Daily. 100 each 3   • Lancets (ONETOUCH ULTRASOFT) lancets Test sugars 3-4 times daily as instructed by physician.     • losartan (COZAAR) 50 MG tablet Take 1 tablet by mouth Daily. 90 tablet 3   • magnesium oxide (MAG-OX) 400 MG tablet Take 1 tablet by mouth 2 (Two) Times a Day. 180 tablet 3   • ondansetron ODT (ZOFRAN-ODT) 4 MG disintegrating tablet Take 1 tablet by mouth Every 6 (Six) Hours As Needed for Nausea or Vomiting. 45 tablet 5   • alfuzosin (UROXATRAL) 10 MG 24 " "hr tablet Take 10 mg by mouth Daily.     • Empagliflozin 10 MG tablet Take 10 mg by mouth Daily. 90 tablet 3   • metFORMIN ER (GLUCOPHAGE XR) 500 MG 24 hr tablet 1 tab with supper for 2 weeks, then 2 tabs with supper therafter 60 tablet 11   • probenecid (BENEMID) 500 MG tablet Take 1 tablet by mouth 2 (Two) Times a Day. 180 tablet 3     No current facility-administered medications for this visit.          OBJECTIVE    /60   Pulse 68   Ht 185.4 cm (72.99\")   Wt 83 kg (183 lb)   SpO2 98%   BMI 24.15 kg/m²         Review of Systems     Constitutional:  Denies recent weight loss, weight gain, fever or chills, no change in exercise tolerance     HENT:  Denies any hearing loss, epistaxis, hoarseness, or difficulty speaking.     Eyes: Wears eyeglasses or contact lenses     Respiratory:  Denies dyspnea with exertion,no cough, wheezing, or hemoptysis.     Cardiovascular: Negative for palpations, chest pain, orthopnea, PND, peripheral edema, syncope, or claudication.     Gastrointestinal:  Denies change in bowel habits, dyspepsia, ulcer disease, hematochezia, or melena.     Endocrine: Negative for cold intolerance, heat intolerance, polydipsia, polyphagia and polyuria. Denies any history of weight change, heat/cold intolerance, polydipsia, polyuria     Genitourinary: Negative.      Musculoskeletal: DJD    Skin:  Denies any change in hair or nails, rashes, or skin lesions.     Allergic/Immunologic: Negative.  Negative for environmental allergies, food allergies and immunocompromised state.     Neurological:  Denies any history of recurrent headaches, strokes, TIA, or seizure disorder.     Hematological: Denies any food allergies, seasonal allergies, bleeding disorders, or lymphadenopathy.     Psychiatric/Behavioral: Denies any history of depression, substance abuse, or change in cognitive function.         Physical Exam     Constitutional: Cooperative, alert and oriented,  in no acute distress.     HENT:   Head: " Normocephalic, normal hair patterns, no masses or tenderness.  Ears, Nose, and Throat: No gross abnormalities. No pallor or cyanosis.   Eyes: EOMS intact, PERRL, conjunctivae and lids unremarkable. Fundoscopic exam and visual fields not performed.   Neck: No palpable masses or adenopathy, no thyromegaly, no JVD, carotid pulses are full and equal bilaterally and without  Bruits.     Cardiovascular: Regular rhythm, S1 and S2 normal, no S3 or S4.  No murmurs, gallops, or rubs detected.     Pulmonary/Chest: Chest: normal symmetry,  normal respiratory excursion, no intercostal retraction, no use of accessory muscles.            Pulmonary: Normal breath sounds. No rales or ronchi.    Abdominal: Abdomen soft, bowel sounds normoactive, no masses, no hepatosplenomegaly, non-tender, no bruits.     Musculoskeletal: No deformities, clubbing, cyanosis, erythema, or edema observed.     Neurological: No gross motor or sensory deficits noted, affect appropriate, oriented to time, person, place.     Skin: Warm and dry to the touch, no apparent skin lesions or masses noted.     Psychiatric: He has a normal mood and affect. His behavior is normal. Judgment and thought content normal.         Procedures      Lab Results   Component Value Date    WBC 3.95 05/22/2019    HGB 14.5 05/22/2019    HCT 44.2 05/22/2019    MCV 92.3 05/22/2019     05/22/2019     Lab Results   Component Value Date    GLUCOSE 221 (H) 05/22/2019    BUN 25 (H) 05/22/2019    CREATININE 1.37 (H) 05/22/2019    EGFRIFNONA 51 (L) 05/22/2019    BCR 18.2 05/22/2019    CO2 25.6 05/22/2019    CALCIUM 9.8 05/22/2019    ALBUMIN 4.30 05/22/2019    AST 29 05/22/2019    ALT 34 05/22/2019     Lab Results   Component Value Date    CHOL 159 02/11/2019    CHOL 169 01/02/2018    CHOL 233 (H) 06/26/2017     Lab Results   Component Value Date    TRIG 187 02/11/2019    TRIG 215 (H) 01/02/2018    TRIG 243 (H) 06/26/2017     Lab Results   Component Value Date    HDL 27 (L) 02/11/2019     HDL 28 (L) 01/02/2018    HDL 32 (L) 06/26/2017     No components found for: LDLCALC  Lab Results   Component Value Date     02/11/2019     11/12/2018    LDL 95 04/05/2018     No results found for: HDLLDLRATIO  No components found for: CHOLHDL  Lab Results   Component Value Date    HGBA1C 8.30 (H) 05/14/2019     Lab Results   Component Value Date    TSH 2.470 05/22/2019           ASSESSMENT AND PLAN  Mr. Galindo is stable with regard to his heart with no clinical evidence of progression of coronary artery disease.  No angina, arrhythmia or congestive heart failure was noted.  Of continued antiplatelet therapy with aspirin and Plavix, antihypertensive therapy with amlodipine, carvedilol, losartan, lipid-lowering therapy with atorvastatin, Zetia has been continued.    Zachary was seen today for follow-up.    Diagnoses and all orders for this visit:    Coronary artery disease involving coronary bypass graft of native heart without angina pectoris    Essential hypertension    Mixed hyperlipidemia        Patient's Body mass index is 24.15 kg/m². BMI is within normal parameters. No follow-up required..  Patient is a non-smoker    Medhat Clark MD  10/1/2019  9:39 AM

## 2019-10-28 ENCOUNTER — TELEPHONE (OUTPATIENT)
Dept: FAMILY MEDICINE CLINIC | Facility: CLINIC | Age: 74
End: 2019-10-28

## 2019-10-28 ENCOUNTER — TELEPHONE (OUTPATIENT)
Dept: ENDOCRINOLOGY | Facility: CLINIC | Age: 74
End: 2019-10-28

## 2019-10-28 NOTE — TELEPHONE ENCOUNTER
WIFE HAS CALLED AND STATES THAT HE IS HAVING SEVERE STOMACH CRAMPS AND FEELS LIKE IT COULD BE A SIDE EFFECT FROM ONE OF THE INJECTIONS. SHE STATES HE TAKES 2 THE TRASIBA AND JARDIANCE. SHE STATES HE HAS LOST ABOUT 15 POUNDS SINCE STARTING. CALL HIM -[phone]

## 2019-10-28 NOTE — TELEPHONE ENCOUNTER
WIFE HAS CALLED AND STATES THAT HE IS HAVING SEVERE STOMACH CRAMPS AND FEELS LIKE IT COULD BE A SIDE EFFECT FROM ONE OF THE INJECTIONS. SHE STATES HE TAKES 2 THE TRASIBA AND JARDIANCE. SHE STATES HE HAS LOST ABOUT 15 POUNDS SINCE STARTING. CALL HIM -[phone]

## 2019-10-28 NOTE — TELEPHONE ENCOUNTER
Is it normal to lose that much weight? I'm not sure what to tell him to do. Do you want him taking different meds or different doses? Are severe stomach cramps a side effect? I can call him, but not sure what to tell him.  Thank you,  Flores

## 2019-11-18 DIAGNOSIS — E11.8 TYPE 2 DIABETES MELLITUS WITH COMPLICATION, WITH LONG-TERM CURRENT USE OF INSULIN (HCC): Primary | Chronic | ICD-10-CM

## 2019-11-18 DIAGNOSIS — Z79.4 TYPE 2 DIABETES MELLITUS WITH COMPLICATION, WITH LONG-TERM CURRENT USE OF INSULIN (HCC): Primary | Chronic | ICD-10-CM

## 2019-11-20 ENCOUNTER — TELEPHONE (OUTPATIENT)
Dept: FAMILY MEDICINE CLINIC | Facility: CLINIC | Age: 74
End: 2019-11-20

## 2019-11-22 ENCOUNTER — LAB (OUTPATIENT)
Dept: LAB | Facility: HOSPITAL | Age: 74
End: 2019-11-22

## 2019-11-22 DIAGNOSIS — Z79.4 TYPE 2 DIABETES MELLITUS WITH COMPLICATION, WITH LONG-TERM CURRENT USE OF INSULIN (HCC): ICD-10-CM

## 2019-11-22 DIAGNOSIS — E11.8 TYPE 2 DIABETES MELLITUS WITH COMPLICATION, WITH LONG-TERM CURRENT USE OF INSULIN (HCC): ICD-10-CM

## 2019-11-22 DIAGNOSIS — I10 ESSENTIAL HYPERTENSION: ICD-10-CM

## 2019-11-22 LAB
25(OH)D3 SERPL-MCNC: 32.9 NG/ML (ref 30–100)
ALBUMIN SERPL-MCNC: 4.7 G/DL (ref 3.5–5.2)
ALBUMIN/GLOB SERPL: 1.4 G/DL
ALP SERPL-CCNC: 96 U/L (ref 39–117)
ALT SERPL W P-5'-P-CCNC: 26 U/L (ref 1–41)
ANION GAP SERPL CALCULATED.3IONS-SCNC: 11.5 MMOL/L (ref 5–15)
AST SERPL-CCNC: 19 U/L (ref 1–40)
BASOPHILS # BLD AUTO: 0.04 10*3/MM3 (ref 0–0.2)
BASOPHILS NFR BLD AUTO: 0.7 % (ref 0–1.5)
BILIRUB SERPL-MCNC: 0.4 MG/DL (ref 0.2–1.2)
BUN BLD-MCNC: 22 MG/DL (ref 8–23)
BUN/CREAT SERPL: 18.5 (ref 7–25)
CALCIUM SPEC-SCNC: 9.8 MG/DL (ref 8.6–10.5)
CHLORIDE SERPL-SCNC: 105 MMOL/L (ref 98–107)
CO2 SERPL-SCNC: 29.5 MMOL/L (ref 22–29)
CREAT BLD-MCNC: 1.19 MG/DL (ref 0.76–1.27)
DEPRECATED RDW RBC AUTO: 40.1 FL (ref 37–54)
EOSINOPHIL # BLD AUTO: 0.17 10*3/MM3 (ref 0–0.4)
EOSINOPHIL NFR BLD AUTO: 2.8 % (ref 0.3–6.2)
ERYTHROCYTE [DISTWIDTH] IN BLOOD BY AUTOMATED COUNT: 13 % (ref 12.3–15.4)
GFR SERPL CREATININE-BSD FRML MDRD: 60 ML/MIN/1.73
GLOBULIN UR ELPH-MCNC: 3.4 GM/DL
GLUCOSE BLD-MCNC: 203 MG/DL (ref 65–99)
HBA1C MFR BLD: 9.4 % (ref 4.8–5.6)
HCT VFR BLD AUTO: 45.9 % (ref 37.5–51)
HGB BLD-MCNC: 15.6 G/DL (ref 13–17.7)
IMM GRANULOCYTES # BLD AUTO: 0.01 10*3/MM3 (ref 0–0.05)
IMM GRANULOCYTES NFR BLD AUTO: 0.2 % (ref 0–0.5)
LYMPHOCYTES # BLD AUTO: 0.84 10*3/MM3 (ref 0.7–3.1)
LYMPHOCYTES NFR BLD AUTO: 14.1 % (ref 19.6–45.3)
MCH RBC QN AUTO: 29.3 PG (ref 26.6–33)
MCHC RBC AUTO-ENTMCNC: 34 G/DL (ref 31.5–35.7)
MCV RBC AUTO: 86.3 FL (ref 79–97)
MONOCYTES # BLD AUTO: 0.44 10*3/MM3 (ref 0.1–0.9)
MONOCYTES NFR BLD AUTO: 7.4 % (ref 5–12)
NEUTROPHILS # BLD AUTO: 4.47 10*3/MM3 (ref 1.7–7)
NEUTROPHILS NFR BLD AUTO: 74.8 % (ref 42.7–76)
NRBC BLD AUTO-RTO: 0 /100 WBC (ref 0–0.2)
PLATELET # BLD AUTO: 119 10*3/MM3 (ref 140–450)
PMV BLD AUTO: 12.4 FL (ref 6–12)
POTASSIUM BLD-SCNC: 5 MMOL/L (ref 3.5–5.2)
PROT SERPL-MCNC: 8.1 G/DL (ref 6–8.5)
RBC # BLD AUTO: 5.32 10*6/MM3 (ref 4.14–5.8)
SODIUM BLD-SCNC: 146 MMOL/L (ref 136–145)
TSH SERPL DL<=0.05 MIU/L-ACNC: 2.14 UIU/ML (ref 0.27–4.2)
VIT B12 BLD-MCNC: 537 PG/ML (ref 211–946)
WBC NRBC COR # BLD: 5.97 10*3/MM3 (ref 3.4–10.8)

## 2019-11-22 PROCEDURE — 85025 COMPLETE CBC W/AUTO DIFF WBC: CPT

## 2019-11-22 PROCEDURE — 84443 ASSAY THYROID STIM HORMONE: CPT

## 2019-11-22 PROCEDURE — 82607 VITAMIN B-12: CPT

## 2019-11-22 PROCEDURE — 80053 COMPREHEN METABOLIC PANEL: CPT

## 2019-11-22 PROCEDURE — 83036 HEMOGLOBIN GLYCOSYLATED A1C: CPT

## 2019-11-22 PROCEDURE — 82306 VITAMIN D 25 HYDROXY: CPT

## 2019-11-22 PROCEDURE — 36415 COLL VENOUS BLD VENIPUNCTURE: CPT

## 2019-11-25 ENCOUNTER — OFFICE VISIT (OUTPATIENT)
Dept: FAMILY MEDICINE CLINIC | Facility: CLINIC | Age: 74
End: 2019-11-25

## 2019-11-25 VITALS
BODY MASS INDEX: 24.86 KG/M2 | HEIGHT: 73 IN | HEART RATE: 65 BPM | OXYGEN SATURATION: 97 % | DIASTOLIC BLOOD PRESSURE: 80 MMHG | WEIGHT: 187.6 LBS | SYSTOLIC BLOOD PRESSURE: 150 MMHG

## 2019-11-25 DIAGNOSIS — E11.8 TYPE 2 DIABETES MELLITUS WITH COMPLICATION, WITH LONG-TERM CURRENT USE OF INSULIN (HCC): Chronic | ICD-10-CM

## 2019-11-25 DIAGNOSIS — Z00.00 MEDICARE ANNUAL WELLNESS VISIT, SUBSEQUENT: Primary | ICD-10-CM

## 2019-11-25 DIAGNOSIS — I10 ESSENTIAL HYPERTENSION: Chronic | ICD-10-CM

## 2019-11-25 DIAGNOSIS — Z23 NEED FOR IMMUNIZATION AGAINST INFLUENZA: ICD-10-CM

## 2019-11-25 DIAGNOSIS — Z79.4 TYPE 2 DIABETES MELLITUS WITH COMPLICATION, WITH LONG-TERM CURRENT USE OF INSULIN (HCC): Chronic | ICD-10-CM

## 2019-11-25 DIAGNOSIS — N18.30 STAGE 3 CHRONIC KIDNEY DISEASE (HCC): Chronic | ICD-10-CM

## 2019-11-25 DIAGNOSIS — E78.2 MIXED HYPERLIPIDEMIA: Chronic | ICD-10-CM

## 2019-11-25 PROCEDURE — G0008 ADMIN INFLUENZA VIRUS VAC: HCPCS | Performed by: GENERAL PRACTICE

## 2019-11-25 PROCEDURE — G0439 PPPS, SUBSEQ VISIT: HCPCS | Performed by: GENERAL PRACTICE

## 2019-11-25 PROCEDURE — 90653 IIV ADJUVANT VACCINE IM: CPT | Performed by: GENERAL PRACTICE

## 2019-11-25 PROCEDURE — 99214 OFFICE O/P EST MOD 30 MIN: CPT | Performed by: GENERAL PRACTICE

## 2019-11-25 NOTE — PATIENT INSTRUCTIONS
Check at pharmacy on Shingrix shingles vaccine  Bring blood pressure monitor to next visit    Medicare Wellness  Personal Prevention Plan of Service     Date of Office Visit:  2019  Encounter Provider:  Halley Raymond MD  Place of Service:  [facility]  Patient Name: Zachary Galindo  :  1945    As part of the Medicare Wellness portion of your visit today, we are providing you with this personalized preventive plan of services (PPPS). This plan is based upon recommendations of the [place] Preventive Services Task Force (USPSTF) and the Advisory Committee on Immunization Practices (ACIP).    This lists the preventive care services that should be considered, and provides dates of when you are due. Items listed as completed are up-to-date and do not require any further intervention.    Health Maintenance   Topic Date Due   • ZOSTER VACCINE (2 of 2) 2016   • INFLUENZA VACCINE  2019   • MEDICARE ANNUAL WELLNESS  2019   • LIPID PANEL  2020   • URINE MICROALBUMIN  2020   • DIABETIC FOOT EXAM  2020   • DIABETIC EYE EXAM  2020   • COLONOSCOPY  2020   • HEMOGLOBIN A1C  2020   • TDAP/TD VACCINES (2 - Td) 2027   • HEPATITIS C SCREENING  Completed   • PNEUMOCOCCAL VACCINES (65+ LOW/MEDIUM RISK)  Completed   • AAA SCREEN (ONE-TIME)  Addressed       Orders Placed This Encounter   Procedures   • Fluad Quad >65 years ([id number])       Return in about 6 months (around 2020) for Recheck.

## 2019-11-25 NOTE — PROGRESS NOTES
The ABCs of the Annual Wellness Visit  Subsequent Medicare Wellness Visit    Chief Complaint   Patient presents with   • Annual Exam     labs medicare wellness       Subjective   History of Present Illness:  Zachary Galindo is a 73 y.o. male who presents for a Subsequent Medicare Wellness Visit.    HEALTH RISK ASSESSMENT    Recent Hospitalizations:  No hospitalization(s) within the last year.    Current Medical Providers:  Patient Care Team:  Halley Raymond MD as PCP - General  Halley Raymond MD as PCP - Claims Attributed  Aime Stiles MD as Consulting Physician (Cardiology)  Vicente Hsu MD as Consulting Physician (Nephrology)  Khadar Ruiz MD (Inactive) as Consulting Physician (Cardiology)  Brodie Patterson DMD as Consulting Physician (Dental General Practice)    Smoking Status:  Social History     Tobacco Use   Smoking Status Former Smoker   Smokeless Tobacco Never Used   Tobacco Comment    Quit 1979       Alcohol Consumption:  Social History     Substance and Sexual Activity   Alcohol Use No       Depression Screen:   PHQ-2/PHQ-9 Depression Screening 11/25/2019   Little interest or pleasure in doing things 0   Feeling down, depressed, or hopeless 0   Trouble falling or staying asleep, or sleeping too much 0   Feeling tired or having little energy 0   Poor appetite or overeating 0   Feeling bad about yourself - or that you are a failure or have let yourself or your family down 0   Trouble concentrating on things, such as reading the newspaper or watching television 0   Moving or speaking so slowly that other people could have noticed. Or the opposite - being so fidgety or restless that you have been moving around a lot more than usual 0   Thoughts that you would be better off dead, or of hurting yourself in some way 0   Total Score 0   If you checked off any problems, how difficult have these problems made it for you to do your work, take care of things at home, or get along with  other people? -       Fall Risk Screen:  ALLIE Fall Risk Assessment was completed, and patient is at LOW risk for falls.Assessment completed on:11/25/2019    Health Habits and Functional and Cognitive Screening:  Functional & Cognitive Status 11/25/2019   Do you have difficulty preparing food and eating? No   Do you have difficulty bathing yourself, getting dressed or grooming yourself? No   Do you have difficulty using the toilet? No   Do you have difficulty moving around from place to place? No   Do you have trouble with steps or getting out of a bed or a chair? No   Current Diet Low Carb Diet   Dental Exam Up to date   Eye Exam Up to date   Exercise (times per week) 0 times per week   Current Exercise Activities Include None   Do you need help using the phone?  No   Are you deaf or do you have serious difficulty hearing?  No   Do you need help with transportation? No   Do you need help shopping? No   Do you need help preparing meals?  No   Do you need help with housework?  No   Do you need help with laundry? No   Do you need help taking your medications? No   Do you need help managing money? No   Do you ever drive or ride in a car without wearing a seat belt? No   Have you felt unusual stress, anger or loneliness in the last month? No   Who do you live with? Spouse   If you need help, do you have trouble finding someone available to you? No   Have you been bothered in the last four weeks by sexual problems? No   Do you have difficulty concentrating, remembering or making decisions? -         Does the patient have evidence of cognitive impairment? No    Asprin use counseling:Taking ASA appropriately as indicated    Age-appropriate Screening Schedule:  Refer to the list below for future screening recommendations based on patient's age, sex and/or medical conditions. Orders for these recommended tests are listed in the plan section. The patient has been provided with a written plan.    Health Maintenance   Topic  "Date Due   • ZOSTER VACCINE (2 of 2) 11/24/2016   • INFLUENZA VACCINE  08/01/2019   • LIPID PANEL  02/11/2020   • URINE MICROALBUMIN  02/11/2020   • DIABETIC FOOT EXAM  02/21/2020   • DIABETIC EYE EXAM  03/29/2020   • COLONOSCOPY  04/07/2020   • HEMOGLOBIN A1C  05/22/2020   • TDAP/TD VACCINES (2 - Td) 07/03/2027   • PNEUMOCOCCAL VACCINES (65+ LOW/MEDIUM RISK)  Completed          The following portions of the patient's history were reviewed and updated as appropriate: allergies, current medications, past family history, past medical history, past social history, past surgical history and problem list.    Outpatient Medications Prior to Visit   Medication Sig Dispense Refill   • alfuzosin (UROXATRAL) 10 MG 24 hr tablet Take 10 mg by mouth Daily.     • amLODIPine (NORVASC) 5 MG tablet Take 1 tablet by mouth Daily. 90 tablet 3   • aspirin 81 MG tablet Take 1 tablet by mouth Daily. 30 tablet 11   • atorvastatin (LIPITOR) 10 MG tablet Take 1 tablet by mouth Every Night. 90 tablet 3   • carvedilol (COREG) 12.5 MG tablet Take 1 tablet by mouth 2 (Two) Times a Day With Meals. 180 tablet 1   • clopidogrel (PLAVIX) 75 MG tablet Take 1 tablet by mouth Daily. 90 tablet 3   • ezetimibe (ZETIA) 10 MG tablet Take 1 tablet by mouth Daily. 90 tablet 3   • glucose blood test strip OneTouch Ultra Test strips  -  Test sugars 3-4 times a day as directed by provider.     • Insulin Degludec (TRESIBA FLEXTOUCH) 200 UNIT/ML solution pen-injector pen injection Inject 40 unit marking on U-100 syringe under the skin into the appropriate area as directed Every Night.     • Insulin Pen Needle (PEN NEEDLES 3/16\") 31G X 5 MM misc 100 each Daily. 100 each 3   • Lancets (ONETOUCH ULTRASOFT) lancets Test sugars 3-4 times daily as instructed by physician.     • losartan (COZAAR) 50 MG tablet Take 1 tablet by mouth Daily. 90 tablet 3   • magnesium oxide (MAG-OX) 400 MG tablet Take 1 tablet by mouth 2 (Two) Times a Day. 180 tablet 3   • ondansetron ODT " (ZOFRAN-ODT) 4 MG disintegrating tablet Take 1 tablet by mouth Every 6 (Six) Hours As Needed for Nausea or Vomiting. 45 tablet 5   • probenecid (BENEMID) 500 MG tablet Take 1 tablet by mouth 2 (Two) Times a Day. 180 tablet 3   • Empagliflozin 10 MG tablet Take 10 mg by mouth Daily. 90 tablet 3   • Insulin Degludec (TRESIBA FLEXTOUCH) 200 UNIT/ML solution pen-injector pen injection Inject 40 Units under the skin into the appropriate area as directed Every Night. 2 pen 11   • metFORMIN ER (GLUCOPHAGE XR) 500 MG 24 hr tablet 1 tab with supper for 2 weeks, then 2 tabs with supper therafter 60 tablet 11     No facility-administered medications prior to visit.        Patient Active Problem List   Diagnosis   • Essential hypertension   • Gout   • Hyperlipidemia   • Type 2 diabetes mellitus with complication, with long-term current use of insulin (CMS/[facility])   • Coronary artery disease involving coronary bypass graft of native heart without angina pectoris   • Pure hypercholesterolemia   • Stage 3 chronic kidney disease (CMS/[facility])   • Right knee pain   • Primary osteoarthritis of right knee       Advanced Care Planning:  Patient has an advance directive - a copy has been provided and is visible in patient header    Review of Systems   Constitutional: Negative.  Negative for chills, fatigue, fever and unexpected weight change.   HENT: Negative.  Negative for congestion, ear pain, hearing loss, nosebleeds, rhinorrhea, sneezing, sore throat and tinnitus.    Eyes: Negative.  Negative for discharge.   Respiratory: Negative.  Negative for cough, shortness of breath and wheezing.    Cardiovascular: Negative.  Negative for chest pain and palpitations.   Gastrointestinal: Negative.  Negative for abdominal pain, constipation, diarrhea, nausea and vomiting.   Endocrine: Negative.    Genitourinary: Negative.  Negative for dysuria, frequency and urgency.   Musculoskeletal: Negative.  Negative for arthralgias, back pain, joint swelling,  "myalgias and neck pain.   Skin: Negative.  Negative for rash.   Allergic/Immunologic: Negative.    Neurological: Negative.  Negative for dizziness, weakness, numbness and headaches.   Hematological: Negative.  Negative for adenopathy.   Psychiatric/Behavioral: Negative.  Negative for dysphoric mood and sleep disturbance. The patient is not nervous/anxious.        Compared to one year ago, the patient feels his physical health is the same.  Compared to one year ago, the patient feels his mental health is the same.    Reviewed chart for potential of high risk medication in the elderly: yes  Reviewed chart for potential of harmful drug interactions in the elderly:yes    Objective         Vitals:    11/25/19 1118   BP: 150/80   Pulse: 65   SpO2: 97%   Weight: 85.1 kg (187 lb 9.6 oz)   Height: 185.4 cm (73\")   PainSc: 0-No pain       Body mass index is 24.75 kg/m².  Discussed the patient's BMI with him. The BMI is in the acceptable range.    Physical Exam   Constitutional: He is oriented to person, place, and time. He appears well-developed and well-nourished. No distress.   HENT:   Head: Normocephalic and atraumatic.   Nose: Nose normal.   Mouth/Throat: Oropharynx is clear and moist.   Eyes: Conjunctivae and EOM are normal. Pupils are equal, round, and reactive to light. Right eye exhibits no discharge. Left eye exhibits no discharge.   Neck: Normal range of motion. No thyromegaly present.   Cardiovascular: Normal rate, regular rhythm, normal heart sounds and intact distal pulses.   No murmur heard.  Pulmonary/Chest: Effort normal and breath sounds normal. No respiratory distress. He has no wheezes. He has no rales. He exhibits no tenderness.   Abdominal: Soft. Bowel sounds are normal. He exhibits no distension and no mass. There is no tenderness. No hernia.   Musculoskeletal: Normal range of motion. He exhibits no edema or deformity.   Lymphadenopathy:     He has no cervical adenopathy.   Neurological: He is alert and " oriented to person, place, and time. He has normal reflexes.   Skin: Skin is warm and dry. No rash noted. No pallor.   Psychiatric: He has a normal mood and affect. His behavior is normal. Judgment and thought content normal.   Nursing note and vitals reviewed.      Lab Results   Component Value Date    HGBA1C 9.40 (H) 11/22/2019        Assessment/Plan   Medicare Risks and Personalized Health Plan  CMS Preventative Services Quick Reference  Fall Risk  Immunizations Discussed/Encouraged (specific immunizations; Influenza and Shingrix )    The above risks/problems have been discussed with the patient.  Pertinent information has been shared with the patient in the After Visit Summary.  Follow up plans and orders are seen below in the Assessment/Plan Section.    Diagnoses and all orders for this visit:    1. Medicare annual wellness visit, subsequent (Primary)    2. Essential hypertension    3. Mixed hyperlipidemia    4. Type 2 diabetes mellitus with complication, with long-term current use of insulin (CMS/[facility])    5. Stage 3 chronic kidney disease (CMS/[facility])    6. Need for immunization against influenza  -     Fluad Quad >65 years ([id number])      Follow Up:  Return in about 6 months (around 5/25/2020) for Recheck.     An After Visit Summary and PPPS were given to the patient.    Information has been scanned into chart.  Discussed importance of taking medications as prescribed. Encouraged healthy eating habits with low fat, low salt choices and working towards maintaining a healthy weight. Recommended regular exercise if able as well as care to prevent falls including avoiding anything on the floor that they could slip or trip on such as throw rugs, making sure they have a bathmat to step onto when their feet are wet and having grab bars and railings where needed.

## 2019-11-25 NOTE — PROGRESS NOTES
Subjective   Zachary Galindo is a 73 y.o. male.     Chief Complaint   Patient presents with   • Annual Exam     labs medicare wellness     For review and evaluation of management of chronic medical problems. Labs reviewed. Seeing endocrinology for diabetes, has not tolerated new treatment, has appt next week. Blood pressure is high today but upset over some bills. Has not taken his medications yet.  Diabetes   He presents for his follow-up diabetic visit. He has type 2 diabetes mellitus. His disease course has been improving. There are no hypoglycemic associated symptoms. Pertinent negatives for hypoglycemia include no dizziness or nervousness/anxiousness. There are no diabetic associated symptoms. Pertinent negatives for diabetes include no fatigue. There are no hypoglycemic complications. Diabetic complications include nephropathy. Risk factors for coronary artery disease include diabetes mellitus, dyslipidemia, hypertension and male sex. Current diabetic treatment includes insulin injections. He is compliant with treatment all of the time. His weight is stable. He is following a generally healthy diet. Meal planning includes ADA exchanges. He participates in exercise intermittently. There is no change in his home blood glucose trend. An ACE inhibitor/angiotensin II receptor blocker is being taken. Eye exam is current.   Hypertension   This is a chronic problem. The current episode started more than 1 year ago. The problem is unchanged. The problem is controlled. Pertinent negatives include no neck pain, palpitations or shortness of breath. There are no associated agents to hypertension. Risk factors for coronary artery disease include diabetes mellitus and dyslipidemia. Current antihypertension treatment includes angiotensin blockers, calcium channel blockers and beta blockers. The current treatment provides significant improvement. There are no compliance problems.    Hyperlipidemia   This is a chronic  problem. The current episode started more than 1 year ago. The problem is uncontrolled. Recent lipid tests were reviewed and are high. Exacerbating diseases include diabetes. There are no known factors aggravating his hyperlipidemia. Pertinent negatives include no myalgias or shortness of breath. Current antihyperlipidemic treatment includes ezetimibe. The current treatment provides mild improvement of lipids. There are no compliance problems.    Chronic Kidney Disease   This is a chronic problem. The current episode started more than 1 year ago. The problem occurs constantly. The problem has been gradually improving. Pertinent negatives include no arthralgias, chills, congestion, coughing, fatigue, joint swelling, myalgias, nausea, neck pain, rash, sore throat or vomiting. Nothing aggravates the symptoms. Treatments tried: hydration.      The following portions of the patient's history were reviewed and updated as appropriate: allergies, current medications, past family and social history and problem list.    Outpatient Medications Prior to Visit   Medication Sig Dispense Refill   • alfuzosin (UROXATRAL) 10 MG 24 hr tablet Take 10 mg by mouth Daily.     • amLODIPine (NORVASC) 5 MG tablet Take 1 tablet by mouth Daily. 90 tablet 3   • aspirin 81 MG tablet Take 1 tablet by mouth Daily. 30 tablet 11   • atorvastatin (LIPITOR) 10 MG tablet Take 1 tablet by mouth Every Night. 90 tablet 3   • carvedilol (COREG) 12.5 MG tablet Take 1 tablet by mouth 2 (Two) Times a Day With Meals. 180 tablet 1   • clopidogrel (PLAVIX) 75 MG tablet Take 1 tablet by mouth Daily. 90 tablet 3   • ezetimibe (ZETIA) 10 MG tablet Take 1 tablet by mouth Daily. 90 tablet 3   • glucose blood test strip OneTouch Ultra Test strips  -  Test sugars 3-4 times a day as directed by provider.     • Insulin Degludec (TRESIBA FLEXTOUCH) 200 UNIT/ML solution pen-injector pen injection Inject 40 unit marking on U-100 syringe under the skin into the appropriate  "area as directed Every Night.     • Insulin Pen Needle (PEN NEEDLES 3/16\") 31G X 5 MM misc 100 each Daily. 100 each 3   • Lancets (ONETOUCH ULTRASOFT) lancets Test sugars 3-4 times daily as instructed by physician.     • losartan (COZAAR) 50 MG tablet Take 1 tablet by mouth Daily. 90 tablet 3   • magnesium oxide (MAG-OX) 400 MG tablet Take 1 tablet by mouth 2 (Two) Times a Day. 180 tablet 3   • ondansetron ODT (ZOFRAN-ODT) 4 MG disintegrating tablet Take 1 tablet by mouth Every 6 (Six) Hours As Needed for Nausea or Vomiting. 45 tablet 5   • probenecid (BENEMID) 500 MG tablet Take 1 tablet by mouth 2 (Two) Times a Day. 180 tablet 3   • Empagliflozin 10 MG tablet Take 10 mg by mouth Daily. 90 tablet 3   • Insulin Degludec (TRESIBA FLEXTOUCH) 200 UNIT/ML solution pen-injector pen injection Inject 40 Units under the skin into the appropriate area as directed Every Night. 2 pen 11   • metFORMIN ER (GLUCOPHAGE XR) 500 MG 24 hr tablet 1 tab with supper for 2 weeks, then 2 tabs with supper therafter 60 tablet 11     No facility-administered medications prior to visit.        Review of Systems   Constitutional: Negative.  Negative for chills, fatigue and unexpected weight change.   HENT: Negative.  Negative for congestion, ear pain, hearing loss, nosebleeds, rhinorrhea, sneezing, sore throat and tinnitus.    Eyes: Negative.  Negative for discharge.   Respiratory: Negative.  Negative for cough, shortness of breath and wheezing.    Cardiovascular: Negative.  Negative for palpitations.   Gastrointestinal: Negative.  Negative for constipation, diarrhea, nausea and vomiting.   Endocrine: Negative.    Genitourinary: Negative for frequency and urgency.   Musculoskeletal: Negative for arthralgias, back pain, joint swelling, myalgias and neck pain.   Skin: Negative.  Negative for rash.   Allergic/Immunologic: Negative.    Neurological: Negative.  Negative for dizziness.   Hematological: Negative.  Negative for adenopathy. " "  Psychiatric/Behavioral: Negative.  Negative for dysphoric mood and sleep disturbance. The patient is not nervous/anxious.      Objective     Visit Vitals  /80   Pulse 65   Ht 185.4 cm (73\")   Wt 85.1 kg (187 lb 9.6 oz)   SpO2 97%   BMI 24.75 kg/m²     Physical Exam   Constitutional: He is oriented to person, place, and time. He appears well-developed and well-nourished. No distress.   HENT:   Head: Normocephalic and atraumatic.   Nose: Nose normal.   Mouth/Throat: Oropharynx is clear and moist.   Eyes: Conjunctivae and EOM are normal. Pupils are equal, round, and reactive to light. Right eye exhibits no discharge. Left eye exhibits no discharge.   Neck: Normal range of motion. No thyromegaly present.   Cardiovascular: Normal rate, regular rhythm, normal heart sounds and intact distal pulses.   No murmur heard.  Pulmonary/Chest: Effort normal and breath sounds normal. No respiratory distress. He has no wheezes. He has no rales. He exhibits no tenderness.   Abdominal: Soft. Bowel sounds are normal. He exhibits no distension and no mass. There is no tenderness. No hernia.   Musculoskeletal: Normal range of motion. He exhibits no deformity.   Lymphadenopathy:     He has no cervical adenopathy.   Neurological: He is alert and oriented to person, place, and time. He has normal reflexes.   Skin: Skin is warm and dry. No rash noted. No pallor.   Psychiatric: He has a normal mood and affect. His behavior is normal. Judgment and thought content normal.     Results for orders placed or performed in visit on 11/22/19   TSH   Result Value Ref Range    TSH 2.140 0.270 - 4.200 uIU/mL   Vitamin D 25 Hydroxy   Result Value Ref Range    25 Hydroxy, Vitamin D 32.9 30.0 - 100.0 ng/ml   Comprehensive Metabolic Panel   Result Value Ref Range    Glucose 203 (H) 65 - 99 mg/dL    BUN 22 8 - 23 mg/dL    Creatinine 1.19 0.76 - 1.27 mg/dL    Sodium 146 (H) 136 - 145 mmol/L    Potassium 5.0 3.5 - 5.2 mmol/L    Chloride 105 98 - 107 " mmol/L    CO2 29.5 (H) 22.0 - 29.0 mmol/L    Calcium 9.8 8.6 - 10.5 mg/dL    Total Protein 8.1 6.0 - 8.5 g/dL    Albumin 4.70 3.50 - 5.20 g/dL    ALT (SGPT) 26 1 - 41 U/L    AST (SGOT) 19 1 - 40 U/L    Alkaline Phosphatase 96 39 - 117 U/L    Total Bilirubin 0.4 0.2 - 1.2 mg/dL    eGFR Non African Amer 60 (L) >60 mL/min/1.73    Globulin 3.4 gm/dL    A/G Ratio 1.4 g/dL    BUN/Creatinine Ratio 18.5 7.0 - 25.0    Anion Gap 11.5 5.0 - 15.0 mmol/L   Hemoglobin A1c   Result Value Ref Range    Hemoglobin A1C 9.40 (H) 4.80 - 5.60 %   Vitamin B12   Result Value Ref Range    Vitamin B-12 537 211 - 946 pg/mL   CBC Auto Differential   Result Value Ref Range    WBC 5.97 3.40 - 10.80 10*3/mm3    RBC 5.32 4.14 - 5.80 10*6/mm3    Hemoglobin 15.6 13.0 - 17.7 g/dL    Hematocrit 45.9 37.5 - 51.0 %    MCV 86.3 79.0 - 97.0 fL    MCH 29.3 26.6 - 33.0 pg    MCHC 34.0 31.5 - 35.7 g/dL    RDW 13.0 12.3 - 15.4 %    RDW-SD 40.1 37.0 - 54.0 fl    MPV 12.4 (H) 6.0 - 12.0 fL    Platelets 119 (L) 140 - 450 10*3/mm3    Neutrophil % 74.8 42.7 - 76.0 %    Lymphocyte % 14.1 (L) 19.6 - 45.3 %    Monocyte % 7.4 5.0 - 12.0 %    Eosinophil % 2.8 0.3 - 6.2 %    Basophil % 0.7 0.0 - 1.5 %    Immature Grans % 0.2 0.0 - 0.5 %    Neutrophils, Absolute 4.47 1.70 - 7.00 10*3/mm3    Lymphocytes, Absolute 0.84 0.70 - 3.10 10*3/mm3    Monocytes, Absolute 0.44 0.10 - 0.90 10*3/mm3    Eosinophils, Absolute 0.17 0.00 - 0.40 10*3/mm3    Basophils, Absolute 0.04 0.00 - 0.20 10*3/mm3    Immature Grans, Absolute 0.01 0.00 - 0.05 10*3/mm3    nRBC 0.0 0.0 - 0.2 /100 WBC      Assessment/Plan   Problem List Items Addressed This Visit        Cardiovascular and Mediastinum    Essential hypertension (Chronic)    Hyperlipidemia (Chronic)       Endocrine    Type 2 diabetes mellitus with complication, with long-term current use of insulin (CMS/[facility]) (Chronic)    Relevant Medications    Insulin Degludec (TRESIBA FLEXTOUCH) 200 UNIT/ML solution pen-injector pen injection        Genitourinary    Stage 3 chronic kidney disease (CMS/[facility]) (Chronic)      Other Visit Diagnoses     Medicare annual wellness visit, subsequent    -  Primary         Continue current treatment. Follow up with endocrinology as scheduled.    No orders of the defined types were placed in this encounter.    Return in about 6 months (around 5/25/2020) for Recheck.

## 2019-12-02 DIAGNOSIS — E78.2 MIXED HYPERLIPIDEMIA: Chronic | ICD-10-CM

## 2019-12-02 DIAGNOSIS — I10 ESSENTIAL HYPERTENSION: Chronic | ICD-10-CM

## 2019-12-02 RX ORDER — CLOPIDOGREL BISULFATE 75 MG/1
TABLET ORAL
Qty: 90 TABLET | Refills: 3 | Status: SHIPPED | OUTPATIENT
Start: 2019-12-02 | End: 2020-11-30

## 2019-12-02 RX ORDER — CARVEDILOL 12.5 MG/1
TABLET ORAL
Qty: 180 TABLET | Refills: 1 | Status: SHIPPED | OUTPATIENT
Start: 2019-12-02 | End: 2020-06-01

## 2019-12-02 RX ORDER — ATORVASTATIN CALCIUM 10 MG/1
10 TABLET, FILM COATED ORAL NIGHTLY
Qty: 90 TABLET | Refills: 3 | Status: SHIPPED | OUTPATIENT
Start: 2019-12-02 | End: 2020-11-30

## 2019-12-02 RX ORDER — LOSARTAN POTASSIUM 50 MG/1
TABLET ORAL
Qty: 90 TABLET | Refills: 3 | Status: SHIPPED | OUTPATIENT
Start: 2019-12-02 | End: 2019-12-23

## 2019-12-02 RX ORDER — PROBENECID 500 MG/1
TABLET, FILM COATED ORAL
Qty: 180 TABLET | Refills: 3 | Status: SHIPPED | OUTPATIENT
Start: 2019-12-02 | End: 2020-11-30

## 2019-12-09 ENCOUNTER — OFFICE VISIT (OUTPATIENT)
Dept: ENDOCRINOLOGY | Facility: CLINIC | Age: 74
End: 2019-12-09

## 2019-12-09 VITALS
OXYGEN SATURATION: 98 % | HEIGHT: 73 IN | BODY MASS INDEX: 25.34 KG/M2 | SYSTOLIC BLOOD PRESSURE: 132 MMHG | DIASTOLIC BLOOD PRESSURE: 78 MMHG | HEART RATE: 63 BPM | WEIGHT: 191.2 LBS

## 2019-12-09 DIAGNOSIS — E11.8 TYPE 2 DIABETES MELLITUS WITH COMPLICATION, WITH LONG-TERM CURRENT USE OF INSULIN (HCC): Primary | Chronic | ICD-10-CM

## 2019-12-09 DIAGNOSIS — Z79.4 TYPE 2 DIABETES MELLITUS WITH COMPLICATION, WITH LONG-TERM CURRENT USE OF INSULIN (HCC): Primary | Chronic | ICD-10-CM

## 2019-12-09 PROCEDURE — 99214 OFFICE O/P EST MOD 30 MIN: CPT | Performed by: NURSE PRACTITIONER

## 2019-12-09 RX ORDER — METFORMIN HYDROCHLORIDE 500 MG/1
TABLET, EXTENDED RELEASE ORAL
Qty: 60 TABLET | Refills: 11 | Status: SHIPPED | OUTPATIENT
Start: 2019-12-09 | End: 2020-01-21

## 2019-12-09 NOTE — PROGRESS NOTES
Subjective     Zachary Galindo is a 73 y.o. male who presents today for a follow up for his type 2 diabetes.    Chief Complaint   Patient presents with   • Diabetes         Patients Primary Care provider  Halley Raymond MD       Diabetes   He presents for his follow-up diabetic visit. He has type 2 diabetes mellitus. Pertinent negatives for hypoglycemia include no confusion, dizziness or nervousness/anxiousness. Associated symptoms include visual change. Pertinent negatives for diabetes include no fatigue, no foot ulcerations, no polyphagia and no polyuria. Symptoms are stable. Risk factors for coronary artery disease include dyslipidemia, male sex, sedentary lifestyle and diabetes mellitus. Current diabetic treatment includes oral agent (dual therapy). He is compliant with treatment all of the time. He is following a generally healthy diet. Eye exam is current.     Has had no hypoglycemia     Glucose has been running in the 200-250 range,     Fasting blood sugar this morning was 250      The following portions of the patient's history were reviewed and updated as appropriate:     Past Medical History:   Diagnosis Date   • Allergic rhinitis    • Ankle joint pain    • Artificial lens present     Left   • Astigmatism    • Benign prostatic hyperplasia    • Cataract    • Closed fracture of medial malleolus    • Coronary arteriosclerosis    • Diabetes mellitus (CMS/HCC)     no retinopathy   • Elevated levels of transaminase & lactic acid dehydrogenase     improving    • Encounter for health maintenance examination     Individual health examination     • Encounter for health maintenance examination in adult     Adult health examination    • Essential hypertension    • Essential hypertension     Unspecified   • Flank pain     probably muscular    • GERD (gastroesophageal reflux disease)    • Gout    • Gout     unspecified     • Hemorrhoids    • History of artificial eye lens     Artificial lens in position -  right    • History of colonoscopy 04/04/2010    Colon endoscopy  (55317)  (1)    • History of kidney stones    • Hyperlipidemia    • Hypermetropia    • Low blood pressure    • Malaise and fatigue    • Need for influenza vaccination     Needs influenza immunization    • Nuclear cataract of left eye    • Posterior subcapsular polar senile cataract     Left   • Renal impairment    • Special screening for malignant neoplasm of prostate    • Type 2 diabetes mellitus (CMS/HCC)     improving   • 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 edema (CMS/HCC)     without macular edema - mild OS    • Upper respiratory infection    • Urticaria     Drug-aggravated angioedema-urticaria   • Urticaria      Past Surgical History:   Procedure Laterality Date   • CARDIAC CATHETERIZATION  03/24/2014    (27918)  (2)  Reduction of stenoisis from 95% to less than 0% stenosis with evidence of good JENNY 3-flow. Distal RCA beyond the touchdown was seen filling the circumflex system   • CATARACT EXTRACTION  10/2015    Remove cataract, insert lens (2)    • CORONARY ARTERY BYPASS GRAFT      Arterial, two  (1)   -  3 - 12/1998   • HERNIA REPAIR      w/mesh  (1)   • INJECTION OF MEDICATION  04/11/2013    B12  (1)  - RAYMOND Raymond   • INJECTION OF MEDICATION  07/03/2013    Benadryl  (1) - DMajor Raymond   • INJECTION OF MEDICATION  10/22/2014    Kenalog  (2)   • OTHER SURGICAL HISTORY  07/10/2002    Fragmenting of kidney stone  -   [facility], 2010 shocks. 1cm UP stone,right. Diabetes mellitus     Family History   Problem Relation Age of Onset   • Diabetes Other    • Hypertension Other    • Cancer Mother    • Heart disease Mother    • Hypertension Mother    • Hyperlipidemia Mother    • Diabetes Brother    • Diabetes Maternal Aunt    • Cancer Sister    • Diabetes Sister          Current Outpatient Medications:   •  alfuzosin (UROXATRAL) 10 MG 24 hr tablet, Take 10 mg by mouth Daily., Disp: , Rfl:   •  amLODIPine  "(NORVASC) 5 MG tablet, Take 1 tablet by mouth Daily., Disp: 90 tablet, Rfl: 3  •  aspirin 81 MG tablet, Take 1 tablet by mouth Daily., Disp: 30 tablet, Rfl: 11  •  atorvastatin (LIPITOR) 10 MG tablet, TAKE 1 TABLET BY MOUTH EVERY NIGHT, Disp: 90 tablet, Rfl: 3  •  carvedilol (COREG) 12.5 MG tablet, TAKE 1 TABLET TWICE DAILY WITH MEALS, Disp: 180 tablet, Rfl: 1  •  clopidogrel (PLAVIX) 75 MG tablet, TAKE 1 TABLET BY MOUTH EVERY DAY, Disp: 90 tablet, Rfl: 3  •  ezetimibe (ZETIA) 10 MG tablet, Take 1 tablet by mouth Daily., Disp: 90 tablet, Rfl: 3  •  glucose blood test strip, OneTouch Ultra Test strips  -  Test sugars 3-4 times a day as directed by provider., Disp: , Rfl:   •  Insulin Degludec (TRESIBA FLEXTOUCH) 200 UNIT/ML solution pen-injector pen injection, Inject 50 Units under the skin into the appropriate area as directed Every Night., Disp: 4 pen, Rfl: 11  •  Insulin Pen Needle (PEN NEEDLES 3/16\") 31G X 5 MM misc, 100 each Daily., Disp: 100 each, Rfl: 3  •  Lancets (ONETOUCH ULTRASOFT) lancets, Test sugars 3-4 times daily as instructed by physician., Disp: , Rfl:   •  losartan (COZAAR) 50 MG tablet, TAKE 1 TABLET BY MOUTH EVERY DAY, Disp: 90 tablet, Rfl: 3  •  magnesium oxide (MAG-OX) 400 MG tablet, Take 1 tablet by mouth 2 (Two) Times a Day., Disp: 180 tablet, Rfl: 3  •  ondansetron ODT (ZOFRAN-ODT) 4 MG disintegrating tablet, Take 1 tablet by mouth Every 6 (Six) Hours As Needed for Nausea or Vomiting., Disp: 45 tablet, Rfl: 5  •  probenecid (BENEMID) 500 MG tablet, TAKE 1 TABLET BY MOUTH TWICE DAILY, Disp: 180 tablet, Rfl: 3  •  metFORMIN ER (GLUCOPHAGE XR) 500 MG 24 hr tablet, 1 tab with supper for 2 weeks, then 2 tabs with supper therafter, Disp: 60 tablet, Rfl: 11        Allergies   Allergen Reactions   • Advicor [Niacin-Lovastatin Er] Unknown (See Comments)     Swelling   • Lisinopril Other (See Comments)     Cough   • Nsaids Other (See Comments)     Kidney disease     Social History     Socioeconomic " History   • Marital status:      Spouse name: Not on file   • Number of children: Not on file   • Years of education: Not on file   • Highest education level: Not on file   Tobacco Use   • Smoking status: Former Smoker   • Smokeless tobacco: Never Used   • Tobacco comment: Quit 1979   Substance and Sexual Activity   • Alcohol use: No   • Drug use: No       Review of Systems   Constitutional: Negative for activity change, appetite change, diaphoresis, fatigue and unexpected weight change.   HENT: Negative for congestion and ear pain.    Eyes: Negative for discharge and visual disturbance.   Respiratory: Negative for apnea, cough, choking, shortness of breath, wheezing and stridor.    Gastrointestinal: Negative for abdominal distention, constipation, diarrhea and nausea.   Endocrine: Negative for cold intolerance, heat intolerance, polyphagia and polyuria.   Genitourinary: Negative.  Negative for frequency.   Musculoskeletal: Negative for arthralgias, back pain and myalgias.   Skin: Negative for color change and wound.   Allergic/Immunologic: Negative.    Neurological: Negative for dizziness, syncope and light-headedness.   Psychiatric/Behavioral: Negative for agitation and confusion. The patient is not nervous/anxious.        Objective     Vitals:    12/09/19 1007   BP: 132/78   Pulse: 63   SpO2: 98%     Physical Exam   Constitutional: He is oriented to person, place, and time. He appears well-developed and well-nourished.   HENT:   Head: Normocephalic.   Nose: Nose normal.   Eyes: Pupils are equal, round, and reactive to light. Conjunctivae are normal.   Patient had recent surgery on eye lids, stitches intact    Neck: Trachea normal and normal range of motion. No tracheal tenderness present. No tracheal deviation present. No thyroid mass and no thyromegaly present.   Cardiovascular: Normal rate and normal heart sounds.   Pulmonary/Chest: Effort normal and breath sounds normal. No stridor. No respiratory  distress. He has no wheezes. He has no rales.   Abdominal: Soft. Normal appearance.   Musculoskeletal: Normal range of motion.   Lymphadenopathy:        Head (right side): No submental, no submandibular and no tonsillar adenopathy present.        Head (left side): No submental, no submandibular and no tonsillar adenopathy present.     He has no cervical adenopathy.   Neurological: He is alert and oriented to person, place, and time.   Skin: Skin is warm, dry and intact.   Psychiatric: He has a normal mood and affect. His speech is normal and behavior is normal. Judgment and thought content normal. His mood appears not anxious. His affect is not inappropriate. He is not agitated and not hyperactive. Cognition and memory are normal.   Nursing note and vitals reviewed.      Nursing note and vitals reviewed    Lab Review    Labs reviewed from last visit     Complete labs before next visit     Results for orders placed or performed in visit on 11/22/19   TSH   Result Value Ref Range    TSH 2.140 0.270 - 4.200 uIU/mL   Vitamin D 25 Hydroxy   Result Value Ref Range    25 Hydroxy, Vitamin D 32.9 30.0 - 100.0 ng/ml   Comprehensive Metabolic Panel   Result Value Ref Range    Glucose 203 (H) 65 - 99 mg/dL    BUN 22 8 - 23 mg/dL    Creatinine 1.19 0.76 - 1.27 mg/dL    Sodium 146 (H) 136 - 145 mmol/L    Potassium 5.0 3.5 - 5.2 mmol/L    Chloride 105 98 - 107 mmol/L    CO2 29.5 (H) 22.0 - 29.0 mmol/L    Calcium 9.8 8.6 - 10.5 mg/dL    Total Protein 8.1 6.0 - 8.5 g/dL    Albumin 4.70 3.50 - 5.20 g/dL    ALT (SGPT) 26 1 - 41 U/L    AST (SGOT) 19 1 - 40 U/L    Alkaline Phosphatase 96 39 - 117 U/L    Total Bilirubin 0.4 0.2 - 1.2 mg/dL    eGFR Non African Amer 60 (L) >60 mL/min/1.73    Globulin 3.4 gm/dL    A/G Ratio 1.4 g/dL    BUN/Creatinine Ratio 18.5 7.0 - 25.0    Anion Gap 11.5 5.0 - 15.0 mmol/L   Hemoglobin A1c   Result Value Ref Range    Hemoglobin A1C 9.40 (H) 4.80 - 5.60 %   Vitamin B12   Result Value Ref Range    Vitamin  B-12 537 211 - 946 pg/mL   CBC Auto Differential   Result Value Ref Range    WBC 5.97 3.40 - 10.80 10*3/mm3    RBC 5.32 4.14 - 5.80 10*6/mm3    Hemoglobin 15.6 13.0 - 17.7 g/dL    Hematocrit 45.9 37.5 - 51.0 %    MCV 86.3 79.0 - 97.0 fL    MCH 29.3 26.6 - 33.0 pg    MCHC 34.0 31.5 - 35.7 g/dL    RDW 13.0 12.3 - 15.4 %    RDW-SD 40.1 37.0 - 54.0 fl    MPV 12.4 (H) 6.0 - 12.0 fL    Platelets 119 (L) 140 - 450 10*3/mm3    Neutrophil % 74.8 42.7 - 76.0 %    Lymphocyte % 14.1 (L) 19.6 - 45.3 %    Monocyte % 7.4 5.0 - 12.0 %    Eosinophil % 2.8 0.3 - 6.2 %    Basophil % 0.7 0.0 - 1.5 %    Immature Grans % 0.2 0.0 - 0.5 %    Neutrophils, Absolute 4.47 1.70 - 7.00 10*3/mm3    Lymphocytes, Absolute 0.84 0.70 - 3.10 10*3/mm3    Monocytes, Absolute 0.44 0.10 - 0.90 10*3/mm3    Eosinophils, Absolute 0.17 0.00 - 0.40 10*3/mm3    Basophils, Absolute 0.04 0.00 - 0.20 10*3/mm3    Immature Grans, Absolute 0.01 0.00 - 0.05 10*3/mm3    nRBC 0.0 0.0 - 0.2 /100 WBC         Assessment/Plan     Diagnosis Plan   1. Type 2 diabetes mellitus with complication, with long-term current use of insulin (CMS/[facility])  metFORMIN ER (GLUCOPHAGE XR) 500 MG 24 hr tablet    Insulin Degludec (TRESIBA FLEXTOUCH) 200 UNIT/ML solution pen-injector pen injection       Levemir/Tresiba 40 units- 42 units     Increase 1 unit until we reach AM fasting goal     Decrease to 35 if you have a sugar less than 80 mg per dl          =========================================================     Trulicity 0.75 mg weekly- stopped       ====      Metformin 500 mg with supper for 2 weeks then 1000 mg with supper ---stopped taking due to side effects      Invokana 100 mg daily - increase by 2 glasses of water per day --not taking due to cost     Pt stated that metformin gives diarrhea and invokana is expensive  ----------------------------------------------------------------------------------     Taking Jardiance 10mg --stopped due to side effects     Trulicity- Stopped due to  side effects      We discussed  mealtime insulin, will return in 6 weeks      Would like to retry Metformin and increasing Tresiba before switching to meal time     Metformin 500 mg with supper for 2 weeks then 1000 mg with supper     Blood sugar range has been in 150-250, has been running high mostly     Please start checking blood sugar throughout the day       Microvascular Complication Monitoring        Weight Related  Body mass index is 25.23 kg/m².      Bone Health    Lab Results   Component Value Date    PTH 45.8 01/17/2018    CALCIUM 9.8 11/22/2019    PHOS 3.5 01/25/2019    UVTA66WT 32.9 11/22/2019       Thyroid Health    Lab Results   Component Value Date    TSH 2.140 11/22/2019           Lab Results   Reviewed      Hyperlipidemia      Lab Results   Component Value Date    CHOL 159 02/11/2019    CHOL 169 01/02/2018    CHOL 233 (H) 06/26/2017     Lab Results   Component Value Date    TRIG 187 02/11/2019    TRIG 215 (H) 01/02/2018    TRIG 243 (H) 06/26/2017     Lab Results   Component Value Date    HDL 27 (L) 02/11/2019    HDL 28 (L) 01/02/2018    HDL 32 (L) 06/26/2017     Lab Results   Component Value Date     02/11/2019     11/12/2018    LDL 95 04/05/2018     No results found for: VLDL  Lab Results   Component Value Date    LDLHDL 3.50 02/11/2019    LDLHDL 3.50 01/02/2018    LDLHDL 4.76 (H) 06/26/2017     Complete labs before next visit       Hypertension      Vitals:    12/09/19 1007   BP: 132/78   Pulse: 63   SpO2: 98%       Lab Results   Component Value Date    HGBA1C 9.40 (H) 11/22/2019    HGBA1C 8.30 (H) 05/14/2019    HGBA1C 9.5 (H) 02/11/2019     Lab Results   Component Value Date    GLUCOSE 203 (H) 11/22/2019    BUN 22 11/22/2019    CREATININE 1.19 11/22/2019    EGFRIFNONA 60 (L) 11/22/2019    BCR 18.5 11/22/2019    K 5.0 11/22/2019    CO2 29.5 (H) 11/22/2019    CALCIUM 9.8 11/22/2019    ALBUMIN 4.70 11/22/2019    AST 19 11/22/2019    ALT 26 11/22/2019    ANIONGAP 11.5 11/22/2019      Lab Results   Component Value Date    WBC 5.97 11/22/2019    HGB 15.6 11/22/2019    HCT 45.9 11/22/2019    MCV 86.3 11/22/2019     (L) 11/22/2019         Followup:    Return in about 6 weeks (around 1/20/2020), or if symptoms worsen or fail to improve, for Recheck.            This document has been electronically signed by ROCCO Newberry on December 9, 2019 11:14 AM

## 2019-12-23 ENCOUNTER — TELEPHONE (OUTPATIENT)
Dept: FAMILY MEDICINE CLINIC | Facility: CLINIC | Age: 74
End: 2019-12-23

## 2019-12-23 ENCOUNTER — OFFICE VISIT (OUTPATIENT)
Dept: FAMILY MEDICINE CLINIC | Facility: CLINIC | Age: 74
End: 2019-12-23

## 2019-12-23 VITALS
HEART RATE: 57 BPM | SYSTOLIC BLOOD PRESSURE: 162 MMHG | OXYGEN SATURATION: 96 % | HEIGHT: 73 IN | BODY MASS INDEX: 25.31 KG/M2 | WEIGHT: 191 LBS | DIASTOLIC BLOOD PRESSURE: 70 MMHG

## 2019-12-23 DIAGNOSIS — R07.2 PRECORDIAL PAIN: Primary | ICD-10-CM

## 2019-12-23 DIAGNOSIS — I10 ESSENTIAL HYPERTENSION: Chronic | ICD-10-CM

## 2019-12-23 DIAGNOSIS — I25.810 CORONARY ARTERY DISEASE INVOLVING CORONARY BYPASS GRAFT OF NATIVE HEART WITHOUT ANGINA PECTORIS: ICD-10-CM

## 2019-12-23 PROCEDURE — 93010 ELECTROCARDIOGRAM REPORT: CPT | Performed by: INTERNAL MEDICINE

## 2019-12-23 PROCEDURE — 99214 OFFICE O/P EST MOD 30 MIN: CPT | Performed by: GENERAL PRACTICE

## 2019-12-23 PROCEDURE — 93005 ELECTROCARDIOGRAM TRACING: CPT | Performed by: GENERAL PRACTICE

## 2019-12-23 RX ORDER — LOSARTAN POTASSIUM 100 MG/1
100 TABLET ORAL DAILY
Qty: 90 TABLET | Refills: 3 | Status: SHIPPED | OUTPATIENT
Start: 2019-12-23 | End: 2020-12-10

## 2019-12-23 RX ORDER — NITROGLYCERIN 0.4 MG/1
0.4 TABLET SUBLINGUAL
Qty: 25 TABLET | Refills: 0 | Status: SHIPPED | OUTPATIENT
Start: 2019-12-23 | End: 2021-03-15 | Stop reason: SDUPTHER

## 2019-12-23 NOTE — TELEPHONE ENCOUNTER
Daphnie said Zachary's blood pressure is staying high 190/90 and this morning is was 170.  He was having chest pain Saturday and he took Nitro and has not had pain anymore, he just can't get his b/p down.  She said his cardiologist is out and their office told her to see his family doctor.  Please advise.

## 2020-01-15 ENCOUNTER — OFFICE VISIT (OUTPATIENT)
Dept: CARDIOLOGY | Facility: CLINIC | Age: 75
End: 2020-01-15

## 2020-01-15 VITALS
BODY MASS INDEX: 25.66 KG/M2 | WEIGHT: 193.6 LBS | OXYGEN SATURATION: 96 % | DIASTOLIC BLOOD PRESSURE: 70 MMHG | HEART RATE: 65 BPM | SYSTOLIC BLOOD PRESSURE: 140 MMHG | HEIGHT: 73 IN

## 2020-01-15 DIAGNOSIS — I25.810 CORONARY ARTERY DISEASE INVOLVING CORONARY BYPASS GRAFT OF NATIVE HEART WITHOUT ANGINA PECTORIS: Primary | ICD-10-CM

## 2020-01-15 DIAGNOSIS — I10 ESSENTIAL HYPERTENSION: Chronic | ICD-10-CM

## 2020-01-15 DIAGNOSIS — E78.2 MIXED HYPERLIPIDEMIA: Chronic | ICD-10-CM

## 2020-01-15 DIAGNOSIS — R07.2 PRECORDIAL PAIN: ICD-10-CM

## 2020-01-15 PROCEDURE — 99215 OFFICE O/P EST HI 40 MIN: CPT | Performed by: INTERNAL MEDICINE

## 2020-01-15 RX ORDER — ISOSORBIDE MONONITRATE 30 MG/1
30 TABLET, EXTENDED RELEASE ORAL EVERY MORNING
Qty: 30 TABLET | Refills: 6 | Status: SHIPPED | OUTPATIENT
Start: 2020-01-15 | End: 2021-03-15

## 2020-01-15 NOTE — PROGRESS NOTES
Zachary Galindo  74 y.o. male    10/01/2019  1. Coronary artery disease involving coronary bypass graft of native heart without angina pectoris    2. Essential hypertension    3. Mixed hyperlipidemia    4. Precordial pain        History of Present Illness  Mr. Galindo is a 74-year-old male who is here for a follow-up of his above stated problems. His history is remarkable for coronary artery disease status post coronary artery bypass surgery in 1998 by Dr. Flor and had subsequent PCI to SVG to OM and RCA by Dr. Stiles.    He has been compliant with his medications.  His stress test performed in 2017 showed no reversible ischemia.  He had been physically quite active without any restriction and has followed up with both primary care and Dr. Juan Briggs on a regular basis.  History was reviewed in detail.    However, in  December 2019 the patient did have a few episodes of chest pain which was retrosternal and reminded him of the pain related to his coronary artery disease.  It subsided with sublingual nitroglycerin & rest.  He did not seek any medical help.  However, during the past few weeks he has not had any chest pain.  He has however had fatigue.  Clinical exam today was unremarkable with no signs of congestive heart failure.  No bronchospasm was noted.  He indicates that his diabetes has not been under good control and he has an appointment with Dr. Briggs    SUBJECTIVE    Allergies   Allergen Reactions   • Advicor [Niacin-Lovastatin Er] Swelling     Swelling   • Nsaids Other (See Comments)     Kidney disease   • Lisinopril Cough     Cough         Past Medical History:   Diagnosis Date   • Allergic rhinitis    • Ankle joint pain    • Artificial lens present     Left   • Astigmatism    • Benign prostatic hyperplasia    • Cataract    • Closed fracture of medial malleolus    • Coronary arteriosclerosis    • Diabetes mellitus (CMS/[facility])     no retinopathy   • Elevated levels of transaminase & lactic  acid dehydrogenase     improving    • Encounter for health maintenance examination     Individual health examination     • Encounter for health maintenance examination in adult     Adult health examination    • Essential hypertension    • Essential hypertension     Unspecified   • Flank pain     probably muscular    • GERD (gastroesophageal reflux disease)    • Gout    • Gout     unspecified     • Hemorrhoids    • History of artificial eye lens     Artificial lens in position - right    • History of colonoscopy 04/04/2010    Colon endoscopy  (13115)  (1)    • History of kidney stones    • Hyperlipidemia    • Hypermetropia    • Low blood pressure    • Malaise and fatigue    • Need for influenza vaccination     Needs influenza immunization    • Nuclear cataract of left eye    • Posterior subcapsular polar senile cataract     Left   • Renal impairment    • Special screening for malignant neoplasm of prostate    • Type 2 diabetes mellitus (CMS/HCC)     improving   • 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 edema (CMS/HCC)     without macular edema - mild OS    • Upper respiratory infection    • Urticaria     Drug-aggravated angioedema-urticaria   • Urticaria          Past Surgical History:   Procedure Laterality Date   • CARDIAC CATHETERIZATION  03/24/2014    (90670)  (2)  Reduction of stenoisis from 95% to less than 0% stenosis with evidence of good JENNY 3-flow. Distal RCA beyond the touchdown was seen filling the circumflex system   • CATARACT EXTRACTION  10/2015    Remove cataract, insert lens (2)    • CORONARY ARTERY BYPASS GRAFT      Arterial, two  (1)   -  3 - 12/1998   • HERNIA REPAIR      w/mesh  (1)   • INJECTION OF MEDICATION  04/11/2013    B12  (1)  - RAYMOND Raymond   • INJECTION OF MEDICATION  07/03/2013    Benadryl  (1) - RAYMOND Raymond   • INJECTION OF MEDICATION  10/22/2014    Kenalog  (2)   • OTHER SURGICAL HISTORY  07/10/2002    Fragmenting of kidney stone  -    "[facility], 2010 shocks. 1cm UP stone,right. Diabetes mellitus         Family History   Problem Relation Age of Onset   • Diabetes Other    • Hypertension Other    • Cancer Mother    • Heart disease Mother    • Hypertension Mother    • Hyperlipidemia Mother    • Diabetes Brother    • Diabetes Maternal Aunt    • Cancer Sister    • Diabetes Sister          Social History     Socioeconomic History   • Marital status:      Spouse name: Not on file   • Number of children: Not on file   • Years of education: Not on file   • Highest education level: Not on file   Tobacco Use   • Smoking status: Former Smoker   • Smokeless tobacco: Never Used   • Tobacco comment: Quit 1979   Substance and Sexual Activity   • Alcohol use: No   • Drug use: No   • Sexual activity: Defer         Current Outpatient Medications   Medication Sig Dispense Refill   • alfuzosin (UROXATRAL) 10 MG 24 hr tablet Take 10 mg by mouth Daily.     • amLODIPine (NORVASC) 5 MG tablet Take 1 tablet by mouth Daily. 90 tablet 3   • aspirin 81 MG tablet Take 1 tablet by mouth Daily. 30 tablet 11   • atorvastatin (LIPITOR) 10 MG tablet TAKE 1 TABLET BY MOUTH EVERY NIGHT 90 tablet 3   • carvedilol (COREG) 12.5 MG tablet TAKE 1 TABLET TWICE DAILY WITH MEALS 180 tablet 1   • clopidogrel (PLAVIX) 75 MG tablet TAKE 1 TABLET BY MOUTH EVERY DAY 90 tablet 3   • ezetimibe (ZETIA) 10 MG tablet Take 1 tablet by mouth Daily. 90 tablet 3   • glucose blood test strip OneTouch Ultra Test strips  -  Test sugars 3-4 times a day as directed by provider.     • Insulin Degludec (TRESIBA FLEXTOUCH) 200 UNIT/ML solution pen-injector pen injection Inject 50 Units under the skin into the appropriate area as directed Every Night. 4 pen 11   • Insulin Pen Needle (PEN NEEDLES 3/16\") 31G X 5 MM misc 100 each Daily. 100 each 3   • Lancets (ONETOUCH ULTRASOFT) lancets Test sugars 3-4 times daily as instructed by physician.     • losartan (COZAAR) 100 MG tablet Take 1 tablet by mouth Daily. 90 " "tablet 3   • magnesium oxide (MAG-OX) 400 MG tablet Take 1 tablet by mouth 2 (Two) Times a Day. 180 tablet 3   • metFORMIN ER (GLUCOPHAGE XR) 500 MG 24 hr tablet 1 tab with supper for 2 weeks, then 2 tabs with supper therafter 60 tablet 11   • nitroglycerin (NITROSTAT) 0.4 MG SL tablet Place 1 tablet under the tongue Every 5 (Five) Minutes As Needed for Chest Pain. Max 3 doses. Go to ER if no relief 25 tablet 0   • probenecid (BENEMID) 500 MG tablet TAKE 1 TABLET BY MOUTH TWICE DAILY 180 tablet 3   • isosorbide mononitrate (IMDUR) 30 MG 24 hr tablet Take 1 tablet by mouth Every Morning. 30 tablet 6   • ondansetron ODT (ZOFRAN-ODT) 4 MG disintegrating tablet Take 1 tablet by mouth Every 6 (Six) Hours As Needed for Nausea or Vomiting. 45 tablet 5     No current facility-administered medications for this visit.          OBJECTIVE    /70   Pulse 65   Ht 185.4 cm (73\")   Wt 87.8 kg (193 lb 9.6 oz)   SpO2 96%   BMI 25.54 kg/m²         Review of Systems     Constitutional:  Denies recent weight loss, weight gain, fever or chills. fatigue     HENT:  Denies any hearing loss, epistaxis, hoarseness, or difficulty speaking.     Eyes: Wears eyeglasses or contact lenses     Respiratory:  Denies dyspnea with exertion,no cough, wheezing, or hemoptysis.     Cardiovascular: See HPI    Gastrointestinal:  Denies change in bowel habits, dyspepsia, ulcer disease, hematochezia, or melena.     Endocrine: Negative for cold intolerance, heat intolerance, polydipsia, polyphagia and polyuria.     Genitourinary: Negative.      Musculoskeletal: DJD    Skin:  Denies any change in hair or nails, rashes, or skin lesions.     Allergic/Immunologic: Negative.  Negative for environmental allergies, food allergies and immunocompromised state.     Neurological:  Denies any history of recurrent headaches, strokes, TIA, or seizure disorder.     Hematological: Denies any food allergies, seasonal allergies, bleeding disorders, or lymphadenopathy. "     Psychiatric/Behavioral: Denies any history of depression, substance abuse, or change in cognitive function.         Physical Exam     Constitutional: Cooperative, alert and oriented,  in no acute distress.     HENT:   Head: Normocephalic, normal hair patterns, no masses or tenderness.  Ears, Nose, and Throat: No gross abnormalities. No pallor or cyanosis.   Eyes: EOMS intact, PERRL, conjunctivae and lids unremarkable. Fundoscopic exam and visual fields not performed.   Neck: No palpable masses or adenopathy, no thyromegaly, no JVD, carotid pulses are full and equal bilaterally and without  Bruits.     Cardiovascular: Regular rhythm, S1 and S2 normal, no S3 or S4.  No murmurs, gallops, or rubs detected.     Pulmonary/Chest: Chest: normal symmetry,  normal respiratory excursion, no intercostal retraction, no use of accessory muscles.            Pulmonary: Normal breath sounds. No rales or ronchi.    Abdominal: Abdomen soft, bowel sounds normoactive, no masses, no hepatosplenomegaly, non-tender, no bruits.     Musculoskeletal: No deformities, clubbing, cyanosis, erythema, or edema observed.     Neurological: No gross motor or sensory deficits noted, affect appropriate, oriented to time, person, place.     Skin: Warm and dry to the touch, no apparent skin lesions or masses noted.     Psychiatric: He has a normal mood and affect. His behavior is normal. Judgment and thought content normal.         Procedures      Lab Results   Component Value Date    WBC 5.97 11/22/2019    HGB 15.6 11/22/2019    HCT 45.9 11/22/2019    MCV 86.3 11/22/2019     (L) 11/22/2019     Lab Results   Component Value Date    GLUCOSE 203 (H) 11/22/2019    BUN 22 11/22/2019    CREATININE 1.19 11/22/2019    EGFRIFNONA 60 (L) 11/22/2019    BCR 18.5 11/22/2019    CO2 29.5 (H) 11/22/2019    CALCIUM 9.8 11/22/2019    ALBUMIN 4.70 11/22/2019    AST 19 11/22/2019    ALT 26 11/22/2019     Lab Results   Component Value Date    CHOL 159 02/11/2019     CHOL 169 01/02/2018    CHOL 233 (H) 06/26/2017     Lab Results   Component Value Date    TRIG 187 02/11/2019    TRIG 215 (H) 01/02/2018    TRIG 243 (H) 06/26/2017     Lab Results   Component Value Date    HDL 27 (L) 02/11/2019    HDL 28 (L) 01/02/2018    HDL 32 (L) 06/26/2017     No components found for: LDLCALC  Lab Results   Component Value Date     02/11/2019     11/12/2018    LDL 95 04/05/2018     No results found for: HDLLDLRATIO  No components found for: CHOLHDL  Lab Results   Component Value Date    HGBA1C 9.40 (H) 11/22/2019     Lab Results   Component Value Date    TSH 2.140 11/22/2019           ASSESSMENT AND PLAN  Mr. Galindo has multiple medical issues and longstanding coronary artery disease with CABG in 1998.  Based on his history and presentation, progression of coronary artery disease cannot be ruled out.  However his grafts more than 22 years old and he will be management challenge.    I discussed the different treatment options with him and since he is relatively asymptomatic at this time the plan would be to start him on isosorbide mononitrate 30 mg daily in addition to his present medications.  I have continued antiplatelet therapy with aspirin and Plavix, antihypertensive therapy with amlodipine, carvedilol, losartan, lipid-lowering therapy with atorvastatin, Zetia has been continued.  He has any recurrence of symptoms, cardiac catheterization will be considered.  He is aware of the pros and cons and these were discussed in detail.  Echocardiogram to assess left ventricular and valvular function has been arranged.  About 40 minutes was spent in the assessment and evaluation of this patient    Zachary was seen today for follow-up.    Diagnoses and all orders for this visit:    Coronary artery disease involving coronary bypass graft of native heart without angina pectoris  -     Adult Transthoracic Echo Complete W/ Cont if Necessary Per Protocol; Future    Essential hypertension  -      Adult Transthoracic Echo Complete W/ Cont if Necessary Per Protocol; Future    Mixed hyperlipidemia  -     Adult Transthoracic Echo Complete W/ Cont if Necessary Per Protocol; Future    Precordial pain  -     Adult Transthoracic Echo Complete W/ Cont if Necessary Per Protocol; Future    Other orders  -     isosorbide mononitrate (IMDUR) 30 MG 24 hr tablet; Take 1 tablet by mouth Every Morning.        Patient's Body mass index is 25.54 kg/m². BMI is within normal parameters. No follow-up required..  Patient is a non-smoker    Medhat Clark MD  1/15/2020  7:36 PM

## 2020-01-21 ENCOUNTER — OFFICE VISIT (OUTPATIENT)
Dept: ENDOCRINOLOGY | Facility: CLINIC | Age: 75
End: 2020-01-21

## 2020-01-21 VITALS
WEIGHT: 192 LBS | OXYGEN SATURATION: 98 % | SYSTOLIC BLOOD PRESSURE: 124 MMHG | HEIGHT: 73 IN | DIASTOLIC BLOOD PRESSURE: 70 MMHG | BODY MASS INDEX: 25.45 KG/M2 | HEART RATE: 60 BPM

## 2020-01-21 DIAGNOSIS — E11.8 TYPE 2 DIABETES MELLITUS WITH COMPLICATION, WITH LONG-TERM CURRENT USE OF INSULIN (HCC): Primary | Chronic | ICD-10-CM

## 2020-01-21 DIAGNOSIS — Z79.4 TYPE 2 DIABETES MELLITUS WITH COMPLICATION, WITH LONG-TERM CURRENT USE OF INSULIN (HCC): Primary | Chronic | ICD-10-CM

## 2020-01-21 DIAGNOSIS — E78.2 MIXED HYPERLIPIDEMIA: Chronic | ICD-10-CM

## 2020-01-21 PROCEDURE — 99213 OFFICE O/P EST LOW 20 MIN: CPT | Performed by: NURSE PRACTITIONER

## 2020-01-21 RX ORDER — METFORMIN HYDROCHLORIDE 1000 MG/1
1000 TABLET, FILM COATED, EXTENDED RELEASE ORAL
Qty: 30 TABLET | Refills: 11 | Status: SHIPPED | OUTPATIENT
Start: 2020-01-21 | End: 2020-04-21

## 2020-01-21 NOTE — PROGRESS NOTES
Subjective     Zachary Galindo is a 74 y.o. male who presents today for a follow up for his type 2 diabetes.    Chief Complaint   Patient presents with   • Diabetes         Patients Primary Care provider  Halley Raymond MD       Diabetes   He presents for his follow-up diabetic visit. He has type 2 diabetes mellitus. Pertinent negatives for hypoglycemia include no confusion, dizziness or nervousness/anxiousness. Associated symptoms include visual change. Pertinent negatives for diabetes include no fatigue, no foot ulcerations, no polyphagia and no polyuria. Symptoms are stable. Risk factors for coronary artery disease include dyslipidemia, male sex, sedentary lifestyle and diabetes mellitus. Current diabetic treatment includes oral agent (dual therapy). He is compliant with treatment all of the time. He is following a generally healthy diet. Eye exam is current.     Has had no hypoglycemia     Glucose has been running in the 140-200 range     Fasting blood sugar this morning was 190      The following portions of the patient's history were reviewed and updated as appropriate:     Past Medical History:   Diagnosis Date   • Allergic rhinitis    • Ankle joint pain    • Artificial lens present     Left   • Astigmatism    • Benign prostatic hyperplasia    • Cataract    • Closed fracture of medial malleolus    • Coronary arteriosclerosis    • Diabetes mellitus (CMS/HCC)     no retinopathy   • Elevated levels of transaminase & lactic acid dehydrogenase     improving    • Encounter for health maintenance examination     Individual health examination     • Encounter for health maintenance examination in adult     Adult health examination    • Essential hypertension    • Essential hypertension     Unspecified   • Flank pain     probably muscular    • GERD (gastroesophageal reflux disease)    • Gout    • Gout     unspecified     • Hemorrhoids    • History of artificial eye lens     Artificial lens in position -  right    • History of colonoscopy 04/04/2010    Colon endoscopy  (69306)  (1)    • History of kidney stones    • Hyperlipidemia    • Hypermetropia    • Low blood pressure    • Malaise and fatigue    • Need for influenza vaccination     Needs influenza immunization    • Nuclear cataract of left eye    • Posterior subcapsular polar senile cataract     Left   • Renal impairment    • Special screening for malignant neoplasm of prostate    • Type 2 diabetes mellitus (CMS/HCC)     improving   • 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 edema (CMS/HCC)     without macular edema - mild OS    • Upper respiratory infection    • Urticaria     Drug-aggravated angioedema-urticaria   • Urticaria      Past Surgical History:   Procedure Laterality Date   • CARDIAC CATHETERIZATION  03/24/2014    (59415)  (2)  Reduction of stenoisis from 95% to less than 0% stenosis with evidence of good JENNY 3-flow. Distal RCA beyond the touchdown was seen filling the circumflex system   • CATARACT EXTRACTION  10/2015    Remove cataract, insert lens (2)    • CORONARY ARTERY BYPASS GRAFT      Arterial, two  (1)   -  3 - 12/1998   • HERNIA REPAIR      w/mesh  (1)   • INJECTION OF MEDICATION  04/11/2013    B12  (1)  - RAYMOND Raymond   • INJECTION OF MEDICATION  07/03/2013    Benadryl  (1) - DMajor Raymond   • INJECTION OF MEDICATION  10/22/2014    Kenalog  (2)   • OTHER SURGICAL HISTORY  07/10/2002    Fragmenting of kidney stone  -   [facility], 2010 shocks. 1cm UP stone,right. Diabetes mellitus     Family History   Problem Relation Age of Onset   • Diabetes Other    • Hypertension Other    • Cancer Mother    • Heart disease Mother    • Hypertension Mother    • Hyperlipidemia Mother    • Diabetes Brother    • Diabetes Maternal Aunt    • Cancer Sister    • Diabetes Sister          Current Outpatient Medications:   •  alfuzosin (UROXATRAL) 10 MG 24 hr tablet, Take 10 mg by mouth Daily., Disp: , Rfl:   •  amLODIPine  "(NORVASC) 5 MG tablet, Take 1 tablet by mouth Daily., Disp: 90 tablet, Rfl: 3  •  aspirin 81 MG tablet, Take 1 tablet by mouth Daily., Disp: 30 tablet, Rfl: 11  •  atorvastatin (LIPITOR) 10 MG tablet, TAKE 1 TABLET BY MOUTH EVERY NIGHT, Disp: 90 tablet, Rfl: 3  •  carvedilol (COREG) 12.5 MG tablet, TAKE 1 TABLET TWICE DAILY WITH MEALS, Disp: 180 tablet, Rfl: 1  •  clopidogrel (PLAVIX) 75 MG tablet, TAKE 1 TABLET BY MOUTH EVERY DAY, Disp: 90 tablet, Rfl: 3  •  ezetimibe (ZETIA) 10 MG tablet, Take 1 tablet by mouth Daily., Disp: 90 tablet, Rfl: 3  •  glucose blood test strip, OneTouch Ultra Test strips  -  Test sugars 3-4 times a day as directed by provider., Disp: , Rfl:   •  Insulin Degludec (TRESIBA FLEXTOUCH) 200 UNIT/ML solution pen-injector pen injection, Inject 50 Units under the skin into the appropriate area as directed Every Night., Disp: 4 pen, Rfl: 11  •  Insulin Pen Needle (PEN NEEDLES 3/16\") 31G X 5 MM misc, 100 each Daily., Disp: 100 each, Rfl: 3  •  isosorbide mononitrate (IMDUR) 30 MG 24 hr tablet, Take 1 tablet by mouth Every Morning., Disp: 30 tablet, Rfl: 6  •  Lancets (ONETOUCH ULTRASOFT) lancets, Test sugars 3-4 times daily as instructed by physician., Disp: , Rfl:   •  losartan (COZAAR) 100 MG tablet, Take 1 tablet by mouth Daily., Disp: 90 tablet, Rfl: 3  •  magnesium oxide (MAG-OX) 400 MG tablet, Take 1 tablet by mouth 2 (Two) Times a Day., Disp: 180 tablet, Rfl: 3  •  nitroglycerin (NITROSTAT) 0.4 MG SL tablet, Place 1 tablet under the tongue Every 5 (Five) Minutes As Needed for Chest Pain. Max 3 doses. Go to ER if no relief, Disp: 25 tablet, Rfl: 0  •  ondansetron ODT (ZOFRAN-ODT) 4 MG disintegrating tablet, Take 1 tablet by mouth Every 6 (Six) Hours As Needed for Nausea or Vomiting., Disp: 45 tablet, Rfl: 5  •  probenecid (BENEMID) 500 MG tablet, TAKE 1 TABLET BY MOUTH TWICE DAILY, Disp: 180 tablet, Rfl: 3  •  metFORMIN (GLUMETZA) 1000 MG (MOD) 24 hr tablet, Take 1 tablet by mouth Daily " With Breakfast., Disp: 30 tablet, Rfl: 11        Allergies   Allergen Reactions   • Advicor [Niacin-Lovastatin Er] Swelling     Swelling   • Nsaids Other (See Comments)     Kidney disease   • Lisinopril Cough     Cough     Social History     Socioeconomic History   • Marital status:      Spouse name: Not on file   • Number of children: Not on file   • Years of education: Not on file   • Highest education level: Not on file   Tobacco Use   • Smoking status: Former Smoker   • Smokeless tobacco: Never Used   • Tobacco comment: Quit 1979   Substance and Sexual Activity   • Alcohol use: No   • Drug use: No   • Sexual activity: Defer       Review of Systems   Constitutional: Negative for activity change, appetite change, diaphoresis, fatigue and unexpected weight change.   HENT: Negative for congestion and ear pain.    Eyes: Negative for discharge and visual disturbance.   Respiratory: Negative for apnea, cough, choking, shortness of breath, wheezing and stridor.    Gastrointestinal: Negative for abdominal distention, constipation, diarrhea and nausea.   Endocrine: Negative for cold intolerance, heat intolerance, polyphagia and polyuria.   Genitourinary: Negative.  Negative for frequency.   Musculoskeletal: Negative for arthralgias, back pain and myalgias.   Skin: Negative for color change and wound.   Allergic/Immunologic: Negative.    Neurological: Negative for dizziness, syncope and light-headedness.   Psychiatric/Behavioral: Negative for agitation and confusion. The patient is not nervous/anxious.        Objective     Vitals:    01/21/20 0854   BP: 124/70   Pulse: 60   SpO2: 98%     Physical Exam   Constitutional: He is oriented to person, place, and time. He appears well-developed and well-nourished.   HENT:   Head: Normocephalic.   Nose: Nose normal.   Eyes: Pupils are equal, round, and reactive to light. Conjunctivae are normal.   Neck: Trachea normal and normal range of motion. No tracheal tenderness  present. No tracheal deviation present. No thyroid mass and no thyromegaly present.   Cardiovascular: Normal rate and normal heart sounds.   Pulmonary/Chest: Effort normal and breath sounds normal. No stridor. No respiratory distress. He has no wheezes. He has no rales.   Abdominal: Soft. Normal appearance.   Musculoskeletal: Normal range of motion.   Lymphadenopathy:        Head (right side): No submental, no submandibular and no tonsillar adenopathy present.        Head (left side): No submental, no submandibular and no tonsillar adenopathy present.     He has no cervical adenopathy.   Neurological: He is alert and oriented to person, place, and time.   Skin: Skin is warm, dry and intact.   Psychiatric: He has a normal mood and affect. His speech is normal and behavior is normal. Judgment and thought content normal. His mood appears not anxious. His affect is not inappropriate. He is not agitated and not hyperactive. Cognition and memory are normal.   Nursing note and vitals reviewed.      Nursing note and vitals reviewed    Lab Review    Labs reviewed from last visit     Complete labs before next visit     Results for orders placed or performed in visit on 11/22/19   TSH   Result Value Ref Range    TSH 2.140 0.270 - 4.200 uIU/mL   Vitamin D 25 Hydroxy   Result Value Ref Range    25 Hydroxy, Vitamin D 32.9 30.0 - 100.0 ng/ml   Comprehensive Metabolic Panel   Result Value Ref Range    Glucose 203 (H) 65 - 99 mg/dL    BUN 22 8 - 23 mg/dL    Creatinine 1.19 0.76 - 1.27 mg/dL    Sodium 146 (H) 136 - 145 mmol/L    Potassium 5.0 3.5 - 5.2 mmol/L    Chloride 105 98 - 107 mmol/L    CO2 29.5 (H) 22.0 - 29.0 mmol/L    Calcium 9.8 8.6 - 10.5 mg/dL    Total Protein 8.1 6.0 - 8.5 g/dL    Albumin 4.70 3.50 - 5.20 g/dL    ALT (SGPT) 26 1 - 41 U/L    AST (SGOT) 19 1 - 40 U/L    Alkaline Phosphatase 96 39 - 117 U/L    Total Bilirubin 0.4 0.2 - 1.2 mg/dL    eGFR Non African Amer 60 (L) >60 mL/min/1.73    Globulin 3.4 gm/dL    A/G  Ratio 1.4 g/dL    BUN/Creatinine Ratio 18.5 7.0 - 25.0    Anion Gap 11.5 5.0 - 15.0 mmol/L   Hemoglobin A1c   Result Value Ref Range    Hemoglobin A1C 9.40 (H) 4.80 - 5.60 %   Vitamin B12   Result Value Ref Range    Vitamin B-12 537 211 - 946 pg/mL   CBC Auto Differential   Result Value Ref Range    WBC 5.97 3.40 - 10.80 10*3/mm3    RBC 5.32 4.14 - 5.80 10*6/mm3    Hemoglobin 15.6 13.0 - 17.7 g/dL    Hematocrit 45.9 37.5 - 51.0 %    MCV 86.3 79.0 - 97.0 fL    MCH 29.3 26.6 - 33.0 pg    MCHC 34.0 31.5 - 35.7 g/dL    RDW 13.0 12.3 - 15.4 %    RDW-SD 40.1 37.0 - 54.0 fl    MPV 12.4 (H) 6.0 - 12.0 fL    Platelets 119 (L) 140 - 450 10*3/mm3    Neutrophil % 74.8 42.7 - 76.0 %    Lymphocyte % 14.1 (L) 19.6 - 45.3 %    Monocyte % 7.4 5.0 - 12.0 %    Eosinophil % 2.8 0.3 - 6.2 %    Basophil % 0.7 0.0 - 1.5 %    Immature Grans % 0.2 0.0 - 0.5 %    Neutrophils, Absolute 4.47 1.70 - 7.00 10*3/mm3    Lymphocytes, Absolute 0.84 0.70 - 3.10 10*3/mm3    Monocytes, Absolute 0.44 0.10 - 0.90 10*3/mm3    Eosinophils, Absolute 0.17 0.00 - 0.40 10*3/mm3    Basophils, Absolute 0.04 0.00 - 0.20 10*3/mm3    Immature Grans, Absolute 0.01 0.00 - 0.05 10*3/mm3    nRBC 0.0 0.0 - 0.2 /100 WBC         Assessment/Plan     Diagnosis Plan   1. Type 2 diabetes mellitus with complication, with long-term current use of insulin (CMS/[facility])  TSH    Comprehensive Metabolic Panel    Hemoglobin A1c    CBC & Differential    Vitamin B12    Lipid Panel    metFORMIN (GLUMETZA) 1000 MG (MOD) 24 hr tablet    Insulin Degludec (TRESIBA FLEXTOUCH) 200 UNIT/ML solution pen-injector pen injection   2. Mixed hyperlipidemia  TSH    Comprehensive Metabolic Panel    Hemoglobin A1c    CBC & Differential    Vitamin B12    Lipid Panel       Levemir/Tresiba 40 units- 42 units-48-50     Increase 1 unit until we reach AM fasting goal     Decrease to 35 if you have a sugar less than 80 mg per dl       =========================================================     Trulicity 0.75 mg  weekly- stopped       ====      Metformin 500 mg with supper for 2 weeks then 1000 mg with supper ---stopped, would like to retry      Invokana 100 mg daily - increase by 2 glasses of water per day --not taking due to cost     Pt stated that metformin gives diarrhea and invokana is expensive  ----------------------------------------------------------------------------------     Taking Jardiance 10mg --stopped due to side effects     Trulicity- Stopped due to side effects     We discussed  mealtime insulin    Would like to retry Metformin and increasing Tresiba before switching to meal time         Metformin 500 mg with supper for 2 weeks then 1000 mg with supper     Blood sugar range has been in 140-200      No side effects with metformin, we will continue to monitor     Microvascular Complication Monitoring        Weight Related  Body mass index is 25.33 kg/m².      Bone Health    Lab Results   Component Value Date    PTH 45.8 01/17/2018    CALCIUM 9.8 11/22/2019    PHOS 3.5 01/25/2019    MVFF31DP 32.9 11/22/2019       Thyroid Health    Lab Results   Component Value Date    TSH 2.140 11/22/2019           Lab Results   Reviewed      Hyperlipidemia      Lab Results   Component Value Date    CHOL 159 02/11/2019    CHOL 169 01/02/2018    CHOL 233 (H) 06/26/2017     Lab Results   Component Value Date    TRIG 187 02/11/2019    TRIG 215 (H) 01/02/2018    TRIG 243 (H) 06/26/2017     Lab Results   Component Value Date    HDL 27 (L) 02/11/2019    HDL 28 (L) 01/02/2018    HDL 32 (L) 06/26/2017     Lab Results   Component Value Date     02/11/2019     11/12/2018    LDL 95 04/05/2018     No results found for: VLDL  Lab Results   Component Value Date    LDLHDL 3.50 02/11/2019    LDLHDL 3.50 01/02/2018    LDLHDL 4.76 (H) 06/26/2017     Atorvastatin 10mg   Zetia 10mg       Hypertension      Vitals:    01/21/20 0854   BP: 124/70   Pulse: 60   SpO2: 98%       Lab Results   Component Value Date    HGBA1C 9.40 (H)  11/22/2019    HGBA1C 8.30 (H) 05/14/2019    HGBA1C 9.5 (H) 02/11/2019     Lab Results   Component Value Date    GLUCOSE 203 (H) 11/22/2019    BUN 22 11/22/2019    CREATININE 1.19 11/22/2019    EGFRIFNONA 60 (L) 11/22/2019    BCR 18.5 11/22/2019    K 5.0 11/22/2019    CO2 29.5 (H) 11/22/2019    CALCIUM 9.8 11/22/2019    ALBUMIN 4.70 11/22/2019    AST 19 11/22/2019    ALT 26 11/22/2019    ANIONGAP 11.5 11/22/2019     Lab Results   Component Value Date    WBC 5.97 11/22/2019    HGB 15.6 11/22/2019    HCT 45.9 11/22/2019    MCV 86.3 11/22/2019     (L) 11/22/2019         Labs before next visit       Followup:    Return in about 3 months (around 4/21/2020), or if symptoms worsen or fail to improve, for Recheck.            This document has been electronically signed by ROCCO Newberry on January 21, 2020 9:53 AM

## 2020-01-23 ENCOUNTER — OFFICE VISIT (OUTPATIENT)
Dept: FAMILY MEDICINE CLINIC | Facility: CLINIC | Age: 75
End: 2020-01-23

## 2020-01-23 VITALS
DIASTOLIC BLOOD PRESSURE: 70 MMHG | WEIGHT: 192 LBS | BODY MASS INDEX: 25.45 KG/M2 | SYSTOLIC BLOOD PRESSURE: 140 MMHG | HEIGHT: 73 IN | OXYGEN SATURATION: 98 % | HEART RATE: 62 BPM

## 2020-01-23 DIAGNOSIS — I10 ESSENTIAL HYPERTENSION: Chronic | ICD-10-CM

## 2020-01-23 DIAGNOSIS — I25.810 CORONARY ARTERY DISEASE INVOLVING CORONARY BYPASS GRAFT OF NATIVE HEART WITHOUT ANGINA PECTORIS: Primary | Chronic | ICD-10-CM

## 2020-01-23 DIAGNOSIS — E78.2 MIXED HYPERLIPIDEMIA: Chronic | ICD-10-CM

## 2020-01-23 PROCEDURE — 99213 OFFICE O/P EST LOW 20 MIN: CPT | Performed by: GENERAL PRACTICE

## 2020-01-23 RX ORDER — AMLODIPINE BESYLATE 5 MG/1
5 TABLET ORAL DAILY
Qty: 90 TABLET | Refills: 3 | Status: SHIPPED | OUTPATIENT
Start: 2020-01-23 | End: 2021-03-01

## 2020-01-23 RX ORDER — EZETIMIBE 10 MG/1
10 TABLET ORAL DAILY
Qty: 90 TABLET | Refills: 3 | Status: SHIPPED | OUTPATIENT
Start: 2020-01-23 | End: 2021-03-01

## 2020-01-23 NOTE — PROGRESS NOTES
Subjective   Zachary Galindo is a 74 y.o. male.   Chief Complaint   Patient presents with   • Hypertension   • Diabetes     For review and evaluation of management of chronic medical problems. Has not had any more chest pain.  Did see Dr. Aragon who did not feel any further investigation was indicated apart from an echocardiogram which he is scheduled for tomorrow.  He did start him on isosorbide mononitrate 30 mg daily.  This is causing him a headache when he takes it in the morning.    Hypertension   This is a chronic problem. The current episode started more than 1 year ago. The problem is unchanged. The problem is controlled. Pertinent negatives include no chest pain, headaches, neck pain, palpitations or shortness of breath. There are no associated agents to hypertension. Risk factors for coronary artery disease include diabetes mellitus and dyslipidemia. Current antihypertension treatment includes angiotensin blockers, calcium channel blockers and beta blockers. The current treatment provides moderate improvement. There are no compliance problems.    Coronary Artery Disease   Presents for follow-up visit. Pertinent negatives include no chest pain, chest pressure, chest tightness, leg swelling, palpitations or shortness of breath. The symptoms have been resolved. Compliance with diet is good. Compliance with exercise is good. Compliance with medications is good.      The following portions of the patient's history were reviewed and updated as appropriate: allergies, current medications, past social history and problem list.    Outpatient Medications Prior to Visit   Medication Sig Dispense Refill   • alfuzosin (UROXATRAL) 10 MG 24 hr tablet Take 10 mg by mouth Daily.     • aspirin 81 MG tablet Take 1 tablet by mouth Daily. 30 tablet 11   • atorvastatin (LIPITOR) 10 MG tablet TAKE 1 TABLET BY MOUTH EVERY NIGHT 90 tablet 3   • carvedilol (COREG) 12.5 MG tablet TAKE 1 TABLET TWICE DAILY WITH MEALS 180 tablet  "1   • clopidogrel (PLAVIX) 75 MG tablet TAKE 1 TABLET BY MOUTH EVERY DAY 90 tablet 3   • glucose blood test strip OneTouch Ultra Test strips  -  Test sugars 3-4 times a day as directed by provider.     • Insulin Degludec (TRESIBA FLEXTOUCH) 200 UNIT/ML solution pen-injector pen injection Inject 50 Units under the skin into the appropriate area as directed Every Night. 4 pen 11   • Insulin Pen Needle (PEN NEEDLES 3/16\") 31G X 5 MM misc 100 each Daily. 100 each 3   • isosorbide mononitrate (IMDUR) 30 MG 24 hr tablet Take 1 tablet by mouth Every Morning. 30 tablet 6   • Lancets (ONETOUCH ULTRASOFT) lancets Test sugars 3-4 times daily as instructed by physician.     • losartan (COZAAR) 100 MG tablet Take 1 tablet by mouth Daily. 90 tablet 3   • magnesium oxide (MAG-OX) 400 MG tablet Take 1 tablet by mouth 2 (Two) Times a Day. 180 tablet 3   • metFORMIN (GLUMETZA) 1000 MG (MOD) 24 hr tablet Take 1 tablet by mouth Daily With Breakfast. 30 tablet 11   • nitroglycerin (NITROSTAT) 0.4 MG SL tablet Place 1 tablet under the tongue Every 5 (Five) Minutes As Needed for Chest Pain. Max 3 doses. Go to ER if no relief 25 tablet 0   • ondansetron ODT (ZOFRAN-ODT) 4 MG disintegrating tablet Take 1 tablet by mouth Every 6 (Six) Hours As Needed for Nausea or Vomiting. 45 tablet 5   • probenecid (BENEMID) 500 MG tablet TAKE 1 TABLET BY MOUTH TWICE DAILY 180 tablet 3   • amLODIPine (NORVASC) 5 MG tablet Take 1 tablet by mouth Daily. 90 tablet 3   • ezetimibe (ZETIA) 10 MG tablet Take 1 tablet by mouth Daily. 90 tablet 3     No facility-administered medications prior to visit.      Review of Systems   Constitutional: Negative for appetite change and unexpected weight change.   HENT: Negative for voice change.    Eyes: Negative for visual disturbance.   Respiratory: Negative for chest tightness and shortness of breath.    Cardiovascular: Negative for chest pain, palpitations and leg swelling.   Gastrointestinal: Negative for abdominal " "pain, constipation and diarrhea.   Endocrine: Negative for cold intolerance and heat intolerance.   Genitourinary: Negative for difficulty urinating.   Musculoskeletal: Negative for back pain, myalgias and neck pain.   Skin: Negative for rash.   Neurological: Negative for numbness and headaches.   Psychiatric/Behavioral: Negative for dysphoric mood and sleep disturbance.     Objective   Visit Vitals  /70   Pulse 62   Ht 185.4 cm (73\")   Wt 87.1 kg (192 lb)   SpO2 98%   BMI 25.33 kg/m²     Physical Exam   Constitutional: He is oriented to person, place, and time. He appears well-developed and well-nourished. No distress.   HENT:   Head: Normocephalic.   Nose: Nose normal.   Mouth/Throat: Oropharynx is clear and moist.   Eyes: Pupils are equal, round, and reactive to light. Conjunctivae and EOM are normal. Right eye exhibits no discharge. Left eye exhibits no discharge.   Neck: No thyromegaly present.   Cardiovascular: Normal rate, regular rhythm, normal heart sounds and intact distal pulses.   No murmur heard.  Pulmonary/Chest: Effort normal and breath sounds normal.   Musculoskeletal: He exhibits no edema.   Lymphadenopathy:     He has no cervical adenopathy.   Neurological: He is alert and oriented to person, place, and time.   Skin: Skin is warm and dry.   Psychiatric: He has a normal mood and affect.   Nursing note and vitals reviewed.    Assessment/Plan   Problem List Items Addressed This Visit        Cardiovascular and Mediastinum    Essential hypertension (Chronic)    Relevant Medications    amLODIPine (NORVASC) 5 MG tablet    Hyperlipidemia (Chronic)    Relevant Medications    ezetimibe (ZETIA) 10 MG tablet    Coronary artery disease involving coronary bypass graft of native heart without angina pectoris - Primary         Continue current treatment.     New Medications Ordered This Visit   Medications   • amLODIPine (NORVASC) 5 MG tablet     Sig: Take 1 tablet by mouth Daily.     Dispense:  90 tablet    "  Refill:  3     Generic For:*NORVASC 5MG   • ezetimibe (ZETIA) 10 MG tablet     Sig: Take 1 tablet by mouth Daily.     Dispense:  90 tablet     Refill:  3     Return in about 3 months (around 4/23/2020) for Recheck.

## 2020-01-27 ENCOUNTER — TRANSCRIBE ORDERS (OUTPATIENT)
Dept: LAB | Facility: HOSPITAL | Age: 75
End: 2020-01-27

## 2020-01-27 ENCOUNTER — LAB (OUTPATIENT)
Dept: LAB | Facility: HOSPITAL | Age: 75
End: 2020-01-27

## 2020-01-27 DIAGNOSIS — M54.2 NECK PAIN: ICD-10-CM

## 2020-01-27 DIAGNOSIS — I10 BENIGN ESSENTIAL HYPERTENSION: ICD-10-CM

## 2020-01-27 DIAGNOSIS — M10.9 GOUT, UNSPECIFIED CAUSE, UNSPECIFIED CHRONICITY, UNSPECIFIED SITE: ICD-10-CM

## 2020-01-27 DIAGNOSIS — N18.30 CHRONIC KIDNEY DISEASE, STAGE III (MODERATE) (HCC): ICD-10-CM

## 2020-01-27 DIAGNOSIS — E78.49 OTHER HYPERLIPIDEMIA: ICD-10-CM

## 2020-01-27 DIAGNOSIS — E11.9 DIABETES MELLITUS WITHOUT COMPLICATION (HCC): ICD-10-CM

## 2020-01-27 DIAGNOSIS — N18.30 CHRONIC KIDNEY DISEASE, STAGE III (MODERATE) (HCC): Primary | ICD-10-CM

## 2020-01-27 DIAGNOSIS — I25.9 CHRONIC ISCHEMIC HEART DISEASE, UNSPECIFIED: ICD-10-CM

## 2020-01-27 LAB
25(OH)D3 SERPL-MCNC: 26.5 NG/ML (ref 30–100)
ALBUMIN SERPL-MCNC: 3.9 G/DL (ref 3.5–5.2)
ALBUMIN/GLOB SERPL: 1.2 G/DL
ALP SERPL-CCNC: 76 U/L (ref 39–117)
ALT SERPL W P-5'-P-CCNC: 19 U/L (ref 1–41)
ANION GAP SERPL CALCULATED.3IONS-SCNC: 12.1 MMOL/L (ref 5–15)
AST SERPL-CCNC: 14 U/L (ref 1–40)
BILIRUB SERPL-MCNC: 0.4 MG/DL (ref 0.2–1.2)
BUN BLD-MCNC: 34 MG/DL (ref 8–23)
BUN/CREAT SERPL: 20.4 (ref 7–25)
CALCIUM SPEC-SCNC: 9.2 MG/DL (ref 8.6–10.5)
CHLORIDE SERPL-SCNC: 101 MMOL/L (ref 98–107)
CO2 SERPL-SCNC: 28.9 MMOL/L (ref 22–29)
CREAT BLD-MCNC: 1.67 MG/DL (ref 0.76–1.27)
GFR SERPL CREATININE-BSD FRML MDRD: 40 ML/MIN/1.73
GLOBULIN UR ELPH-MCNC: 3.2 GM/DL
GLUCOSE BLD-MCNC: 258 MG/DL (ref 65–99)
HCT VFR BLD AUTO: 41.6 % (ref 37.5–51)
HGB BLD-MCNC: 14.1 G/DL (ref 13–17.7)
MAGNESIUM SERPL-MCNC: 2.2 MG/DL (ref 1.6–2.4)
PHOSPHATE SERPL-MCNC: 3.7 MG/DL (ref 2.5–4.5)
POTASSIUM BLD-SCNC: 5 MMOL/L (ref 3.5–5.2)
PROT SERPL-MCNC: 7.1 G/DL (ref 6–8.5)
PTH-INTACT SERPL-MCNC: 38.2 PG/ML (ref 15–65)
SODIUM BLD-SCNC: 142 MMOL/L (ref 136–145)
URATE SERPL-MCNC: 4.4 MG/DL (ref 3.4–7)

## 2020-01-27 PROCEDURE — 83970 ASSAY OF PARATHORMONE: CPT | Performed by: NURSE PRACTITIONER

## 2020-01-27 PROCEDURE — 36415 COLL VENOUS BLD VENIPUNCTURE: CPT | Performed by: NURSE PRACTITIONER

## 2020-01-27 PROCEDURE — 85018 HEMOGLOBIN: CPT | Performed by: NURSE PRACTITIONER

## 2020-01-27 PROCEDURE — 85014 HEMATOCRIT: CPT | Performed by: NURSE PRACTITIONER

## 2020-01-27 PROCEDURE — 84550 ASSAY OF BLOOD/URIC ACID: CPT

## 2020-01-27 PROCEDURE — 84100 ASSAY OF PHOSPHORUS: CPT

## 2020-01-27 PROCEDURE — 82570 ASSAY OF URINE CREATININE: CPT | Performed by: NURSE PRACTITIONER

## 2020-01-27 PROCEDURE — 84156 ASSAY OF PROTEIN URINE: CPT | Performed by: NURSE PRACTITIONER

## 2020-01-27 PROCEDURE — 80053 COMPREHEN METABOLIC PANEL: CPT | Performed by: NURSE PRACTITIONER

## 2020-01-27 PROCEDURE — 83735 ASSAY OF MAGNESIUM: CPT | Performed by: NURSE PRACTITIONER

## 2020-01-27 PROCEDURE — 82306 VITAMIN D 25 HYDROXY: CPT | Performed by: NURSE PRACTITIONER

## 2020-01-28 LAB
CREAT UR-MCNC: 149.7 MG/DL
PROT UR-MCNC: 67 MG/DL
PROT/CREAT UR: 447.6 MG/G CREA (ref 0–200)

## 2020-02-06 ENCOUNTER — TELEPHONE (OUTPATIENT)
Dept: CARDIOLOGY | Facility: CLINIC | Age: 75
End: 2020-02-06

## 2020-02-18 ENCOUNTER — TELEPHONE (OUTPATIENT)
Dept: ENDOCRINOLOGY | Facility: CLINIC | Age: 75
End: 2020-02-18

## 2020-04-13 ENCOUNTER — LAB (OUTPATIENT)
Dept: LAB | Facility: HOSPITAL | Age: 75
End: 2020-04-13

## 2020-04-13 DIAGNOSIS — E11.8 TYPE 2 DIABETES MELLITUS WITH COMPLICATION, WITH LONG-TERM CURRENT USE OF INSULIN (HCC): ICD-10-CM

## 2020-04-13 DIAGNOSIS — E78.2 MIXED HYPERLIPIDEMIA: ICD-10-CM

## 2020-04-13 DIAGNOSIS — N18.30 CHRONIC KIDNEY DISEASE (CKD), STAGE III (MODERATE) (HCC): ICD-10-CM

## 2020-04-13 DIAGNOSIS — Z79.4 TYPE 2 DIABETES MELLITUS WITH COMPLICATION, WITH LONG-TERM CURRENT USE OF INSULIN (HCC): ICD-10-CM

## 2020-04-13 LAB
ALBUMIN SERPL-MCNC: 4.4 G/DL (ref 3.5–5.2)
ALBUMIN/GLOB SERPL: 1.6 G/DL
ALP SERPL-CCNC: 74 U/L (ref 39–117)
ALT SERPL W P-5'-P-CCNC: 18 U/L (ref 1–41)
ANION GAP SERPL CALCULATED.3IONS-SCNC: 11.9 MMOL/L (ref 5–15)
AST SERPL-CCNC: 18 U/L (ref 1–40)
BASOPHILS # BLD AUTO: 0.03 10*3/MM3 (ref 0–0.2)
BASOPHILS NFR BLD AUTO: 0.7 % (ref 0–1.5)
BILIRUB SERPL-MCNC: 0.4 MG/DL (ref 0.2–1.2)
BUN BLD-MCNC: 31 MG/DL (ref 8–23)
BUN/CREAT SERPL: 16.8 (ref 7–25)
CALCIUM SPEC-SCNC: 9.7 MG/DL (ref 8.6–10.5)
CHLORIDE SERPL-SCNC: 103 MMOL/L (ref 98–107)
CHOLEST SERPL-MCNC: 124 MG/DL (ref 0–200)
CO2 SERPL-SCNC: 27.1 MMOL/L (ref 22–29)
CREAT BLD-MCNC: 1.84 MG/DL (ref 0.76–1.27)
DEPRECATED RDW RBC AUTO: 43.4 FL (ref 37–54)
EOSINOPHIL # BLD AUTO: 0.15 10*3/MM3 (ref 0–0.4)
EOSINOPHIL NFR BLD AUTO: 3.3 % (ref 0.3–6.2)
ERYTHROCYTE [DISTWIDTH] IN BLOOD BY AUTOMATED COUNT: 13.5 % (ref 12.3–15.4)
GFR SERPL CREATININE-BSD FRML MDRD: 36 ML/MIN/1.73
GLOBULIN UR ELPH-MCNC: 2.8 GM/DL
GLUCOSE BLD-MCNC: 153 MG/DL (ref 65–99)
HBA1C MFR BLD: 8.79 % (ref 4.8–5.6)
HCT VFR BLD AUTO: 39.7 % (ref 37.5–51)
HDLC SERPL-MCNC: 26 MG/DL (ref 40–60)
HGB BLD-MCNC: 13.5 G/DL (ref 13–17.7)
IMM GRANULOCYTES # BLD AUTO: 0.01 10*3/MM3 (ref 0–0.05)
IMM GRANULOCYTES NFR BLD AUTO: 0.2 % (ref 0–0.5)
LDLC SERPL CALC-MCNC: 67 MG/DL (ref 0–100)
LDLC/HDLC SERPL: 2.57 {RATIO}
LYMPHOCYTES # BLD AUTO: 0.97 10*3/MM3 (ref 0.7–3.1)
LYMPHOCYTES NFR BLD AUTO: 21.1 % (ref 19.6–45.3)
MCH RBC QN AUTO: 30.1 PG (ref 26.6–33)
MCHC RBC AUTO-ENTMCNC: 34 G/DL (ref 31.5–35.7)
MCV RBC AUTO: 88.4 FL (ref 79–97)
MONOCYTES # BLD AUTO: 0.36 10*3/MM3 (ref 0.1–0.9)
MONOCYTES NFR BLD AUTO: 7.8 % (ref 5–12)
NEUTROPHILS # BLD AUTO: 3.08 10*3/MM3 (ref 1.7–7)
NEUTROPHILS NFR BLD AUTO: 66.9 % (ref 42.7–76)
NRBC BLD AUTO-RTO: 0 /100 WBC (ref 0–0.2)
PLATELET # BLD AUTO: 143 10*3/MM3 (ref 140–450)
PMV BLD AUTO: 12.1 FL (ref 6–12)
POTASSIUM BLD-SCNC: 5.3 MMOL/L (ref 3.5–5.2)
PROT SERPL-MCNC: 7.2 G/DL (ref 6–8.5)
RBC # BLD AUTO: 4.49 10*6/MM3 (ref 4.14–5.8)
SODIUM BLD-SCNC: 142 MMOL/L (ref 136–145)
TRIGL SERPL-MCNC: 156 MG/DL (ref 0–150)
TSH SERPL DL<=0.05 MIU/L-ACNC: 2.45 UIU/ML (ref 0.27–4.2)
VIT B12 BLD-MCNC: 426 PG/ML (ref 211–946)
VLDLC SERPL-MCNC: 31.2 MG/DL (ref 5–40)
WBC NRBC COR # BLD: 4.6 10*3/MM3 (ref 3.4–10.8)

## 2020-04-13 PROCEDURE — 83036 HEMOGLOBIN GLYCOSYLATED A1C: CPT

## 2020-04-13 PROCEDURE — 85025 COMPLETE CBC W/AUTO DIFF WBC: CPT

## 2020-04-13 PROCEDURE — 36415 COLL VENOUS BLD VENIPUNCTURE: CPT

## 2020-04-13 PROCEDURE — 82607 VITAMIN B-12: CPT

## 2020-04-13 PROCEDURE — 80061 LIPID PANEL: CPT

## 2020-04-13 PROCEDURE — 80053 COMPREHEN METABOLIC PANEL: CPT

## 2020-04-13 PROCEDURE — 84443 ASSAY THYROID STIM HORMONE: CPT

## 2020-04-21 ENCOUNTER — OFFICE VISIT (OUTPATIENT)
Dept: ENDOCRINOLOGY | Facility: CLINIC | Age: 75
End: 2020-04-21

## 2020-04-21 DIAGNOSIS — E11.8 TYPE 2 DIABETES MELLITUS WITH COMPLICATION, WITH LONG-TERM CURRENT USE OF INSULIN (HCC): Primary | ICD-10-CM

## 2020-04-21 DIAGNOSIS — I25.10 CORONARY ARTERY DISEASE INVOLVING NATIVE CORONARY ARTERY OF NATIVE HEART WITHOUT ANGINA PECTORIS: ICD-10-CM

## 2020-04-21 DIAGNOSIS — E78.2 MIXED HYPERLIPIDEMIA: ICD-10-CM

## 2020-04-21 DIAGNOSIS — I10 ESSENTIAL HYPERTENSION: ICD-10-CM

## 2020-04-21 DIAGNOSIS — Z79.4 TYPE 2 DIABETES MELLITUS WITH COMPLICATION, WITH LONG-TERM CURRENT USE OF INSULIN (HCC): Primary | ICD-10-CM

## 2020-04-21 PROCEDURE — 99442 PR PHYS/QHP TELEPHONE EVALUATION 11-20 MIN: CPT | Performed by: INTERNAL MEDICINE

## 2020-04-21 RX ORDER — METFORMIN HYDROCHLORIDE 500 MG/1
TABLET, EXTENDED RELEASE ORAL
Qty: 30 TABLET | Refills: 11 | Status: SHIPPED | OUTPATIENT
Start: 2020-04-21 | End: 2020-07-10 | Stop reason: SINTOL

## 2020-04-21 RX ORDER — METFORMIN HYDROCHLORIDE 500 MG/1
TABLET, EXTENDED RELEASE ORAL
Qty: 60 TABLET | Refills: 11 | Status: SHIPPED | OUTPATIENT
Start: 2020-04-21 | End: 2020-04-21 | Stop reason: SDUPTHER

## 2020-04-21 RX ORDER — PEN NEEDLE, DIABETIC 30 GX5/16"
100 NEEDLE, DISPOSABLE MISCELLANEOUS DAILY
Qty: 100 EACH | Refills: 3 | Status: SHIPPED | OUTPATIENT
Start: 2020-04-21 | End: 2021-02-17 | Stop reason: SDUPTHER

## 2020-04-21 NOTE — PROGRESS NOTES
"Subjective     Zachary Galindo is a 74 y.o. male who presents today for a follow up for his type 2 diabetes.    Chief Complaint   Patient presents with   • Diabetes           This was a Telephone Encounter. Benefits and Disadvantages of a Telephone Visit were discussed and accepted by patient. .  Patient agreed to receive service through Telephone Visit as patient is being compliant with social distancing recommendations imparted by [facility].     You have chosen to receive care through a telephone visit. Do you consent to use a telephone visit for your medical care today? Yes        Patients Primary Care provider  Halley Raymond MD       Diabetes   He presents for his follow-up diabetic visit. He has type 2 diabetes mellitus. Pertinent negatives for hypoglycemia include no confusion, dizziness or nervousness/anxiousness. Associated symptoms include visual change. Pertinent negatives for diabetes include no fatigue, no foot ulcerations, no polyphagia and no polyuria. Symptoms are stable. Risk factors for coronary artery disease include dyslipidemia, male sex, sedentary lifestyle and diabetes mellitus. Current diabetic treatment includes oral agent (dual therapy). He is compliant with treatment all of the time. He is following a generally healthy diet. Eye exam is current.     Duration 10 years     Timing - Diabetes is Constant     Quality -  needs improvement     Severity -  High given complications     Complications - nephropathy and CAD      Current symptoms/problems  increase appetite      Alleviating Factors: Compliance       Side Effects  none     Current diet  in general, a \"healthy\" diet  but sometimes high carb     Current exercise not exercising     Current monitoring regimen: home blood tests - checking 1 x daily   Home blood sugar records: 50 to 250      Hypoglycemia nocturnal and if skipped meals     Past Medical History:   Diagnosis Date   • Allergic rhinitis    • Ankle joint pain    • Artificial " lens present     Left   • Astigmatism    • Benign prostatic hyperplasia    • Cataract    • Closed fracture of medial malleolus    • Coronary arteriosclerosis    • Diabetes mellitus (CMS/HCC)     no retinopathy   • Elevated levels of transaminase & lactic acid dehydrogenase     improving    • Encounter for health maintenance examination     Individual health examination     • Encounter for health maintenance examination in adult     Adult health examination    • Essential hypertension    • Essential hypertension     Unspecified   • Flank pain     probably muscular    • GERD (gastroesophageal reflux disease)    • Gout    • Gout     unspecified     • Hemorrhoids    • History of artificial eye lens     Artificial lens in position - right    • History of colonoscopy 04/04/2010    Colon endoscopy  (93166)  (1)    • History of kidney stones    • Hyperlipidemia    • Hypermetropia    • Low blood pressure    • Malaise and fatigue    • Need for influenza vaccination     Needs influenza immunization    • Nuclear cataract of left eye    • Posterior subcapsular polar senile cataract     Left   • Renal impairment    • Special screening for malignant neoplasm of prostate    • Type 2 diabetes mellitus (CMS/HCC)     improving   • 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 Distal RCA beyond the touchdown was seen filling the circumflex system   • CATARACT EXTRACTION  10/2015    Remove cataract, insert lens (2)    • CORONARY ARTERY BYPASS GRAFT      Arterial, two  (1)   -  3 - 12/1998   • HERNIA REPAIR      w/mesh  (1)   • INJECTION OF  "MEDICATION  04/11/2013    B12  (1)  - RAYMOND Raymond   • INJECTION OF MEDICATION  07/03/2013    Benadryl  (1) - RAYMOND Raymond   • INJECTION OF MEDICATION  10/22/2014    Kenalog  (2)   • OTHER SURGICAL HISTORY  07/10/2002    Fragmenting of kidney stone  -   [facility], 2010 shocks. 1cm UP stone,right. Diabetes mellitus     Family History   Problem Relation Age of Onset   • Diabetes Other    • Hypertension Other    • Cancer Mother    • Heart disease Mother    • Hypertension Mother    • Hyperlipidemia Mother    • Diabetes Brother    • Diabetes Maternal Aunt    • Cancer Sister    • Diabetes Sister          Current Outpatient Medications:   •  alfuzosin (UROXATRAL) 10 MG 24 hr tablet, Take 10 mg by mouth Daily., Disp: , Rfl:   •  amLODIPine (NORVASC) 5 MG tablet, Take 1 tablet by mouth Daily., Disp: 90 tablet, Rfl: 3  •  aspirin 81 MG tablet, Take 1 tablet by mouth Daily., Disp: 30 tablet, Rfl: 11  •  atorvastatin (LIPITOR) 10 MG tablet, TAKE 1 TABLET BY MOUTH EVERY NIGHT, Disp: 90 tablet, Rfl: 3  •  carvedilol (COREG) 12.5 MG tablet, TAKE 1 TABLET TWICE DAILY WITH MEALS, Disp: 180 tablet, Rfl: 1  •  clopidogrel (PLAVIX) 75 MG tablet, TAKE 1 TABLET BY MOUTH EVERY DAY, Disp: 90 tablet, Rfl: 3  •  ezetimibe (ZETIA) 10 MG tablet, Take 1 tablet by mouth Daily., Disp: 90 tablet, Rfl: 3  •  glucose blood test strip, OneTouch Ultra Test strips  -  Test sugars 3-4 times a day as directed by provider., Disp: , Rfl:   •  insulin aspart (NovoLOG FlexPen) 100 UNIT/ML solution pen-injector sc pen, 4-8 units with meals, Disp: 3 pen, Rfl: 11  •  Insulin Degludec (TRESIBA FLEXTOUCH) 200 UNIT/ML solution pen-injector pen injection, Inject 50 Units under the skin into the appropriate area as directed Every Night., Disp: 4 pen, Rfl: 11  •  Insulin Pen Needle (PEN NEEDLES 3/16\") 31G X 5 MM misc, 100 each Daily., Disp: 100 each, Rfl: 3  •  isosorbide mononitrate (IMDUR) 30 MG 24 hr tablet, Take 1 tablet by mouth Every Morning., Disp: 30 tablet, Rfl: 6  • "  Lancets (ONETOUCH ULTRASOFT) lancets, Test sugars 3-4 times daily as instructed by physician., Disp: , Rfl:   •  losartan (COZAAR) 100 MG tablet, Take 1 tablet by mouth Daily., Disp: 90 tablet, Rfl: 3  •  magnesium oxide (MAG-OX) 400 MG tablet, Take 1 tablet by mouth 2 (Two) Times a Day., Disp: 180 tablet, Rfl: 3  •  metFORMIN ER (Glucophage XR) 500 MG 24 hr tablet, 2 tabs with supper, Disp: 60 tablet, Rfl: 11  •  nitroglycerin (NITROSTAT) 0.4 MG SL tablet, Place 1 tablet under the tongue Every 5 (Five) Minutes As Needed for Chest Pain. Max 3 doses. Go to ER if no relief, Disp: 25 tablet, Rfl: 0  •  ondansetron ODT (ZOFRAN-ODT) 4 MG disintegrating tablet, Take 1 tablet by mouth Every 6 (Six) Hours As Needed for Nausea or Vomiting., Disp: 45 tablet, Rfl: 5  •  probenecid (BENEMID) 500 MG tablet, TAKE 1 TABLET BY MOUTH TWICE DAILY, Disp: 180 tablet, Rfl: 3        Allergies   Allergen Reactions   • Advicor [Niacin-Lovastatin Er] Swelling     Swelling   • Nsaids Other (See Comments)     Kidney disease   • Lisinopril Cough     Cough     Social History     Socioeconomic History   • Marital status:      Spouse name: Not on file   • Number of children: Not on file   • Years of education: Not on file   • Highest education level: Not on file   Tobacco Use   • Smoking status: Former Smoker   • Smokeless tobacco: Never Used   • Tobacco comment: Quit 1979   Substance and Sexual Activity   • Alcohol use: No   • Drug use: No   • Sexual activity: Defer       Review of Systems   Constitutional: Negative for activity change, appetite change, diaphoresis, fatigue and unexpected weight change.   HENT: Negative for congestion and ear pain.    Eyes: Negative for discharge and visual disturbance.   Respiratory: Negative for apnea, cough, choking, shortness of breath, wheezing and stridor.    Gastrointestinal: Negative for abdominal distention, constipation, diarrhea and nausea.   Endocrine: Negative for cold intolerance, heat  intolerance, polyphagia and polyuria.   Genitourinary: Negative.  Negative for frequency.   Musculoskeletal: Negative for arthralgias, back pain and myalgias.   Skin: Negative for color change and wound.   Allergic/Immunologic: Negative.    Neurological: Negative for dizziness, syncope and light-headedness.   Psychiatric/Behavioral: Negative for agitation and confusion. The patient is not nervous/anxious.        Objective     There were no vitals filed for this visit.  Physical Exam    Nursing note and vitals reviewed    Lab Review    Labs reviewed from last visit     Complete labs before next visit     Results for orders placed or performed in visit on 04/13/20   TSH   Result Value Ref Range    TSH 2.450 0.270 - 4.200 uIU/mL   Comprehensive Metabolic Panel   Result Value Ref Range    Glucose 153 (H) 65 - 99 mg/dL    BUN 31 (H) 8 - 23 mg/dL    Creatinine 1.84 (H) 0.76 - 1.27 mg/dL    Sodium 142 136 - 145 mmol/L    Potassium 5.3 (H) 3.5 - 5.2 mmol/L    Chloride 103 98 - 107 mmol/L    CO2 27.1 22.0 - 29.0 mmol/L    Calcium 9.7 8.6 - 10.5 mg/dL    Total Protein 7.2 6.0 - 8.5 g/dL    Albumin 4.40 3.50 - 5.20 g/dL    ALT (SGPT) 18 1 - 41 U/L    AST (SGOT) 18 1 - 40 U/L    Alkaline Phosphatase 74 39 - 117 U/L    Total Bilirubin 0.4 0.2 - 1.2 mg/dL    eGFR Non African Amer 36 (L) >60 mL/min/1.73    Globulin 2.8 gm/dL    A/G Ratio 1.6 g/dL    BUN/Creatinine Ratio 16.8 7.0 - 25.0    Anion Gap 11.9 5.0 - 15.0 mmol/L   Hemoglobin A1c   Result Value Ref Range    Hemoglobin A1C 8.79 (H) 4.80 - 5.60 %   Vitamin B12   Result Value Ref Range    Vitamin B-12 426 211 - 946 pg/mL   Lipid Panel   Result Value Ref Range    Total Cholesterol 124 0 - 200 mg/dL    Triglycerides 156 (H) 0 - 150 mg/dL    HDL Cholesterol 26 (L) 40 - 60 mg/dL    LDL Cholesterol  67 0 - 100 mg/dL    VLDL Cholesterol 31.2 5 - 40 mg/dL    LDL/HDL Ratio 2.57    CBC Auto Differential   Result Value Ref Range    WBC 4.60 3.40 - 10.80 10*3/mm3    RBC 4.49 4.14 - 5.80  10*6/mm3    Hemoglobin 13.5 13.0 - 17.7 g/dL    Hematocrit 39.7 37.5 - 51.0 %    MCV 88.4 79.0 - 97.0 fL    MCH 30.1 26.6 - 33.0 pg    MCHC 34.0 31.5 - 35.7 g/dL    RDW 13.5 12.3 - 15.4 %    RDW-SD 43.4 37.0 - 54.0 fl    MPV 12.1 (H) 6.0 - 12.0 fL    Platelets 143 140 - 450 10*3/mm3    Neutrophil % 66.9 42.7 - 76.0 %    Lymphocyte % 21.1 19.6 - 45.3 %    Monocyte % 7.8 5.0 - 12.0 %    Eosinophil % 3.3 0.3 - 6.2 %    Basophil % 0.7 0.0 - 1.5 %    Immature Grans % 0.2 0.0 - 0.5 %    Neutrophils, Absolute 3.08 1.70 - 7.00 10*3/mm3    Lymphocytes, Absolute 0.97 0.70 - 3.10 10*3/mm3    Monocytes, Absolute 0.36 0.10 - 0.90 10*3/mm3    Eosinophils, Absolute 0.15 0.00 - 0.40 10*3/mm3    Basophils, Absolute 0.03 0.00 - 0.20 10*3/mm3    Immature Grans, Absolute 0.01 0.00 - 0.05 10*3/mm3    nRBC 0.0 0.0 - 0.2 /100 WBC         Assessment/Plan     Diagnosis Plan   1. Type 2 diabetes mellitus with complication, with long-term current use of insulin (CMS/[facility])     2. Mixed hyperlipidemia     3. Essential hypertension     4. Coronary artery disease involving native coronary artery of native heart without angina pectoris       Diabetes complicated by stage III ckd and CAD     Lab Results   Component Value Date    HGBA1C 8.79 (H) 04/13/2020    HGBA1C 9.40 (H) 11/22/2019    HGBA1C 8.30 (H) 05/14/2019     Lab Results   Component Value Date    MICROALBUR 12.2 02/11/2019    CREATININE 1.84 (H) 04/13/2020           Discontinued Medications     Trulicity 0.75 mg weekly- stopped - cramping    invokana is expensive , jardiance , side effects    Present regimen     Tresiba 40 units- 42 units-48-50 --- now taking 52 units     Morning range 130-180       =====     Metformin 500 mg , 2 with supper ,intially  GI upset but now resolved    Change to XR     =====    Novolog 4 to 8 units with meals to start PRN high carb meals      Approve for Freestyle Kimi      #1  Patient has diabetes mellitus, insulin-dependent.    #2 He performs blood glucose  testing at least 4 times daily and blood glucose log was brought to office with variability from .    #3  He is requiring  Basal insulin  and Prandial Insulin for a total of at least  4 injections per day and has been doing this for at least 6 months.     #4 Patient tests blood sugars at least 4 times daily and makes frequent self-adjustments and patient is injecting insulin at least 4 times daily. He has been doing this for more than 6 months . He tests frequently due to hypoglycemia and hyperglycemia.     #5 I have personally seen patient within the past 6 months    #6 We plan on seeing her every 2-3 months for continuous adjustment of her diabetes regimen     #7 patient has hypoglycemia with episodes of unawareness.    #8 patient has day-to-day variation in her mealtime which confounds the degree of insulin dosing with multiple daily injections.    #9 patient has completed diabetes education program with us.    #10 He has demonstrated the ability to self monitor her glucose.        #11 Patient is motivated in improving  diabetes control              Hyperlipidemia      Lab Results   Component Value Date    CHOL 124 04/13/2020    CHOL 159 02/11/2019    CHOL 169 01/02/2018     Lab Results   Component Value Date    TRIG 156 (H) 04/13/2020    TRIG 187 02/11/2019    TRIG 215 (H) 01/02/2018     Lab Results   Component Value Date    HDL 26 (L) 04/13/2020    HDL 27 (L) 02/11/2019    HDL 28 (L) 01/02/2018     Lab Results   Component Value Date    LDL 67 04/13/2020     02/11/2019     11/12/2018     Lab Results   Component Value Date    VLDL 31.2 04/13/2020     Lab Results   Component Value Date    LDLHDL 2.57 04/13/2020    LDLHDL 3.50 02/11/2019    LDLHDL 3.50 01/02/2018     Atorvastatin 10mg , 5 days per week   Zetia 10mg       Hypertension     controlled on losartan, coreg, norvasc                    This document has been electronically signed by Edgard Briggs MD on April 21, 2020  09:10          I spent 15 minutes reviewing patient electronic chart , reviewing medications , past history , active problems.   I provided advice regarding management of medical conditions, refilled prescriptions , ordered labs and arranged for future appointment.   Patient was advised to contact us if there were any unanswered questions or ongoing concerns.

## 2020-04-22 ENCOUNTER — TELEPHONE (OUTPATIENT)
Dept: ENDOCRINOLOGY | Facility: CLINIC | Age: 75
End: 2020-04-22

## 2020-05-20 ENCOUNTER — OFFICE VISIT (OUTPATIENT)
Dept: ENDOCRINOLOGY | Facility: CLINIC | Age: 75
End: 2020-05-20

## 2020-05-20 VITALS
OXYGEN SATURATION: 98 % | HEART RATE: 62 BPM | BODY MASS INDEX: 25.69 KG/M2 | HEIGHT: 73 IN | DIASTOLIC BLOOD PRESSURE: 78 MMHG | WEIGHT: 193.8 LBS | SYSTOLIC BLOOD PRESSURE: 140 MMHG

## 2020-05-20 DIAGNOSIS — E11.8 TYPE 2 DIABETES MELLITUS WITH COMPLICATION, WITH LONG-TERM CURRENT USE OF INSULIN (HCC): Primary | ICD-10-CM

## 2020-05-20 DIAGNOSIS — I25.10 CORONARY ARTERY DISEASE INVOLVING NATIVE CORONARY ARTERY OF NATIVE HEART WITHOUT ANGINA PECTORIS: ICD-10-CM

## 2020-05-20 DIAGNOSIS — I10 ESSENTIAL HYPERTENSION: ICD-10-CM

## 2020-05-20 DIAGNOSIS — Z79.4 TYPE 2 DIABETES MELLITUS WITH COMPLICATION, WITH LONG-TERM CURRENT USE OF INSULIN (HCC): Primary | ICD-10-CM

## 2020-05-20 DIAGNOSIS — E78.2 MIXED HYPERLIPIDEMIA: ICD-10-CM

## 2020-05-20 DIAGNOSIS — N18.30 CHRONIC KIDNEY DISEASE (CKD), STAGE III (MODERATE) (HCC): ICD-10-CM

## 2020-05-20 PROCEDURE — 99214 OFFICE O/P EST MOD 30 MIN: CPT | Performed by: INTERNAL MEDICINE

## 2020-05-20 NOTE — PROGRESS NOTES
"Subjective     Zachary Galindo is a 74 y.o. male who presents today for a follow up for his type 2 diabetes.    Chief Complaint   Patient presents with   • Diabetes             Patients Primary Care provider  Halley Raymond MD       Diabetes   He presents for his follow-up diabetic visit. He has type 2 diabetes mellitus. Pertinent negatives for hypoglycemia include no confusion, dizziness or nervousness/anxiousness. Associated symptoms include visual change. Pertinent negatives for diabetes include no fatigue, no foot ulcerations, no polyphagia and no polyuria. Symptoms are stable. Risk factors for coronary artery disease include dyslipidemia, male sex, sedentary lifestyle and diabetes mellitus. Current diabetic treatment includes oral agent (dual therapy). He is compliant with treatment all of the time. He is following a generally healthy diet. Eye exam is current.     Duration 10 years     Timing - Diabetes is Constant     Quality -  needs improvement     Severity -  High given complications     Complications - nephropathy and CAD      Current symptoms/problems  increase appetite      Alleviating Factors: Compliance       Side Effects  none     Current diet  in general, a \"healthy\" diet  but sometimes high carb     Current exercise not exercising     Current monitoring regimen: home blood tests - checking 1 x daily   Home blood sugar records: 50 to 250      Hypoglycemia nocturnal and if skipped meals     Past Medical History:   Diagnosis Date   • Allergic rhinitis    • Ankle joint pain    • Artificial lens present     Left   • Astigmatism    • Benign prostatic hyperplasia    • Cataract    • Closed fracture of medial malleolus    • Coronary arteriosclerosis    • Diabetes mellitus (CMS/HCC)     no retinopathy   • Elevated levels of transaminase & lactic acid dehydrogenase     improving    • Encounter for health maintenance examination     Individual health examination     • Encounter for health " maintenance examination in adult     Adult health examination    • Essential hypertension    • Essential hypertension     Unspecified   • Flank pain     probably muscular    • GERD (gastroesophageal reflux disease)    • Gout    • Gout     unspecified     • Hemorrhoids    • History of artificial eye lens     Artificial lens in position - right    • History of colonoscopy 04/04/2010    Colon endoscopy  (23069)  (1)    • History of kidney stones    • Hyperlipidemia    • Hypermetropia    • Low blood pressure    • Malaise and fatigue    • Need for influenza vaccination     Needs influenza immunization    • Nuclear cataract of left eye    • Posterior subcapsular polar senile cataract     Left   • Renal impairment    • Special screening for malignant neoplasm of prostate    • Type 2 diabetes mellitus (CMS/HCC)     improving   • 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 Distal RCA beyond the touchdown was seen filling the circumflex system   • CATARACT EXTRACTION  10/2015    Remove cataract, insert lens (2)    • CORONARY ARTERY BYPASS GRAFT      Arterial, two  (1)   -  3 - 12/1998   • HERNIA REPAIR      w/mesh  (1)   • INJECTION OF MEDICATION  04/11/2013    B12  (1)  - RAYMOND Raymond   • INJECTION OF MEDICATION  07/03/2013    Benadryl  (1) - RAYMOND Raymond   • INJECTION OF MEDICATION  10/22/2014    Kenalog  (2)   • OTHER SURGICAL HISTORY  07/10/2002    Fragmenting of kidney stone  -   [facility], 2010 shocks. 1cm UP stone,right. Diabetes mellitus     Family History   Problem Relation Age of Onset   • Diabetes Other    • Hypertension Other  "   • Cancer Mother    • Heart disease Mother    • Hypertension Mother    • Hyperlipidemia Mother    • Diabetes Brother    • Diabetes Maternal Aunt    • Cancer Sister    • Diabetes Sister          Current Outpatient Medications:   •  alfuzosin (UROXATRAL) 10 MG 24 hr tablet, Take 10 mg by mouth Daily., Disp: , Rfl:   •  amLODIPine (NORVASC) 5 MG tablet, Take 1 tablet by mouth Daily., Disp: 90 tablet, Rfl: 3  •  aspirin 81 MG tablet, Take 1 tablet by mouth Daily., Disp: 30 tablet, Rfl: 11  •  atorvastatin (LIPITOR) 10 MG tablet, TAKE 1 TABLET BY MOUTH EVERY NIGHT, Disp: 90 tablet, Rfl: 3  •  carvedilol (COREG) 12.5 MG tablet, TAKE 1 TABLET TWICE DAILY WITH MEALS, Disp: 180 tablet, Rfl: 1  •  clopidogrel (PLAVIX) 75 MG tablet, TAKE 1 TABLET BY MOUTH EVERY DAY, Disp: 90 tablet, Rfl: 3  •  ezetimibe (ZETIA) 10 MG tablet, Take 1 tablet by mouth Daily., Disp: 90 tablet, Rfl: 3  •  glucose blood test strip, OneTouch Ultra Test strips  -  Test sugars 3-4 times a day as directed by provider., Disp: , Rfl:   •  insulin aspart (NovoLOG FlexPen) 100 UNIT/ML solution pen-injector sc pen, 4-8 units with meals (Patient taking differently: 8 units with meals), Disp: 3 pen, Rfl: 11  •  Insulin Degludec (TRESIBA FLEXTOUCH) 200 UNIT/ML solution pen-injector pen injection, Inject 50 Units under the skin into the appropriate area as directed Every Night. (Patient taking differently: Inject 52 Units under the skin into the appropriate area as directed Every Night.), Disp: 4 pen, Rfl: 11  •  Insulin Pen Needle (PEN NEEDLES 3/16\") 31G X 5 MM misc, 100 each Daily., Disp: 100 each, Rfl: 3  •  isosorbide mononitrate (IMDUR) 30 MG 24 hr tablet, Take 1 tablet by mouth Every Morning., Disp: 30 tablet, Rfl: 6  •  Lancets (ONETOUCH ULTRASOFT) lancets, Test sugars 3-4 times daily as instructed by physician., Disp: , Rfl:   •  losartan (COZAAR) 100 MG tablet, Take 1 tablet by mouth Daily., Disp: 90 tablet, Rfl: 3  •  magnesium oxide (MAG-OX) 400 MG " tablet, Take 1 tablet by mouth 2 (Two) Times a Day., Disp: 180 tablet, Rfl: 3  •  metFORMIN ER (Glucophage XR) 500 MG 24 hr tablet, 1 tabs with supper, this is the correct dose, Disp: 30 tablet, Rfl: 11  •  nitroglycerin (NITROSTAT) 0.4 MG SL tablet, Place 1 tablet under the tongue Every 5 (Five) Minutes As Needed for Chest Pain. Max 3 doses. Go to ER if no relief, Disp: 25 tablet, Rfl: 0  •  ondansetron ODT (ZOFRAN-ODT) 4 MG disintegrating tablet, Take 1 tablet by mouth Every 6 (Six) Hours As Needed for Nausea or Vomiting., Disp: 45 tablet, Rfl: 5  •  probenecid (BENEMID) 500 MG tablet, TAKE 1 TABLET BY MOUTH TWICE DAILY, Disp: 180 tablet, Rfl: 3        Allergies   Allergen Reactions   • Advicor [Niacin-Lovastatin Er] Swelling     Swelling   • Nsaids Other (See Comments)     Kidney disease   • Lisinopril Cough     Cough     Social History     Socioeconomic History   • Marital status:      Spouse name: Not on file   • Number of children: Not on file   • Years of education: Not on file   • Highest education level: Not on file   Tobacco Use   • Smoking status: Former Smoker   • Smokeless tobacco: Never Used   • Tobacco comment: Quit 1979   Substance and Sexual Activity   • Alcohol use: No   • Drug use: No   • Sexual activity: Defer       Review of Systems   Constitutional: Negative for activity change, appetite change, diaphoresis, fatigue and unexpected weight change.   HENT: Negative for congestion and ear pain.    Eyes: Negative for discharge and visual disturbance.   Respiratory: Negative for apnea, cough, choking, shortness of breath, wheezing and stridor.    Gastrointestinal: Negative for abdominal distention, constipation, diarrhea and nausea.   Endocrine: Negative for cold intolerance, heat intolerance, polyphagia and polyuria.   Genitourinary: Negative.  Negative for frequency.   Musculoskeletal: Negative for arthralgias, back pain and myalgias.   Skin: Negative for color change and wound.    Allergic/Immunologic: Negative.    Neurological: Negative for dizziness, syncope and light-headedness.   Psychiatric/Behavioral: Negative for agitation and confusion. The patient is not nervous/anxious.        Objective     Vitals:    05/20/20 1115   BP: 140/78   Pulse: 62   SpO2: 98%         Physical Exam   Constitutional: He appears well-developed and well-nourished. No distress.   HENT:   Head: Normocephalic and atraumatic.   Right Ear: External ear normal.   Left Ear: External ear normal.   Nose: Nose normal.   Mouth/Throat: Oropharynx is clear and moist. No oropharyngeal exudate.   Eyes: Pupils are equal, round, and reactive to light. Right eye exhibits no discharge. Left eye exhibits no discharge. No scleral icterus.   Neck: No tracheal deviation present. No thyromegaly present.   Cardiovascular: Normal rate.   No murmur heard.  Pulmonary/Chest: Effort normal and breath sounds normal. No respiratory distress. He has no wheezes. He has no rales.   Abdominal: He exhibits no distension and no mass. There is no tenderness. There is no rebound and no guarding.   Musculoskeletal: He exhibits no edema, tenderness or deformity.   Lymphadenopathy:     He has no cervical adenopathy.   Neurological: He displays normal reflexes. No cranial nerve deficit. He exhibits normal muscle tone. Coordination normal.   Skin: No rash noted. He is not diaphoretic. No erythema. No pallor.         Lab Review    Labs reviewed from last visit          Results for orders placed or performed in visit on 04/13/20   TSH   Result Value Ref Range    TSH 2.450 0.270 - 4.200 uIU/mL   Comprehensive Metabolic Panel   Result Value Ref Range    Glucose 153 (H) 65 - 99 mg/dL    BUN 31 (H) 8 - 23 mg/dL    Creatinine 1.84 (H) 0.76 - 1.27 mg/dL    Sodium 142 136 - 145 mmol/L    Potassium 5.3 (H) 3.5 - 5.2 mmol/L    Chloride 103 98 - 107 mmol/L    CO2 27.1 22.0 - 29.0 mmol/L    Calcium 9.7 8.6 - 10.5 mg/dL    Total Protein 7.2 6.0 - 8.5 g/dL    Albumin  4.40 3.50 - 5.20 g/dL    ALT (SGPT) 18 1 - 41 U/L    AST (SGOT) 18 1 - 40 U/L    Alkaline Phosphatase 74 39 - 117 U/L    Total Bilirubin 0.4 0.2 - 1.2 mg/dL    eGFR Non African Amer 36 (L) >60 mL/min/1.73    Globulin 2.8 gm/dL    A/G Ratio 1.6 g/dL    BUN/Creatinine Ratio 16.8 7.0 - 25.0    Anion Gap 11.9 5.0 - 15.0 mmol/L   Hemoglobin A1c   Result Value Ref Range    Hemoglobin A1C 8.79 (H) 4.80 - 5.60 %   Vitamin B12   Result Value Ref Range    Vitamin B-12 426 211 - 946 pg/mL   Lipid Panel   Result Value Ref Range    Total Cholesterol 124 0 - 200 mg/dL    Triglycerides 156 (H) 0 - 150 mg/dL    HDL Cholesterol 26 (L) 40 - 60 mg/dL    LDL Cholesterol  67 0 - 100 mg/dL    VLDL Cholesterol 31.2 5 - 40 mg/dL    LDL/HDL Ratio 2.57    CBC Auto Differential   Result Value Ref Range    WBC 4.60 3.40 - 10.80 10*3/mm3    RBC 4.49 4.14 - 5.80 10*6/mm3    Hemoglobin 13.5 13.0 - 17.7 g/dL    Hematocrit 39.7 37.5 - 51.0 %    MCV 88.4 79.0 - 97.0 fL    MCH 30.1 26.6 - 33.0 pg    MCHC 34.0 31.5 - 35.7 g/dL    RDW 13.5 12.3 - 15.4 %    RDW-SD 43.4 37.0 - 54.0 fl    MPV 12.1 (H) 6.0 - 12.0 fL    Platelets 143 140 - 450 10*3/mm3    Neutrophil % 66.9 42.7 - 76.0 %    Lymphocyte % 21.1 19.6 - 45.3 %    Monocyte % 7.8 5.0 - 12.0 %    Eosinophil % 3.3 0.3 - 6.2 %    Basophil % 0.7 0.0 - 1.5 %    Immature Grans % 0.2 0.0 - 0.5 %    Neutrophils, Absolute 3.08 1.70 - 7.00 10*3/mm3    Lymphocytes, Absolute 0.97 0.70 - 3.10 10*3/mm3    Monocytes, Absolute 0.36 0.10 - 0.90 10*3/mm3    Eosinophils, Absolute 0.15 0.00 - 0.40 10*3/mm3    Basophils, Absolute 0.03 0.00 - 0.20 10*3/mm3    Immature Grans, Absolute 0.01 0.00 - 0.05 10*3/mm3    nRBC 0.0 0.0 - 0.2 /100 WBC         Assessment/Plan     Diagnosis Plan   1. Type 2 diabetes mellitus with complication, with long-term current use of insulin (CMS/[facility])     2. Mixed hyperlipidemia     3. Essential hypertension     4. Chronic kidney disease (CKD), stage III (moderate) (CMS/[facility])     5. Coronary  artery disease involving native coronary artery of native heart without angina pectoris       Diabetes complicated by stage III ckd and CAD     Lab Results   Component Value Date    HGBA1C 8.79 (H) 04/13/2020    HGBA1C 9.40 (H) 11/22/2019    HGBA1C 8.30 (H) 05/14/2019     Lab Results   Component Value Date    MICROALBUR 12.2 02/11/2019    CREATININE 1.84 (H) 04/13/2020           Discontinued Medications     Trulicity 0.75 mg weekly- stopped - cramping    invokana is expensive , jardiance , side effects    Present regimen     Tresiba 40 units- 42 units-48-50 --- now taking 52 units     Morning range 130-180       =====     Metformin 500 mg , 2 with supper ,intially  GI upset but now resolved    Change to XR , just one at night     =====    Novolog 8 units with meals    Self adjusting explained       Now doing freestyle     Spoke how to adjust based on arrows and carb content     Hyperlipidemia      Lab Results   Component Value Date    CHOL 124 04/13/2020    CHOL 159 02/11/2019    CHOL 169 01/02/2018     Lab Results   Component Value Date    TRIG 156 (H) 04/13/2020    TRIG 187 02/11/2019    TRIG 215 (H) 01/02/2018     Lab Results   Component Value Date    HDL 26 (L) 04/13/2020    HDL 27 (L) 02/11/2019    HDL 28 (L) 01/02/2018     Lab Results   Component Value Date    LDL 67 04/13/2020     02/11/2019     11/12/2018     Lab Results   Component Value Date    VLDL 31.2 04/13/2020     Lab Results   Component Value Date    LDLHDL 2.57 04/13/2020    LDLHDL 3.50 02/11/2019    LDLHDL 3.50 01/02/2018     Atorvastatin 10mg , 5 days per week   Zetia 10mg       Hypertension     controlled on losartan, coreg, norvasc                    This document has been electronically signed by Edgard Briggs MD on May 20, 2020 11:39

## 2020-06-01 RX ORDER — CARVEDILOL 12.5 MG/1
TABLET ORAL
Qty: 180 TABLET | Refills: 1 | Status: SHIPPED | OUTPATIENT
Start: 2020-06-01 | End: 2020-12-10

## 2020-06-03 ENCOUNTER — OFFICE VISIT (OUTPATIENT)
Dept: ENDOCRINOLOGY | Facility: CLINIC | Age: 75
End: 2020-06-03

## 2020-06-03 VITALS
WEIGHT: 193.7 LBS | SYSTOLIC BLOOD PRESSURE: 150 MMHG | HEIGHT: 74 IN | OXYGEN SATURATION: 98 % | HEART RATE: 74 BPM | DIASTOLIC BLOOD PRESSURE: 70 MMHG | BODY MASS INDEX: 24.86 KG/M2

## 2020-06-03 DIAGNOSIS — I10 ESSENTIAL HYPERTENSION: Primary | ICD-10-CM

## 2020-06-03 DIAGNOSIS — E78.2 MIXED HYPERLIPIDEMIA: ICD-10-CM

## 2020-06-03 DIAGNOSIS — Z79.4 TYPE 2 DIABETES MELLITUS WITH COMPLICATION, WITH LONG-TERM CURRENT USE OF INSULIN (HCC): ICD-10-CM

## 2020-06-03 DIAGNOSIS — E55.9 VITAMIN D DEFICIENCY: ICD-10-CM

## 2020-06-03 DIAGNOSIS — E11.8 TYPE 2 DIABETES MELLITUS WITH COMPLICATION, WITH LONG-TERM CURRENT USE OF INSULIN (HCC): ICD-10-CM

## 2020-06-03 PROCEDURE — 99214 OFFICE O/P EST MOD 30 MIN: CPT | Performed by: NURSE PRACTITIONER

## 2020-06-03 PROCEDURE — 95251 CONT GLUC MNTR ANALYSIS I&R: CPT | Performed by: NURSE PRACTITIONER

## 2020-06-03 NOTE — PROGRESS NOTES
Subjective    Zachary Galindo is a 74 y.o. male. he is here today for follow-up.    History of Present Illness     IN OFFICE VISIT     REASON - DIABETES     Duration DIAGNOSED AT AGE 37      Timing - Diabetes is Constant     Quality - improving      Severity -  High due to associated complications      Complications - nephropathy and CAD      Current symptoms/problems  increase appetite      Alleviating Factors: Compliance       Side Effects  cramping from Trulicity      Current diet  tries to watch carb intake      Current exercise none      Current monitoring regimen: home blood tests - checking 4 x daily before kimi    Now has kimi personal use and is able to monitor more frequently and having less hypoglycemia     Home blood sugar records:     Kimi personal use       Downloaded and reviewed     Dated from May 21 - Kathy 3, 2020     Average BG - 168     In target 70 % of the time     20% high    6% very high    3 % low     Hypoglycemia nocturnal and if skipped meals           The following portions of the patient's history were reviewed and updated as appropriate:   Past Medical History:   Diagnosis Date   • Allergic rhinitis    • Ankle joint pain    • Artificial lens present     Left   • Astigmatism    • Benign prostatic hyperplasia    • Cataract    • Closed fracture of medial malleolus    • Coronary arteriosclerosis    • Diabetes mellitus (CMS/HCC)     no retinopathy   • Elevated levels of transaminase & lactic acid dehydrogenase     improving    • Encounter for health maintenance examination     Individual health examination     • Encounter for health maintenance examination in adult     Adult health examination    • Essential hypertension    • Essential hypertension     Unspecified   • Flank pain     probably muscular    • GERD (gastroesophageal reflux disease)    • Gout    • Gout     unspecified     • Hemorrhoids    • History of artificial eye lens     Artificial lens in position - right    •  History of colonoscopy 04/04/2010    Colon endoscopy  (13049)  (1)    • History of kidney stones    • Hyperlipidemia    • Hypermetropia    • Low blood pressure    • Malaise and fatigue    • Need for influenza vaccination     Needs influenza immunization    • Nuclear cataract of left eye    • Posterior subcapsular polar senile cataract     Left   • Renal impairment    • Special screening for malignant neoplasm of prostate    • Type 2 diabetes mellitus (CMS/HCC)     improving   • 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 edema (CMS/HCC)     without macular edema - mild OS    • Upper respiratory infection    • Urticaria     Drug-aggravated angioedema-urticaria   • Urticaria      Past Surgical History:   Procedure Laterality Date   • CARDIAC CATHETERIZATION  03/24/2014    (81432)  (2)  Reduction of stenoisis from 95% to less than 0% stenosis with evidence of good JENNY 3-flow. Distal RCA beyond the touchdown was seen filling the circumflex system   • CATARACT EXTRACTION  10/2015    Remove cataract, insert lens (2)    • CORONARY ARTERY BYPASS GRAFT      Arterial, two  (1)   -  3 - 12/1998   • HERNIA REPAIR      w/mesh  (1)   • INJECTION OF MEDICATION  04/11/2013    B12  (1)  - RAYMOND Raymond   • INJECTION OF MEDICATION  07/03/2013    Benadryl  (1) - DMajor Raymond   • INJECTION OF MEDICATION  10/22/2014    Kenalog  (2)   • OTHER SURGICAL HISTORY  07/10/2002    Fragmenting of kidney stone  -   [facility], 2010 shocks. 1cm UP stone,right. Diabetes mellitus     Family History   Problem Relation Age of Onset   • Diabetes Other    • Hypertension Other    • Cancer Mother    • Heart disease Mother    • Hypertension Mother    • Hyperlipidemia Mother    • Diabetes Brother    • Diabetes Maternal Aunt    • Cancer Sister    • Diabetes Sister        Current Outpatient Medications   Medication Sig Dispense Refill   • alfuzosin (UROXATRAL) 10 MG 24 hr tablet Take 10 mg by mouth Daily.     • amLODIPine  "(NORVASC) 5 MG tablet Take 1 tablet by mouth Daily. 90 tablet 3   • aspirin 81 MG tablet Take 1 tablet by mouth Daily. 30 tablet 11   • atorvastatin (LIPITOR) 10 MG tablet TAKE 1 TABLET BY MOUTH EVERY NIGHT 90 tablet 3   • carvedilol (COREG) 12.5 MG tablet TAKE 1 TABLET BY MOUTH TWICE DAILY WITH MEALS 180 tablet 1   • clopidogrel (PLAVIX) 75 MG tablet TAKE 1 TABLET BY MOUTH EVERY DAY 90 tablet 3   • ezetimibe (ZETIA) 10 MG tablet Take 1 tablet by mouth Daily. 90 tablet 3   • glucose blood test strip OneTouch Ultra Test strips  -  Test sugars 3-4 times a day as directed by provider.     • insulin aspart (NovoLOG FlexPen) 100 UNIT/ML solution pen-injector sc pen 4-8 units with meals (Patient taking differently: 8 units with meals) 3 pen 11   • Insulin Degludec (TRESIBA FLEXTOUCH) 200 UNIT/ML solution pen-injector pen injection Inject 50 Units under the skin into the appropriate area as directed Every Night. (Patient taking differently: Inject 52 Units under the skin into the appropriate area as directed Every Night.) 4 pen 11   • Insulin Pen Needle (PEN NEEDLES 3/16\") 31G X 5 MM misc 100 each Daily. 100 each 3   • isosorbide mononitrate (IMDUR) 30 MG 24 hr tablet Take 1 tablet by mouth Every Morning. 30 tablet 6   • Lancets (ONETOUCH ULTRASOFT) lancets Test sugars 3-4 times daily as instructed by physician.     • losartan (COZAAR) 100 MG tablet Take 1 tablet by mouth Daily. 90 tablet 3   • magnesium oxide (MAG-OX) 400 MG tablet Take 1 tablet by mouth 2 (Two) Times a Day. 180 tablet 3   • metFORMIN ER (Glucophage XR) 500 MG 24 hr tablet 1 tabs with supper, this is the correct dose 30 tablet 11   • probenecid (BENEMID) 500 MG tablet TAKE 1 TABLET BY MOUTH TWICE DAILY 180 tablet 3   • nitroglycerin (NITROSTAT) 0.4 MG SL tablet Place 1 tablet under the tongue Every 5 (Five) Minutes As Needed for Chest Pain. Max 3 doses. Go to ER if no relief 25 tablet 0   • ondansetron ODT (ZOFRAN-ODT) 4 MG disintegrating tablet Take 1 " tablet by mouth Every 6 (Six) Hours As Needed for Nausea or Vomiting. 45 tablet 5     No current facility-administered medications for this visit.      Allergies   Allergen Reactions   • Advicor [Niacin-Lovastatin Er] Swelling     Swelling   • Nsaids Other (See Comments)     Kidney disease   • Lisinopril Cough     Cough     Social History     Socioeconomic History   • Marital status:      Spouse name: Not on file   • Number of children: Not on file   • Years of education: Not on file   • Highest education level: Not on file   Tobacco Use   • Smoking status: Former Smoker   • Smokeless tobacco: Never Used   • Tobacco comment: Quit 1979   Substance and Sexual Activity   • Alcohol use: No   • Drug use: No   • Sexual activity: Defer       Review of Systems  Review of Systems   Constitutional: Negative for activity change, appetite change, diaphoresis and fatigue.   HENT: Negative for facial swelling, sneezing, sore throat, tinnitus, trouble swallowing and voice change.    Eyes: Negative for photophobia, pain, discharge, redness, itching and visual disturbance.   Respiratory: Negative for apnea, cough, choking, chest tightness and shortness of breath.    Cardiovascular: Negative for chest pain, palpitations and leg swelling.   Gastrointestinal: Negative for abdominal distention, abdominal pain, constipation, diarrhea, nausea and vomiting.   Endocrine: Negative for cold intolerance, heat intolerance, polydipsia, polyphagia and polyuria.   Genitourinary: Negative for difficulty urinating, dysuria, frequency, hematuria and urgency.   Musculoskeletal: Negative for arthralgias, back pain, gait problem, joint swelling, myalgias, neck pain and neck stiffness.   Skin: Negative for color change, pallor, rash and wound.   Neurological: Negative for dizziness, tremors, weakness, light-headedness, numbness and headaches.   Hematological: Negative for adenopathy. Does not bruise/bleed easily.   Psychiatric/Behavioral: Negative  "for behavioral problems, confusion and sleep disturbance.        Objective    /70   Pulse 74   Ht 188 cm (74\")   Wt 87.9 kg (193 lb 11.2 oz)   SpO2 98%   BMI 24.87 kg/m²   Physical Exam   Constitutional: He is oriented to person, place, and time. He appears well-developed and well-nourished. No distress.   HENT:   Head: Normocephalic and atraumatic.   Right Ear: External ear normal.   Left Ear: External ear normal.   Nose: Nose normal.   Eyes: Pupils are equal, round, and reactive to light. Conjunctivae and EOM are normal.   Neck: Normal range of motion. Neck supple. No tracheal deviation present. No thyromegaly present.   Cardiovascular: Normal rate, regular rhythm and normal heart sounds.   No murmur heard.  Pulmonary/Chest: Effort normal and breath sounds normal. No respiratory distress. He has no wheezes.   Abdominal: Soft. Bowel sounds are normal. There is no tenderness. There is no rebound and no guarding.   Musculoskeletal: Normal range of motion. He exhibits no edema, tenderness or deformity.       Neurological Sensory Findings -  No right ankle/foot altered proprioception and no left ankle/foot altered proprioception  Vascular Status -  His right foot exhibits no edema. His left foot exhibits no edema.  Skin Integrity  -  His right foot skin is intact.His left foot skin is intact..  Neurological: He is alert and oriented to person, place, and time. No cranial nerve deficit.   Skin: Skin is warm and dry. No rash noted.   Psychiatric: He has a normal mood and affect. His behavior is normal. Judgment and thought content normal.       Lab Review  Glucose (mg/dL)   Date Value   04/13/2020 153 (H)   01/27/2020 258 (H)   11/22/2019 203 (H)     Sodium (mmol/L)   Date Value   04/13/2020 142   01/27/2020 142   11/22/2019 146 (H)     Potassium (mmol/L)   Date Value   04/13/2020 5.3 (H)   01/27/2020 5.0   11/22/2019 5.0     Chloride (mmol/L)   Date Value   04/13/2020 103   01/27/2020 101   11/22/2019 105 "     CO2 (mmol/L)   Date Value   04/13/2020 27.1   01/27/2020 28.9   11/22/2019 29.5 (H)     BUN (mg/dL)   Date Value   04/13/2020 31 (H)   01/27/2020 34 (H)   11/22/2019 22     Creatinine (mg/dL)   Date Value   04/13/2020 1.84 (H)   01/27/2020 1.67 (H)   11/22/2019 1.19     Hemoglobin A1C (%)   Date Value   04/13/2020 8.79 (H)   11/22/2019 9.40 (H)   05/14/2019 8.30 (H)     Triglycerides (mg/dL)   Date Value   04/13/2020 156 (H)   02/11/2019 187   01/02/2018 215 (H)     LDL Cholesterol  (mg/dL)   Date Value   04/13/2020 67   02/11/2019 100   11/12/2018 109   04/05/2018 95       Assessment/Plan      1. Essential hypertension    2. Mixed hyperlipidemia    3. Type 2 diabetes mellitus with complication, with long-term current use of insulin (CMS/[facility])    4. Vitamin D deficiency    .    Medications prescribed:  Outpatient Encounter Medications as of 6/3/2020   Medication Sig Dispense Refill   • alfuzosin (UROXATRAL) 10 MG 24 hr tablet Take 10 mg by mouth Daily.     • amLODIPine (NORVASC) 5 MG tablet Take 1 tablet by mouth Daily. 90 tablet 3   • aspirin 81 MG tablet Take 1 tablet by mouth Daily. 30 tablet 11   • atorvastatin (LIPITOR) 10 MG tablet TAKE 1 TABLET BY MOUTH EVERY NIGHT 90 tablet 3   • carvedilol (COREG) 12.5 MG tablet TAKE 1 TABLET BY MOUTH TWICE DAILY WITH MEALS 180 tablet 1   • clopidogrel (PLAVIX) 75 MG tablet TAKE 1 TABLET BY MOUTH EVERY DAY 90 tablet 3   • ezetimibe (ZETIA) 10 MG tablet Take 1 tablet by mouth Daily. 90 tablet 3   • glucose blood test strip OneTouch Ultra Test strips  -  Test sugars 3-4 times a day as directed by provider.     • insulin aspart (NovoLOG FlexPen) 100 UNIT/ML solution pen-injector sc pen 4-8 units with meals (Patient taking differently: 8 units with meals) 3 pen 11   • Insulin Degludec (TRESIBA FLEXTOUCH) 200 UNIT/ML solution pen-injector pen injection Inject 50 Units under the skin into the appropriate area as directed Every Night. (Patient taking differently: Inject 52  "Units under the skin into the appropriate area as directed Every Night.) 4 pen 11   • Insulin Pen Needle (PEN NEEDLES 3/16\") 31G X 5 MM misc 100 each Daily. 100 each 3   • isosorbide mononitrate (IMDUR) 30 MG 24 hr tablet Take 1 tablet by mouth Every Morning. 30 tablet 6   • Lancets (ONETOUCH ULTRASOFT) lancets Test sugars 3-4 times daily as instructed by physician.     • losartan (COZAAR) 100 MG tablet Take 1 tablet by mouth Daily. 90 tablet 3   • magnesium oxide (MAG-OX) 400 MG tablet Take 1 tablet by mouth 2 (Two) Times a Day. 180 tablet 3   • metFORMIN ER (Glucophage XR) 500 MG 24 hr tablet 1 tabs with supper, this is the correct dose 30 tablet 11   • probenecid (BENEMID) 500 MG tablet TAKE 1 TABLET BY MOUTH TWICE DAILY 180 tablet 3   • nitroglycerin (NITROSTAT) 0.4 MG SL tablet Place 1 tablet under the tongue Every 5 (Five) Minutes As Needed for Chest Pain. Max 3 doses. Go to ER if no relief 25 tablet 0   • ondansetron ODT (ZOFRAN-ODT) 4 MG disintegrating tablet Take 1 tablet by mouth Every 6 (Six) Hours As Needed for Nausea or Vomiting. 45 tablet 5     No facility-administered encounter medications on file as of 6/3/2020.        Orders placed during this encounter include:  Orders Placed This Encounter   Procedures   • Comprehensive Metabolic Panel   • Hemoglobin A1c   • Lipid Panel   • Vitamin D 25 Hydroxy   • Vitamin B12   • TSH   • Protein / Creatinine Ratio, Urine - Urine, Clean Catch   • Microalbumin / Creatinine Urine Ratio - Urine, Clean Catch   • CBC & Differential     Order Specific Question:   Manual Differential     Answer:   No     Diabetes complicated by stage III ckd and CAD      Lab Results   Component Value Date    HGBA1C 8.79 (H) 04/13/2020           catherine freestyle personal use     Downloaded and reviewed     Dated from May 21 - Kathy 3, 2020     Average BG - 168     In target 70 % of the time       20% high    6% very high    3 % low       Discontinued Medications      Trulicity 0.75 mg " weekly- stopped - cramping    invokana is expensive , jardiance , side effects     Present regimen      Tresiba taking 52 units      Morning range 130-180         =====      Metformin 500 mg , 2 with supper ,intially  GI upset but now resolved     Metformin  mg one with supper       =====     Novolog 8 units with meals up to 10 meals     Give 6 units for small meal      Self adjusting explained              Kimi  has allowed the patient to minimize hypoglycemia as alarms have predicted and avoided them    She also uses the arrows on the kimi  as follows:    If arrow is horizontal , she uses the predicted bolus    If the arrow is slanted up or down-- she increase or decrease by 4  units    If the arrow is vertical up or down - she increases or decreases by 4 unit s       Microvascular complications     Last eye exam -- next appt July 2020 , following with      Lab Results   Component Value Date    MICROALBUR 12.2 02/11/2019       Neuropathy none      Hyperlipidemia            Atorvastatin 10mg , 5 days per week   Zetia 10mg         Hypertension      controlled on losartan, coreg, norvasc            We spent 25 minutes including reviewing the patients electronic chart, reviewing medications, reviewing labs, active problems    I provided advice regarding diabetes management, dietary changes, adjustments of medication, self titration of insulin, refilled medications, ordering labs, future appointments    Patient was advised to contact the office if there are unanswered questions, trouble with blood glucose management, or any concerns       4. Follow-up: Return in about 2 months (around 8/3/2020) for Recheck.

## 2020-07-06 ENCOUNTER — LAB (OUTPATIENT)
Dept: LAB | Facility: HOSPITAL | Age: 75
End: 2020-07-06

## 2020-07-06 DIAGNOSIS — Z12.5 ENCOUNTER FOR PROSTATE CANCER SCREENING: Primary | ICD-10-CM

## 2020-07-06 DIAGNOSIS — Z12.5 ENCOUNTER FOR PROSTATE CANCER SCREENING: ICD-10-CM

## 2020-07-06 LAB
25(OH)D3 SERPL-MCNC: 53.3 NG/ML (ref 30–100)
ALBUMIN SERPL-MCNC: 4.5 G/DL (ref 3.5–5.2)
ALBUMIN UR-MCNC: 18.1 MG/DL
ALBUMIN/GLOB SERPL: 1.6 G/DL
ALP SERPL-CCNC: 72 U/L (ref 39–117)
ALT SERPL W P-5'-P-CCNC: 26 U/L (ref 1–41)
ANION GAP SERPL CALCULATED.3IONS-SCNC: 10.5 MMOL/L (ref 5–15)
AST SERPL-CCNC: 22 U/L (ref 1–40)
BASOPHILS # BLD AUTO: 0.03 10*3/MM3 (ref 0–0.2)
BASOPHILS NFR BLD AUTO: 0.7 % (ref 0–1.5)
BILIRUB SERPL-MCNC: 0.4 MG/DL (ref 0–1.2)
BUN SERPL-MCNC: 40 MG/DL (ref 8–23)
BUN/CREAT SERPL: 16.6 (ref 7–25)
CALCIUM SPEC-SCNC: 9.7 MG/DL (ref 8.6–10.5)
CHLORIDE SERPL-SCNC: 107 MMOL/L (ref 98–107)
CHOLEST SERPL-MCNC: 109 MG/DL (ref 0–200)
CO2 SERPL-SCNC: 24.5 MMOL/L (ref 22–29)
CREAT SERPL-MCNC: 2.41 MG/DL (ref 0.76–1.27)
CREAT UR-MCNC: 104.7 MG/DL
CREAT UR-MCNC: 104.7 MG/DL
DEPRECATED RDW RBC AUTO: 43.4 FL (ref 37–54)
EOSINOPHIL # BLD AUTO: 0.15 10*3/MM3 (ref 0–0.4)
EOSINOPHIL NFR BLD AUTO: 3.3 % (ref 0.3–6.2)
ERYTHROCYTE [DISTWIDTH] IN BLOOD BY AUTOMATED COUNT: 13.7 % (ref 12.3–15.4)
GFR SERPL CREATININE-BSD FRML MDRD: 26 ML/MIN/1.73
GLOBULIN UR ELPH-MCNC: 2.8 GM/DL
GLUCOSE SERPL-MCNC: 97 MG/DL (ref 65–99)
HBA1C MFR BLD: 7.53 % (ref 4.8–5.6)
HCT VFR BLD AUTO: 37.2 % (ref 37.5–51)
HDLC SERPL-MCNC: 30 MG/DL (ref 40–60)
HGB BLD-MCNC: 12.5 G/DL (ref 13–17.7)
IMM GRANULOCYTES # BLD AUTO: 0.01 10*3/MM3 (ref 0–0.05)
IMM GRANULOCYTES NFR BLD AUTO: 0.2 % (ref 0–0.5)
LDLC SERPL CALC-MCNC: 53 MG/DL (ref 0–100)
LDLC/HDLC SERPL: 1.78 {RATIO}
LYMPHOCYTES # BLD AUTO: 1.02 10*3/MM3 (ref 0.7–3.1)
LYMPHOCYTES NFR BLD AUTO: 22.6 % (ref 19.6–45.3)
MCH RBC QN AUTO: 29.3 PG (ref 26.6–33)
MCHC RBC AUTO-ENTMCNC: 33.6 G/DL (ref 31.5–35.7)
MCV RBC AUTO: 87.1 FL (ref 79–97)
MICROALBUMIN/CREAT UR: 172.9 MG/G
MONOCYTES # BLD AUTO: 0.38 10*3/MM3 (ref 0.1–0.9)
MONOCYTES NFR BLD AUTO: 8.4 % (ref 5–12)
NEUTROPHILS NFR BLD AUTO: 2.93 10*3/MM3 (ref 1.7–7)
NEUTROPHILS NFR BLD AUTO: 64.8 % (ref 42.7–76)
NRBC BLD AUTO-RTO: 0 /100 WBC (ref 0–0.2)
PLATELET # BLD AUTO: 127 10*3/MM3 (ref 140–450)
PMV BLD AUTO: 12.6 FL (ref 6–12)
POTASSIUM SERPL-SCNC: 4.9 MMOL/L (ref 3.5–5.2)
PROT SERPL-MCNC: 7.3 G/DL (ref 6–8.5)
PROT UR-MCNC: 46 MG/DL
PROT/CREAT UR: 439.4 MG/G CREA (ref 0–200)
PSA SERPL-MCNC: 0.89 NG/ML (ref 0–4)
RBC # BLD AUTO: 4.27 10*6/MM3 (ref 4.14–5.8)
SODIUM SERPL-SCNC: 142 MMOL/L (ref 136–145)
TRIGL SERPL-MCNC: 128 MG/DL (ref 0–150)
TSH SERPL DL<=0.05 MIU/L-ACNC: 2.27 UIU/ML (ref 0.27–4.2)
VIT B12 BLD-MCNC: 422 PG/ML (ref 211–946)
VLDLC SERPL-MCNC: 25.6 MG/DL (ref 5–40)
WBC # BLD AUTO: 4.52 10*3/MM3 (ref 3.4–10.8)

## 2020-07-06 PROCEDURE — 80053 COMPREHEN METABOLIC PANEL: CPT | Performed by: NURSE PRACTITIONER

## 2020-07-06 PROCEDURE — 82607 VITAMIN B-12: CPT | Performed by: NURSE PRACTITIONER

## 2020-07-06 PROCEDURE — 82043 UR ALBUMIN QUANTITATIVE: CPT | Performed by: NURSE PRACTITIONER

## 2020-07-06 PROCEDURE — G0103 PSA SCREENING: HCPCS

## 2020-07-06 PROCEDURE — 83036 HEMOGLOBIN GLYCOSYLATED A1C: CPT | Performed by: NURSE PRACTITIONER

## 2020-07-06 PROCEDURE — 82306 VITAMIN D 25 HYDROXY: CPT | Performed by: NURSE PRACTITIONER

## 2020-07-06 PROCEDURE — 36415 COLL VENOUS BLD VENIPUNCTURE: CPT | Performed by: NURSE PRACTITIONER

## 2020-07-06 PROCEDURE — 82570 ASSAY OF URINE CREATININE: CPT | Performed by: NURSE PRACTITIONER

## 2020-07-06 PROCEDURE — 85025 COMPLETE CBC W/AUTO DIFF WBC: CPT | Performed by: NURSE PRACTITIONER

## 2020-07-06 PROCEDURE — 84443 ASSAY THYROID STIM HORMONE: CPT | Performed by: NURSE PRACTITIONER

## 2020-07-06 PROCEDURE — 80061 LIPID PANEL: CPT | Performed by: NURSE PRACTITIONER

## 2020-07-06 PROCEDURE — 84156 ASSAY OF PROTEIN URINE: CPT | Performed by: NURSE PRACTITIONER

## 2020-07-10 ENCOUNTER — OFFICE VISIT (OUTPATIENT)
Dept: FAMILY MEDICINE CLINIC | Facility: CLINIC | Age: 75
End: 2020-07-10

## 2020-07-10 VITALS
SYSTOLIC BLOOD PRESSURE: 138 MMHG | WEIGHT: 195.5 LBS | BODY MASS INDEX: 25.09 KG/M2 | HEART RATE: 61 BPM | OXYGEN SATURATION: 98 % | HEIGHT: 74 IN | DIASTOLIC BLOOD PRESSURE: 64 MMHG

## 2020-07-10 DIAGNOSIS — I10 ESSENTIAL HYPERTENSION: Primary | Chronic | ICD-10-CM

## 2020-07-10 DIAGNOSIS — Z12.11 SCREENING FOR COLON CANCER: ICD-10-CM

## 2020-07-10 DIAGNOSIS — I25.10 CORONARY ARTERIOSCLEROSIS: Chronic | ICD-10-CM

## 2020-07-10 DIAGNOSIS — E78.2 MIXED HYPERLIPIDEMIA: Chronic | ICD-10-CM

## 2020-07-10 PROCEDURE — 99214 OFFICE O/P EST MOD 30 MIN: CPT | Performed by: GENERAL PRACTICE

## 2020-07-10 NOTE — PROGRESS NOTES
Subjective   Zachary Galindo is a 74 y.o. male.   Chief Complaint   Patient presents with   • Hypertension     For review and evaluation of management of chronic medical problems. Labs reviewed. Due for colon cancer screening.    Hypertension   This is a chronic problem. The current episode started more than 1 year ago. The problem is unchanged. The problem is controlled. Pertinent negatives include no chest pain, headaches, neck pain, palpitations or shortness of breath. There are no associated agents to hypertension. Risk factors for coronary artery disease include diabetes mellitus and dyslipidemia. Current antihypertension treatment includes angiotensin blockers, calcium channel blockers and beta blockers. The current treatment provides moderate improvement. There are no compliance problems.    Coronary Artery Disease   Presents for follow-up visit. Pertinent negatives include no chest pain, chest pressure, chest tightness, dizziness, leg swelling, palpitations or shortness of breath. Risk factors include hyperlipidemia. The symptoms have been resolved. Compliance with diet is good. Compliance with exercise is good. Compliance with medications is good.   Hyperlipidemia   This is a chronic problem. The current episode started more than 1 year ago. The problem is controlled. Recent lipid tests were reviewed and are normal. There are no known factors aggravating his hyperlipidemia. Pertinent negatives include no chest pain, myalgias or shortness of breath. Current antihyperlipidemic treatment includes statins. The current treatment provides significant improvement of lipids. There are no compliance problems.       The following portions of the patient's history were reviewed and updated as appropriate: allergies, current medications, past social history and problem list.    Outpatient Medications Prior to Visit   Medication Sig Dispense Refill   • alfuzosin (UROXATRAL) 10 MG 24 hr tablet Take 10 mg by mouth  "Daily.     • amLODIPine (NORVASC) 5 MG tablet Take 1 tablet by mouth Daily. 90 tablet 3   • aspirin 81 MG tablet Take 1 tablet by mouth Daily. 30 tablet 11   • atorvastatin (LIPITOR) 10 MG tablet TAKE 1 TABLET BY MOUTH EVERY NIGHT 90 tablet 3   • carvedilol (COREG) 12.5 MG tablet TAKE 1 TABLET BY MOUTH TWICE DAILY WITH MEALS 180 tablet 1   • clopidogrel (PLAVIX) 75 MG tablet TAKE 1 TABLET BY MOUTH EVERY DAY 90 tablet 3   • ezetimibe (ZETIA) 10 MG tablet Take 1 tablet by mouth Daily. 90 tablet 3   • glucose blood test strip OneTouch Ultra Test strips  -  Test sugars 3-4 times a day as directed by provider.     • insulin aspart (NovoLOG FlexPen) 100 UNIT/ML solution pen-injector sc pen 4-8 units with meals (Patient taking differently: 8 units with meals) 3 pen 11   • Insulin Degludec (TRESIBA FLEXTOUCH) 200 UNIT/ML solution pen-injector pen injection Inject 50 Units under the skin into the appropriate area as directed Every Night. (Patient taking differently: Inject 52 Units under the skin into the appropriate area as directed Every Night.) 4 pen 11   • Insulin Pen Needle (PEN NEEDLES 3/16\") 31G X 5 MM misc 100 each Daily. 100 each 3   • isosorbide mononitrate (IMDUR) 30 MG 24 hr tablet Take 1 tablet by mouth Every Morning. 30 tablet 6   • Lancets (ONETOUCH ULTRASOFT) lancets Test sugars 3-4 times daily as instructed by physician.     • losartan (COZAAR) 100 MG tablet Take 1 tablet by mouth Daily. 90 tablet 3   • magnesium oxide (MAG-OX) 400 MG tablet Take 1 tablet by mouth 2 (Two) Times a Day. 180 tablet 3   • nitroglycerin (NITROSTAT) 0.4 MG SL tablet Place 1 tablet under the tongue Every 5 (Five) Minutes As Needed for Chest Pain. Max 3 doses. Go to ER if no relief 25 tablet 0   • ondansetron ODT (ZOFRAN-ODT) 4 MG disintegrating tablet Take 1 tablet by mouth Every 6 (Six) Hours As Needed for Nausea or Vomiting. 45 tablet 5   • probenecid (BENEMID) 500 MG tablet TAKE 1 TABLET BY MOUTH TWICE DAILY 180 tablet 3   • " "metFORMIN ER (Glucophage XR) 500 MG 24 hr tablet 1 tabs with supper, this is the correct dose 30 tablet 11     No facility-administered medications prior to visit.        Review of Systems   Constitutional: Negative.  Negative for chills, fatigue, fever and unexpected weight change.   HENT: Negative.  Negative for congestion, ear pain, hearing loss, nosebleeds, rhinorrhea, sneezing, sore throat and tinnitus.    Eyes: Negative.  Negative for discharge.   Respiratory: Negative.  Negative for cough, chest tightness, shortness of breath and wheezing.    Cardiovascular: Negative.  Negative for chest pain, palpitations and leg swelling.   Gastrointestinal: Negative.  Negative for abdominal pain, constipation, diarrhea, nausea and vomiting.   Endocrine: Negative.    Genitourinary: Negative.  Negative for dysuria, frequency and urgency.   Musculoskeletal: Negative.  Negative for arthralgias, back pain, joint swelling, myalgias and neck pain.   Skin: Negative.  Negative for rash.   Allergic/Immunologic: Negative.    Neurological: Negative.  Negative for dizziness, weakness, numbness and headaches.   Hematological: Negative.  Negative for adenopathy.   Psychiatric/Behavioral: Negative.  Negative for dysphoric mood and sleep disturbance. The patient is not nervous/anxious.        Objective   Visit Vitals  /64 (BP Location: Left arm)   Pulse 61   Ht 188 cm (74\")   Wt 88.7 kg (195 lb 8 oz)   SpO2 98%   BMI 25.10 kg/m²     Physical Exam   Constitutional: He is oriented to person, place, and time. He appears well-developed and well-nourished. No distress.   HENT:   Head: Normocephalic.   Nose: Nose normal.   Mouth/Throat: Oropharynx is clear and moist.   Eyes: Pupils are equal, round, and reactive to light. Conjunctivae and EOM are normal. Right eye exhibits no discharge. Left eye exhibits no discharge.   Neck: No thyromegaly present.   Cardiovascular: Normal rate, regular rhythm, normal heart sounds and intact distal " pulses.   No murmur heard.  Pulmonary/Chest: Effort normal and breath sounds normal.   Musculoskeletal: He exhibits no edema.   Lymphadenopathy:     He has no cervical adenopathy.   Neurological: He is alert and oriented to person, place, and time.   Skin: Skin is warm and dry.   Psychiatric: He has a normal mood and affect.   Nursing note and vitals reviewed.      Notes brought forward are reviewed and updated if indicated.     Assessment/Plan   Problem List Items Addressed This Visit        Cardiovascular and Mediastinum    Essential hypertension - Primary (Chronic)    Hyperlipidemia (Chronic)    Coronary arteriosclerosis (Chronic)      Other Visit Diagnoses     Screening for colon cancer        Relevant Orders    Cologuard - Stool, Per Rectum          Continue current treatment. Increase fluids.     No orders of the defined types were placed in this encounter.    Return in about 5 months (around 12/10/2020) for Recheck, medicare wellness visit.

## 2020-07-20 ENCOUNTER — OFFICE VISIT (OUTPATIENT)
Dept: CARDIOLOGY | Facility: CLINIC | Age: 75
End: 2020-07-20

## 2020-07-20 VITALS
HEIGHT: 74 IN | BODY MASS INDEX: 25.03 KG/M2 | SYSTOLIC BLOOD PRESSURE: 140 MMHG | DIASTOLIC BLOOD PRESSURE: 74 MMHG | WEIGHT: 195 LBS | HEART RATE: 68 BPM | OXYGEN SATURATION: 100 %

## 2020-07-20 DIAGNOSIS — I25.810 CORONARY ARTERY DISEASE INVOLVING CORONARY BYPASS GRAFT OF NATIVE HEART WITHOUT ANGINA PECTORIS: ICD-10-CM

## 2020-07-20 DIAGNOSIS — I10 ESSENTIAL HYPERTENSION: Primary | Chronic | ICD-10-CM

## 2020-07-20 DIAGNOSIS — I25.10 CORONARY ARTERIOSCLEROSIS: Chronic | ICD-10-CM

## 2020-07-20 DIAGNOSIS — E78.2 MIXED HYPERLIPIDEMIA: Chronic | ICD-10-CM

## 2020-07-20 PROCEDURE — 99214 OFFICE O/P EST MOD 30 MIN: CPT | Performed by: INTERNAL MEDICINE

## 2020-07-20 NOTE — PROGRESS NOTES
Zachary Galindo  74 y.o. male    1. Essential hypertension    2. Mixed hyperlipidemia    3. Coronary arteriosclerosis    4. Coronary artery disease involving coronary bypass graft of native heart without angina pectoris        History of Present Illness  Mr. Galindo is a 74-year-old male who is here for a follow-up of his above stated problems. His history is remarkable for coronary artery disease status post coronary artery bypass surgery in 1998 by Dr. Flor and had subsequent PCI to SVG to OM and RCA by Dr. Stiles.   The patient has done quite well and denied any chest pain, shortness of breath, palpitation or dizziness. He has been physically quite active without any restrictions.  His heart rate and blood pressure are in normal range today.    His recent lab work performed by Dr. Raymond showed that his creatinine increased to 2.4.  He does follow-up with nephrology on a regular basis.  He does follow-up with Dr. Briggs as well.    Echocardiogram in January of this year showed:  · Left ventricular wall thickness is consistent with mild concentric hypertrophy.  · Estimated EF = 58%.  · Left ventricular systolic function is normal.  · Left ventricular diastolic dysfunction (grade I) consistent with impaired relaxation.  · Mild mitral valve regurgitation is present    Allergies   Allergen Reactions   • Advicor [Niacin-Lovastatin Er] Swelling     Swelling   • Nsaids Other (See Comments)     Kidney disease   • Lisinopril Cough     Cough         Past Medical History:   Diagnosis Date   • Allergic rhinitis    • Ankle joint pain    • Artificial lens present     Left   • Astigmatism    • Benign prostatic hyperplasia    • Cataract    • Closed fracture of medial malleolus    • Coronary arteriosclerosis    • Diabetes mellitus (CMS/HCC)     no retinopathy   • Elevated levels of transaminase & lactic acid dehydrogenase     improving    • Encounter for health maintenance examination     Individual health  examination     • Encounter for health maintenance examination in adult     Adult health examination    • Essential hypertension    • Essential hypertension     Unspecified   • Flank pain     probably muscular    • GERD (gastroesophageal reflux disease)    • Gout    • Gout     unspecified     • Hemorrhoids    • History of artificial eye lens     Artificial lens in position - right    • History of colonoscopy 04/04/2010    Colon endoscopy  (64842)  (1)    • History of kidney stones    • Hyperlipidemia    • Hypermetropia    • Low blood pressure    • Malaise and fatigue    • Need for influenza vaccination     Needs influenza immunization    • Nuclear cataract of left eye    • Posterior subcapsular polar senile cataract     Left   • Renal impairment    • Special screening for malignant neoplasm of prostate    • Type 2 diabetes mellitus (CMS/HCC)     improving   • 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 edema (CMS/HCC)     without macular edema - mild OS    • Upper respiratory infection    • Urticaria     Drug-aggravated angioedema-urticaria   • Urticaria          Past Surgical History:   Procedure Laterality Date   • CARDIAC CATHETERIZATION  03/24/2014    ([id number])  (2)  Reduction of stenoisis from 95% to less than 0% stenosis with evidence of good JENNY 3-flow. Distal RCA beyond the touchdown was seen filling the circumflex system   • CATARACT EXTRACTION  10/2015    Remove cataract, insert lens (2)    • CORONARY ARTERY BYPASS GRAFT      Arterial, two  (1)   -  3 - 12/1998   • HERNIA REPAIR      w/mesh  (1)   • INJECTION OF MEDICATION  04/11/2013    B12  (1)  - RAYMOND Raymond   • INJECTION OF MEDICATION  07/03/2013    Benadryl  (1) - DMajor Raymond   • INJECTION OF MEDICATION  10/22/2014    Kenalog  (2)   • OTHER SURGICAL HISTORY  07/10/2002    Fragmenting of kidney stone  -   ES[facility], 2010 shocks. 1cm UP stone,right. Diabetes mellitus         Family History   Problem Relation Age of  "Onset   • Diabetes Other    • Hypertension Other    • Cancer Mother    • Heart disease Mother    • Hypertension Mother    • Hyperlipidemia Mother    • Diabetes Brother    • Diabetes Maternal Aunt    • Cancer Sister    • Diabetes Sister          Social History     Socioeconomic History   • Marital status:      Spouse name: Not on file   • Number of children: Not on file   • Years of education: Not on file   • Highest education level: Not on file   Tobacco Use   • Smoking status: Former Smoker   • Smokeless tobacco: Never Used   • Tobacco comment: Quit 1979   Substance and Sexual Activity   • Alcohol use: No   • Drug use: No   • Sexual activity: Defer         Current Outpatient Medications   Medication Sig Dispense Refill   • alfuzosin (UROXATRAL) 10 MG 24 hr tablet Take 10 mg by mouth Daily.     • amLODIPine (NORVASC) 5 MG tablet Take 1 tablet by mouth Daily. 90 tablet 3   • aspirin 81 MG tablet Take 1 tablet by mouth Daily. 30 tablet 11   • atorvastatin (LIPITOR) 10 MG tablet TAKE 1 TABLET BY MOUTH EVERY NIGHT 90 tablet 3   • carvedilol (COREG) 12.5 MG tablet TAKE 1 TABLET BY MOUTH TWICE DAILY WITH MEALS 180 tablet 1   • clopidogrel (PLAVIX) 75 MG tablet TAKE 1 TABLET BY MOUTH EVERY DAY 90 tablet 3   • ezetimibe (ZETIA) 10 MG tablet Take 1 tablet by mouth Daily. 90 tablet 3   • glucose blood test strip OneTouch Ultra Test strips  -  Test sugars 3-4 times a day as directed by provider.     • insulin aspart (NovoLOG FlexPen) 100 UNIT/ML solution pen-injector sc pen 4-8 units with meals (Patient taking differently: 8 units with meals) 3 pen 11   • Insulin Degludec (TRESIBA FLEXTOUCH) 200 UNIT/ML solution pen-injector pen injection Inject 50 Units under the skin into the appropriate area as directed Every Night. (Patient taking differently: Inject 52 Units under the skin into the appropriate area as directed Every Night.) 4 pen 11   • Insulin Pen Needle (PEN NEEDLES 3/16\") 31G X 5 MM misc 100 each Daily. 100 each " "3   • isosorbide mononitrate (IMDUR) 30 MG 24 hr tablet Take 1 tablet by mouth Every Morning. 30 tablet 6   • Lancets (ONETOUCH ULTRASOFT) lancets Test sugars 3-4 times daily as instructed by physician.     • losartan (COZAAR) 100 MG tablet Take 1 tablet by mouth Daily. 90 tablet 3   • magnesium oxide (MAG-OX) 400 MG tablet Take 1 tablet by mouth 2 (Two) Times a Day. 180 tablet 3   • nitroglycerin (NITROSTAT) 0.4 MG SL tablet Place 1 tablet under the tongue Every 5 (Five) Minutes As Needed for Chest Pain. Max 3 doses. Go to ER if no relief 25 tablet 0   • ondansetron ODT (ZOFRAN-ODT) 4 MG disintegrating tablet Take 1 tablet by mouth Every 6 (Six) Hours As Needed for Nausea or Vomiting. 45 tablet 5   • probenecid (BENEMID) 500 MG tablet TAKE 1 TABLET BY MOUTH TWICE DAILY 180 tablet 3     No current facility-administered medications for this visit.          OBJECTIVE    /74 (BP Location: Right arm, Patient Position: Sitting, Cuff Size: Large Adult)   Pulse 68   Ht 188 cm (74\")   Wt 88.5 kg (195 lb)   SpO2 100%   BMI 25.04 kg/m²         Review of Systems     Constitutional:  Denies recent weight loss, weight gain, fever or chills. fatigue     HENT:  Denies any hearing loss, epistaxis, hoarseness, or difficulty speaking.     Eyes: Wears eyeglasses or contact lenses     Respiratory:  Denies dyspnea with exertion,no cough, wheezing, or hemoptysis.     Cardiovascular: See HPI    Gastrointestinal:  Denies change in bowel habits, dyspepsia, ulcer disease, hematochezia, or melena.     Endocrine: Negative for cold intolerance, heat intolerance, polydipsia, polyphagia and polyuria.     Genitourinary: CKD      Musculoskeletal: DJD    Skin:  Denies any change in hair or nails, rashes, or skin lesions.     Allergic/Immunologic: Negative.  Negative for environmental allergies, food allergies and immunocompromised state.     Neurological:  Denies any history of recurrent headaches, strokes, TIA, or seizure disorder. "     Hematological: Denies any food allergies, seasonal allergies, bleeding disorders, or lymphadenopathy.     Psychiatric/Behavioral: Denies any history of depression, substance abuse, or change in cognitive function.         Physical Exam     Constitutional: Cooperative, alert and oriented,  in no acute distress.     HENT:   Head: Normocephalic, normal hair patterns, no masses or tenderness.  Ears, Nose, and Throat: No gross abnormalities. No pallor or cyanosis.   Eyes: EOMS intact, PERRL, conjunctivae and lids unremarkable. Fundoscopic exam and visual fields not performed.   Neck: No palpable masses or adenopathy, no thyromegaly, no JVD, carotid pulses are full and equal bilaterally and without  Bruits.     Cardiovascular: Regular rhythm, S1 and S2 normal, no S3 or S4.  No murmurs, gallops, or rubs detected.     Pulmonary/Chest: Chest: normal symmetry,  normal respiratory excursion, no intercostal retraction, no use of accessory muscles.            Pulmonary: Normal breath sounds. No rales or ronchi.    Abdominal: Abdomen soft, bowel sounds normoactive, no masses, no hepatosplenomegaly, non-tender, no bruits.     Musculoskeletal: No deformities, clubbing, cyanosis, erythema, or edema observed.     Neurological: No gross motor or sensory deficits noted, affect appropriate, oriented to time, person, place.     Skin: Warm and dry to the touch, no apparent skin lesions or masses noted.     Psychiatric: He has a normal mood and affect. His behavior is normal. Judgment and thought content normal.         Procedures      Lab Results   Component Value Date    WBC 4.52 07/06/2020    HGB 12.5 (L) 07/06/2020    HCT 37.2 (L) 07/06/2020    MCV 87.1 07/06/2020     (L) 07/06/2020     Lab Results   Component Value Date    GLUCOSE 97 07/06/2020    BUN 40 (H) 07/06/2020    CREATININE 2.41 (H) 07/06/2020    EGFRIFNONA 26 (L) 07/06/2020    BCR 16.6 07/06/2020    CO2 24.5 07/06/2020    CALCIUM 9.7 07/06/2020    ALBUMIN 4.50  07/06/2020    AST 22 07/06/2020    ALT 26 07/06/2020     Lab Results   Component Value Date    CHOL 109 07/06/2020    CHOL 124 04/13/2020    CHOL 159 02/11/2019     Lab Results   Component Value Date    TRIG 128 07/06/2020    TRIG 156 (H) 04/13/2020    TRIG 187 02/11/2019     Lab Results   Component Value Date    HDL 30 (L) 07/06/2020    HDL 26 (L) 04/13/2020    HDL 27 (L) 02/11/2019     No components found for: LDLCALC  Lab Results   Component Value Date    LDL 53 07/06/2020    LDL 67 04/13/2020     02/11/2019     No results found for: HDLLDLRATIO  No components found for: CHOLHDL  Lab Results   Component Value Date    HGBA1C 7.53 (H) 07/06/2020     Lab Results   Component Value Date    TSH 2.270 07/06/2020           ASSESSMENT AND PLAN  Mr. Galindo has multiple medical issues and longstanding coronary artery disease with CABG in 1998.  He is progressing quite well with no definite evidence of angina, arrhythmia or congestive heart failure.  I have continued antiplatelet therapy with aspirin and Plavix, antihypertensive therapy with amlodipine, carvedilol, losartan, lipid-lowering therapy with atorvastatin, Zetia and antianginal therapy with isosorbide mononitrate.    He will continue follow-up with nephrology on a regular basis.  I have advised him to maintain a high fluid intake.    Zachary was seen today for follow-up.    Diagnoses and all orders for this visit:    Essential hypertension    Mixed hyperlipidemia    Coronary arteriosclerosis    Coronary artery disease involving coronary bypass graft of native heart without angina pectoris        Patient's Body mass index is 25.04 kg/m². BMI is within normal parameters. No follow-up required..  Patient is a non-smoker    Medhat Clark MD  7/20/2020  10:37

## 2020-07-29 DIAGNOSIS — Z12.11 SCREENING FOR COLON CANCER: ICD-10-CM

## 2020-08-04 ENCOUNTER — LAB (OUTPATIENT)
Dept: LAB | Facility: HOSPITAL | Age: 75
End: 2020-08-04

## 2020-08-04 ENCOUNTER — TRANSCRIBE ORDERS (OUTPATIENT)
Dept: LAB | Facility: HOSPITAL | Age: 75
End: 2020-08-04

## 2020-08-04 DIAGNOSIS — M10.9 GOUT, UNSPECIFIED CAUSE, UNSPECIFIED CHRONICITY, UNSPECIFIED SITE: ICD-10-CM

## 2020-08-04 DIAGNOSIS — M54.2 NECK PAIN: ICD-10-CM

## 2020-08-04 DIAGNOSIS — M25.512 LEFT SHOULDER PAIN, UNSPECIFIED CHRONICITY: ICD-10-CM

## 2020-08-04 DIAGNOSIS — E11.9 TYPE 2 DIABETES MELLITUS WITHOUT COMPLICATION, UNSPECIFIED WHETHER LONG TERM INSULIN USE (HCC): ICD-10-CM

## 2020-08-04 DIAGNOSIS — N18.30 CHRONIC KIDNEY DISEASE, STAGE III (MODERATE) (HCC): Primary | ICD-10-CM

## 2020-08-04 DIAGNOSIS — I10 ESSENTIAL (PRIMARY) HYPERTENSION: ICD-10-CM

## 2020-08-04 DIAGNOSIS — I25.9 CHRONIC ISCHEMIC HEART DISEASE, UNSPECIFIED: ICD-10-CM

## 2020-08-04 DIAGNOSIS — E78.5 HYPERLIPIDEMIA, UNSPECIFIED HYPERLIPIDEMIA TYPE: ICD-10-CM

## 2020-08-04 LAB
ALBUMIN SERPL-MCNC: 4.3 G/DL (ref 3.5–5.2)
ANION GAP SERPL CALCULATED.3IONS-SCNC: 8.3 MMOL/L (ref 5–15)
BUN SERPL-MCNC: 33 MG/DL (ref 8–23)
BUN/CREAT SERPL: 15.9 (ref 7–25)
CALCIUM SPEC-SCNC: 9.8 MG/DL (ref 8.6–10.5)
CHLORIDE SERPL-SCNC: 105 MMOL/L (ref 98–107)
CO2 SERPL-SCNC: 27.7 MMOL/L (ref 22–29)
CREAT SERPL-MCNC: 2.08 MG/DL (ref 0.76–1.27)
CREAT UR-MCNC: 142.6 MG/DL
GFR SERPL CREATININE-BSD FRML MDRD: 31 ML/MIN/1.73
GLUCOSE SERPL-MCNC: 109 MG/DL (ref 65–99)
HCT VFR BLD AUTO: 41.3 % (ref 37.5–51)
HGB BLD-MCNC: 14.1 G/DL (ref 13–17.7)
PHOSPHATE SERPL-MCNC: 3 MG/DL (ref 2.5–4.5)
POTASSIUM SERPL-SCNC: 5.3 MMOL/L (ref 3.5–5.2)
PROT UR-MCNC: 63 MG/DL
PROT/CREAT UR: 441.8 MG/G CREA (ref 0–200)
SODIUM SERPL-SCNC: 141 MMOL/L (ref 136–145)

## 2020-08-04 PROCEDURE — 84156 ASSAY OF PROTEIN URINE: CPT | Performed by: INTERNAL MEDICINE

## 2020-08-04 PROCEDURE — 85014 HEMATOCRIT: CPT | Performed by: INTERNAL MEDICINE

## 2020-08-04 PROCEDURE — 82570 ASSAY OF URINE CREATININE: CPT | Performed by: INTERNAL MEDICINE

## 2020-08-04 PROCEDURE — 36415 COLL VENOUS BLD VENIPUNCTURE: CPT | Performed by: INTERNAL MEDICINE

## 2020-08-04 PROCEDURE — 80069 RENAL FUNCTION PANEL: CPT | Performed by: INTERNAL MEDICINE

## 2020-08-04 PROCEDURE — 85018 HEMOGLOBIN: CPT | Performed by: INTERNAL MEDICINE

## 2020-08-11 ENCOUNTER — OFFICE VISIT (OUTPATIENT)
Dept: ENDOCRINOLOGY | Facility: CLINIC | Age: 75
End: 2020-08-11

## 2020-08-11 VITALS
WEIGHT: 194 LBS | BODY MASS INDEX: 24.9 KG/M2 | SYSTOLIC BLOOD PRESSURE: 146 MMHG | HEIGHT: 74 IN | DIASTOLIC BLOOD PRESSURE: 70 MMHG | HEART RATE: 58 BPM | OXYGEN SATURATION: 99 %

## 2020-08-11 DIAGNOSIS — I10 ESSENTIAL HYPERTENSION: Chronic | ICD-10-CM

## 2020-08-11 DIAGNOSIS — E78.2 MIXED HYPERLIPIDEMIA: Chronic | ICD-10-CM

## 2020-08-11 DIAGNOSIS — Z79.4 TYPE 2 DIABETES MELLITUS WITH HYPERGLYCEMIA, WITH LONG-TERM CURRENT USE OF INSULIN (HCC): Primary | ICD-10-CM

## 2020-08-11 DIAGNOSIS — E11.65 TYPE 2 DIABETES MELLITUS WITH HYPERGLYCEMIA, WITH LONG-TERM CURRENT USE OF INSULIN (HCC): Primary | ICD-10-CM

## 2020-08-11 DIAGNOSIS — H92.09 OTALGIA, UNSPECIFIED LATERALITY: ICD-10-CM

## 2020-08-11 PROCEDURE — 99214 OFFICE O/P EST MOD 30 MIN: CPT | Performed by: NURSE PRACTITIONER

## 2020-08-11 RX ORDER — AMOXICILLIN 500 MG/1
500 CAPSULE ORAL 3 TIMES DAILY
Qty: 30 CAPSULE | Refills: 0 | Status: SHIPPED | OUTPATIENT
Start: 2020-08-11 | End: 2020-08-21

## 2020-08-11 NOTE — PROGRESS NOTES
Subjective    Zachary Galindo is a 74 y.o. male. he is here today for follow-up.    History of Present Illness           IN OFFICE VISIT      REASON - DIABETES      Duration DIAGNOSED AT AGE 37      Timing - Diabetes is Constant     Quality - improving      Severity -  High due to associated complications      Complications - nephropathy and CAD      Current symptoms/problems  increase appetite      Alleviating Factors: Compliance       Side Effects  cramping from Trulicity , metformin on diarrhea      Current diet  tries to watch carb intake      Current exercise none      Current monitoring regimen: home blood tests - checking 4 x daily before kimi     Now has kimi personal use and is able to monitor more frequently and having less hypoglycemia       Lab Results   Component Value Date    HGBA1C 7.53 (H) 07/06/2020            Home blood sugar records:      Kimi personal use --he forgot to bring reader to office         States running good     After breakfast 150     This am 64 units        Hypoglycemia nocturnal and if skipped meals             The following portions of the patient's history were reviewed and updated as appropriate:   Past Medical History:   Diagnosis Date   • Allergic rhinitis    • Ankle joint pain    • Artificial lens present     Left   • Astigmatism    • Benign prostatic hyperplasia    • Cataract    • Closed fracture of medial malleolus    • Coronary arteriosclerosis    • Diabetes mellitus (CMS/HCC)     no retinopathy   • Elevated levels of transaminase & lactic acid dehydrogenase     improving    • Encounter for health maintenance examination     Individual health examination     • Encounter for health maintenance examination in adult     Adult health examination    • Essential hypertension    • Essential hypertension     Unspecified   • Flank pain     probably muscular    • GERD (gastroesophageal reflux disease)    • Gout    • Gout     unspecified     • Hemorrhoids    • History of  artificial eye lens     Artificial lens in position - right    • History of colonoscopy 04/04/2010    Colon endoscopy  (89192)  (1)    • History of kidney stones    • Hyperlipidemia    • Hypermetropia    • Low blood pressure    • Malaise and fatigue    • Need for influenza vaccination     Needs influenza immunization    • Nuclear cataract of left eye    • Posterior subcapsular polar senile cataract     Left   • Renal impairment    • Special screening for malignant neoplasm of prostate    • Type 2 diabetes mellitus (CMS/HCC)     improving   • 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 edema (CMS/HCC)     without macular edema - mild OS    • Upper respiratory infection    • Urticaria     Drug-aggravated angioedema-urticaria   • Urticaria      Past Surgical History:   Procedure Laterality Date   • CARDIAC CATHETERIZATION  03/24/2014    (62103)  (2)  Reduction of stenoisis from 95% to less than 0% stenosis with evidence of good JENNY 3-flow. Distal RCA beyond the touchdown was seen filling the circumflex system   • CATARACT EXTRACTION  10/2015    Remove cataract, insert lens (2)    • CORONARY ARTERY BYPASS GRAFT      Arterial, two  (1)   -  3 - 12/1998   • HERNIA REPAIR      w/mesh  (1)   • INJECTION OF MEDICATION  04/11/2013    B12  (1)  - RAYMOND Raymond   • INJECTION OF MEDICATION  07/03/2013    Benadryl  (1) - DMajor Segundo   • INJECTION OF MEDICATION  10/22/2014    Kenalog  (2)   • OTHER SURGICAL HISTORY  07/10/2002    Fragmenting of kidney stone  -   ES[facility], 2010 shocks. 1cm UP stone,right. Diabetes mellitus     Family History   Problem Relation Age of Onset   • Diabetes Other    • Hypertension Other    • Cancer Mother    • Heart disease Mother    • Hypertension Mother    • Hyperlipidemia Mother    • Diabetes Brother    • Diabetes Maternal Aunt    • Cancer Sister    • Diabetes Sister        Current Outpatient Medications   Medication Sig Dispense Refill   • alfuzosin (UROXATRAL)  "10 MG 24 hr tablet Take 10 mg by mouth Daily.     • amLODIPine (NORVASC) 5 MG tablet Take 1 tablet by mouth Daily. 90 tablet 3   • aspirin 81 MG tablet Take 1 tablet by mouth Daily. 30 tablet 11   • atorvastatin (LIPITOR) 10 MG tablet TAKE 1 TABLET BY MOUTH EVERY NIGHT 90 tablet 3   • carvedilol (COREG) 12.5 MG tablet TAKE 1 TABLET BY MOUTH TWICE DAILY WITH MEALS 180 tablet 1   • clopidogrel (PLAVIX) 75 MG tablet TAKE 1 TABLET BY MOUTH EVERY DAY 90 tablet 3   • ezetimibe (ZETIA) 10 MG tablet Take 1 tablet by mouth Daily. 90 tablet 3   • glucose blood test strip OneTouch Ultra Test strips  -  Test sugars 3-4 times a day as directed by provider.     • insulin aspart (NovoLOG FlexPen) 100 UNIT/ML solution pen-injector sc pen 4-8 units with meals (Patient taking differently: 8 units with meals) 3 pen 11   • Insulin Degludec (TRESIBA FLEXTOUCH) 200 UNIT/ML solution pen-injector pen injection Inject 50 Units under the skin into the appropriate area as directed Every Night. (Patient taking differently: Inject 52 Units under the skin into the appropriate area as directed Every Night.) 4 pen 11   • Insulin Pen Needle (PEN NEEDLES 3/16\") 31G X 5 MM misc 100 each Daily. 100 each 3   • isosorbide mononitrate (IMDUR) 30 MG 24 hr tablet Take 1 tablet by mouth Every Morning. 30 tablet 6   • Lancets (ONETOUCH ULTRASOFT) lancets Test sugars 3-4 times daily as instructed by physician.     • losartan (COZAAR) 100 MG tablet Take 1 tablet by mouth Daily. 90 tablet 3   • magnesium oxide (MAG-OX) 400 MG tablet Take 1 tablet by mouth 2 (Two) Times a Day. 180 tablet 3   • nitroglycerin (NITROSTAT) 0.4 MG SL tablet Place 1 tablet under the tongue Every 5 (Five) Minutes As Needed for Chest Pain. Max 3 doses. Go to ER if no relief 25 tablet 0   • ondansetron ODT (ZOFRAN-ODT) 4 MG disintegrating tablet Take 1 tablet by mouth Every 6 (Six) Hours As Needed for Nausea or Vomiting. 45 tablet 5   • probenecid (BENEMID) 500 MG tablet TAKE 1 TABLET BY " MOUTH TWICE DAILY 180 tablet 3   • amoxicillin (AMOXIL) 500 MG capsule Take 1 capsule by mouth 3 (Three) Times a Day for 10 days. 30 capsule 0     No current facility-administered medications for this visit.      Allergies   Allergen Reactions   • Advicor [Niacin-Lovastatin Er] Swelling     Swelling   • Nsaids Other (See Comments)     Kidney disease   • Lisinopril Cough     Cough     Social History     Socioeconomic History   • Marital status:      Spouse name: Not on file   • Number of children: Not on file   • Years of education: Not on file   • Highest education level: Not on file   Tobacco Use   • Smoking status: Former Smoker   • Smokeless tobacco: Never Used   • Tobacco comment: Quit 1979   Substance and Sexual Activity   • Alcohol use: No   • Drug use: No   • Sexual activity: Defer       Review of Systems  Review of Systems   Constitutional: Negative for activity change, appetite change, diaphoresis and fatigue.   HENT: Negative for facial swelling, sneezing, sore throat, tinnitus, trouble swallowing and voice change.    Eyes: Negative for photophobia, pain, discharge, redness, itching and visual disturbance.   Respiratory: Negative for apnea, cough, choking, chest tightness and shortness of breath.    Cardiovascular: Negative for chest pain, palpitations and leg swelling.   Gastrointestinal: Negative for abdominal distention, abdominal pain, constipation, diarrhea, nausea and vomiting.   Endocrine: Negative for cold intolerance, heat intolerance, polydipsia, polyphagia and polyuria.   Genitourinary: Negative for difficulty urinating, dysuria, frequency, hematuria and urgency.   Musculoskeletal: Negative for arthralgias, back pain, gait problem, joint swelling, myalgias, neck pain and neck stiffness.   Skin: Negative for color change, pallor, rash and wound.   Neurological: Negative for dizziness, tremors, weakness, light-headedness, numbness and headaches.   Hematological: Negative for adenopathy.  "Does not bruise/bleed easily.   Psychiatric/Behavioral: Negative for behavioral problems, confusion and sleep disturbance.        Objective    /70   Pulse 58   Ht 188 cm (74\")   Wt 88 kg (194 lb)   SpO2 99%   BMI 24.91 kg/m²   Physical Exam   Constitutional: He is oriented to person, place, and time. He appears well-developed and well-nourished. No distress.   HENT:   Head: Normocephalic and atraumatic.   Right Ear: External ear normal.   Left Ear: External ear normal.   Nose: Nose normal.   Eyes: Pupils are equal, round, and reactive to light. Conjunctivae and EOM are normal.   Neck: Normal range of motion. Neck supple. No tracheal deviation present. No thyromegaly present.   Cardiovascular: Normal rate, regular rhythm and normal heart sounds.   No murmur heard.  Pulmonary/Chest: Effort normal and breath sounds normal. No respiratory distress. He has no wheezes.   Abdominal: Soft. Bowel sounds are normal. There is no tenderness. There is no rebound and no guarding.   Musculoskeletal: Normal range of motion. He exhibits no edema, tenderness or deformity.   Neurological: He is alert and oriented to person, place, and time. No cranial nerve deficit.   Skin: Skin is warm and dry. No rash noted.   Psychiatric: He has a normal mood and affect. His behavior is normal. Judgment and thought content normal.       Lab Review  Glucose (mg/dL)   Date Value   08/04/2020 109 (H)   07/06/2020 97   04/13/2020 153 (H)     Sodium (mmol/L)   Date Value   08/04/2020 141   07/06/2020 142   04/13/2020 142     Potassium (mmol/L)   Date Value   08/04/2020 5.3 (H)   07/06/2020 4.9   04/13/2020 5.3 (H)     Chloride (mmol/L)   Date Value   08/04/2020 105   07/06/2020 107   04/13/2020 103     CO2 (mmol/L)   Date Value   08/04/2020 27.7   07/06/2020 24.5   04/13/2020 27.1     BUN (mg/dL)   Date Value   08/04/2020 33 (H)   07/06/2020 40 (H)   04/13/2020 31 (H)     Creatinine (mg/dL)   Date Value   08/04/2020 2.08 (H)   07/06/2020 2.41 " "(H)   04/13/2020 1.84 (H)     Hemoglobin A1C (%)   Date Value   07/06/2020 7.53 (H)   04/13/2020 8.79 (H)   11/22/2019 9.40 (H)     Triglycerides (mg/dL)   Date Value   07/06/2020 128   04/13/2020 156 (H)   02/11/2019 187     LDL Cholesterol  (mg/dL)   Date Value   07/06/2020 53   04/13/2020 67   02/11/2019 100   11/12/2018 109   04/05/2018 95       Assessment/Plan      1. Type 2 diabetes mellitus with hyperglycemia, with long-term current use of insulin (CMS/[facility])    2. Mixed hyperlipidemia    3. Essential hypertension    4. Otalgia, unspecified laterality    .    Medications prescribed:  Outpatient Encounter Medications as of 8/11/2020   Medication Sig Dispense Refill   • alfuzosin (UROXATRAL) 10 MG 24 hr tablet Take 10 mg by mouth Daily.     • amLODIPine (NORVASC) 5 MG tablet Take 1 tablet by mouth Daily. 90 tablet 3   • aspirin 81 MG tablet Take 1 tablet by mouth Daily. 30 tablet 11   • atorvastatin (LIPITOR) 10 MG tablet TAKE 1 TABLET BY MOUTH EVERY NIGHT 90 tablet 3   • carvedilol (COREG) 12.5 MG tablet TAKE 1 TABLET BY MOUTH TWICE DAILY WITH MEALS 180 tablet 1   • clopidogrel (PLAVIX) 75 MG tablet TAKE 1 TABLET BY MOUTH EVERY DAY 90 tablet 3   • ezetimibe (ZETIA) 10 MG tablet Take 1 tablet by mouth Daily. 90 tablet 3   • glucose blood test strip OneTouch Ultra Test strips  -  Test sugars 3-4 times a day as directed by provider.     • insulin aspart (NovoLOG FlexPen) 100 UNIT/ML solution pen-injector sc pen 4-8 units with meals (Patient taking differently: 8 units with meals) 3 pen 11   • Insulin Degludec (TRESIBA FLEXTOUCH) 200 UNIT/ML solution pen-injector pen injection Inject 50 Units under the skin into the appropriate area as directed Every Night. (Patient taking differently: Inject 52 Units under the skin into the appropriate area as directed Every Night.) 4 pen 11   • Insulin Pen Needle (PEN NEEDLES 3/16\") 31G X 5 MM misc 100 each Daily. 100 each 3   • isosorbide mononitrate (IMDUR) 30 MG 24 hr tablet " Take 1 tablet by mouth Every Morning. 30 tablet 6   • Lancets (ONETOUCH ULTRASOFT) lancets Test sugars 3-4 times daily as instructed by physician.     • losartan (COZAAR) 100 MG tablet Take 1 tablet by mouth Daily. 90 tablet 3   • magnesium oxide (MAG-OX) 400 MG tablet Take 1 tablet by mouth 2 (Two) Times a Day. 180 tablet 3   • nitroglycerin (NITROSTAT) 0.4 MG SL tablet Place 1 tablet under the tongue Every 5 (Five) Minutes As Needed for Chest Pain. Max 3 doses. Go to ER if no relief 25 tablet 0   • ondansetron ODT (ZOFRAN-ODT) 4 MG disintegrating tablet Take 1 tablet by mouth Every 6 (Six) Hours As Needed for Nausea or Vomiting. 45 tablet 5   • probenecid (BENEMID) 500 MG tablet TAKE 1 TABLET BY MOUTH TWICE DAILY 180 tablet 3   • amoxicillin (AMOXIL) 500 MG capsule Take 1 capsule by mouth 3 (Three) Times a Day for 10 days. 30 capsule 0     No facility-administered encounter medications on file as of 8/11/2020.        Orders placed during this encounter include:  No orders of the defined types were placed in this encounter.    Diabetes complicated by stage III ckd and CAD       Lab Results   Component Value Date    HGBA1C 7.53 (H) 07/06/2020                 kimi freestyle personal use              Discontinued Medications      Trulicity 0.75 mg weekly- stopped - cramping    invokana is expensive , jardiance , side effects     Present regimen      Tresiba taking 52 units --decrease to 48 untis      Morning range 130-180         =====      Metformin 500 mg , 2 with supper ,intially  GI upset but now resolved     Metformin  mg one with supper  ---stopped due to side effects      =====     Novolog 8 units with meals up to 10 meals             Self adjusting explained                    Kimi  has allowed the patient to minimize hypoglycemia as alarms have predicted and avoided them     She also uses the arrows on the kimi  as follows:     If arrow is horizontal , she uses the predicted bolus     If the arrow is  slanted up or down-- she increase or decrease by 4  units     If the arrow is vertical up or down - she increases or decreases by 4 unit s        Microvascular complications      Last eye exam -- July 2020 , following with         Component      Latest Ref Rng & Units 7/6/2020 7/6/2020          11:49 AM 11:49 AM   Protein/Creatinine Ratio      0.0 - 200.0 mg/G Crea 439.4 (H)    Creatinine, Urine      mg/dL 104.7 104.7   Total Protein, Urine      mg/dL 46.0    Microalbumin/Creatinine Ratio      mg/g  172.9   Microalbumin, Urine      mg/dL  18.1        Neuropathy none      Hyperlipidemia              Atorvastatin 10mg , 5 days per week   Zetia 10mg       Component      Latest Ref Rng & Units 7/6/2020             Total Cholesterol      0 - 200 mg/dL 109   Triglycerides      0 - 150 mg/dL 128   HDL Cholesterol      40 - 60 mg/dL 30 (L)   LDL Cholesterol      0 - 100 mg/dL 53   VLDL Cholesterol      5 - 40 mg/dL 25.6   LDL/HDL Ratio       1.78               Hypertension      controlled on losartan, coreg, norvasc      Left ear -- swollen, tender     Called in amoxicillin       4. Follow-up: No follow-ups on file.

## 2020-08-17 ENCOUNTER — OFFICE VISIT (OUTPATIENT)
Dept: ENDOCRINOLOGY | Facility: CLINIC | Age: 75
End: 2020-08-17

## 2020-08-17 VITALS
WEIGHT: 194.9 LBS | BODY MASS INDEX: 25.01 KG/M2 | HEART RATE: 56 BPM | HEIGHT: 74 IN | OXYGEN SATURATION: 99 % | DIASTOLIC BLOOD PRESSURE: 60 MMHG | SYSTOLIC BLOOD PRESSURE: 130 MMHG

## 2020-08-17 DIAGNOSIS — E78.2 MIXED HYPERLIPIDEMIA: ICD-10-CM

## 2020-08-17 DIAGNOSIS — I10 ESSENTIAL HYPERTENSION: Primary | ICD-10-CM

## 2020-08-17 DIAGNOSIS — Z79.4 TYPE 2 DIABETES MELLITUS WITH HYPERGLYCEMIA, WITH LONG-TERM CURRENT USE OF INSULIN (HCC): ICD-10-CM

## 2020-08-17 DIAGNOSIS — E55.9 VITAMIN D DEFICIENCY: ICD-10-CM

## 2020-08-17 DIAGNOSIS — E11.65 TYPE 2 DIABETES MELLITUS WITH HYPERGLYCEMIA, WITH LONG-TERM CURRENT USE OF INSULIN (HCC): ICD-10-CM

## 2020-08-17 PROCEDURE — 99214 OFFICE O/P EST MOD 30 MIN: CPT | Performed by: NURSE PRACTITIONER

## 2020-08-17 RX ORDER — OFLOXACIN 3 MG/ML
5 SOLUTION AURICULAR (OTIC) 2 TIMES DAILY
Qty: 10 ML | Refills: 0 | Status: SHIPPED | OUTPATIENT
Start: 2020-08-17 | End: 2020-08-27

## 2020-08-17 NOTE — PROGRESS NOTES
Subjective    Zachary Galindo is a 74 y.o. male. he is here today for follow-up.    History of Present Illness         IN OFFICE VISIT     Recheck ear and bg      REASON - DIABETES      Duration DIAGNOSED AT AGE 37      Timing - Diabetes is Constant     Quality - improving      Severity -  High due to associated complications      Complications - nephropathy and CAD      Current symptoms/problems  increase appetite      Alleviating Factors: Compliance       Side Effects  cramping from Trulicity , metformin on diarrhea      Current diet  tries to watch carb intake      Current exercise none      Current monitoring regimen: home blood tests - checking 4 x daily before kimi     Now has kimi personal use and is able to monitor more frequently and having less hypoglycemia               Lab Results   Component Value Date     HGBA1C 7.53 (H) 07/06/2020               Home blood sugar records:      Kimi personal use --he forgot to bring reader to office          States running good      No low siince last week              Hypoglycemia nocturnal and if skipped meals               The following portions of the patient's history were reviewed and updated as appropriate:   Past Medical History:   Diagnosis Date   • Allergic rhinitis    • Ankle joint pain    • Artificial lens present     Left   • Astigmatism    • Benign prostatic hyperplasia    • Cataract    • Closed fracture of medial malleolus    • Coronary arteriosclerosis    • Diabetes mellitus (CMS/HCC)     no retinopathy   • Elevated levels of transaminase & lactic acid dehydrogenase     improving    • Encounter for health maintenance examination     Individual health examination     • Encounter for health maintenance examination in adult     Adult health examination    • Essential hypertension    • Essential hypertension     Unspecified   • Flank pain     probably muscular    • GERD (gastroesophageal reflux disease)    • Gout    • Gout     unspecified     •  Hemorrhoids    • History of artificial eye lens     Artificial lens in position - right    • History of colonoscopy 04/04/2010    Colon endoscopy  ([id number])  (1)    • History of kidney stones    • Hyperlipidemia    • Hypermetropia    • Low blood pressure    • Malaise and fatigue    • Need for influenza vaccination     Needs influenza immunization    • Nuclear cataract of left eye    • Posterior subcapsular polar senile cataract     Left   • Renal impairment    • Special screening for malignant neoplasm of prostate    • Type 2 diabetes mellitus (CMS/HCC)     improving   • 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 Distal RCA beyond the touchdown was seen filling the circumflex system   • CATARACT EXTRACTION  10/2015    Remove cataract, insert lens (2)    • CORONARY ARTERY BYPASS GRAFT      Arterial, two  (1)   -  3 - 12/1998   • HERNIA REPAIR      w/mesh  (1)   • INJECTION OF MEDICATION  04/11/2013    B12  (1)  - RAYMOND Raymond   • INJECTION OF MEDICATION  07/03/2013    Benadryl  (1) - RAYMOND Raymond   • INJECTION OF MEDICATION  10/22/2014    Kenalog  (2)   • OTHER SURGICAL HISTORY  07/10/2002    Fragmenting of kidney stone  -   ES[facility], 2010 shocks. 1cm UP stone,right. Diabetes mellitus     Family History   Problem Relation Age of Onset   • Diabetes Other    • Hypertension Other    • Cancer Mother    • Heart disease Mother    • Hypertension Mother    • Hyperlipidemia Mother    • Diabetes Brother    • Diabetes Maternal Aunt    • Cancer Sister    • Diabetes Sister        Current Outpatient Medications   Medication Sig Dispense  "Refill   • alfuzosin (UROXATRAL) 10 MG 24 hr tablet Take 10 mg by mouth Daily.     • amLODIPine (NORVASC) 5 MG tablet Take 1 tablet by mouth Daily. 90 tablet 3   • amoxicillin (AMOXIL) 500 MG capsule Take 1 capsule by mouth 3 (Three) Times a Day for 10 days. 30 capsule 0   • aspirin 81 MG tablet Take 1 tablet by mouth Daily. 30 tablet 11   • atorvastatin (LIPITOR) 10 MG tablet TAKE 1 TABLET BY MOUTH EVERY NIGHT 90 tablet 3   • carvedilol (COREG) 12.5 MG tablet TAKE 1 TABLET BY MOUTH TWICE DAILY WITH MEALS 180 tablet 1   • clopidogrel (PLAVIX) 75 MG tablet TAKE 1 TABLET BY MOUTH EVERY DAY 90 tablet 3   • ezetimibe (ZETIA) 10 MG tablet Take 1 tablet by mouth Daily. 90 tablet 3   • glucose blood test strip OneTouch Ultra Test strips  -  Test sugars 3-4 times a day as directed by provider.     • insulin aspart (NovoLOG FlexPen) 100 UNIT/ML solution pen-injector sc pen 4-8 units with meals (Patient taking differently: 8 units with meals) 3 pen 11   • Insulin Degludec (TRESIBA FLEXTOUCH) 200 UNIT/ML solution pen-injector pen injection Inject 50 Units under the skin into the appropriate area as directed Every Night. (Patient taking differently: Inject 52 Units under the skin into the appropriate area as directed Every Night.) 4 pen 11   • Insulin Pen Needle (PEN NEEDLES 3/16\") 31G X 5 MM misc 100 each Daily. 100 each 3   • isosorbide mononitrate (IMDUR) 30 MG 24 hr tablet Take 1 tablet by mouth Every Morning. 30 tablet 6   • Lancets (ONETOUCH ULTRASOFT) lancets Test sugars 3-4 times daily as instructed by physician.     • losartan (COZAAR) 100 MG tablet Take 1 tablet by mouth Daily. 90 tablet 3   • magnesium oxide (MAG-OX) 400 MG tablet Take 1 tablet by mouth 2 (Two) Times a Day. 180 tablet 3   • nitroglycerin (NITROSTAT) 0.4 MG SL tablet Place 1 tablet under the tongue Every 5 (Five) Minutes As Needed for Chest Pain. Max 3 doses. Go to ER if no relief 25 tablet 0   • ondansetron ODT (ZOFRAN-ODT) 4 MG disintegrating tablet " Take 1 tablet by mouth Every 6 (Six) Hours As Needed for Nausea or Vomiting. 45 tablet 5   • probenecid (BENEMID) 500 MG tablet TAKE 1 TABLET BY MOUTH TWICE DAILY 180 tablet 3   • ofloxacin (Floxin Otic) 0.3 % otic solution Administer 5 drops into the left ear 2 (Two) Times a Day. For 10 days 10 mL 0     No current facility-administered medications for this visit.      Allergies   Allergen Reactions   • Advicor [Niacin-Lovastatin Er] Swelling     Swelling   • Nsaids Other (See Comments)     Kidney disease   • Lisinopril Cough     Cough     Social History     Socioeconomic History   • Marital status:      Spouse name: Not on file   • Number of children: Not on file   • Years of education: Not on file   • Highest education level: Not on file   Tobacco Use   • Smoking status: Former Smoker   • Smokeless tobacco: Never Used   • Tobacco comment: Quit 1979   Substance and Sexual Activity   • Alcohol use: No   • Drug use: No   • Sexual activity: Defer       Review of Systems  Review of Systems   Constitutional: Negative for activity change, appetite change, diaphoresis and fatigue.   HENT: Negative for facial swelling, sneezing, sore throat, tinnitus, trouble swallowing and voice change.    Eyes: Negative for photophobia, pain, discharge, redness, itching and visual disturbance.   Respiratory: Negative for apnea, cough, choking, chest tightness and shortness of breath.    Cardiovascular: Negative for chest pain, palpitations and leg swelling.   Gastrointestinal: Negative for abdominal distention, abdominal pain, constipation, diarrhea, nausea and vomiting.   Endocrine: Negative for cold intolerance, heat intolerance, polydipsia, polyphagia and polyuria.   Genitourinary: Negative for difficulty urinating, dysuria, frequency, hematuria and urgency.   Musculoskeletal: Negative for arthralgias, back pain, gait problem, joint swelling, myalgias, neck pain and neck stiffness.   Skin: Negative for color change, pallor,  "rash and wound.   Neurological: Negative for dizziness, tremors, weakness, light-headedness, numbness and headaches.   Hematological: Negative for adenopathy. Does not bruise/bleed easily.   Psychiatric/Behavioral: Negative for behavioral problems, confusion and sleep disturbance.        Objective    /60   Pulse 56   Ht 188 cm (74\")   Wt 88.4 kg (194 lb 14.4 oz)   SpO2 99%   BMI 25.02 kg/m²   Physical Exam   Constitutional: He is oriented to person, place, and time. He appears well-developed and well-nourished. No distress.   HENT:   Head: Normocephalic and atraumatic.   Right Ear: External ear normal.   Left Ear: External ear normal.   Nose: Nose normal.   Eyes: Pupils are equal, round, and reactive to light. Conjunctivae and EOM are normal.   Neck: Normal range of motion. Neck supple. No tracheal deviation present. No thyromegaly present.   Cardiovascular: Normal rate, regular rhythm and normal heart sounds.   No murmur heard.  Pulmonary/Chest: Effort normal and breath sounds normal. No respiratory distress. He has no wheezes.   Abdominal: Soft. Bowel sounds are normal. There is no tenderness. There is no rebound and no guarding.   Musculoskeletal: Normal range of motion. He exhibits no edema, tenderness or deformity.   Neurological: He is alert and oriented to person, place, and time. No cranial nerve deficit.   Skin: Skin is warm and dry. No rash noted.   Psychiatric: He has a normal mood and affect. His behavior is normal. Judgment and thought content normal.       Lab Review  Glucose (mg/dL)   Date Value   08/04/2020 109 (H)   07/06/2020 97   04/13/2020 153 (H)     Sodium (mmol/L)   Date Value   08/04/2020 141   07/06/2020 142   04/13/2020 142     Potassium (mmol/L)   Date Value   08/04/2020 5.3 (H)   07/06/2020 4.9   04/13/2020 5.3 (H)     Chloride (mmol/L)   Date Value   08/04/2020 105   07/06/2020 107   04/13/2020 103     CO2 (mmol/L)   Date Value   08/04/2020 27.7   07/06/2020 24.5   04/13/2020 " 27.1     BUN (mg/dL)   Date Value   08/04/2020 33 (H)   07/06/2020 40 (H)   04/13/2020 31 (H)     Creatinine (mg/dL)   Date Value   08/04/2020 2.08 (H)   07/06/2020 2.41 (H)   04/13/2020 1.84 (H)     Hemoglobin A1C (%)   Date Value   07/06/2020 7.53 (H)   04/13/2020 8.79 (H)   11/22/2019 9.40 (H)     Triglycerides (mg/dL)   Date Value   07/06/2020 128   04/13/2020 156 (H)   02/11/2019 187     LDL Cholesterol  (mg/dL)   Date Value   07/06/2020 53   04/13/2020 67   02/11/2019 100   11/12/2018 109   04/05/2018 95       Assessment/Plan      1. Essential hypertension    2. Mixed hyperlipidemia    3. Type 2 diabetes mellitus with hyperglycemia, with long-term current use of insulin (CMS/[facility])    4. Vitamin D deficiency    .    Medications prescribed:  Outpatient Encounter Medications as of 8/17/2020   Medication Sig Dispense Refill   • alfuzosin (UROXATRAL) 10 MG 24 hr tablet Take 10 mg by mouth Daily.     • amLODIPine (NORVASC) 5 MG tablet Take 1 tablet by mouth Daily. 90 tablet 3   • amoxicillin (AMOXIL) 500 MG capsule Take 1 capsule by mouth 3 (Three) Times a Day for 10 days. 30 capsule 0   • aspirin 81 MG tablet Take 1 tablet by mouth Daily. 30 tablet 11   • atorvastatin (LIPITOR) 10 MG tablet TAKE 1 TABLET BY MOUTH EVERY NIGHT 90 tablet 3   • carvedilol (COREG) 12.5 MG tablet TAKE 1 TABLET BY MOUTH TWICE DAILY WITH MEALS 180 tablet 1   • clopidogrel (PLAVIX) 75 MG tablet TAKE 1 TABLET BY MOUTH EVERY DAY 90 tablet 3   • ezetimibe (ZETIA) 10 MG tablet Take 1 tablet by mouth Daily. 90 tablet 3   • glucose blood test strip OneTouch Ultra Test strips  -  Test sugars 3-4 times a day as directed by provider.     • insulin aspart (NovoLOG FlexPen) 100 UNIT/ML solution pen-injector sc pen 4-8 units with meals (Patient taking differently: 8 units with meals) 3 pen 11   • Insulin Degludec (TRESIBA FLEXTOUCH) 200 UNIT/ML solution pen-injector pen injection Inject 50 Units under the skin into the appropriate area as directed Every  "Night. (Patient taking differently: Inject 52 Units under the skin into the appropriate area as directed Every Night.) 4 pen 11   • Insulin Pen Needle (PEN NEEDLES 3/16\") 31G X 5 MM misc 100 each Daily. 100 each 3   • isosorbide mononitrate (IMDUR) 30 MG 24 hr tablet Take 1 tablet by mouth Every Morning. 30 tablet 6   • Lancets (ONETOUCH ULTRASOFT) lancets Test sugars 3-4 times daily as instructed by physician.     • losartan (COZAAR) 100 MG tablet Take 1 tablet by mouth Daily. 90 tablet 3   • magnesium oxide (MAG-OX) 400 MG tablet Take 1 tablet by mouth 2 (Two) Times a Day. 180 tablet 3   • nitroglycerin (NITROSTAT) 0.4 MG SL tablet Place 1 tablet under the tongue Every 5 (Five) Minutes As Needed for Chest Pain. Max 3 doses. Go to ER if no relief 25 tablet 0   • ondansetron ODT (ZOFRAN-ODT) 4 MG disintegrating tablet Take 1 tablet by mouth Every 6 (Six) Hours As Needed for Nausea or Vomiting. 45 tablet 5   • probenecid (BENEMID) 500 MG tablet TAKE 1 TABLET BY MOUTH TWICE DAILY 180 tablet 3   • ofloxacin (Floxin Otic) 0.3 % otic solution Administer 5 drops into the left ear 2 (Two) Times a Day. For 10 days 10 mL 0     No facility-administered encounter medications on file as of 8/17/2020.        Orders placed during this encounter include:  Orders Placed This Encounter   Procedures   • Comprehensive Metabolic Panel   • Hemoglobin A1c   • Vitamin D 25 Hydroxy   • Vitamin B12   • TSH   • CBC & Differential     Order Specific Question:   Manual Differential     Answer:   No     Diabetes complicated by stage III ckd and CAD               Lab Results   Component Value Date     HGBA1C 7.53 (H) 07/06/2020                   catherine freestyle personal use               Discontinued Medications      Trulicity 0.75 mg weekly- stopped - cramping    invokana is expensive , jardiance , side effects     Present regimen      Tresiba taking  48 untis     Improved since decreasing tresiba      Morning range 130-180         =====      " Metformin 500 mg , 2 with supper ,intially  GI upset but now resolved     Metformin  mg one with supper  ---stopped due to side effects      =====     Novolog 8 units with meals up to 10 meals               Self adjusting explained                    Kimi  has allowed the patient to minimize hypoglycemia as alarms have predicted and avoided them     She also uses the arrows on the kimi  as follows:     If arrow is horizontal , she uses the predicted bolus     If the arrow is slanted up or down-- she increase or decrease by 4  units     If the arrow is vertical up or down - she increases or decreases by 4 unit s        Microvascular complications      Last eye exam -- July 2020 , following with          Component      Latest Ref Rng & Units 7/6/2020 7/6/2020          11:49 AM 11:49 AM   Protein/Creatinine Ratio      0.0 - 200.0 mg/G Crea 439.4 (H)     Creatinine, Urine      mg/dL 104.7 104.7   Total Protein, Urine      mg/dL 46.0     Microalbumin/Creatinine Ratio      mg/g   172.9   Microalbumin, Urine      mg/dL   18.1         Neuropathy none      Hyperlipidemia              Atorvastatin 10mg , 5 days per week   Zetia 10mg         Component      Latest Ref Rng & Units 7/6/2020              Total Cholesterol      0 - 200 mg/dL 109   Triglycerides      0 - 150 mg/dL 128   HDL Cholesterol      40 - 60 mg/dL 30 (L)   LDL Cholesterol      0 - 100 mg/dL 53   VLDL Cholesterol      5 - 40 mg/dL 25.6   LDL/HDL Ratio       1.78                  Hypertension      controlled on losartan, coreg, norvasc        Left ear -- the swelling is now down, no edema                 4. Follow-up: Return in about 3 months (around 11/17/2020) for Recheck.

## 2020-08-27 ENCOUNTER — OFFICE VISIT (OUTPATIENT)
Dept: FAMILY MEDICINE CLINIC | Facility: CLINIC | Age: 75
End: 2020-08-27

## 2020-08-27 VITALS
SYSTOLIC BLOOD PRESSURE: 140 MMHG | HEART RATE: 72 BPM | OXYGEN SATURATION: 98 % | DIASTOLIC BLOOD PRESSURE: 70 MMHG | WEIGHT: 193.1 LBS | HEIGHT: 74 IN | BODY MASS INDEX: 24.78 KG/M2

## 2020-08-27 DIAGNOSIS — G62.9 NEUROPATHY: Primary | ICD-10-CM

## 2020-08-27 PROCEDURE — 99213 OFFICE O/P EST LOW 20 MIN: CPT | Performed by: GENERAL PRACTICE

## 2020-08-27 RX ORDER — GABAPENTIN 300 MG/1
CAPSULE ORAL
Qty: 30 CAPSULE | Refills: 0 | Status: SHIPPED | OUTPATIENT
Start: 2020-08-27 | End: 2021-02-02 | Stop reason: SDUPTHER

## 2020-11-06 ENCOUNTER — DOCUMENTATION (OUTPATIENT)
Dept: ENDOCRINOLOGY | Facility: CLINIC | Age: 75
End: 2020-11-06

## 2020-11-10 ENCOUNTER — LAB (OUTPATIENT)
Dept: LAB | Facility: HOSPITAL | Age: 75
End: 2020-11-10

## 2020-11-10 DIAGNOSIS — I10 ESSENTIAL HYPERTENSION: Primary | Chronic | ICD-10-CM

## 2020-11-10 PROCEDURE — 80053 COMPREHEN METABOLIC PANEL: CPT | Performed by: NURSE PRACTITIONER

## 2020-11-10 PROCEDURE — 36415 COLL VENOUS BLD VENIPUNCTURE: CPT | Performed by: NURSE PRACTITIONER

## 2020-11-10 PROCEDURE — 82306 VITAMIN D 25 HYDROXY: CPT | Performed by: NURSE PRACTITIONER

## 2020-11-10 PROCEDURE — 83036 HEMOGLOBIN GLYCOSYLATED A1C: CPT | Performed by: NURSE PRACTITIONER

## 2020-11-10 PROCEDURE — 82607 VITAMIN B-12: CPT | Performed by: NURSE PRACTITIONER

## 2020-11-10 PROCEDURE — 85025 COMPLETE CBC W/AUTO DIFF WBC: CPT | Performed by: NURSE PRACTITIONER

## 2020-11-10 PROCEDURE — 84443 ASSAY THYROID STIM HORMONE: CPT | Performed by: NURSE PRACTITIONER

## 2020-11-11 LAB
25(OH)D3 SERPL-MCNC: 42.5 NG/ML (ref 30–100)
ALBUMIN SERPL-MCNC: 4.4 G/DL (ref 3.5–5.2)
ALBUMIN/GLOB SERPL: 1.4 G/DL
ALP SERPL-CCNC: 78 U/L (ref 39–117)
ALT SERPL W P-5'-P-CCNC: 21 U/L (ref 1–41)
ANION GAP SERPL CALCULATED.3IONS-SCNC: 7.3 MMOL/L (ref 5–15)
AST SERPL-CCNC: 19 U/L (ref 1–40)
BASOPHILS # BLD AUTO: 0.05 10*3/MM3 (ref 0–0.2)
BASOPHILS NFR BLD AUTO: 0.8 % (ref 0–1.5)
BILIRUB SERPL-MCNC: 0.3 MG/DL (ref 0–1.2)
BUN SERPL-MCNC: 39 MG/DL (ref 8–23)
BUN/CREAT SERPL: 18.9 (ref 7–25)
CALCIUM SPEC-SCNC: 9.4 MG/DL (ref 8.6–10.5)
CHLORIDE SERPL-SCNC: 103 MMOL/L (ref 98–107)
CO2 SERPL-SCNC: 27.7 MMOL/L (ref 22–29)
CREAT SERPL-MCNC: 2.06 MG/DL (ref 0.76–1.27)
DEPRECATED RDW RBC AUTO: 44.8 FL (ref 37–54)
EOSINOPHIL # BLD AUTO: 0.19 10*3/MM3 (ref 0–0.4)
EOSINOPHIL NFR BLD AUTO: 3.2 % (ref 0.3–6.2)
ERYTHROCYTE [DISTWIDTH] IN BLOOD BY AUTOMATED COUNT: 13.8 % (ref 12.3–15.4)
GFR SERPL CREATININE-BSD FRML MDRD: 32 ML/MIN/1.73
GLOBULIN UR ELPH-MCNC: 3.1 GM/DL
GLUCOSE SERPL-MCNC: 184 MG/DL (ref 65–99)
HBA1C MFR BLD: 8.55 % (ref 4.8–5.6)
HCT VFR BLD AUTO: 41.3 % (ref 37.5–51)
HGB BLD-MCNC: 13.9 G/DL (ref 13–17.7)
IMM GRANULOCYTES # BLD AUTO: 0.02 10*3/MM3 (ref 0–0.05)
IMM GRANULOCYTES NFR BLD AUTO: 0.3 % (ref 0–0.5)
LYMPHOCYTES # BLD AUTO: 1.26 10*3/MM3 (ref 0.7–3.1)
LYMPHOCYTES NFR BLD AUTO: 21.2 % (ref 19.6–45.3)
MCH RBC QN AUTO: 30 PG (ref 26.6–33)
MCHC RBC AUTO-ENTMCNC: 33.7 G/DL (ref 31.5–35.7)
MCV RBC AUTO: 89.2 FL (ref 79–97)
MONOCYTES # BLD AUTO: 0.46 10*3/MM3 (ref 0.1–0.9)
MONOCYTES NFR BLD AUTO: 7.8 % (ref 5–12)
NEUTROPHILS NFR BLD AUTO: 3.95 10*3/MM3 (ref 1.7–7)
NEUTROPHILS NFR BLD AUTO: 66.7 % (ref 42.7–76)
NRBC BLD AUTO-RTO: 0 /100 WBC (ref 0–0.2)
PLATELET # BLD AUTO: 121 10*3/MM3 (ref 140–450)
PMV BLD AUTO: 12.6 FL (ref 6–12)
POTASSIUM SERPL-SCNC: 4.6 MMOL/L (ref 3.5–5.2)
PROT SERPL-MCNC: 7.5 G/DL (ref 6–8.5)
RBC # BLD AUTO: 4.63 10*6/MM3 (ref 4.14–5.8)
SODIUM SERPL-SCNC: 138 MMOL/L (ref 136–145)
TSH SERPL DL<=0.05 MIU/L-ACNC: 2.49 UIU/ML (ref 0.27–4.2)
VIT B12 BLD-MCNC: 497 PG/ML (ref 211–946)
WBC # BLD AUTO: 5.93 10*3/MM3 (ref 3.4–10.8)

## 2020-11-17 ENCOUNTER — OFFICE VISIT (OUTPATIENT)
Dept: ENDOCRINOLOGY | Facility: CLINIC | Age: 75
End: 2020-11-17

## 2020-11-17 DIAGNOSIS — I10 ESSENTIAL HYPERTENSION: Primary | Chronic | ICD-10-CM

## 2020-11-17 DIAGNOSIS — E55.9 VITAMIN D DEFICIENCY: ICD-10-CM

## 2020-11-17 DIAGNOSIS — E11.65 TYPE 2 DIABETES MELLITUS WITH HYPERGLYCEMIA, WITH LONG-TERM CURRENT USE OF INSULIN (HCC): ICD-10-CM

## 2020-11-17 DIAGNOSIS — E78.2 MIXED HYPERLIPIDEMIA: Chronic | ICD-10-CM

## 2020-11-17 DIAGNOSIS — Z79.4 TYPE 2 DIABETES MELLITUS WITH HYPERGLYCEMIA, WITH LONG-TERM CURRENT USE OF INSULIN (HCC): ICD-10-CM

## 2020-11-17 PROCEDURE — 99443 PR PHYS/QHP TELEPHONE EVALUATION 21-30 MIN: CPT | Performed by: NURSE PRACTITIONER

## 2020-11-17 NOTE — PROGRESS NOTES
Subjective    Zachary Galindo is a 74 y.o. male. he is here today for follow-up.    History of Present Illness     You have chosen to receive care through a telephone visit. Do you consent to use a telephone visit for your medical care today? Yes         TELEHEALTH TELEPHONE   VISIT     This a telephone  visit due to [facility] current guidelines for social distancing due to the COVID 19 pandemic    74-year-old male presents for follow-up    Reason diabetes mellitus type 2    Duration diagnosed at the age of 37    Timing constant    Quality not controlled      Aggravating factors states that his insulin pen was not working and he was not getting insulin     Severity -  High due to associated complications      Complications - nephropathy and CAD      Current symptoms/problems   none      Alleviating Factors: Compliance       Side Effects  cramping from Trulicity , metformin on diarrhea      Current diet  tries to watch carb intake      Current exercise none      Current monitoring regimen: home blood tests - checking 4 x daily before nichole     Now has nichole personal use and is able to monitor more frequently and having less hypoglycemia         Lab Results   Component Value Date    HGBA1C 8.55 (H) 11/10/2020             Home blood sugar records:      NICHOLE USING THE PERSONAL SYSTEM          States now back at goal--states was out of control due to insulin pen not working         This a.m. 120      Hypoglycemia nocturnal and if skipped meals --- none documented since last visit          Stream physical reviewed no changes noted    The following portions of the patient's history were reviewed and updated as appropriate:   Past Medical History:   Diagnosis Date   • Allergic rhinitis    • Ankle joint pain    • Artificial lens present     Left   • Astigmatism    • Benign prostatic hyperplasia    • Cataract    • Closed fracture of medial malleolus    • Coronary arteriosclerosis    • Diabetes mellitus (CMS/[facility])     no  retinopathy   • Elevated levels of transaminase & lactic acid dehydrogenase     improving    • Encounter for health maintenance examination     Individual health examination     • Encounter for health maintenance examination in adult     Adult health examination    • Essential hypertension    • Essential hypertension     Unspecified   • Flank pain     probably muscular    • GERD (gastroesophageal reflux disease)    • Gout    • Gout     unspecified     • Hemorrhoids    • History of artificial eye lens     Artificial lens in position - right    • History of colonoscopy 04/04/2010    Colon endoscopy  (48184)  (1)    • History of kidney stones    • Hyperlipidemia    • Hypermetropia    • Low blood pressure    • Malaise and fatigue    • Need for influenza vaccination     Needs influenza immunization    • Nuclear cataract of left eye    • Posterior subcapsular polar senile cataract     Left   • Renal impairment    • Special screening for malignant neoplasm of prostate    • Type 2 diabetes mellitus (CMS/HCC)     improving   • 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 edema (CMS/HCC)     without macular edema - mild OS    • Upper respiratory infection    • Urticaria     Drug-aggravated angioedema-urticaria   • Urticaria      Past Surgical History:   Procedure Laterality Date   • CARDIAC CATHETERIZATION  03/24/2014    (23982)  (2)  Reduction of stenoisis from 95% to less than 0% stenosis with evidence of good JENNY 3-flow. Distal RCA beyond the touchdown was seen filling the circumflex system   • CATARACT EXTRACTION  10/2015    Remove cataract, insert lens (2)    • CORONARY ARTERY BYPASS GRAFT      Arterial, two  (1)   -  3 - 12/1998   • HERNIA REPAIR      w/mesh  (1)   • INJECTION OF MEDICATION  04/11/2013    B12  (1)  - RAYMOND Raymond   • INJECTION OF MEDICATION  07/03/2013    Benadryl  (1) - RAYMOND Raymond   • INJECTION OF MEDICATION  10/22/2014    Kenalog  (2)   • OTHER SURGICAL  "HISTORY  07/10/2002    Fragmenting of kidney stone  -   ESWL, 2010 shocks. 1cm UP stone,right. Diabetes mellitus     Family History   Problem Relation Age of Onset   • Diabetes Other    • Hypertension Other    • Cancer Mother    • Heart disease Mother    • Hypertension Mother    • Hyperlipidemia Mother    • Diabetes Brother    • Diabetes Maternal Aunt    • Cancer Sister    • Diabetes Sister        Current Outpatient Medications   Medication Sig Dispense Refill   • alfuzosin (UROXATRAL) 10 MG 24 hr tablet Take 10 mg by mouth Daily.     • amLODIPine (NORVASC) 5 MG tablet Take 1 tablet by mouth Daily. 90 tablet 3   • aspirin 81 MG tablet Take 1 tablet by mouth Daily. 30 tablet 11   • atorvastatin (LIPITOR) 10 MG tablet TAKE 1 TABLET BY MOUTH EVERY NIGHT 90 tablet 3   • carvedilol (COREG) 12.5 MG tablet TAKE 1 TABLET BY MOUTH TWICE DAILY WITH MEALS 180 tablet 1   • clopidogrel (PLAVIX) 75 MG tablet TAKE 1 TABLET BY MOUTH EVERY DAY 90 tablet 3   • ezetimibe (ZETIA) 10 MG tablet Take 1 tablet by mouth Daily. 90 tablet 3   • gabapentin (NEURONTIN) 300 MG capsule 1 qam and 2 qhs 30 capsule 0   • glucose blood test strip OneTouch Ultra Test strips  -  Test sugars 3-4 times a day as directed by provider.     • insulin aspart (NovoLOG FlexPen) 100 UNIT/ML solution pen-injector sc pen 4-8 units with meals (Patient taking differently: 8 units with meals) 3 pen 11   • Insulin Degludec (TRESIBA FLEXTOUCH) 200 UNIT/ML solution pen-injector pen injection Inject 50 Units under the skin into the appropriate area as directed Every Night. (Patient taking differently: Inject 52 Units under the skin into the appropriate area as directed Every Night.) 4 pen 11   • Insulin Pen Needle (PEN NEEDLES 3/16\") 31G X 5 MM misc 100 each Daily. 100 each 3   • isosorbide mononitrate (IMDUR) 30 MG 24 hr tablet Take 1 tablet by mouth Every Morning. 30 tablet 6   • Lancets (ONETOUCH ULTRASOFT) lancets Test sugars 3-4 times daily as instructed by " physician.     • losartan (COZAAR) 100 MG tablet Take 1 tablet by mouth Daily. (Patient taking differently: Take 50 mg by mouth Daily.) 90 tablet 3   • magnesium oxide (MAG-OX) 400 MG tablet Take 1 tablet by mouth 2 (Two) Times a Day. 180 tablet 3   • nitroglycerin (NITROSTAT) 0.4 MG SL tablet Place 1 tablet under the tongue Every 5 (Five) Minutes As Needed for Chest Pain. Max 3 doses. Go to ER if no relief 25 tablet 0   • ondansetron ODT (ZOFRAN-ODT) 4 MG disintegrating tablet Take 1 tablet by mouth Every 6 (Six) Hours As Needed for Nausea or Vomiting. 45 tablet 5   • probenecid (BENEMID) 500 MG tablet TAKE 1 TABLET BY MOUTH TWICE DAILY 180 tablet 3     No current facility-administered medications for this visit.      Allergies   Allergen Reactions   • Advicor [Niacin-Lovastatin Er] Swelling     Swelling   • Nsaids Other (See Comments)     Kidney disease   • Lisinopril Cough     Cough     Social History     Socioeconomic History   • Marital status:      Spouse name: Not on file   • Number of children: Not on file   • Years of education: Not on file   • Highest education level: Not on file   Tobacco Use   • Smoking status: Former Smoker   • Smokeless tobacco: Never Used   • Tobacco comment: Quit 1979   Substance and Sexual Activity   • Alcohol use: No   • Drug use: No   • Sexual activity: Defer       Review of Systems  Review of Systems   Constitutional: Negative for activity change, appetite change, diaphoresis and fatigue.   HENT: Negative for facial swelling, sneezing, sore throat, tinnitus, trouble swallowing and voice change.    Eyes: Negative for photophobia, pain, discharge, redness, itching and visual disturbance.   Respiratory: Negative for apnea, cough, choking, chest tightness and shortness of breath.    Cardiovascular: Negative for chest pain, palpitations and leg swelling.   Gastrointestinal: Negative for abdominal distention, abdominal pain, constipation, diarrhea, nausea and vomiting.    Endocrine: Negative for cold intolerance, heat intolerance, polydipsia, polyphagia and polyuria.   Genitourinary: Negative for difficulty urinating, dysuria, frequency, hematuria and urgency.   Musculoskeletal: Negative for arthralgias, back pain, gait problem, joint swelling, myalgias, neck pain and neck stiffness.   Skin: Negative for color change, pallor, rash and wound.   Neurological: Negative for dizziness, tremors, weakness, light-headedness, numbness and headaches.   Hematological: Negative for adenopathy. Does not bruise/bleed easily.   Psychiatric/Behavioral: Negative for behavioral problems, confusion and sleep disturbance.        Objective    There were no vitals taken for this visit.  Physical Exam  Neurological:      General: No focal deficit present.      Mental Status: He is alert.   Psychiatric:         Mood and Affect: Mood normal.         Thought Content: Thought content normal.         Judgment: Judgment normal.         Lab Review  Glucose (mg/dL)   Date Value   11/10/2020 184 (H)   08/04/2020 109 (H)   07/06/2020 97     Sodium (mmol/L)   Date Value   11/10/2020 138   08/04/2020 141   07/06/2020 142     Potassium (mmol/L)   Date Value   11/10/2020 4.6   08/04/2020 5.3 (H)   07/06/2020 4.9     Chloride (mmol/L)   Date Value   11/10/2020 103   08/04/2020 105   07/06/2020 107     CO2 (mmol/L)   Date Value   11/10/2020 27.7   08/04/2020 27.7   07/06/2020 24.5     BUN (mg/dL)   Date Value   11/10/2020 39 (H)   08/04/2020 33 (H)   07/06/2020 40 (H)     Creatinine (mg/dL)   Date Value   11/10/2020 2.06 (H)   08/04/2020 2.08 (H)   07/06/2020 2.41 (H)     Hemoglobin A1C (%)   Date Value   11/10/2020 8.55 (H)   07/06/2020 7.53 (H)   04/13/2020 8.79 (H)     Triglycerides (mg/dL)   Date Value   07/06/2020 128   04/13/2020 156 (H)   02/11/2019 187     LDL Cholesterol  (mg/dL)   Date Value   07/06/2020 53   04/13/2020 67   02/11/2019 100   11/12/2018 109   04/05/2018 95       Assessment/Plan      1.  "Essential hypertension    2. Mixed hyperlipidemia    3. Type 2 diabetes mellitus with hyperglycemia, with long-term current use of insulin (CMS/[facility])    4. Vitamin D deficiency    .    Medications prescribed:  Outpatient Encounter Medications as of 11/17/2020   Medication Sig Dispense Refill   • alfuzosin (UROXATRAL) 10 MG 24 hr tablet Take 10 mg by mouth Daily.     • amLODIPine (NORVASC) 5 MG tablet Take 1 tablet by mouth Daily. 90 tablet 3   • aspirin 81 MG tablet Take 1 tablet by mouth Daily. 30 tablet 11   • atorvastatin (LIPITOR) 10 MG tablet TAKE 1 TABLET BY MOUTH EVERY NIGHT 90 tablet 3   • carvedilol (COREG) 12.5 MG tablet TAKE 1 TABLET BY MOUTH TWICE DAILY WITH MEALS 180 tablet 1   • clopidogrel (PLAVIX) 75 MG tablet TAKE 1 TABLET BY MOUTH EVERY DAY 90 tablet 3   • ezetimibe (ZETIA) 10 MG tablet Take 1 tablet by mouth Daily. 90 tablet 3   • gabapentin (NEURONTIN) 300 MG capsule 1 qam and 2 qhs 30 capsule 0   • glucose blood test strip OneTouch Ultra Test strips  -  Test sugars 3-4 times a day as directed by provider.     • insulin aspart (NovoLOG FlexPen) 100 UNIT/ML solution pen-injector sc pen 4-8 units with meals (Patient taking differently: 8 units with meals) 3 pen 11   • Insulin Degludec (TRESIBA FLEXTOUCH) 200 UNIT/ML solution pen-injector pen injection Inject 50 Units under the skin into the appropriate area as directed Every Night. (Patient taking differently: Inject 52 Units under the skin into the appropriate area as directed Every Night.) 4 pen 11   • Insulin Pen Needle (PEN NEEDLES 3/16\") 31G X 5 MM misc 100 each Daily. 100 each 3   • isosorbide mononitrate (IMDUR) 30 MG 24 hr tablet Take 1 tablet by mouth Every Morning. 30 tablet 6   • Lancets (ONETOUCH ULTRASOFT) lancets Test sugars 3-4 times daily as instructed by physician.     • losartan (COZAAR) 100 MG tablet Take 1 tablet by mouth Daily. (Patient taking differently: Take 50 mg by mouth Daily.) 90 tablet 3   • magnesium oxide (MAG-OX) 400 " MG tablet Take 1 tablet by mouth 2 (Two) Times a Day. 180 tablet 3   • nitroglycerin (NITROSTAT) 0.4 MG SL tablet Place 1 tablet under the tongue Every 5 (Five) Minutes As Needed for Chest Pain. Max 3 doses. Go to ER if no relief 25 tablet 0   • ondansetron ODT (ZOFRAN-ODT) 4 MG disintegrating tablet Take 1 tablet by mouth Every 6 (Six) Hours As Needed for Nausea or Vomiting. 45 tablet 5   • probenecid (BENEMID) 500 MG tablet TAKE 1 TABLET BY MOUTH TWICE DAILY 180 tablet 3     No facility-administered encounter medications on file as of 11/17/2020.        Orders placed during this encounter include:  Orders Placed This Encounter   Procedures   • Comprehensive Metabolic Panel     Standing Status:   Future     Standing Expiration Date:   11/17/2021   • Hemoglobin A1c     Standing Status:   Future     Standing Expiration Date:   11/17/2021   • Lipid Panel     Standing Status:   Future     Standing Expiration Date:   11/17/2021   • Microalbumin / Creatinine Urine Ratio - Urine, Clean Catch     Standing Status:   Future     Standing Expiration Date:   11/17/2021   • Protein / Creatinine Ratio, Urine - Urine, Clean Catch     Standing Status:   Future     Standing Expiration Date:   11/17/2021   • TSH     Standing Status:   Future     Standing Expiration Date:   11/17/2021   • Vitamin B12     Standing Status:   Future     Standing Expiration Date:   11/17/2021   • Vitamin D 25 Hydroxy     Standing Status:   Future     Standing Expiration Date:   11/17/2021   • CBC & Differential     Standing Status:   Future     Standing Expiration Date:   11/17/2021     Order Specific Question:   Manual Differential     Answer:   No     Diabetes complicated by stage III ckd and CAD         Lab Results   Component Value Date    HGBA1C 8.55 (H) 11/10/2020              catherine freestyle personal use               Discontinued Medications      Trulicity 0.75 mg weekly- stopped - cramping    invokana is expensive , jardiance , side effects      Present regimen      Tresiba taking  50 units           Morning range 130-180         =====      Metformin 500 mg , 2 with supper ,intially  GI upset but now resolved     Metformin  mg one with supper  ---stopped due to side effects      =====     Novolog 8 units with meals up to 10 meals               Self adjusting explained         He will keep current doses since blood sugar levels are now back at goal he is instructed to call if having hyper or hypoglycemia           Kimi  has allowed the patient to minimize hypoglycemia as alarms have predicted and avoided them     She also uses the arrows on the kimi  as follows:     If arrow is horizontal , she uses the predicted bolus     If the arrow is slanted up or down-- she increase or decrease by 4  units     If the arrow is vertical up or down - she increases or decreases by 4 unit s        Microvascular complications          Last eye exam -- July 2020 , following with          Component      Latest Ref Rng & Units 7/6/2020 7/6/2020          11:49 AM 11:49 AM   Protein/Creatinine Ratio      0.0 - 200.0 mg/G Crea 439.4 (H)     Creatinine, Urine      mg/dL 104.7 104.7   Total Protein, Urine      mg/dL 46.0     Microalbumin/Creatinine Ratio      mg/g   172.9   Microalbumin, Urine      mg/dL   18.1         Neuropathy none       No changes noted     Hyperlipidemia              Atorvastatin 10mg , 5 days per week   Zetia 10mg      Lab Results   Component Value Date    LDL 53 07/06/2020              Continue current medication        Hypertension     TAKING NORVASC 5 MG DAILY --keep       Bone health    Vitamin D deficiency    Taking multivitamin daily    Reassess vitamin D level                 4. Follow-up: Return in about 3 months (around 2/17/2021) for Recheck.        We spent 25 minutes including reviewing the patients electronic chart, reviewing medications, reviewing labs, active problems    I provided advice regarding diabetes management,  dietary changes, adjustments of medication, self titration of insulin, refilled medications, ordering labs, future appointments    Patient was advised to contact the office if there are unanswered questions, trouble with blood glucose management, or any concerns

## 2020-11-23 ENCOUNTER — TELEPHONE (OUTPATIENT)
Dept: ENDOCRINOLOGY | Facility: CLINIC | Age: 75
End: 2020-11-23

## 2020-11-30 DIAGNOSIS — E78.2 MIXED HYPERLIPIDEMIA: Chronic | ICD-10-CM

## 2020-11-30 RX ORDER — CLOPIDOGREL BISULFATE 75 MG/1
TABLET ORAL
Qty: 90 TABLET | Refills: 2 | Status: SHIPPED | OUTPATIENT
Start: 2020-11-30 | End: 2021-08-27

## 2020-11-30 RX ORDER — ATORVASTATIN CALCIUM 10 MG/1
10 TABLET, FILM COATED ORAL NIGHTLY
Qty: 90 TABLET | Refills: 2 | Status: SHIPPED | OUTPATIENT
Start: 2020-11-30 | End: 2021-05-21

## 2020-11-30 RX ORDER — PROBENECID 500 MG/1
TABLET, FILM COATED ORAL
Qty: 180 TABLET | Refills: 2 | Status: SHIPPED | OUTPATIENT
Start: 2020-11-30 | End: 2021-08-27

## 2020-12-03 ENCOUNTER — LAB (OUTPATIENT)
Dept: LAB | Facility: HOSPITAL | Age: 75
End: 2020-12-03

## 2020-12-03 DIAGNOSIS — I10 ESSENTIAL HYPERTENSION: Chronic | ICD-10-CM

## 2020-12-03 LAB
ANION GAP SERPL CALCULATED.3IONS-SCNC: 9.6 MMOL/L (ref 5–15)
BUN SERPL-MCNC: 33 MG/DL (ref 8–23)
BUN/CREAT SERPL: 19 (ref 7–25)
CALCIUM SPEC-SCNC: 10 MG/DL (ref 8.6–10.5)
CHLORIDE SERPL-SCNC: 103 MMOL/L (ref 98–107)
CO2 SERPL-SCNC: 27.4 MMOL/L (ref 22–29)
CREAT SERPL-MCNC: 1.74 MG/DL (ref 0.76–1.27)
GFR SERPL CREATININE-BSD FRML MDRD: 39 ML/MIN/1.73
GLUCOSE SERPL-MCNC: 203 MG/DL (ref 65–99)
POTASSIUM SERPL-SCNC: 4.9 MMOL/L (ref 3.5–5.2)
SODIUM SERPL-SCNC: 140 MMOL/L (ref 136–145)

## 2020-12-03 PROCEDURE — 36415 COLL VENOUS BLD VENIPUNCTURE: CPT

## 2020-12-03 PROCEDURE — 80048 BASIC METABOLIC PNL TOTAL CA: CPT

## 2020-12-10 ENCOUNTER — OFFICE VISIT (OUTPATIENT)
Dept: FAMILY MEDICINE CLINIC | Facility: CLINIC | Age: 75
End: 2020-12-10

## 2020-12-10 VITALS
WEIGHT: 199 LBS | OXYGEN SATURATION: 97 % | HEART RATE: 90 BPM | BODY MASS INDEX: 25.54 KG/M2 | DIASTOLIC BLOOD PRESSURE: 62 MMHG | HEIGHT: 74 IN | SYSTOLIC BLOOD PRESSURE: 174 MMHG

## 2020-12-10 DIAGNOSIS — J60 BLACK LUNG DISEASE (HCC): ICD-10-CM

## 2020-12-10 DIAGNOSIS — Z79.4 TYPE 2 DIABETES MELLITUS WITH COMPLICATION, WITH LONG-TERM CURRENT USE OF INSULIN (HCC): Chronic | ICD-10-CM

## 2020-12-10 DIAGNOSIS — I10 ESSENTIAL HYPERTENSION: Chronic | ICD-10-CM

## 2020-12-10 DIAGNOSIS — Z00.00 MEDICARE ANNUAL WELLNESS VISIT, SUBSEQUENT: Primary | ICD-10-CM

## 2020-12-10 DIAGNOSIS — R91.8 ABNORMALITY OF LUNG ON CXR: ICD-10-CM

## 2020-12-10 DIAGNOSIS — I25.10 CORONARY ARTERIOSCLEROSIS: Chronic | ICD-10-CM

## 2020-12-10 DIAGNOSIS — E11.8 TYPE 2 DIABETES MELLITUS WITH COMPLICATION, WITH LONG-TERM CURRENT USE OF INSULIN (HCC): Chronic | ICD-10-CM

## 2020-12-10 PROCEDURE — G0439 PPPS, SUBSEQ VISIT: HCPCS | Performed by: GENERAL PRACTICE

## 2020-12-10 PROCEDURE — 99214 OFFICE O/P EST MOD 30 MIN: CPT | Performed by: GENERAL PRACTICE

## 2020-12-10 RX ORDER — LOSARTAN POTASSIUM 50 MG/1
50 TABLET ORAL DAILY
COMMUNITY
End: 2022-03-25 | Stop reason: SDUPTHER

## 2020-12-10 RX ORDER — CARVEDILOL 25 MG/1
25 TABLET ORAL 2 TIMES DAILY WITH MEALS
Qty: 180 TABLET | Refills: 3 | Status: SHIPPED | OUTPATIENT
Start: 2020-12-10 | End: 2021-08-27

## 2020-12-10 NOTE — PATIENT INSTRUCTIONS
Medicare Wellness  Personal Prevention Plan of Service     Date of Office Visit:  12/10/2020  Encounter Provider:  Halley Raymond MD  Place of Service:  [facility]  Patient Name: Zachary Galindo  :  1945    As part of the Medicare Wellness portion of your visit today, we are providing you with this personalized preventive plan of services (PPPS). This plan is based upon recommendations of the [place] Preventive Services Task Force (USPSTF) and the Advisory Committee on Immunization Practices (ACIP).    This lists the preventive care services that should be considered, and provides dates of when you are due. Items listed as completed are up-to-date and do not require any further intervention.    Health Maintenance   Topic Date Due   • ANNUAL WELLNESS VISIT  2020   • HEMOGLOBIN A1C  05/10/2021   • DIABETIC FOOT EXAM  2021   • LIPID PANEL  2021   • DIABETIC EYE EXAM  2021   • URINE MICROALBUMIN  2021   • COLOGUARD  2023   • TDAP/TD VACCINES (2 - Td) 2027   • HEPATITIS C SCREENING  Completed   • INFLUENZA VACCINE  Completed   • Pneumococcal Vaccine 65+  Completed   • AAA SCREEN (ONE-TIME)  Addressed   • ZOSTER VACCINE  Addressed       Orders Placed This Encounter   Procedures   • CT Chest With Contrast     Standing Status:   Future     Standing Expiration Date:   12/10/2021       Return in about 6 months (around 6/10/2021) for Annual physical.

## 2020-12-10 NOTE — PROGRESS NOTES
Subjective   Zachary Galindo is a 74 y.o. male.   Chief Complaint   Patient presents with   • Hypertension     medicare wellness   • Hyperlipidemia     For review and evaluation of management of chronic medical problems. Labs reviewed. [facility] and found to have an abnormal CXR. 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   Diabetes  He presents for his follow-up diabetic visit. He has type 2 diabetes mellitus. His disease course has been improving. There are no hypoglycemic associated symptoms. Pertinent negatives for hypoglycemia include no dizziness, headaches or nervousness/anxiousness. There are no diabetic associated symptoms. Pertinent negatives for diabetes include no chest pain, no fatigue and no weakness. There are no hypoglycemic complications. Diabetic complications include nephropathy. Risk factors for coronary artery disease include diabetes mellitus, dyslipidemia, hypertension and male sex. Current diabetic treatment includes insulin injections. He is compliant with treatment all of the time. His weight is stable. He is following a generally healthy diet. Meal planning includes ADA exchanges. He participates in exercise intermittently. There is no change in his home blood glucose trend. An ACE inhibitor/angiotensin II receptor blocker is being taken. Eye exam is current.   Hypertension  This is a chronic problem. The current episode started more than 1 year ago. The problem is unchanged. The problem is controlled. Pertinent negatives include no chest pain, headaches, neck pain, palpitations or shortness of breath. There are no associated agents to hypertension. Risk factors for coronary artery disease include diabetes mellitus and dyslipidemia. Current antihypertension treatment includes angiotensin blockers, calcium channel blockers and beta blockers. The current treatment provides significant improvement. There are no compliance problems.    Hyperlipidemia  This is a chronic problem.  The current episode started more than 1 year ago. The problem is uncontrolled. Recent lipid tests were reviewed and are high. Exacerbating diseases include diabetes. There are no known factors aggravating his hyperlipidemia. Pertinent negatives include no chest pain, myalgias or shortness of breath. Current antihyperlipidemic treatment includes ezetimibe. The current treatment provides mild improvement of lipids. There are no compliance problems.    Chronic Kidney Disease  This is a chronic problem. The current episode started more than 1 year ago. The problem occurs constantly. The problem has been gradually improving. Pertinent negatives include no abdominal pain, arthralgias, chest pain, chills, congestion, coughing, fatigue, fever, headaches, joint swelling, myalgias, nausea, neck pain, numbness, rash, sore throat, vomiting or weakness. Nothing aggravates the symptoms. Treatments tried: hydration.      The following portions of the patient's history were reviewed and updated as appropriate: allergies, current medications, past social history and problem list.    Outpatient Medications Prior to Visit   Medication Sig Dispense Refill   • alfuzosin (UROXATRAL) 10 MG 24 hr tablet Take 10 mg by mouth Daily.     • amLODIPine (NORVASC) 5 MG tablet Take 1 tablet by mouth Daily. 90 tablet 3   • aspirin 81 MG tablet Take 1 tablet by mouth Daily. 30 tablet 11   • atorvastatin (LIPITOR) 10 MG tablet TAKE 1 TABLET BY MOUTH EVERY NIGHT 90 tablet 2   • clopidogrel (PLAVIX) 75 MG tablet TAKE 1 TABLET BY MOUTH EVERY DAY 90 tablet 2   • ezetimibe (ZETIA) 10 MG tablet Take 1 tablet by mouth Daily. 90 tablet 3   • gabapentin (NEURONTIN) 300 MG capsule 1 qam and 2 qhs 30 capsule 0   • glucose blood test strip OneTouch Ultra Test strips  -  Test sugars 3-4 times a day as directed by provider.     • insulin aspart (NovoLOG FlexPen) 100 UNIT/ML solution pen-injector sc pen 4-8 units with meals (Patient taking differently: 8 units with  "meals) 3 pen 11   • Insulin Degludec (TRESIBA FLEXTOUCH) 200 UNIT/ML solution pen-injector pen injection Inject 50 Units under the skin into the appropriate area as directed Every Night. (Patient taking differently: Inject 52 Units under the skin into the appropriate area as directed Every Night.) 4 pen 11   • Insulin Pen Needle (PEN NEEDLES 3/16\") 31G X 5 MM misc 100 each Daily. 100 each 3   • isosorbide mononitrate (IMDUR) 30 MG 24 hr tablet Take 1 tablet by mouth Every Morning. 30 tablet 6   • Lancets (ONETOUCH ULTRASOFT) lancets Test sugars 3-4 times daily as instructed by physician.     • losartan (COZAAR) 50 MG tablet Take 50 mg by mouth Daily.     • magnesium oxide (MAG-OX) 400 MG tablet Take 1 tablet by mouth 2 (Two) Times a Day. 180 tablet 3   • nitroglycerin (NITROSTAT) 0.4 MG SL tablet Place 1 tablet under the tongue Every 5 (Five) Minutes As Needed for Chest Pain. Max 3 doses. Go to ER if no relief 25 tablet 0   • probenecid (BENEMID) 500 MG tablet TAKE 1 TABLET BY MOUTH TWICE DAILY 180 tablet 2   • carvedilol (COREG) 12.5 MG tablet TAKE 1 TABLET BY MOUTH TWICE DAILY WITH MEALS 180 tablet 1   • losartan (COZAAR) 100 MG tablet Take 1 tablet by mouth Daily. (Patient taking differently: Take 50 mg by mouth Daily.) 90 tablet 3   • ondansetron ODT (ZOFRAN-ODT) 4 MG disintegrating tablet Take 1 tablet by mouth Every 6 (Six) Hours As Needed for Nausea or Vomiting. 45 tablet 5     No facility-administered medications prior to visit.        Review of Systems   Constitutional: Negative.  Negative for chills, fatigue, fever and unexpected weight change.   HENT: Negative.  Negative for congestion, ear pain, hearing loss, nosebleeds, rhinorrhea, sneezing, sore throat and tinnitus.    Eyes: Negative.  Negative for discharge.   Respiratory: Negative.  Negative for cough, shortness of breath and wheezing.    Cardiovascular: Negative.  Negative for chest pain and palpitations.   Gastrointestinal: Negative.  Negative for " "abdominal pain, constipation, diarrhea, nausea and vomiting.   Endocrine: Negative.    Genitourinary: Negative.  Negative for dysuria, frequency and urgency.   Musculoskeletal: Negative.  Negative for arthralgias, back pain, joint swelling, myalgias and neck pain.   Skin: Negative.  Negative for rash.   Allergic/Immunologic: Negative.    Neurological: Negative.  Negative for dizziness, weakness, numbness and headaches.   Hematological: Negative.  Negative for adenopathy.   Psychiatric/Behavioral: Negative.  Negative for dysphoric mood and sleep disturbance. The patient is not nervous/anxious.        Objective   Visit Vitals  /62   Pulse 90   Ht 188 cm (74\")   Wt 90.3 kg (199 lb)   SpO2 97%   BMI 25.55 kg/m²     Physical Exam  Vitals signs and nursing note reviewed.   Constitutional:       General: He is not in acute distress.     Appearance: He is well-developed.   HENT:      Head: Normocephalic.      Nose: Nose normal.   Eyes:      General:         Right eye: No discharge.         Left eye: No discharge.      Conjunctiva/sclera: Conjunctivae normal.      Pupils: Pupils are equal, round, and reactive to light.   Neck:      Thyroid: No thyromegaly.   Cardiovascular:      Rate and Rhythm: Normal rate and regular rhythm.      Heart sounds: Normal heart sounds. No murmur.   Pulmonary:      Effort: Pulmonary effort is normal.      Breath sounds: Normal breath sounds.   Lymphadenopathy:      Cervical: No cervical adenopathy.   Skin:     General: Skin is warm and dry.   Neurological:      Mental Status: He is alert and oriented to person, place, and time.         Notes brought forward are reviewed and updated if indicated.     Assessment/Plan   Problems Addressed this Visit        Cardiovascular and Mediastinum    Essential hypertension (Chronic)    Relevant Medications    losartan (COZAAR) 50 MG tablet    carvedilol (Coreg) 25 MG tablet    Coronary arteriosclerosis (Chronic)    Relevant Medications    carvedilol " (Coreg) 25 MG tablet       Endocrine    Type 2 diabetes mellitus with complication, with long-term current use of insulin (CMS/[facility]) (Chronic)      Other Visit Diagnoses     Medicare annual wellness visit, subsequent    -  Primary    Abnormality of lung on CXR        Relevant Orders    CT Chest Without Contrast    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[facility])        Relevant Orders    CT Chest Without Contrast      Diagnoses       Codes Comments    Medicare annual wellness visit, subsequent    -  Primary ICD-10-CM: Z00.00  ICD-9-CM: V70.0     Abnormality of lung on CXR     ICD-10-CM: R91.8  ICD-9-CM: 793.19     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[facility])     ICD-10-CM: J60  ICD-9-CM: 500     Essential hypertension     ICD-10-CM: I10  ICD-9-CM: 401.9     Coronary arteriosclerosis     ICD-10-CM: I25.10  ICD-9-CM: 414.00     Type 2 diabetes mellitus with complication, with long-term current use of insulin (CMS/[facility])     ICD-10-CM: E11.8, Z79.4  ICD-9-CM: 250.90, V58.67          Increase coreg to 25 mg bid. Continue current treatment. Schedule CT chest.     New Medications Ordered This Visit   Medications   • carvedilol (Coreg) 25 MG tablet     Sig: Take 1 tablet by mouth 2 (Two) Times a Day With Meals.     Dispense:  180 tablet     Refill:  3     Return in about 6 months (around 6/10/2021) for Annual physical.

## 2020-12-18 ENCOUNTER — HOSPITAL ENCOUNTER (OUTPATIENT)
Dept: CT IMAGING | Facility: HOSPITAL | Age: 75
Discharge: HOME | End: 2020-12-18
Admitting: GENERAL PRACTICE

## 2020-12-18 DIAGNOSIS — J60 BLACK LUNG DISEASE (HCC): ICD-10-CM

## 2020-12-18 DIAGNOSIS — R91.8 ABNORMALITY OF LUNG ON CXR: Primary | ICD-10-CM

## 2020-12-18 DIAGNOSIS — R91.8 MULTIPLE LUNG NODULES: ICD-10-CM

## 2020-12-18 DIAGNOSIS — R91.8 ABNORMALITY OF LUNG ON CXR: ICD-10-CM

## 2020-12-18 PROCEDURE — 71250 CT THORAX DX C-: CPT

## 2021-01-29 ENCOUNTER — OFFICE VISIT (OUTPATIENT)
Dept: CARDIOLOGY | Facility: CLINIC | Age: 76
End: 2021-01-29

## 2021-01-29 VITALS
OXYGEN SATURATION: 99 % | DIASTOLIC BLOOD PRESSURE: 72 MMHG | WEIGHT: 202.6 LBS | HEART RATE: 68 BPM | BODY MASS INDEX: 26 KG/M2 | HEIGHT: 74 IN | SYSTOLIC BLOOD PRESSURE: 136 MMHG

## 2021-01-29 DIAGNOSIS — E78.2 MIXED HYPERLIPIDEMIA: Chronic | ICD-10-CM

## 2021-01-29 DIAGNOSIS — E78.00 PURE HYPERCHOLESTEROLEMIA: ICD-10-CM

## 2021-01-29 DIAGNOSIS — Z95.1 S/P CABG (CORONARY ARTERY BYPASS GRAFT): ICD-10-CM

## 2021-01-29 DIAGNOSIS — I10 ESSENTIAL HYPERTENSION: Primary | ICD-10-CM

## 2021-01-29 LAB
QT INTERVAL: 438 MS
QTC INTERVAL: 419 MS

## 2021-01-29 PROCEDURE — 99214 OFFICE O/P EST MOD 30 MIN: CPT | Performed by: INTERNAL MEDICINE

## 2021-01-29 PROCEDURE — 93000 ELECTROCARDIOGRAM COMPLETE: CPT | Performed by: INTERNAL MEDICINE

## 2021-01-29 RX ORDER — CEFDINIR 300 MG/1
CAPSULE ORAL
COMMUNITY
Start: 2021-01-11 | End: 2021-03-15

## 2021-01-29 NOTE — PROGRESS NOTES
Zachary Galindo  75 y.o. male    1. Essential hypertension    2. Pure hypercholesterolemia    3. Mixed hyperlipidemia    4. S/P CABG (coronary artery bypass graft)        History of Present Illness  Mr. Galindo is a 74-year-old male with coronary artery disease, hypertension, diabetes mellitus, CKD. His history is remarkable for coronary artery disease status post coronary artery bypass surgery in 1998 by Dr. Flor and had subsequent PCI to SVG to OM and RCA by Dr. Stiles.     Mr. Galindo is stable at this time and denied any chest pain, shortness of breath or palpitation.  He has been compliant with his medication.  His blood pressure was in the normal range and clinical exam today did not reveal any signs of bronchospasm, congestive heart failure.    Echocardiogram in January 2020 showed normal LV systolic function with an EF of 58% with mild LVH.  There was grade 1 diastolic dysfunction.  Mild mitral regurgitation was noted.    EKG today showed sinus rhythm with heart rate of 55 bpm mild IVCD.  Right axis deviation.  Nonspecific T wave changes.      Allergies   Allergen Reactions   • Advicor [Niacin-Lovastatin Er] Swelling     Swelling   • Nsaids Other (See Comments)     Kidney disease   • Lisinopril Cough     Cough         Past Medical History:   Diagnosis Date   • Allergic rhinitis    • Ankle joint pain    • Artificial lens present     Left   • Astigmatism    • Benign prostatic hyperplasia    • Cataract    • Closed fracture of medial malleolus    • Coronary arteriosclerosis    • Diabetes mellitus (CMS/HCC)     no retinopathy   • Elevated levels of transaminase & lactic acid dehydrogenase     improving    • Encounter for health maintenance examination     Individual health examination     • Encounter for health maintenance examination in adult     Adult health examination    • Essential hypertension    • Essential hypertension     Unspecified   • Flank pain     probably muscular    • GERD  (gastroesophageal reflux disease)    • Gout    • Gout     unspecified     • Hemorrhoids    • History of artificial eye lens     Artificial lens in position - right    • History of colonoscopy 04/04/2010    Colon endoscopy  ([id number])  (1)    • History of kidney stones    • Hyperlipidemia    • Hypermetropia    • Low blood pressure    • Malaise and fatigue    • Need for influenza vaccination     Needs influenza immunization    • Nuclear cataract of left eye    • Posterior subcapsular polar senile cataract     Left   • Renal impairment    • Special screening for malignant neoplasm of prostate    • Type 2 diabetes mellitus (CMS/HCC)     improving   • 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 edema (CMS/HCC)     without macular edema - mild OS    • Upper respiratory infection    • Urticaria     Drug-aggravated angioedema-urticaria   • Urticaria          Past Surgical History:   Procedure Laterality Date   • CARDIAC CATHETERIZATION  03/24/2014    ([id number])  (2)  Reduction of stenoisis from 95% to less than 0% stenosis with evidence of good JENNY 3-flow. Distal RCA beyond the touchdown was seen filling the circumflex system   • CATARACT EXTRACTION  10/2015    Remove cataract, insert lens (2)    • CORONARY ARTERY BYPASS GRAFT      Arterial, two  (1)   -  3 - 12/1998   • HERNIA REPAIR      w/mesh  (1)   • INJECTION OF MEDICATION  04/11/2013    B12  (1)  - RAYMOND Raymond   • INJECTION OF MEDICATION  07/03/2013    Benadryl  (1) - RAYMOND Raymond   • INJECTION OF MEDICATION  10/22/2014    Kenalog  (2)   • OTHER SURGICAL HISTORY  07/10/2002    Fragmenting of kidney stone  -   ESWL, 2010 shocks. 1cm UP stone,right. Diabetes mellitus         Family History   Problem Relation Age of Onset   • Diabetes Other    • Hypertension Other    • Cancer Mother    • Heart disease Mother    • Hypertension Mother    • Hyperlipidemia Mother    • Diabetes Brother    • Diabetes Maternal Aunt    • Cancer Sister    •  "Diabetes Sister          Social History     Socioeconomic History   • Marital status:      Spouse name: Not on file   • Number of children: Not on file   • Years of education: Not on file   • Highest education level: Not on file   Tobacco Use   • Smoking status: Former Smoker   • Smokeless tobacco: Never Used   • Tobacco comment: Quit 1979   Substance and Sexual Activity   • Alcohol use: No   • Drug use: No   • Sexual activity: Defer         Current Outpatient Medications   Medication Sig Dispense Refill   • alfuzosin (UROXATRAL) 10 MG 24 hr tablet Take 10 mg by mouth Daily.     • amLODIPine (NORVASC) 5 MG tablet Take 1 tablet by mouth Daily. 90 tablet 3   • aspirin 81 MG tablet Take 1 tablet by mouth Daily. 30 tablet 11   • atorvastatin (LIPITOR) 10 MG tablet TAKE 1 TABLET BY MOUTH EVERY NIGHT 90 tablet 2   • carvedilol (Coreg) 25 MG tablet Take 1 tablet by mouth 2 (Two) Times a Day With Meals. 180 tablet 3   • cefdinir (OMNICEF) 300 MG capsule      • clopidogrel (PLAVIX) 75 MG tablet TAKE 1 TABLET BY MOUTH EVERY DAY 90 tablet 2   • ezetimibe (ZETIA) 10 MG tablet Take 1 tablet by mouth Daily. 90 tablet 3   • gabapentin (NEURONTIN) 300 MG capsule 1 qam and 2 qhs 30 capsule 0   • glucose blood test strip OneTouch Ultra Test strips  -  Test sugars 3-4 times a day as directed by provider.     • insulin aspart (NovoLOG FlexPen) 100 UNIT/ML solution pen-injector sc pen 4-8 units with meals (Patient taking differently: 8 units with meals) 3 pen 11   • Insulin Degludec (TRESIBA FLEXTOUCH) 200 UNIT/ML solution pen-injector pen injection Inject 50 Units under the skin into the appropriate area as directed Every Night. (Patient taking differently: Inject 52 Units under the skin into the appropriate area as directed Every Night.) 4 pen 11   • Insulin Pen Needle (PEN NEEDLES 3/16\") 31G X 5 MM misc 100 each Daily. 100 each 3   • isosorbide mononitrate (IMDUR) 30 MG 24 hr tablet Take 1 tablet by mouth Every Morning. 30 " "tablet 6   • Lancets (ONETOUCH ULTRASOFT) lancets Test sugars 3-4 times daily as instructed by physician.     • losartan (COZAAR) 50 MG tablet Take 50 mg by mouth Daily.     • magnesium oxide (MAG-OX) 400 MG tablet Take 1 tablet by mouth 2 (Two) Times a Day. 180 tablet 3   • nitroglycerin (NITROSTAT) 0.4 MG SL tablet Place 1 tablet under the tongue Every 5 (Five) Minutes As Needed for Chest Pain. Max 3 doses. Go to ER if no relief 25 tablet 0   • probenecid (BENEMID) 500 MG tablet TAKE 1 TABLET BY MOUTH TWICE DAILY 180 tablet 2     No current facility-administered medications for this visit.          OBJECTIVE    /72   Pulse 68   Ht 188 cm (74\")   Wt 91.9 kg (202 lb 9.6 oz)   SpO2 99%   BMI 26.01 kg/m²         Review of Systems: The following systems were reviewed and no changes were noted     Constitutional:  Denies recent weight loss, weight gain, fever or chills. fatigue     HENT:  Denies any hearing loss, epistaxis, hoarseness, or difficulty speaking.     Eyes: Wears eyeglasses or contact lenses     Respiratory:  Denies dyspnea with exertion,no cough, wheezing, or hemoptysis.     Cardiovascular: No chest pain, shortness of breath, palpitation or dizziness.    Gastrointestinal:  Denies change in bowel habits, dyspepsia, ulcer disease, hematochezia, or melena.     Endocrine: Negative for cold intolerance, heat intolerance, polydipsia, polyphagia and polyuria.     Genitourinary: CKD.  He is followed by nephrology.      Musculoskeletal: DJD    Neurological:  Denies any history of recurrent headaches, strokes, TIA, or seizure disorder.     Hematological: Denies any food allergies, seasonal allergies, bleeding disorders, or lymphadenopathy.     Physical Exam : The following systems were reassessed and no changes were noted    Constitutional: Cooperative, alert and oriented,  in no acute distress.     HENT:   Head: Normocephalic, normal hair patterns, no masses or tenderness.  Ears, Nose, and Throat: No " gross abnormalities. No pallor or cyanosis.   Eyes: EOMS intact, PERRL, conjunctivae and lids unremarkable. Fundoscopic exam and visual fields not performed.   Neck: No palpable masses or adenopathy, no thyromegaly, no JVD, carotid pulses are full and equal bilaterally and without  Bruits.     Cardiovascular: Regular rhythm, S1 and S2 normal, no S3 or S4.  No murmurs, gallops, or rubs detected.     Pulmonary/Chest: Chest: normal symmetry,  normal respiratory excursion, no intercostal retraction, no use of accessory muscles.            Pulmonary: Normal breath sounds. No rales or ronchi.    Abdominal: Abdomen soft, bowel sounds normoactive, no masses, no hepatosplenomegaly, non-tender, no bruits.     Musculoskeletal: No deformities, clubbing, cyanosis, erythema, or edema observed.     Neurological: No gross motor or sensory deficits noted, affect appropriate, oriented to time, person, place.     Psychiatric: He has a normal mood and affect. His behavior is normal. Judgment and thought content normal.         Lab Results   Component Value Date    WBC 5.93 11/10/2020    HGB 13.9 11/10/2020    HCT 41.3 11/10/2020    MCV 89.2 11/10/2020     (L) 11/10/2020     Lab Results   Component Value Date    GLUCOSE 203 (H) 12/03/2020    BUN 33 (H) 12/03/2020    CREATININE 1.74 (H) 12/03/2020    EGFRIFNONA 39 (L) 12/03/2020    BCR 19.0 12/03/2020    CO2 27.4 12/03/2020    CALCIUM 10.0 12/03/2020    ALBUMIN 4.40 11/10/2020    AST 19 11/10/2020    ALT 21 11/10/2020     Lab Results   Component Value Date    CHOL 109 07/06/2020    CHOL 124 04/13/2020    CHOL 159 02/11/2019     Lab Results   Component Value Date    TRIG 128 07/06/2020    TRIG 156 (H) 04/13/2020    TRIG 187 02/11/2019     Lab Results   Component Value Date    HDL 30 (L) 07/06/2020    HDL 26 (L) 04/13/2020    HDL 27 (L) 02/11/2019     No components found for: LDLCALC  Lab Results   Component Value Date    LDL 53 07/06/2020    LDL 67 04/13/2020     02/11/2019      No results found for: HDLLDLRATIO  No components found for: CHOLHDL  Lab Results   Component Value Date    HGBA1C 8.55 (H) 11/10/2020     Lab Results   Component Value Date    TSH 2.490 11/10/2020           ASSESSMENT AND PLAN  Mr. Galindo has multiple medical issues as discussed under history of present illness but has no clinical evidence of progression of coronary artery disease.  No signs of congestive heart failure was noted.  He has CKD with a GFR of 39.  This is being monitored closely.  I have continued antiplatelet therapy with aspirin and Plavix, antihypertensive therapy with amlodipine, carvedilol, losartan, lipid-lowering therapy with atorvastatin, Zetia and antianginal therapy with isosorbide mononitrate.    He will continue follow-up with nephrology on a regular basis.  I have advised him to maintain a high fluid intake.    Diagnoses and all orders for this visit:    1. Essential hypertension (Primary)  -     ECG 12 Lead    2. Pure hypercholesterolemia    3. Mixed hyperlipidemia    4. S/P CABG (coronary artery bypass graft)        Patient's Body mass index is 26.01 kg/m². BMI is within normal parameters. No follow-up required..  Patient is a non-smoker    Medhat Clark MD  1/29/2021  19:25 CST

## 2021-02-02 ENCOUNTER — LAB (OUTPATIENT)
Dept: LAB | Facility: HOSPITAL | Age: 76
End: 2021-02-02

## 2021-02-02 ENCOUNTER — OFFICE VISIT (OUTPATIENT)
Dept: FAMILY MEDICINE CLINIC | Facility: CLINIC | Age: 76
End: 2021-02-02

## 2021-02-02 VITALS
HEIGHT: 74 IN | OXYGEN SATURATION: 98 % | HEART RATE: 62 BPM | BODY MASS INDEX: 25.93 KG/M2 | DIASTOLIC BLOOD PRESSURE: 68 MMHG | SYSTOLIC BLOOD PRESSURE: 139 MMHG | WEIGHT: 202 LBS

## 2021-02-02 DIAGNOSIS — G89.29 CHRONIC MIDLINE THORACIC BACK PAIN: ICD-10-CM

## 2021-02-02 DIAGNOSIS — G89.29 CHRONIC MIDLINE THORACIC BACK PAIN: Primary | ICD-10-CM

## 2021-02-02 DIAGNOSIS — M54.6 CHRONIC MIDLINE THORACIC BACK PAIN: Primary | ICD-10-CM

## 2021-02-02 DIAGNOSIS — G62.9 NEUROPATHY: ICD-10-CM

## 2021-02-02 DIAGNOSIS — M54.6 CHRONIC MIDLINE THORACIC BACK PAIN: ICD-10-CM

## 2021-02-02 LAB
BACTERIA UR QL AUTO: ABNORMAL /HPF
BILIRUB UR QL STRIP: NEGATIVE
CLARITY UR: CLEAR
COLOR UR: YELLOW
GLUCOSE UR STRIP-MCNC: ABNORMAL MG/DL
HGB UR QL STRIP.AUTO: ABNORMAL
HYALINE CASTS UR QL AUTO: ABNORMAL /LPF
KETONES UR QL STRIP: NEGATIVE
LEUKOCYTE ESTERASE UR QL STRIP.AUTO: NEGATIVE
NITRITE UR QL STRIP: NEGATIVE
PH UR STRIP.AUTO: 6 [PH] (ref 5–8)
PROT UR QL STRIP: ABNORMAL
RBC # UR: ABNORMAL /HPF
REF LAB TEST METHOD: ABNORMAL
SP GR UR STRIP: 1.03 (ref 1–1.03)
SQUAMOUS #/AREA URNS HPF: ABNORMAL /HPF
UROBILINOGEN UR QL STRIP: ABNORMAL
WBC UR QL AUTO: ABNORMAL /HPF

## 2021-02-02 PROCEDURE — 81001 URINALYSIS AUTO W/SCOPE: CPT

## 2021-02-02 PROCEDURE — 99213 OFFICE O/P EST LOW 20 MIN: CPT | Performed by: GENERAL PRACTICE

## 2021-02-02 RX ORDER — GABAPENTIN 300 MG/1
CAPSULE ORAL
Qty: 90 CAPSULE | Refills: 1 | Status: SHIPPED | OUTPATIENT
Start: 2021-02-02 | End: 2021-03-15 | Stop reason: SDUPTHER

## 2021-02-02 NOTE — PROGRESS NOTES
Subjective   Zachary Galindo is a 75 y.o. male.   Chief Complaint   Patient presents with   • Back Pain       Back Pain  This is a recurrent problem. The current episode started more than 1 month ago. The problem occurs constantly. The problem has been gradually worsening since onset. The pain is present in the thoracic spine. The quality of the pain is described as aching. The pain is at a severity of 8/10. The pain is moderate. The pain is the same all the time. The symptoms are aggravated by position, standing and sitting. Stiffness is present in the morning. He has tried analgesics and heat for the symptoms. The treatment provided mild relief.      The following portions of the patient's history were reviewed and updated as appropriate: allergies, current medications, past social history and problem list.    Outpatient Medications Prior to Visit   Medication Sig Dispense Refill   • alfuzosin (UROXATRAL) 10 MG 24 hr tablet Take 10 mg by mouth Daily.     • amLODIPine (NORVASC) 5 MG tablet Take 1 tablet by mouth Daily. 90 tablet 3   • aspirin 81 MG tablet Take 1 tablet by mouth Daily. 30 tablet 11   • atorvastatin (LIPITOR) 10 MG tablet TAKE 1 TABLET BY MOUTH EVERY NIGHT 90 tablet 2   • carvedilol (Coreg) 25 MG tablet Take 1 tablet by mouth 2 (Two) Times a Day With Meals. 180 tablet 3   • cefdinir (OMNICEF) 300 MG capsule      • clopidogrel (PLAVIX) 75 MG tablet TAKE 1 TABLET BY MOUTH EVERY DAY 90 tablet 2   • ezetimibe (ZETIA) 10 MG tablet Take 1 tablet by mouth Daily. 90 tablet 3   • glucose blood test strip OneTouch Ultra Test strips  -  Test sugars 3-4 times a day as directed by provider.     • insulin aspart (NovoLOG FlexPen) 100 UNIT/ML solution pen-injector sc pen 4-8 units with meals (Patient taking differently: 8 units with meals) 3 pen 11   • Insulin Degludec (TRESIBA FLEXTOUCH) 200 UNIT/ML solution pen-injector pen injection Inject 50 Units under the skin into the appropriate area as directed  "Every Night. (Patient taking differently: Inject 52 Units under the skin into the appropriate area as directed Every Night.) 4 pen 11   • Insulin Pen Needle (PEN NEEDLES 3/16\") 31G X 5 MM misc 100 each Daily. 100 each 3   • isosorbide mononitrate (IMDUR) 30 MG 24 hr tablet Take 1 tablet by mouth Every Morning. 30 tablet 6   • Lancets (ONETOUCH ULTRASOFT) lancets Test sugars 3-4 times daily as instructed by physician.     • losartan (COZAAR) 50 MG tablet Take 50 mg by mouth Daily.     • magnesium oxide (MAG-OX) 400 MG tablet Take 1 tablet by mouth 2 (Two) Times a Day. 180 tablet 3   • nitroglycerin (NITROSTAT) 0.4 MG SL tablet Place 1 tablet under the tongue Every 5 (Five) Minutes As Needed for Chest Pain. Max 3 doses. Go to ER if no relief 25 tablet 0   • probenecid (BENEMID) 500 MG tablet TAKE 1 TABLET BY MOUTH TWICE DAILY 180 tablet 2   • gabapentin (NEURONTIN) 300 MG capsule 1 qam and 2 qhs 30 capsule 0     No facility-administered medications prior to visit.      I have reviewed 12 systems with patient. Findings were negative except what is noted below and/or in history of present illness.   Review of Systems   Musculoskeletal: Positive for back pain.       Objective   Visit Vitals  /68 (BP Location: Left arm)   Pulse 62   Ht 188 cm (74\")   Wt 91.6 kg (202 lb)   SpO2 98%   BMI 25.94 kg/m²     Physical Exam  Vitals signs and nursing note reviewed.   Constitutional:       General: He is not in acute distress.     Appearance: He is well-developed.   HENT:      Head: Normocephalic.      Nose: Nose normal.   Eyes:      General:         Right eye: No discharge.         Left eye: No discharge.      Conjunctiva/sclera: Conjunctivae normal.      Pupils: Pupils are equal, round, and reactive to light.   Neck:      Thyroid: No thyromegaly.   Cardiovascular:      Rate and Rhythm: Normal rate and regular rhythm.      Heart sounds: Normal heart sounds. No murmur.   Pulmonary:      Effort: Pulmonary effort is normal.     "  Breath sounds: Normal breath sounds.   Musculoskeletal:      Thoracic back: He exhibits decreased range of motion and tenderness. He exhibits no spasm.   Lymphadenopathy:      Cervical: No cervical adenopathy.   Skin:     General: Skin is warm and dry.   Neurological:      Mental Status: He is alert and oriented to person, place, and time.       Notes brought forward are reviewed and updated if indicated.     Assessment/Plan   Problems Addressed this Visit     None      Visit Diagnoses     Chronic midline thoracic back pain    -  Primary    Relevant Medications    gabapentin (NEURONTIN) 300 MG capsule    Other Relevant Orders    XR Spine Thoracic 3 View    Neuropathy        Relevant Medications    gabapentin (NEURONTIN) 300 MG capsule      Diagnoses       Codes Comments    Chronic midline thoracic back pain    -  Primary ICD-10-CM: M54.6, G89.29  ICD-9-CM: 724.1, 338.29     Neuropathy     ICD-10-CM: G62.9  ICD-9-CM: 355.9           Will notify regarding results. Restart gabapentin.     New Medications Ordered This Visit   Medications   • gabapentin (NEURONTIN) 300 MG capsule     Si qam and 2 qhs     Dispense:  90 capsule     Refill:  1     Return in about 6 weeks (around 3/16/2021) for Recheck.

## 2021-02-09 ENCOUNTER — LAB (OUTPATIENT)
Dept: LAB | Facility: HOSPITAL | Age: 76
End: 2021-02-09

## 2021-02-09 ENCOUNTER — IMMUNIZATION (OUTPATIENT)
Dept: VACCINE CLINIC | Facility: HOSPITAL | Age: 76
End: 2021-02-09

## 2021-02-09 DIAGNOSIS — E78.2 MIXED HYPERLIPIDEMIA: ICD-10-CM

## 2021-02-09 DIAGNOSIS — E11.65 TYPE 2 DIABETES MELLITUS WITH HYPERGLYCEMIA, WITH LONG-TERM CURRENT USE OF INSULIN (HCC): ICD-10-CM

## 2021-02-09 DIAGNOSIS — Z79.4 TYPE 2 DIABETES MELLITUS WITH HYPERGLYCEMIA, WITH LONG-TERM CURRENT USE OF INSULIN (HCC): ICD-10-CM

## 2021-02-09 DIAGNOSIS — J60 BLACK LUNG DISEASE (HCC): ICD-10-CM

## 2021-02-09 DIAGNOSIS — I10 ESSENTIAL HYPERTENSION: ICD-10-CM

## 2021-02-09 DIAGNOSIS — R91.8 ABNORMALITY OF LUNG ON CXR: Primary | ICD-10-CM

## 2021-02-09 DIAGNOSIS — R91.8 MULTIPLE LUNG NODULES: ICD-10-CM

## 2021-02-09 DIAGNOSIS — E55.9 VITAMIN D DEFICIENCY: ICD-10-CM

## 2021-02-09 DIAGNOSIS — R93.89 ABNORMAL CT OF THE CHEST: ICD-10-CM

## 2021-02-09 LAB
25(OH)D3 SERPL-MCNC: 45.3 NG/ML (ref 30–100)
ALBUMIN SERPL-MCNC: 4.1 G/DL (ref 3.5–5.2)
ALBUMIN UR-MCNC: 132.3 MG/DL
ALBUMIN/GLOB SERPL: 1.2 G/DL
ALP SERPL-CCNC: 85 U/L (ref 39–117)
ALT SERPL W P-5'-P-CCNC: 21 U/L (ref 1–41)
ANION GAP SERPL CALCULATED.3IONS-SCNC: 9.4 MMOL/L (ref 5–15)
AST SERPL-CCNC: 16 U/L (ref 1–40)
BASOPHILS # BLD AUTO: 0.05 10*3/MM3 (ref 0–0.2)
BASOPHILS NFR BLD AUTO: 1 % (ref 0–1.5)
BILIRUB SERPL-MCNC: 0.3 MG/DL (ref 0–1.2)
BUN SERPL-MCNC: 35 MG/DL (ref 8–23)
BUN/CREAT SERPL: 17.9 (ref 7–25)
CALCIUM SPEC-SCNC: 9.4 MG/DL (ref 8.6–10.5)
CHLORIDE SERPL-SCNC: 102 MMOL/L (ref 98–107)
CHOLEST SERPL-MCNC: 117 MG/DL (ref 0–200)
CO2 SERPL-SCNC: 28.6 MMOL/L (ref 22–29)
CREAT SERPL-MCNC: 1.95 MG/DL (ref 0.76–1.27)
CREAT UR-MCNC: 128.2 MG/DL
CREAT UR-MCNC: 128.2 MG/DL
DEPRECATED RDW RBC AUTO: 42.8 FL (ref 37–54)
EOSINOPHIL # BLD AUTO: 0.19 10*3/MM3 (ref 0–0.4)
EOSINOPHIL NFR BLD AUTO: 4 % (ref 0.3–6.2)
ERYTHROCYTE [DISTWIDTH] IN BLOOD BY AUTOMATED COUNT: 13.3 % (ref 12.3–15.4)
GFR SERPL CREATININE-BSD FRML MDRD: 34 ML/MIN/1.73
GLOBULIN UR ELPH-MCNC: 3.3 GM/DL
GLUCOSE SERPL-MCNC: 256 MG/DL (ref 65–99)
HBA1C MFR BLD: 9.18 % (ref 4.8–5.6)
HCT VFR BLD AUTO: 41.9 % (ref 37.5–51)
HDLC SERPL-MCNC: 26 MG/DL (ref 40–60)
HGB BLD-MCNC: 13.6 G/DL (ref 13–17.7)
IMM GRANULOCYTES # BLD AUTO: 0.01 10*3/MM3 (ref 0–0.05)
IMM GRANULOCYTES NFR BLD AUTO: 0.2 % (ref 0–0.5)
LDLC SERPL CALC-MCNC: 54 MG/DL (ref 0–100)
LDLC/HDLC SERPL: 1.76 {RATIO}
LYMPHOCYTES # BLD AUTO: 0.77 10*3/MM3 (ref 0.7–3.1)
LYMPHOCYTES NFR BLD AUTO: 16 % (ref 19.6–45.3)
MCH RBC QN AUTO: 28.5 PG (ref 26.6–33)
MCHC RBC AUTO-ENTMCNC: 32.5 G/DL (ref 31.5–35.7)
MCV RBC AUTO: 87.8 FL (ref 79–97)
MICROALBUMIN/CREAT UR: 1032 MG/G
MONOCYTES # BLD AUTO: 0.39 10*3/MM3 (ref 0.1–0.9)
MONOCYTES NFR BLD AUTO: 8.1 % (ref 5–12)
NEUTROPHILS NFR BLD AUTO: 3.4 10*3/MM3 (ref 1.7–7)
NEUTROPHILS NFR BLD AUTO: 70.7 % (ref 42.7–76)
NRBC BLD AUTO-RTO: 0.2 /100 WBC (ref 0–0.2)
PLATELET # BLD AUTO: 142 10*3/MM3 (ref 140–450)
PMV BLD AUTO: 12.2 FL (ref 6–12)
POTASSIUM SERPL-SCNC: 4.7 MMOL/L (ref 3.5–5.2)
PROT SERPL-MCNC: 7.4 G/DL (ref 6–8.5)
PROT UR-MCNC: 207 MG/DL
PROT/CREAT UR: 1614.7 MG/G CREA (ref 0–200)
RBC # BLD AUTO: 4.77 10*6/MM3 (ref 4.14–5.8)
SODIUM SERPL-SCNC: 140 MMOL/L (ref 136–145)
TRIGL SERPL-MCNC: 226 MG/DL (ref 0–150)
TSH SERPL DL<=0.05 MIU/L-ACNC: 3.98 UIU/ML (ref 0.27–4.2)
VIT B12 BLD-MCNC: 499 PG/ML (ref 211–946)
VLDLC SERPL-MCNC: 37 MG/DL (ref 5–40)
WBC # BLD AUTO: 4.81 10*3/MM3 (ref 3.4–10.8)

## 2021-02-09 PROCEDURE — 82607 VITAMIN B-12: CPT

## 2021-02-09 PROCEDURE — 82306 VITAMIN D 25 HYDROXY: CPT

## 2021-02-09 PROCEDURE — 82570 ASSAY OF URINE CREATININE: CPT

## 2021-02-09 PROCEDURE — 84156 ASSAY OF PROTEIN URINE: CPT

## 2021-02-09 PROCEDURE — 84443 ASSAY THYROID STIM HORMONE: CPT

## 2021-02-09 PROCEDURE — 80053 COMPREHEN METABOLIC PANEL: CPT

## 2021-02-09 PROCEDURE — 83036 HEMOGLOBIN GLYCOSYLATED A1C: CPT

## 2021-02-09 PROCEDURE — 85025 COMPLETE CBC W/AUTO DIFF WBC: CPT

## 2021-02-09 PROCEDURE — 91300 HC SARSCOV02 VAC 30MCG/0.3ML IM: CPT | Performed by: THORACIC SURGERY (CARDIOTHORACIC VASCULAR SURGERY)

## 2021-02-09 PROCEDURE — 80061 LIPID PANEL: CPT

## 2021-02-09 PROCEDURE — 36415 COLL VENOUS BLD VENIPUNCTURE: CPT

## 2021-02-09 PROCEDURE — 82043 UR ALBUMIN QUANTITATIVE: CPT

## 2021-02-09 PROCEDURE — 0001A: CPT | Performed by: THORACIC SURGERY (CARDIOTHORACIC VASCULAR SURGERY)

## 2021-02-17 ENCOUNTER — OFFICE VISIT (OUTPATIENT)
Dept: ENDOCRINOLOGY | Facility: CLINIC | Age: 76
End: 2021-02-17

## 2021-02-17 VITALS
HEIGHT: 73 IN | WEIGHT: 205.7 LBS | OXYGEN SATURATION: 100 % | DIASTOLIC BLOOD PRESSURE: 70 MMHG | HEART RATE: 61 BPM | SYSTOLIC BLOOD PRESSURE: 148 MMHG | BODY MASS INDEX: 27.26 KG/M2

## 2021-02-17 DIAGNOSIS — I10 ESSENTIAL HYPERTENSION: Chronic | ICD-10-CM

## 2021-02-17 DIAGNOSIS — E55.9 VITAMIN D DEFICIENCY: ICD-10-CM

## 2021-02-17 DIAGNOSIS — Z79.4 TYPE 2 DIABETES MELLITUS WITH COMPLICATION, WITH LONG-TERM CURRENT USE OF INSULIN (HCC): Chronic | ICD-10-CM

## 2021-02-17 DIAGNOSIS — E11.65 TYPE 2 DIABETES MELLITUS WITH HYPERGLYCEMIA, WITH LONG-TERM CURRENT USE OF INSULIN (HCC): ICD-10-CM

## 2021-02-17 DIAGNOSIS — E11.8 TYPE 2 DIABETES MELLITUS WITH COMPLICATION, WITH LONG-TERM CURRENT USE OF INSULIN (HCC): Chronic | ICD-10-CM

## 2021-02-17 DIAGNOSIS — E78.2 MIXED HYPERLIPIDEMIA: Primary | Chronic | ICD-10-CM

## 2021-02-17 DIAGNOSIS — Z79.4 TYPE 2 DIABETES MELLITUS WITH HYPERGLYCEMIA, WITH LONG-TERM CURRENT USE OF INSULIN (HCC): ICD-10-CM

## 2021-02-17 PROCEDURE — 99214 OFFICE O/P EST MOD 30 MIN: CPT | Performed by: NURSE PRACTITIONER

## 2021-02-17 RX ORDER — INSULIN DEGLUDEC 200 U/ML
60 INJECTION, SOLUTION SUBCUTANEOUS NIGHTLY
Qty: 6 PEN | Refills: 11 | Status: SHIPPED | OUTPATIENT
Start: 2021-02-17 | End: 2021-06-03 | Stop reason: SDUPTHER

## 2021-02-17 RX ORDER — INSULIN ASPART 100 [IU]/ML
INJECTION, SOLUTION INTRAVENOUS; SUBCUTANEOUS
Qty: 5 PEN | Refills: 11 | Status: SHIPPED | OUTPATIENT
Start: 2021-02-17 | End: 2022-02-25

## 2021-02-17 RX ORDER — PEN NEEDLE, DIABETIC 30 GX5/16"
100 NEEDLE, DISPOSABLE MISCELLANEOUS DAILY
Qty: 100 EACH | Refills: 3 | Status: SHIPPED | OUTPATIENT
Start: 2021-02-17

## 2021-02-17 NOTE — PROGRESS NOTES
"Chief Complaint  Diabetes    Subjective          Zachary Galindo presents to [facility] ENDOCRINOLOGY  History of Present Illness     IN office visit       75-year-old male presents for follow-up    Reason diabetes mellitus type 2    Duration diagnosed at the age of 37         Timing constant     Quality not controlled          Severity -  High due to associated complications      Complications - nephropathy and CAD      Current symptoms/problems    hyperglycemia     Alleviating Factors: Compliance       Side Effects  cramping from Trulicity , metformin on diarrhea      Current diet  tries to watch carb intake      Current exercise none      Current monitoring regimen: home blood tests - checking 4 x daily before nichole     Now has nichole personal use and is able to monitor more frequently and having less hypoglycemia         Lab Results   Component Value Date    HGBA1C 9.18 (H) 02/09/2021             Home blood sugar records:      NICHOLE USING THE PERSONAL SYSTEM       He is using the nichole but he forgot his reader        He states a.m. blood glucose levels are above 150    Daytime is up to 200     Hypoglycemia nocturnal and if skipped meals --- none documented since last visit                 Objective   Vital Signs:   /70   Pulse 61   Ht 185.4 cm (73\")   Wt 93.3 kg (205 lb 11.2 oz)   SpO2 100%   BMI 27.14 kg/m²     Physical Exam  Constitutional:       Appearance: Normal appearance.   HENT:      Head: Normocephalic.      Right Ear: External ear normal.      Left Ear: External ear normal.      Nose: Nose normal.   Eyes:      Conjunctiva/sclera: Conjunctivae normal.   Neck:      Musculoskeletal: Normal range of motion and neck supple.   Cardiovascular:      Rate and Rhythm: Normal rate and regular rhythm.      Heart sounds: Normal heart sounds.   Pulmonary:      Effort: Pulmonary effort is normal.      Breath sounds: Normal breath sounds.   Musculoskeletal: Normal range of motion. "   Skin:     Coloration: Skin is not pale.   Neurological:      General: No focal deficit present.      Mental Status: He is alert and oriented to person, place, and time.   Psychiatric:         Mood and Affect: Mood normal.         Thought Content: Thought content normal.         Judgment: Judgment normal.        Result Review :   The following data was reviewed by: ROCCO Ratliff on 02/17/2021:  Common labs    Common Labsle 11/10/20 11/10/20 11/10/20 12/3/20 2/9/21 2/9/21 2/9/21 2/9/21 2/9/21    1637 1637 1637  0913 0913 0913 0913 0913   Glucose   184 (A) 203 (A)   256 (A)     BUN   39 (A) 33 (A)   35 (A)     Creatinine   2.06 (A) 1.74 (A)   1.95 (A)     eGFR Non  Am   32 (A) 39 (A)   34 (A)     Sodium   138 140   140     Potassium   4.6 4.9   4.7     Chloride   103 103   102     Calcium   9.4 10.0   9.4     Albumin   4.40    4.10     Total Bilirubin   0.3    0.3     Alkaline Phosphatase   78    85     AST (SGOT)   19    16     ALT (SGPT)   21    21     WBC 5.93    4.81       Hemoglobin 13.9    13.6       Hematocrit 41.3    41.9       Platelets 121 (A)    142       Total Cholesterol        117    Triglycerides        226 (A)    HDL Cholesterol        26 (A)    LDL Cholesterol         54    Hemoglobin A1C  8.55 (A)    9.18 (A)      Microalbumin, Urine         132.3   (A) Abnormal value                      Assessment and Plan    Diagnoses and all orders for this visit:    1. Mixed hyperlipidemia (Primary)  -     CBC & Differential; Future  -     Comprehensive Metabolic Panel; Future  -     Hemoglobin A1c; Future  -     Lipid Panel; Future  -     Protein / Creatinine Ratio, Urine - Urine, Clean Catch; Future  -     Microalbumin / Creatinine Urine Ratio - Urine, Clean Catch; Future  -     TSH; Future  -     Vitamin B12; Future  -     Vitamin D 25 Hydroxy; Future    2. Type 2 diabetes mellitus with complication, with long-term current use of insulin (CMS/[facility])  -     Insulin Degludec (Tresiba  "FlexTouch) 200 UNIT/ML solution pen-injector pen injection; Inject 60 Units under the skin into the appropriate area as directed Every Night.  Dispense: 6 pen; Refill: 11    3. Essential hypertension  -     CBC & Differential; Future  -     Comprehensive Metabolic Panel; Future  -     Hemoglobin A1c; Future  -     Lipid Panel; Future  -     Protein / Creatinine Ratio, Urine - Urine, Clean Catch; Future  -     Microalbumin / Creatinine Urine Ratio - Urine, Clean Catch; Future  -     TSH; Future  -     Vitamin B12; Future  -     Vitamin D 25 Hydroxy; Future    4. Type 2 diabetes mellitus with hyperglycemia, with long-term current use of insulin (CMS/[facility])  -     CBC & Differential; Future  -     Comprehensive Metabolic Panel; Future  -     Hemoglobin A1c; Future  -     Lipid Panel; Future  -     Protein / Creatinine Ratio, Urine - Urine, Clean Catch; Future  -     Microalbumin / Creatinine Urine Ratio - Urine, Clean Catch; Future  -     TSH; Future  -     Vitamin B12; Future  -     Vitamin D 25 Hydroxy; Future    5. Vitamin D deficiency  -     CBC & Differential; Future  -     Comprehensive Metabolic Panel; Future  -     Hemoglobin A1c; Future  -     Lipid Panel; Future  -     Protein / Creatinine Ratio, Urine - Urine, Clean Catch; Future  -     Microalbumin / Creatinine Urine Ratio - Urine, Clean Catch; Future  -     TSH; Future  -     Vitamin B12; Future  -     Vitamin D 25 Hydroxy; Future    Other orders  -     insulin aspart (NovoLOG FlexPen) 100 UNIT/ML solution pen-injector sc pen; 12 up to 20 units with meals  Dispense: 5 pen; Refill: 11  -     Insulin Pen Needle (Pen Needles 3/16\") 31G X 5 MM misc; 100 each Daily.  Dispense: 100 each; Refill: 3           Diabetes complicated by stage III ckd and CAD         Lab Results   Component Value Date    HGBA1C 9.18 (H) 02/09/2021           catherine freestyle personal-- using               Discontinued Medications      Trulicity 0.75 mg weekly- stopped - cramping    " invokana is expensive , jardiance , side effects     Present regimen      Tresiba taking  52 units --increase to 56 units            Morning range 130-180         =====      Metformin 500 mg , 2 with supper ,intially  GI upset but now resolved     Metformin  mg one with supper  ---stopped due to side effects      =====     Novolog taking 12 units --increase to 14 units up to 18 units depending on size of meal and cgm reading               Self adjusting explained         He will keep current doses since blood sugar levels are now back at goal he is instructed to call if having hyper or hypoglycemia           Kimi  has allowed the patient to minimize hypoglycemia as alarms have predicted and avoided them     She also uses the arrows on the kimi  as follows:     If arrow is horizontal , she uses the predicted bolus     If the arrow is slanted up or down-- she increase or decrease by 4  units     If the arrow is vertical up or down - she increases or decreases by 4 unit s        Microvascular complications            Last eye exam -- July 2020 , following with        Follows with       positive proteinuria         Neuropathy none           Hyperlipidemia         taking lipitor 10 mg , 5 days per week    Taking zetia 10 mg daily                 Hypertension     Taking norvasc 5 mg one daily          Bone health     Vitamin D deficiency     Taking MVI daily              Follow Up   Return in about 3 months (around 5/17/2021) for Recheck.  Patient was given instructions and counseling regarding his condition or for health maintenance advice. Please see specific information pulled into the AVS if appropriate.

## 2021-03-01 DIAGNOSIS — I10 ESSENTIAL HYPERTENSION: Chronic | ICD-10-CM

## 2021-03-01 DIAGNOSIS — E78.2 MIXED HYPERLIPIDEMIA: Chronic | ICD-10-CM

## 2021-03-01 RX ORDER — AMLODIPINE BESYLATE 5 MG/1
TABLET ORAL
Qty: 90 TABLET | Refills: 0 | Status: SHIPPED | OUTPATIENT
Start: 2021-03-01 | End: 2022-05-02

## 2021-03-01 RX ORDER — EZETIMIBE 10 MG/1
TABLET ORAL
Qty: 90 TABLET | Refills: 0 | Status: SHIPPED | OUTPATIENT
Start: 2021-03-01 | End: 2021-05-25

## 2021-03-01 NOTE — TELEPHONE ENCOUNTER
Dr. Raymond Patient    Last OV 02/02/2021    Next [facility] 03/15/2021

## 2021-03-02 ENCOUNTER — IMMUNIZATION (OUTPATIENT)
Dept: VACCINE CLINIC | Facility: HOSPITAL | Age: 76
End: 2021-03-02

## 2021-03-02 PROCEDURE — 91300 HC SARSCOV02 VAC 30MCG/0.3ML IM: CPT | Performed by: NURSE PRACTITIONER

## 2021-03-02 PROCEDURE — 0002A: CPT | Performed by: NURSE PRACTITIONER

## 2021-03-12 ENCOUNTER — DOCUMENTATION (OUTPATIENT)
Dept: CARDIOLOGY | Facility: CLINIC | Age: 76
End: 2021-03-12

## 2021-03-12 NOTE — PROGRESS NOTES
Called pt saying his left arm was hurting and his neck and chest was as well but doesn't want to go to the hospital but we recommend him going but he wanted to see Dr. Aragon in the office so we schedule him for Monday March 15, at 1015 am . But told patient again it to gets worse he needed to go to the ER. Dr. Aragon ok with seeing patient Monday

## 2021-03-15 ENCOUNTER — OFFICE VISIT (OUTPATIENT)
Dept: FAMILY MEDICINE CLINIC | Facility: CLINIC | Age: 76
End: 2021-03-15

## 2021-03-15 ENCOUNTER — OFFICE VISIT (OUTPATIENT)
Dept: CARDIOLOGY | Facility: CLINIC | Age: 76
End: 2021-03-15

## 2021-03-15 VITALS
BODY MASS INDEX: 24.57 KG/M2 | HEIGHT: 73 IN | SYSTOLIC BLOOD PRESSURE: 160 MMHG | OXYGEN SATURATION: 97 % | DIASTOLIC BLOOD PRESSURE: 62 MMHG | HEART RATE: 51 BPM | WEIGHT: 185.4 LBS

## 2021-03-15 VITALS
HEART RATE: 70 BPM | DIASTOLIC BLOOD PRESSURE: 62 MMHG | OXYGEN SATURATION: 97 % | BODY MASS INDEX: 27.17 KG/M2 | HEIGHT: 73 IN | SYSTOLIC BLOOD PRESSURE: 160 MMHG | WEIGHT: 205 LBS

## 2021-03-15 DIAGNOSIS — Z79.4 TYPE 2 DIABETES MELLITUS WITH COMPLICATION, WITH LONG-TERM CURRENT USE OF INSULIN (HCC): Chronic | ICD-10-CM

## 2021-03-15 DIAGNOSIS — E78.2 MIXED HYPERLIPIDEMIA: Chronic | ICD-10-CM

## 2021-03-15 DIAGNOSIS — G89.29 CHRONIC MIDLINE THORACIC BACK PAIN: ICD-10-CM

## 2021-03-15 DIAGNOSIS — M54.6 CHRONIC MIDLINE THORACIC BACK PAIN: ICD-10-CM

## 2021-03-15 DIAGNOSIS — R07.1 CHEST PAIN ON BREATHING: Primary | ICD-10-CM

## 2021-03-15 DIAGNOSIS — E11.8 TYPE 2 DIABETES MELLITUS WITH COMPLICATION, WITH LONG-TERM CURRENT USE OF INSULIN (HCC): Chronic | ICD-10-CM

## 2021-03-15 DIAGNOSIS — I10 ESSENTIAL HYPERTENSION: Chronic | ICD-10-CM

## 2021-03-15 DIAGNOSIS — Z95.1 S/P CABG (CORONARY ARTERY BYPASS GRAFT): ICD-10-CM

## 2021-03-15 DIAGNOSIS — I10 ESSENTIAL HYPERTENSION: Primary | Chronic | ICD-10-CM

## 2021-03-15 DIAGNOSIS — I25.10 CORONARY ARTERIOSCLEROSIS: Chronic | ICD-10-CM

## 2021-03-15 DIAGNOSIS — N18.32 STAGE 3B CHRONIC KIDNEY DISEASE (HCC): Chronic | ICD-10-CM

## 2021-03-15 DIAGNOSIS — G62.9 NEUROPATHY: ICD-10-CM

## 2021-03-15 DIAGNOSIS — E78.00 PURE HYPERCHOLESTEROLEMIA: ICD-10-CM

## 2021-03-15 PROCEDURE — 99215 OFFICE O/P EST HI 40 MIN: CPT | Performed by: INTERNAL MEDICINE

## 2021-03-15 PROCEDURE — 99214 OFFICE O/P EST MOD 30 MIN: CPT | Performed by: GENERAL PRACTICE

## 2021-03-15 PROCEDURE — 93000 ELECTROCARDIOGRAM COMPLETE: CPT | Performed by: INTERNAL MEDICINE

## 2021-03-15 RX ORDER — GABAPENTIN 300 MG/1
CAPSULE ORAL
Qty: 90 CAPSULE | Refills: 2 | Status: SHIPPED | OUTPATIENT
Start: 2021-03-15 | End: 2021-06-10

## 2021-03-15 RX ORDER — RANOLAZINE 500 MG/1
500 TABLET, EXTENDED RELEASE ORAL 2 TIMES DAILY
Qty: 60 TABLET | Refills: 6 | Status: SHIPPED | OUTPATIENT
Start: 2021-03-15 | End: 2021-04-13 | Stop reason: SDUPTHER

## 2021-03-15 RX ORDER — ISOSORBIDE MONONITRATE 60 MG/1
60 TABLET, EXTENDED RELEASE ORAL EVERY MORNING
Qty: 90 TABLET | Refills: 3 | Status: SHIPPED | OUTPATIENT
Start: 2021-03-15 | End: 2021-04-13

## 2021-03-15 RX ORDER — NITROGLYCERIN 0.4 MG/1
0.4 TABLET SUBLINGUAL
Qty: 90 TABLET | Refills: 3 | Status: SHIPPED | OUTPATIENT
Start: 2021-03-15 | End: 2022-04-24 | Stop reason: SDUPTHER

## 2021-03-15 NOTE — PROGRESS NOTES
Subjective   Zachary Galindo is a 75 y.o. male.   Chief Complaint   Patient presents with   • Hypertension   • Diabetes       For review and evaluation of management of chronic medical problems. Labs reviewed. Has been having some chest pain on exertion with some radialtion to left arm. Is seeing Dr. Clark today.     Hypertension  This is a chronic problem. The current episode started more than 1 year ago. The problem is unchanged. The problem is controlled. Pertinent negatives include no chest pain, headaches, neck pain, palpitations or shortness of breath. There are no associated agents to hypertension. Risk factors for coronary artery disease include diabetes mellitus and dyslipidemia. Current antihypertension treatment includes angiotensin blockers, calcium channel blockers and beta blockers. The current treatment provides moderate improvement. There are no compliance problems.    Diabetes  He presents for his follow-up diabetic visit. He has type 2 diabetes mellitus. His disease course has been improving. There are no hypoglycemic associated symptoms. Pertinent negatives for hypoglycemia include no dizziness, headaches or nervousness/anxiousness. There are no diabetic associated symptoms. Pertinent negatives for diabetes include no chest pain, no fatigue and no weakness. There are no hypoglycemic complications. Diabetic complications include nephropathy. Risk factors for coronary artery disease include diabetes mellitus, dyslipidemia, hypertension and male sex. Current diabetic treatment includes insulin injections. He is compliant with treatment all of the time. His weight is stable. He is following a generally healthy diet. Meal planning includes ADA exchanges. He participates in exercise intermittently. His home blood glucose trend is increasing steadily. An ACE inhibitor/angiotensin II receptor blocker is being taken. Eye exam is current.   Hyperlipidemia  This is a chronic problem. The current  episode started more than 1 year ago. The problem is uncontrolled. Recent lipid tests were reviewed and are high. Exacerbating diseases include diabetes. There are no known factors aggravating his hyperlipidemia. Pertinent negatives include no chest pain, myalgias or shortness of breath. Current antihyperlipidemic treatment includes ezetimibe. The current treatment provides mild improvement of lipids. There are no compliance problems.    Chronic Kidney Disease  This is a chronic problem. The current episode started more than 1 year ago. The problem occurs constantly. The problem has been gradually improving. Pertinent negatives include no abdominal pain, arthralgias, chest pain, chills, congestion, coughing, fatigue, fever, headaches, joint swelling, myalgias, nausea, neck pain, numbness, rash, sore throat, vomiting or weakness. Nothing aggravates the symptoms. Treatments tried: hydration.   Back Pain  This is a recurrent problem. The current episode started more than 1 month ago. The problem occurs constantly. The problem has been gradually worsening since onset. The pain is present in the thoracic spine. The quality of the pain is described as aching. The pain does not radiate. The pain is moderate. The pain is the same all the time. The symptoms are aggravated by position, standing and sitting. Stiffness is present in the morning. Pertinent negatives include no abdominal pain, chest pain, fever, headaches, numbness or weakness. He has tried analgesics and heat (gabapentin) for the symptoms. The treatment provided significant relief.      The following portions of the patient's history were reviewed and updated as appropriate: allergies, current medications, past social history and problem list.    Outpatient Medications Prior to Visit   Medication Sig Dispense Refill   • alfuzosin (UROXATRAL) 10 MG 24 hr tablet Take 10 mg by mouth Daily.     • amLODIPine (NORVASC) 5 MG tablet TAKE 1 TABLET EVERY DAY 90 tablet 0  "  • aspirin 81 MG tablet Take 1 tablet by mouth Daily. 30 tablet 11   • atorvastatin (LIPITOR) 10 MG tablet TAKE 1 TABLET BY MOUTH EVERY NIGHT 90 tablet 2   • carvedilol (Coreg) 25 MG tablet Take 1 tablet by mouth 2 (Two) Times a Day With Meals. 180 tablet 3   • clopidogrel (PLAVIX) 75 MG tablet TAKE 1 TABLET BY MOUTH EVERY DAY 90 tablet 2   • ezetimibe (ZETIA) 10 MG tablet TAKE 1 TABLET EVERY DAY 90 tablet 0   • glucose blood test strip OneTouch Ultra Test strips  -  Test sugars 3-4 times a day as directed by provider.     • insulin aspart (NovoLOG FlexPen) 100 UNIT/ML solution pen-injector sc pen 12 up to 20 units with meals 5 pen 11   • Insulin Degludec (Tresiba FlexTouch) 200 UNIT/ML solution pen-injector pen injection Inject 60 Units under the skin into the appropriate area as directed Every Night. 6 pen 11   • Insulin Pen Needle (Pen Needles 3/16\") 31G X 5 MM misc 100 each Daily. 100 each 3   • isosorbide mononitrate (IMDUR) 30 MG 24 hr tablet Take 1 tablet by mouth Every Morning. 30 tablet 6   • Lancets (ONETOUCH ULTRASOFT) lancets Test sugars 3-4 times daily as instructed by physician.     • losartan (COZAAR) 50 MG tablet Take 50 mg by mouth Daily.     • magnesium oxide (MAG-OX) 400 MG tablet Take 1 tablet by mouth 2 (Two) Times a Day. 180 tablet 3   • nitroglycerin (NITROSTAT) 0.4 MG SL tablet Place 1 tablet under the tongue Every 5 (Five) Minutes As Needed for Chest Pain. Max 3 doses. Go to ER if no relief 25 tablet 0   • probenecid (BENEMID) 500 MG tablet TAKE 1 TABLET BY MOUTH TWICE DAILY 180 tablet 2   • gabapentin (NEURONTIN) 300 MG capsule 1 qam and 2 qhs 90 capsule 1   • cefdinir (OMNICEF) 300 MG capsule        No facility-administered medications prior to visit.     I have reviewed 12 systems with patient. Findings were negative except what is noted below and/or in history of present illness.   Review of Systems   Constitutional: Negative for chills, fatigue and fever.   HENT: Negative for " "congestion and sore throat.    Respiratory: Negative for cough and shortness of breath.    Cardiovascular: Negative for chest pain and palpitations.   Gastrointestinal: Negative for abdominal pain, nausea and vomiting.   Musculoskeletal: Positive for back pain. Negative for arthralgias, joint swelling, myalgias and neck pain.   Skin: Negative for rash.   Neurological: Negative for dizziness, weakness, numbness and headaches.   Psychiatric/Behavioral: The patient is not nervous/anxious.        Objective   Visit Vitals  /62   Pulse 70   Ht 185.4 cm (73\")   Wt 93 kg (205 lb)   SpO2 97%   BMI 27.05 kg/m²     Physical Exam  Vitals and nursing note reviewed.   Constitutional:       General: He is not in acute distress.     Appearance: He is well-developed.   HENT:      Head: Normocephalic.      Nose: Nose normal.   Eyes:      General:         Right eye: No discharge.         Left eye: No discharge.      Conjunctiva/sclera: Conjunctivae normal.      Pupils: Pupils are equal, round, and reactive to light.   Neck:      Thyroid: No thyromegaly.   Cardiovascular:      Rate and Rhythm: Normal rate and regular rhythm.      Heart sounds: Normal heart sounds. No murmur.   Pulmonary:      Effort: Pulmonary effort is normal.      Breath sounds: Normal breath sounds.   Lymphadenopathy:      Cervical: No cervical adenopathy.   Skin:     General: Skin is warm and dry.   Neurological:      Mental Status: He is alert and oriented to person, place, and time.       Results for orders placed or performed in visit on 02/09/21   Comprehensive Metabolic Panel    Specimen: Blood   Result Value Ref Range    Glucose 256 (H) 65 - 99 mg/dL    BUN 35 (H) 8 - 23 mg/dL    Creatinine 1.95 (H) 0.76 - 1.27 mg/dL    Sodium 140 136 - 145 mmol/L    Potassium 4.7 3.5 - 5.2 mmol/L    Chloride 102 98 - 107 mmol/L    CO2 28.6 22.0 - 29.0 mmol/L    Calcium 9.4 8.6 - 10.5 mg/dL    Total Protein 7.4 6.0 - 8.5 g/dL    Albumin 4.10 3.50 - 5.20 g/dL    ALT " (SGPT) 21 1 - 41 U/L    AST (SGOT) 16 1 - 40 U/L    Alkaline Phosphatase 85 39 - 117 U/L    Total Bilirubin 0.3 0.0 - 1.2 mg/dL    eGFR Non African Amer 34 (L) >60 mL/min/1.73    Globulin 3.3 gm/dL    A/G Ratio 1.2 g/dL    BUN/Creatinine Ratio 17.9 7.0 - 25.0    Anion Gap 9.4 5.0 - 15.0 mmol/L   Hemoglobin A1c    Specimen: Blood   Result Value Ref Range    Hemoglobin A1C 9.18 (H) 4.80 - 5.60 %   Lipid Panel    Specimen: Blood   Result Value Ref Range    Total Cholesterol 117 0 - 200 mg/dL    Triglycerides 226 (H) 0 - 150 mg/dL    HDL Cholesterol 26 (L) 40 - 60 mg/dL    LDL Cholesterol  54 0 - 100 mg/dL    VLDL Cholesterol 37 5 - 40 mg/dL    LDL/HDL Ratio 1.76    Microalbumin / Creatinine Urine Ratio - Urine, Clean Catch    Specimen: Urine, Clean Catch   Result Value Ref Range    Microalbumin/Creatinine Ratio 1,032.0 mg/g    Creatinine, Urine 128.2 mg/dL    Microalbumin, Urine 132.3 mg/dL   Protein / Creatinine Ratio, Urine - Urine, Clean Catch    Specimen: Urine, Clean Catch   Result Value Ref Range    Protein/Creatinine Ratio, Urine 1,614.7 (H) 0.0 - 200.0 mg/G Crea    Creatinine, Urine 128.2 mg/dL    Total Protein, Urine 207.0 mg/dL   TSH    Specimen: Blood   Result Value Ref Range    TSH 3.980 0.270 - 4.200 uIU/mL   Vitamin B12    Specimen: Blood   Result Value Ref Range    Vitamin B-12 499 211 - 946 pg/mL   Vitamin D 25 Hydroxy    Specimen: Blood   Result Value Ref Range    25 Hydroxy, Vitamin D 45.3 30.0 - 100.0 ng/ml   CBC Auto Differential    Specimen: Blood   Result Value Ref Range    WBC 4.81 3.40 - 10.80 10*3/mm3    RBC 4.77 4.14 - 5.80 10*6/mm3    Hemoglobin 13.6 13.0 - 17.7 g/dL    Hematocrit 41.9 37.5 - 51.0 %    MCV 87.8 79.0 - 97.0 fL    MCH 28.5 26.6 - 33.0 pg    MCHC 32.5 31.5 - 35.7 g/dL    RDW 13.3 12.3 - 15.4 %    RDW-SD 42.8 37.0 - 54.0 fl    MPV 12.2 (H) 6.0 - 12.0 fL    Platelets 142 140 - 450 10*3/mm3    Neutrophil % 70.7 42.7 - 76.0 %    Lymphocyte % 16.0 (L) 19.6 - 45.3 %    Monocyte % 8.1  5.0 - 12.0 %    Eosinophil % 4.0 0.3 - 6.2 %    Basophil % 1.0 0.0 - 1.5 %    Immature Grans % 0.2 0.0 - 0.5 %    Neutrophils, Absolute 3.40 1.70 - 7.00 10*3/mm3    Lymphocytes, Absolute 0.77 0.70 - 3.10 10*3/mm3    Monocytes, Absolute 0.39 0.10 - 0.90 10*3/mm3    Eosinophils, Absolute 0.19 0.00 - 0.40 10*3/mm3    Basophils, Absolute 0.05 0.00 - 0.20 10*3/mm3    Immature Grans, Absolute 0.01 0.00 - 0.05 10*3/mm3    nRBC 0.2 0.0 - 0.2 /100 WBC      Notes brought forward are reviewed and updated if indicated.     Assessment/Plan   Problems Addressed this Visit        Cardiac and Vasculature    Essential hypertension - Primary (Chronic)    Hyperlipidemia (Chronic)    Coronary arteriosclerosis (Chronic)       Endocrine and Metabolic    Type 2 diabetes mellitus with complication, with long-term current use of insulin (CMS/[facility]) (Chronic)       Genitourinary and Reproductive     Stage 3 chronic kidney disease (CMS/[facility]) (Chronic)       Musculoskeletal and Injuries    Chronic midline thoracic back pain    Relevant Medications    gabapentin (NEURONTIN) 300 MG capsule       Neuro    Neuropathy    Relevant Medications    gabapentin (NEURONTIN) 300 MG capsule      Diagnoses       Codes Comments    Essential hypertension    -  Primary ICD-10-CM: I10  ICD-9-CM: 401.9     Neuropathy     ICD-10-CM: G62.9  ICD-9-CM: 355.9     Chronic midline thoracic back pain     ICD-10-CM: M54.6, G89.29  ICD-9-CM: 724.1, 338.29     Mixed hyperlipidemia     ICD-10-CM: E78.2  ICD-9-CM: 272.2     Coronary arteriosclerosis     ICD-10-CM: I25.10  ICD-9-CM: 414.00     Type 2 diabetes mellitus with complication, with long-term current use of insulin (CMS/[facility])     ICD-10-CM: E11.8, Z79.4  ICD-9-CM: 250.90, V58.67     Stage 3b chronic kidney disease (CMS/[facility])     ICD-10-CM: N18.32  ICD-9-CM: 585.3          See Dr. Clark today. Follow up with endocrinology as scheduled. Follow up with nephro as scheduled. Continue current treatment. Shaq reviewed and  appropriate.     New Medications Ordered This Visit   Medications   • gabapentin (NEURONTIN) 300 MG capsule     Si qam and 2 qhs     Dispense:  90 capsule     Refill:  2     Return in about 3 months (around 6/15/2021) for Recheck.

## 2021-03-15 NOTE — PROGRESS NOTES
Zachary Galindo  75 y.o. male    1. Chest pain on breathing    2. S/P CABG (coronary artery bypass graft)    3. Pure hypercholesterolemia    4. Mixed hyperlipidemia    5. Essential hypertension        History of Present Illness  Mr. Galindo is a 75-year-old male with coronary artery disease, hypertension, diabetes mellitus, CKD. His history is remarkable for coronary artery disease status post coronary artery bypass surgery in 1998 by Dr. Flor and had subsequent PCI to SVG to OM and RCA by Dr. Stiles.  His last PCI was in 2014.    He presents for evaluation sooner than expected.  When seen last by me in January 2020 when he denied any cardiac symptoms.  However during the past week he has had intermittent episodes of chest pain related to exertion which sometimes goes up his neck and arms suggestive of angina.  He has also had some degree of shortness of breath and no palpitation or dizziness was reported. He has been taking it easy for the past few days.  He was pain-free at the time of my examination.    Clinical exam today showed heart rate of 51 bpm and blood pressure was mildly elevated at 160/62 mmHg.  No bronchospasm or signs of congestive heart failure was noted.    Echocardiogram in January 2020 showed normal LV systolic function with an EF of 58% with mild LVH.  There was grade 1 diastolic dysfunction.  Mild mitral regurgitation was noted.    EKG today showed sinus rhythm with heart rate of 51 bpm.  Rightward axis.  Left ventricular hypertrophy.  Inferolateral ST-T changes.      Allergies   Allergen Reactions   • Advicor [Niacin-Lovastatin Er] Swelling     Swelling   • Nsaids Other (See Comments)     Kidney disease   • Lisinopril Cough     Cough         Past Medical History:   Diagnosis Date   • Allergic rhinitis    • Ankle joint pain    • Artificial lens present     Left   • Astigmatism    • Benign prostatic hyperplasia    • Cataract    • Closed fracture of medial malleolus    • Coronary  arteriosclerosis    • Diabetes mellitus (CMS/HCC)     no retinopathy   • Elevated levels of transaminase & lactic acid dehydrogenase     improving    • Encounter for health maintenance examination     Individual health examination     • Encounter for health maintenance examination in adult     Adult health examination    • Essential hypertension    • Essential hypertension     Unspecified   • Flank pain     probably muscular    • GERD (gastroesophageal reflux disease)    • Gout    • Gout     unspecified     • Hemorrhoids    • History of artificial eye lens     Artificial lens in position - right    • History of colonoscopy 04/04/2010    Colon endoscopy  (47013)  (1)    • History of kidney stones    • Hyperlipidemia    • Hypermetropia    • Low blood pressure    • Malaise and fatigue    • Need for influenza vaccination     Needs influenza immunization    • Nuclear cataract of left eye    • Posterior subcapsular polar senile cataract     Left   • Renal impairment    • Special screening for malignant neoplasm of prostate    • Type 2 diabetes mellitus (CMS/HCC)     improving   • 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 edema (CMS/HCC)     without macular edema - mild OS    • Upper respiratory infection    • Urticaria     Drug-aggravated angioedema-urticaria   • Urticaria          Past Surgical History:   Procedure Laterality Date   • CARDIAC CATHETERIZATION  03/24/2014    (45926)  (2)  Reduction of stenoisis from 95% to less than 0% stenosis with evidence of good JENNY 3-flow. Distal RCA beyond the touchdown was seen filling the circumflex system   • CATARACT EXTRACTION  10/2015    Remove cataract, insert lens (2)    • CORONARY ARTERY BYPASS GRAFT      Arterial, two  (1)   -  3 - 12/1998   • HERNIA REPAIR      w/mesh  (1)   • INJECTION OF MEDICATION  04/11/2013    B12  (1)  - RAYMOND Raymond   • INJECTION OF MEDICATION  07/03/2013    Benadryl  (1) - RAYMOND Raymond   • INJECTION OF  MEDICATION  10/22/2014    Kenalog  (2)   • OTHER SURGICAL HISTORY  07/10/2002    Fragmenting of kidney stone  -   [facility], 2010 shocks. 1cm UP stone,right. Diabetes mellitus         Family History   Problem Relation Age of Onset   • Diabetes Other    • Hypertension Other    • Cancer Mother    • Heart disease Mother    • Hypertension Mother    • Hyperlipidemia Mother    • Diabetes Brother    • Diabetes Maternal Aunt    • Cancer Sister    • Diabetes Sister          Social History     Socioeconomic History   • Marital status:      Spouse name: Not on file   • Number of children: Not on file   • Years of education: Not on file   • Highest education level: Not on file   Tobacco Use   • Smoking status: Former Smoker   • Smokeless tobacco: Never Used   • Tobacco comment: Quit 1979   Substance and Sexual Activity   • Alcohol use: No   • Drug use: No   • Sexual activity: Defer         Current Outpatient Medications   Medication Sig Dispense Refill   • alfuzosin (UROXATRAL) 10 MG 24 hr tablet Take 10 mg by mouth Daily.     • amLODIPine (NORVASC) 5 MG tablet TAKE 1 TABLET EVERY DAY 90 tablet 0   • aspirin 81 MG tablet Take 1 tablet by mouth Daily. 30 tablet 11   • atorvastatin (LIPITOR) 10 MG tablet TAKE 1 TABLET BY MOUTH EVERY NIGHT 90 tablet 2   • carvedilol (Coreg) 25 MG tablet Take 1 tablet by mouth 2 (Two) Times a Day With Meals. 180 tablet 3   • clopidogrel (PLAVIX) 75 MG tablet TAKE 1 TABLET BY MOUTH EVERY DAY 90 tablet 2   • ezetimibe (ZETIA) 10 MG tablet TAKE 1 TABLET EVERY DAY 90 tablet 0   • gabapentin (NEURONTIN) 300 MG capsule 1 qam and 2 qhs 90 capsule 2   • glucose blood test strip OneTouch Ultra Test strips  -  Test sugars 3-4 times a day as directed by provider.     • insulin aspart (NovoLOG FlexPen) 100 UNIT/ML solution pen-injector sc pen 12 up to 20 units with meals 5 pen 11   • Insulin Degludec (Tresiba FlexTouch) 200 UNIT/ML solution pen-injector pen injection Inject 60 Units under the skin into the  "appropriate area as directed Every Night. 6 pen 11   • Insulin Pen Needle (Pen Needles 3/16\") 31G X 5 MM misc 100 each Daily. 100 each 3   • isosorbide mononitrate (IMDUR) 30 MG 24 hr tablet Take 1 tablet by mouth Every Morning. 30 tablet 6   • Lancets (ONETOUCH ULTRASOFT) lancets Test sugars 3-4 times daily as instructed by physician.     • losartan (COZAAR) 50 MG tablet Take 50 mg by mouth Daily.     • magnesium oxide (MAG-OX) 400 MG tablet Take 1 tablet by mouth 2 (Two) Times a Day. 180 tablet 3   • nitroglycerin (Nitrostat) 0.4 MG SL tablet Place 1 tablet under the tongue Every 5 (Five) Minutes As Needed for Chest Pain. Max 3 doses. Go to ER if no relief 90 tablet 3   • probenecid (BENEMID) 500 MG tablet TAKE 1 TABLET BY MOUTH TWICE DAILY 180 tablet 2     No current facility-administered medications for this visit.         OBJECTIVE    /62   Pulse 51   Ht 185.4 cm (73\")   Wt 84.1 kg (185 lb 6.4 oz)   SpO2 97%   BMI 24.46 kg/m²         Review of Systems: The following systems were reviewed and the changes noted as below and as described in history of present    Constitutional:  Denies recent weight loss, weight gain, fever or chills. fatigue     HENT:  Denies any hearing loss, epistaxis, hoarseness, or difficulty speaking.     Eyes: Wears eyeglasses or contact lenses     Respiratory:  Dyspnea with exertion,no cough, wheezing, or hemoptysis.     Cardiovascular: Intermittent exertional chest pain.  Dyspnea on exertion    Gastrointestinal:  Denies change in bowel habits, dyspepsia, ulcer disease, hematochezia, or melena.     Endocrine: Negative for cold intolerance, heat intolerance, polydipsia, polyphagia and polyuria.     Genitourinary: CKD.  He is followed by nephrology.      Musculoskeletal: DJD    Neurological:  Denies any history of recurrent headaches, strokes, TIA, or seizure disorder.     Hematological: Denies any food allergies, seasonal allergies, bleeding disorders, or lymphadenopathy. "     Physical Exam : The following systems were reassessed and no changes were noted    Constitutional: Cooperative, alert and oriented,  in no acute distress.     HENT:   Head: Normocephalic, normal hair patterns, no masses or tenderness.  Ears, Nose, and Throat: No gross abnormalities. No pallor or cyanosis.   Eyes: EOMS intact, PERRL, conjunctivae and lids unremarkable. Fundoscopic exam and visual fields not performed.   Neck: No palpable masses or adenopathy, no thyromegaly, no JVD, carotid pulses are full and equal bilaterally and without  Bruits.     Cardiovascular: Regular rhythm, S1 and S2 normal, no S3 or S4.  No murmurs, gallops, or rubs detected.     Pulmonary/Chest: Chest: normal symmetry,  normal respiratory excursion, no intercostal retraction, no use of accessory muscles.            Pulmonary: Normal breath sounds. No rales or ronchi.    Abdominal: Abdomen soft, bowel sounds normoactive, no masses, no hepatosplenomegaly, non-tender, no bruits.     Musculoskeletal: No deformities, clubbing, cyanosis, erythema, or edema observed.     Neurological: No gross motor or sensory deficits noted, affect appropriate, oriented to time, person, place.     Psychiatric: He has a normal mood and affect. His behavior is normal. Judgment and thought content normal.         Lab Results   Component Value Date    WBC 4.81 02/09/2021    HGB 13.6 02/09/2021    HCT 41.9 02/09/2021    MCV 87.8 02/09/2021     02/09/2021     Lab Results   Component Value Date    GLUCOSE 256 (H) 02/09/2021    BUN 35 (H) 02/09/2021    CREATININE 1.95 (H) 02/09/2021    EGFRIFNONA 34 (L) 02/09/2021    BCR 17.9 02/09/2021    CO2 28.6 02/09/2021    CALCIUM 9.4 02/09/2021    ALBUMIN 4.10 02/09/2021    AST 16 02/09/2021    ALT 21 02/09/2021     Lab Results   Component Value Date    CHOL 117 02/09/2021    CHOL 109 07/06/2020    CHOL 124 04/13/2020     Lab Results   Component Value Date    TRIG 226 (H) 02/09/2021    TRIG 128 07/06/2020    TRIG 156  (H) 04/13/2020     Lab Results   Component Value Date    HDL 26 (L) 02/09/2021    HDL 30 (L) 07/06/2020    HDL 26 (L) 04/13/2020     No components found for: LDLCALC  Lab Results   Component Value Date    LDL 54 02/09/2021    LDL 53 07/06/2020    LDL 67 04/13/2020     No results found for: HDLLDLRATIO  No components found for: CHOLHDL  Lab Results   Component Value Date    HGBA1C 9.18 (H) 02/09/2021     Lab Results   Component Value Date    TSH 3.980 02/09/2021           ASSESSMENT AND PLAN  Mr. Galindo has multiple medical issues as discussed under history of present illness and has presented with exertional angina during the past week which is similar to what he has had in the past related to his CAD.  I discussed different treatment options with him and I believe that definitive evaluation by cardiac catheterization would be appropriate.  All risks and benefits were explained to him in detail.  However, he is known to have CKD with a GFR of 34 when checked in February 2021.  I did discuss his case with Dr. Hsu who will evaluate him in the near future and explained the risks and benefits to him 1 more time.  Cardiac catheterization could be considered after this evaluation.  In the interim, I have increased the dose of isosorbide mononitrate to 60 mg daily and Ranexa 5 mg twice a day has been initiated.  I have continued antiplatelet therapy with aspirin and Plavix, antihypertensive therapy with amlodipine, carvedilol, losartan, lipid-lowering therapy with atorvastatin, Zetia and antianginal therapy with isosorbide mononitrate.    I have advised him to maintain a high fluid intake.  He will call us or go to the emergency room if there is any worsening of symptoms.    About 40 minutes was spent in the assessment and evaluation of this patient    Diagnoses and all orders for this visit:    1. Chest pain on breathing (Primary)  -     ECG 12 Lead    2. S/P CABG (coronary artery bypass graft)    3. Pure  hypercholesterolemia    4. Mixed hyperlipidemia    5. Essential hypertension    Other orders  -     nitroglycerin (Nitrostat) 0.4 MG SL tablet; Place 1 tablet under the tongue Every 5 (Five) Minutes As Needed for Chest Pain. Max 3 doses. Go to ER if no relief  Dispense: 90 tablet; Refill: 3        Patient's Body mass index is 24.46 kg/m². BMI is within normal parameters. No follow-up required..  Patient is a non-smoker    Medhat Clark MD  3/15/2021  09:38 CDT

## 2021-03-16 ENCOUNTER — TRANSCRIBE ORDERS (OUTPATIENT)
Dept: LAB | Facility: HOSPITAL | Age: 76
End: 2021-03-16

## 2021-03-16 DIAGNOSIS — M10.9 GOUT, UNSPECIFIED CAUSE, UNSPECIFIED CHRONICITY, UNSPECIFIED SITE: ICD-10-CM

## 2021-03-16 DIAGNOSIS — N18.30 STAGE 3 CHRONIC KIDNEY DISEASE, UNSPECIFIED WHETHER STAGE 3A OR 3B CKD (HCC): Primary | ICD-10-CM

## 2021-03-16 DIAGNOSIS — I10 ESSENTIAL (PRIMARY) HYPERTENSION: ICD-10-CM

## 2021-03-16 DIAGNOSIS — I25.9 CHRONIC ISCHEMIC HEART DISEASE, UNSPECIFIED: ICD-10-CM

## 2021-03-16 DIAGNOSIS — M54.2 NECK PAIN: ICD-10-CM

## 2021-03-16 DIAGNOSIS — E11.69 TYPE 2 DIABETES MELLITUS WITH OTHER SPECIFIED COMPLICATION, UNSPECIFIED WHETHER LONG TERM INSULIN USE (HCC): ICD-10-CM

## 2021-03-16 DIAGNOSIS — E78.5 HYPERLIPIDEMIA, UNSPECIFIED HYPERLIPIDEMIA TYPE: ICD-10-CM

## 2021-03-17 ENCOUNTER — LAB (OUTPATIENT)
Dept: LAB | Facility: HOSPITAL | Age: 76
End: 2021-03-17

## 2021-03-17 LAB
25(OH)D3 SERPL-MCNC: 37.4 NG/ML
ANION GAP SERPL CALCULATED.3IONS-SCNC: 9.8 MMOL/L (ref 5–15)
BUN SERPL-MCNC: 32 MG/DL (ref 8–23)
BUN/CREAT SERPL: 15 (ref 7–25)
CALCIUM SPEC-SCNC: 9.1 MG/DL (ref 8.6–10.5)
CHLORIDE SERPL-SCNC: 107 MMOL/L (ref 98–107)
CO2 SERPL-SCNC: 25.2 MMOL/L (ref 22–29)
CREAT SERPL-MCNC: 2.13 MG/DL (ref 0.76–1.27)
GFR SERPL CREATININE-BSD FRML MDRD: 30 ML/MIN/1.73
GLUCOSE SERPL-MCNC: 103 MG/DL (ref 65–99)
POTASSIUM SERPL-SCNC: 4.4 MMOL/L (ref 3.5–5.2)
PTH-INTACT SERPL-MCNC: 59.3 PG/ML (ref 15–65)
SODIUM SERPL-SCNC: 142 MMOL/L (ref 136–145)

## 2021-03-17 PROCEDURE — 80048 BASIC METABOLIC PNL TOTAL CA: CPT | Performed by: INTERNAL MEDICINE

## 2021-03-17 PROCEDURE — 83970 ASSAY OF PARATHORMONE: CPT | Performed by: INTERNAL MEDICINE

## 2021-03-17 PROCEDURE — 82306 VITAMIN D 25 HYDROXY: CPT | Performed by: INTERNAL MEDICINE

## 2021-03-17 PROCEDURE — 36415 COLL VENOUS BLD VENIPUNCTURE: CPT | Performed by: INTERNAL MEDICINE

## 2021-03-21 LAB
QT INTERVAL: 434 MS
QTC INTERVAL: 400 MS

## 2021-04-13 ENCOUNTER — OFFICE VISIT (OUTPATIENT)
Dept: CARDIOLOGY | Facility: CLINIC | Age: 76
End: 2021-04-13

## 2021-04-13 VITALS
SYSTOLIC BLOOD PRESSURE: 158 MMHG | HEART RATE: 64 BPM | OXYGEN SATURATION: 98 % | BODY MASS INDEX: 26.77 KG/M2 | HEIGHT: 73 IN | WEIGHT: 202 LBS | DIASTOLIC BLOOD PRESSURE: 78 MMHG

## 2021-04-13 DIAGNOSIS — I10 ESSENTIAL HYPERTENSION: Primary | Chronic | ICD-10-CM

## 2021-04-13 DIAGNOSIS — E78.2 MIXED HYPERLIPIDEMIA: Chronic | ICD-10-CM

## 2021-04-13 DIAGNOSIS — N18.32 STAGE 3B CHRONIC KIDNEY DISEASE (HCC): Chronic | ICD-10-CM

## 2021-04-13 DIAGNOSIS — Z95.1 S/P CABG (CORONARY ARTERY BYPASS GRAFT): ICD-10-CM

## 2021-04-13 DIAGNOSIS — R07.2 PRECORDIAL PAIN: ICD-10-CM

## 2021-04-13 PROCEDURE — 99214 OFFICE O/P EST MOD 30 MIN: CPT | Performed by: INTERNAL MEDICINE

## 2021-04-13 RX ORDER — ISOSORBIDE MONONITRATE 60 MG/1
30 TABLET, EXTENDED RELEASE ORAL EVERY MORNING
Qty: 90 TABLET | Refills: 3 | Status: ON HOLD
Start: 2021-04-13 | End: 2021-05-13

## 2021-04-13 RX ORDER — RANOLAZINE 500 MG/1
500 TABLET, EXTENDED RELEASE ORAL 2 TIMES DAILY
Qty: 60 TABLET | Refills: 6 | Status: ON HOLD
Start: 2021-04-13 | End: 2021-05-14 | Stop reason: SDUPTHER

## 2021-04-13 NOTE — PROGRESS NOTES
Zachary Galindo  75 y.o. male    1. Essential hypertension    2. Mixed hyperlipidemia    3. S/P CABG (coronary artery bypass graft)    4. Precordial pain    5. Stage 3b chronic kidney disease (CMS/HCC)        History of Present Illness  Mr. Galindo is a 75-year-old male with coronary artery disease, hypertension, diabetes mellitus, CKD. His history is remarkable for coronary artery disease status post coronary artery bypass surgery in 1998 by Dr. Flor and had subsequent PCI to SVG to OM and RCA by Dr. Stiles.  His last PCI was in 2014.    The patient presents for reassessment of his episodes of chest pain.  On his previous visit he was started on isosorbide mononitrate 60 mg daily and Ranexa 500 mg twice a day.  Though cardiac catheterization for definitive evaluation was recommended, this was deferred pending renal evaluation by Dr. Hsu.  The patient has CKD and after discussion with Dr. Hsu, he opted for maximal medical management since he was symptomatically better.  It was felt that his risk for going into renal failure may be even requiring dialysis would be high.      However secondary to headaches the patient had to discontinue Imdur and stop taking Ranexa thinking that this was causing some degree of swelling in the lower extremities.  Fortunately is not had any significant chest pain.    EKG today showed sinus rhythm with heart rate of 51 bpm.  Rightward axis.  Left ventricular hypertrophy.  Inferolateral ST-T changes.    Clinical exam today showed heart rate of 64 and blood pressure 158/78 mmHg.  No signs of bronchospasm or congestive heart failure was noted.  I did not appreciate any leg edema.      Allergies   Allergen Reactions   • Advicor [Niacin-Lovastatin Er] Swelling     Swelling   • Nsaids Other (See Comments)     Kidney disease   • Statins Swelling     Swelling   • Lisinopril Cough     Cough         Past Medical History:   Diagnosis Date   • Allergic rhinitis    • Ankle joint  pain    • Artificial lens present     Left   • Astigmatism    • Benign prostatic hyperplasia    • Cataract    • Closed fracture of medial malleolus    • Coronary arteriosclerosis    • Diabetes mellitus (CMS/HCC)     no retinopathy   • Elevated levels of transaminase & lactic acid dehydrogenase     improving    • Encounter for health maintenance examination     Individual health examination     • Encounter for health maintenance examination in adult     Adult health examination    • Essential hypertension    • Essential hypertension     Unspecified   • Flank pain     probably muscular    • GERD (gastroesophageal reflux disease)    • Gout    • Gout     unspecified     • Hemorrhoids    • History of artificial eye lens     Artificial lens in position - right    • History of colonoscopy 04/04/2010    Colon endoscopy  (14576)  (1)    • History of kidney stones    • Hyperlipidemia    • Hypermetropia    • Low blood pressure    • Malaise and fatigue    • Need for influenza vaccination     Needs influenza immunization    • Nuclear cataract of left eye    • Posterior subcapsular polar senile cataract     Left   • Renal impairment    • Special screening for malignant neoplasm of prostate    • Type 2 diabetes mellitus (CMS/HCC)     improving   • 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 edema (CMS/HCC)     without macular edema - mild OS    • Upper respiratory infection    • Urticaria     Drug-aggravated angioedema-urticaria   • Urticaria          Past Surgical History:   Procedure Laterality Date   • CARDIAC CATHETERIZATION  03/24/2014    (46115)  (2)  Reduction of stenoisis from 95% to less than 0% stenosis with evidence of good JENNY 3-flow. Distal RCA beyond the touchdown was seen filling the circumflex system   • CATARACT EXTRACTION  10/2015    Remove cataract, insert lens (2)    • CORONARY ARTERY BYPASS GRAFT      Arterial, two  (1)   -  3 - 12/1998   • HERNIA REPAIR       w/mesh  (1)   • INJECTION OF MEDICATION  04/11/2013    B12  (1)  - RAYMOND Raymond   • INJECTION OF MEDICATION  07/03/2013    Benadryl  (1) - RAYMOND Raymond   • INJECTION OF MEDICATION  10/22/2014    Kenalog  (2)   • OTHER SURGICAL HISTORY  07/10/2002    Fragmenting of kidney stone  -   ESWL, 2010 shocks. 1cm UP stone,right. Diabetes mellitus         Family History   Problem Relation Age of Onset   • Diabetes Other    • Hypertension Other    • Cancer Mother    • Heart disease Mother    • Hypertension Mother    • Hyperlipidemia Mother    • Diabetes Brother    • Diabetes Maternal Aunt    • Cancer Sister    • Diabetes Sister          Social History     Socioeconomic History   • Marital status:      Spouse name: Not on file   • Number of children: Not on file   • Years of education: Not on file   • Highest education level: Not on file   Tobacco Use   • Smoking status: Former Smoker   • Smokeless tobacco: Never Used   • Tobacco comment: Quit 1979   Substance and Sexual Activity   • Alcohol use: No   • Drug use: No   • Sexual activity: Defer         Current Outpatient Medications   Medication Sig Dispense Refill   • alfuzosin (UROXATRAL) 10 MG 24 hr tablet Take 10 mg by mouth Daily.     • amLODIPine (NORVASC) 5 MG tablet TAKE 1 TABLET EVERY DAY 90 tablet 0   • aspirin 81 MG tablet Take 1 tablet by mouth Daily. 30 tablet 11   • atorvastatin (LIPITOR) 10 MG tablet TAKE 1 TABLET BY MOUTH EVERY NIGHT 90 tablet 2   • carvedilol (Coreg) 25 MG tablet Take 1 tablet by mouth 2 (Two) Times a Day With Meals. 180 tablet 3   • clopidogrel (PLAVIX) 75 MG tablet TAKE 1 TABLET BY MOUTH EVERY DAY 90 tablet 2   • ezetimibe (ZETIA) 10 MG tablet TAKE 1 TABLET EVERY DAY 90 tablet 0   • gabapentin (NEURONTIN) 300 MG capsule 1 qam and 2 qhs 90 capsule 2   • glucose blood test strip OneTouch Ultra Test strips  -  Test sugars 3-4 times a day as directed by provider.     • insulin aspart (NovoLOG FlexPen) 100 UNIT/ML solution pen-injector sc pen 12  "up to 20 units with meals 5 pen 11   • Insulin Degludec (Tresiba FlexTouch) 200 UNIT/ML solution pen-injector pen injection Inject 60 Units under the skin into the appropriate area as directed Every Night. 6 pen 11   • Insulin Pen Needle (Pen Needles 3/16\") 31G X 5 MM misc 100 each Daily. 100 each 3   • Lancets (ONETOUCH ULTRASOFT) lancets Test sugars 3-4 times daily as instructed by physician.     • losartan (COZAAR) 50 MG tablet Take 50 mg by mouth Daily.     • magnesium oxide (MAG-OX) 400 MG tablet Take 1 tablet by mouth 2 (Two) Times a Day. 180 tablet 3   • nitroglycerin (Nitrostat) 0.4 MG SL tablet Place 1 tablet under the tongue Every 5 (Five) Minutes As Needed for Chest Pain. Max 3 doses. Go to ER if no relief 90 tablet 3   • probenecid (BENEMID) 500 MG tablet TAKE 1 TABLET BY MOUTH TWICE DAILY 180 tablet 2   • isosorbide mononitrate (IMDUR) 60 MG 24 hr tablet Take 0.5 tablets by mouth Every Morning. 90 tablet 3   • ranolazine (Ranexa) 500 MG 12 hr tablet Take 1 tablet by mouth 2 (Two) Times a Day. 60 tablet 6     No current facility-administered medications for this visit.         OBJECTIVE    /78   Pulse 64   Ht 185.4 cm (73\")   Wt 91.6 kg (202 lb)   SpO2 98%   BMI 26.65 kg/m²         Review of Systems: The following systems were reviewed and the changes noted as below and as described in history of present    Constitutional:  Denies recent weight loss, weight gain, fever or chills. fatigue     HENT:  Denies any hearing loss, epistaxis, hoarseness, or difficulty speaking.     Eyes: Wears eyeglasses or contact lenses     Respiratory:  Dyspnea with exertion,no cough, wheezing, or hemoptysis.     Cardiovascular: See HPI    Gastrointestinal:  Denies change in bowel habits, dyspepsia, ulcer disease, hematochezia, or melena.     Endocrine: Negative for cold intolerance, heat intolerance, polydipsia, polyphagia and polyuria.     Genitourinary: CKD.  He is followed by nephrology.      Musculoskeletal: " DJD    Neurological:  Denies any history of recurrent headaches, strokes, TIA, or seizure disorder.     Hematological: Denies any food allergies, seasonal allergies, bleeding disorders, or lymphadenopathy.     Physical Exam : The following systems were reassessed and no changes were noted    Constitutional: Cooperative, alert and oriented,  in no acute distress.     HENT:   Head: Normocephalic, normal hair patterns, no masses or tenderness.  Ears, Nose, and Throat: No gross abnormalities. No pallor or cyanosis.   Eyes: EOMS intact, PERRL, conjunctivae and lids unremarkable. Fundoscopic exam and visual fields not performed.   Neck: No palpable masses or adenopathy, no thyromegaly, no JVD, carotid pulses are full and equal bilaterally and without  Bruits.     Cardiovascular: Regular rhythm, S1 and S2 normal, no S3 or S4.  No murmurs, gallops, or rubs detected.     Pulmonary/Chest: Chest: normal symmetry,  normal respiratory excursion, no intercostal retraction, no use of accessory muscles.            Pulmonary: Normal breath sounds. No rales or ronchi.    Abdominal: Abdomen soft, bowel sounds normoactive, no masses, no hepatosplenomegaly, non-tender, no bruits.     Musculoskeletal: No deformities, clubbing, cyanosis, erythema, or edema observed.     Neurological: No gross motor or sensory deficits noted, affect appropriate, oriented to time, person, place.     Psychiatric: He has a normal mood and affect. His behavior is normal. Judgment and thought content normal.         Lab Results   Component Value Date    WBC 4.81 02/09/2021    HGB 13.6 02/09/2021    HCT 41.9 02/09/2021    MCV 87.8 02/09/2021     02/09/2021     Lab Results   Component Value Date    GLUCOSE 103 (H) 03/17/2021    BUN 32 (H) 03/17/2021    CREATININE 2.13 (H) 03/17/2021    EGFRIFNONA 30 (L) 03/17/2021    BCR 15.0 03/17/2021    CO2 25.2 03/17/2021    CALCIUM 9.1 03/17/2021    ALBUMIN 4.10 02/09/2021    AST 16 02/09/2021    ALT 21 02/09/2021      Lab Results   Component Value Date    CHOL 117 02/09/2021    CHOL 109 07/06/2020    CHOL 124 04/13/2020     Lab Results   Component Value Date    TRIG 226 (H) 02/09/2021    TRIG 128 07/06/2020    TRIG 156 (H) 04/13/2020     Lab Results   Component Value Date    HDL 26 (L) 02/09/2021    HDL 30 (L) 07/06/2020    HDL 26 (L) 04/13/2020     No components found for: LDLCALC  Lab Results   Component Value Date    LDL 54 02/09/2021    LDL 53 07/06/2020    LDL 67 04/13/2020     No results found for: HDLLDLRATIO  No components found for: CHOLHDL  Lab Results   Component Value Date    HGBA1C 9.18 (H) 02/09/2021     Lab Results   Component Value Date    TSH 3.980 02/09/2021           ASSESSMENT AND PLAN  Mr. Galindo has multiple medical issues as discussed under history of present illness but fortunately his angina has improved.  As mentioned above, he has been unable to take isosorbide mononitrate secondary to headache.  I explained to him that he could cut back on the dose of 30 mg daily and this could help his blood pressure and may not cause a headache.  As needed Tylenol has been recommended.  I have also asked him to restart Ranexa 500 mg twice a day and informed him that it is more than likely that amlodipine is responsible for his leg edema and it is unlikely to be due to congestive heart failure.  He has been evaluated by Dr. Hsu who is following him closely.  The patient seems satisfied with his medical therapy and wishes to continue this for now.  If he has any recurrence of angina or worsening, cardiac catheterization will be considered fully understanding the increased risk for worsening renal function.   I have continued antiplatelet therapy with aspirin and Plavix, antihypertensive therapy with amlodipine, carvedilol, losartan, lipid-lowering therapy with atorvastatin, Zetia and     Diagnoses and all orders for this visit:    1. Essential hypertension (Primary)    2. Mixed hyperlipidemia    3. S/P CABG  (coronary artery bypass graft)    4. Precordial pain    5. Stage 3b chronic kidney disease (CMS/HCC)    Other orders  -     ranolazine (Ranexa) 500 MG 12 hr tablet; Take 1 tablet by mouth 2 (Two) Times a Day.  Dispense: 60 tablet; Refill: 6  -     isosorbide mononitrate (IMDUR) 60 MG 24 hr tablet; Take 0.5 tablets by mouth Every Morning.  Dispense: 90 tablet; Refill: 3        Patient's Body mass index is 26.65 kg/m². BMI is within normal parameters. No follow-up required..  Patient is a non-smoker    Medhat Calrk MD  4/13/2021  11:10 CDT

## 2021-04-14 ENCOUNTER — DOCUMENTATION (OUTPATIENT)
Dept: ENDOCRINOLOGY | Facility: CLINIC | Age: 76
End: 2021-04-14

## 2021-04-16 ENCOUNTER — DOCUMENTATION (OUTPATIENT)
Dept: ENDOCRINOLOGY | Facility: CLINIC | Age: 76
End: 2021-04-16

## 2021-04-29 ENCOUNTER — TRANSCRIBE ORDERS (OUTPATIENT)
Dept: LAB | Facility: HOSPITAL | Age: 76
End: 2021-04-29

## 2021-04-29 ENCOUNTER — LAB (OUTPATIENT)
Dept: LAB | Facility: HOSPITAL | Age: 76
End: 2021-04-29

## 2021-04-29 DIAGNOSIS — I25.9 CHRONIC ISCHEMIC HEART DISEASE, UNSPECIFIED: ICD-10-CM

## 2021-04-29 DIAGNOSIS — E11.9 DIABETES MELLITUS WITHOUT COMPLICATION (HCC): ICD-10-CM

## 2021-04-29 DIAGNOSIS — N18.30 STAGE 3 CHRONIC KIDNEY DISEASE, UNSPECIFIED WHETHER STAGE 3A OR 3B CKD (HCC): Primary | ICD-10-CM

## 2021-04-29 DIAGNOSIS — E78.5 HYPERLIPIDEMIA, UNSPECIFIED HYPERLIPIDEMIA TYPE: ICD-10-CM

## 2021-04-29 DIAGNOSIS — M10.9 GOUT, UNSPECIFIED CAUSE, UNSPECIFIED CHRONICITY, UNSPECIFIED SITE: ICD-10-CM

## 2021-04-29 DIAGNOSIS — E11.65 TYPE 2 DIABETES MELLITUS WITH HYPERGLYCEMIA, WITH LONG-TERM CURRENT USE OF INSULIN (HCC): ICD-10-CM

## 2021-04-29 DIAGNOSIS — M25.512 LEFT SHOULDER PAIN, UNSPECIFIED CHRONICITY: ICD-10-CM

## 2021-04-29 DIAGNOSIS — I10 ESSENTIAL HYPERTENSION: ICD-10-CM

## 2021-04-29 DIAGNOSIS — M54.2 NECK PAIN: ICD-10-CM

## 2021-04-29 DIAGNOSIS — E78.2 MIXED HYPERLIPIDEMIA: ICD-10-CM

## 2021-04-29 DIAGNOSIS — I10 ESSENTIAL HYPERTENSION, BENIGN: ICD-10-CM

## 2021-04-29 DIAGNOSIS — Z79.4 TYPE 2 DIABETES MELLITUS WITH HYPERGLYCEMIA, WITH LONG-TERM CURRENT USE OF INSULIN (HCC): ICD-10-CM

## 2021-04-29 DIAGNOSIS — E55.9 VITAMIN D DEFICIENCY: ICD-10-CM

## 2021-04-29 DIAGNOSIS — N18.30 STAGE 3 CHRONIC KIDNEY DISEASE, UNSPECIFIED WHETHER STAGE 3A OR 3B CKD (HCC): ICD-10-CM

## 2021-04-29 LAB
25(OH)D3 SERPL-MCNC: 33.7 NG/ML
ALBUMIN SERPL-MCNC: 4.2 G/DL (ref 3.5–5.2)
ALBUMIN UR-MCNC: 109.7 MG/DL
ALBUMIN/GLOB SERPL: 1.2 G/DL
ALP SERPL-CCNC: 82 U/L (ref 39–117)
ALT SERPL W P-5'-P-CCNC: 23 U/L (ref 1–41)
ANION GAP SERPL CALCULATED.3IONS-SCNC: 8.6 MMOL/L (ref 5–15)
AST SERPL-CCNC: 21 U/L (ref 1–40)
BASOPHILS # BLD AUTO: 0.05 10*3/MM3 (ref 0–0.2)
BASOPHILS NFR BLD AUTO: 1.3 % (ref 0–1.5)
BILIRUB SERPL-MCNC: 0.4 MG/DL (ref 0–1.2)
BUN SERPL-MCNC: 33 MG/DL (ref 8–23)
BUN/CREAT SERPL: 15.1 (ref 7–25)
CALCIUM SPEC-SCNC: 9.7 MG/DL (ref 8.6–10.5)
CHLORIDE SERPL-SCNC: 105 MMOL/L (ref 98–107)
CHOLEST SERPL-MCNC: 148 MG/DL (ref 0–200)
CO2 SERPL-SCNC: 29.4 MMOL/L (ref 22–29)
CREAT SERPL-MCNC: 2.19 MG/DL (ref 0.76–1.27)
CREAT UR-MCNC: 85.3 MG/DL
CREAT UR-MCNC: 85.3 MG/DL
DEPRECATED RDW RBC AUTO: 40.8 FL (ref 37–54)
EOSINOPHIL # BLD AUTO: 0.16 10*3/MM3 (ref 0–0.4)
EOSINOPHIL NFR BLD AUTO: 4.1 % (ref 0.3–6.2)
ERYTHROCYTE [DISTWIDTH] IN BLOOD BY AUTOMATED COUNT: 13.4 % (ref 12.3–15.4)
GFR SERPL CREATININE-BSD FRML MDRD: 29 ML/MIN/1.73
GLOBULIN UR ELPH-MCNC: 3.4 GM/DL
GLUCOSE SERPL-MCNC: 143 MG/DL (ref 65–99)
HBA1C MFR BLD: 8.01 % (ref 4.8–5.6)
HCT VFR BLD AUTO: 40.2 % (ref 37.5–51)
HDLC SERPL-MCNC: 29 MG/DL (ref 40–60)
HGB BLD-MCNC: 13.6 G/DL (ref 13–17.7)
IMM GRANULOCYTES # BLD AUTO: 0.01 10*3/MM3 (ref 0–0.05)
IMM GRANULOCYTES NFR BLD AUTO: 0.3 % (ref 0–0.5)
LDLC SERPL CALC-MCNC: 91 MG/DL (ref 0–100)
LDLC/HDLC SERPL: 3.01 {RATIO}
LYMPHOCYTES # BLD AUTO: 0.73 10*3/MM3 (ref 0.7–3.1)
LYMPHOCYTES NFR BLD AUTO: 18.7 % (ref 19.6–45.3)
MCH RBC QN AUTO: 29.2 PG (ref 26.6–33)
MCHC RBC AUTO-ENTMCNC: 33.8 G/DL (ref 31.5–35.7)
MCV RBC AUTO: 86.5 FL (ref 79–97)
MICROALBUMIN/CREAT UR: 1286 MG/G
MONOCYTES # BLD AUTO: 0.37 10*3/MM3 (ref 0.1–0.9)
MONOCYTES NFR BLD AUTO: 9.5 % (ref 5–12)
NEUTROPHILS NFR BLD AUTO: 2.58 10*3/MM3 (ref 1.7–7)
NEUTROPHILS NFR BLD AUTO: 66.1 % (ref 42.7–76)
NRBC BLD AUTO-RTO: 0 /100 WBC (ref 0–0.2)
PLATELET # BLD AUTO: 144 10*3/MM3 (ref 140–450)
PMV BLD AUTO: 12.6 FL (ref 6–12)
POTASSIUM SERPL-SCNC: 4.8 MMOL/L (ref 3.5–5.2)
PROT SERPL-MCNC: 7.6 G/DL (ref 6–8.5)
PROT UR-MCNC: 174 MG/DL
PROT/CREAT UR: 2039.9 MG/G CREA (ref 0–200)
PTH-INTACT SERPL-MCNC: 52.5 PG/ML (ref 15–65)
RBC # BLD AUTO: 4.65 10*6/MM3 (ref 4.14–5.8)
SODIUM SERPL-SCNC: 143 MMOL/L (ref 136–145)
TRIGL SERPL-MCNC: 159 MG/DL (ref 0–150)
TSH SERPL DL<=0.05 MIU/L-ACNC: 2.24 UIU/ML (ref 0.27–4.2)
VIT B12 BLD-MCNC: 454 PG/ML (ref 211–946)
VLDLC SERPL-MCNC: 28 MG/DL (ref 5–40)
WBC # BLD AUTO: 3.9 10*3/MM3 (ref 3.4–10.8)

## 2021-04-29 PROCEDURE — 84443 ASSAY THYROID STIM HORMONE: CPT

## 2021-04-29 PROCEDURE — 82306 VITAMIN D 25 HYDROXY: CPT

## 2021-04-29 PROCEDURE — 82043 UR ALBUMIN QUANTITATIVE: CPT

## 2021-04-29 PROCEDURE — 84156 ASSAY OF PROTEIN URINE: CPT

## 2021-04-29 PROCEDURE — 82607 VITAMIN B-12: CPT

## 2021-04-29 PROCEDURE — 83036 HEMOGLOBIN GLYCOSYLATED A1C: CPT

## 2021-04-29 PROCEDURE — 85025 COMPLETE CBC W/AUTO DIFF WBC: CPT

## 2021-04-29 PROCEDURE — 82570 ASSAY OF URINE CREATININE: CPT

## 2021-04-29 PROCEDURE — 36415 COLL VENOUS BLD VENIPUNCTURE: CPT

## 2021-04-29 PROCEDURE — 83970 ASSAY OF PARATHORMONE: CPT

## 2021-04-29 PROCEDURE — 80053 COMPREHEN METABOLIC PANEL: CPT

## 2021-04-29 PROCEDURE — 80061 LIPID PANEL: CPT

## 2021-05-12 ENCOUNTER — APPOINTMENT (OUTPATIENT)
Dept: GENERAL RADIOLOGY | Facility: HOSPITAL | Age: 76
End: 2021-05-12

## 2021-05-12 ENCOUNTER — HOSPITAL ENCOUNTER (OUTPATIENT)
Facility: HOSPITAL | Age: 76
Discharge: HOME | End: 2021-05-14
Attending: EMERGENCY MEDICINE | Admitting: STUDENT IN AN ORGANIZED HEALTH CARE EDUCATION/TRAINING PROGRAM

## 2021-05-12 DIAGNOSIS — R07.2 PRECORDIAL PAIN: Primary | ICD-10-CM

## 2021-05-12 DIAGNOSIS — R73.9 HYPERGLYCEMIA: ICD-10-CM

## 2021-05-12 DIAGNOSIS — Z95.1 S/P CABG (CORONARY ARTERY BYPASS GRAFT): ICD-10-CM

## 2021-05-12 DIAGNOSIS — I21.4 NSTEMI, INITIAL EPISODE OF CARE (HCC): ICD-10-CM

## 2021-05-12 LAB
ALBUMIN SERPL-MCNC: 4.1 G/DL (ref 3.5–5.2)
ALBUMIN/GLOB SERPL: 1.1 G/DL
ALP SERPL-CCNC: 98 U/L (ref 39–117)
ALT SERPL W P-5'-P-CCNC: 20 U/L (ref 1–41)
ANION GAP SERPL CALCULATED.3IONS-SCNC: 9 MMOL/L (ref 5–15)
AST SERPL-CCNC: 16 U/L (ref 1–40)
BASOPHILS # BLD AUTO: 0.03 10*3/MM3 (ref 0–0.2)
BASOPHILS NFR BLD AUTO: 0.8 % (ref 0–1.5)
BILIRUB SERPL-MCNC: 0.3 MG/DL (ref 0–1.2)
BUN SERPL-MCNC: 38 MG/DL (ref 8–23)
BUN/CREAT SERPL: 17.8 (ref 7–25)
CALCIUM SPEC-SCNC: 9.3 MG/DL (ref 8.6–10.5)
CHLORIDE SERPL-SCNC: 99 MMOL/L (ref 98–107)
CO2 SERPL-SCNC: 29 MMOL/L (ref 22–29)
CREAT SERPL-MCNC: 2.14 MG/DL (ref 0.76–1.27)
DEPRECATED RDW RBC AUTO: 44.4 FL (ref 37–54)
EOSINOPHIL # BLD AUTO: 0.12 10*3/MM3 (ref 0–0.4)
EOSINOPHIL NFR BLD AUTO: 3 % (ref 0.3–6.2)
ERYTHROCYTE [DISTWIDTH] IN BLOOD BY AUTOMATED COUNT: 13.9 % (ref 12.3–15.4)
GFR SERPL CREATININE-BSD FRML MDRD: 30 ML/MIN/1.73
GLOBULIN UR ELPH-MCNC: 3.6 GM/DL
GLUCOSE SERPL-MCNC: 507 MG/DL (ref 65–99)
HCT VFR BLD AUTO: 38.2 % (ref 37.5–51)
HGB BLD-MCNC: 12.4 G/DL (ref 13–17.7)
HOLD SPECIMEN: NORMAL
IMM GRANULOCYTES # BLD AUTO: 0.01 10*3/MM3 (ref 0–0.05)
IMM GRANULOCYTES NFR BLD AUTO: 0.3 % (ref 0–0.5)
LIPASE SERPL-CCNC: 58 U/L (ref 13–60)
LYMPHOCYTES # BLD AUTO: 0.69 10*3/MM3 (ref 0.7–3.1)
LYMPHOCYTES NFR BLD AUTO: 17.4 % (ref 19.6–45.3)
MCH RBC QN AUTO: 28.4 PG (ref 26.6–33)
MCHC RBC AUTO-ENTMCNC: 32.5 G/DL (ref 31.5–35.7)
MCV RBC AUTO: 87.4 FL (ref 79–97)
MONOCYTES # BLD AUTO: 0.38 10*3/MM3 (ref 0.1–0.9)
MONOCYTES NFR BLD AUTO: 9.6 % (ref 5–12)
NEUTROPHILS NFR BLD AUTO: 2.74 10*3/MM3 (ref 1.7–7)
NEUTROPHILS NFR BLD AUTO: 68.9 % (ref 42.7–76)
NRBC BLD AUTO-RTO: 0 /100 WBC (ref 0–0.2)
NT-PROBNP SERPL-MCNC: 857.2 PG/ML (ref 0–1800)
PLATELET # BLD AUTO: 115 10*3/MM3 (ref 140–450)
PMV BLD AUTO: 11.4 FL (ref 6–12)
POTASSIUM SERPL-SCNC: 4.5 MMOL/L (ref 3.5–5.2)
PROT SERPL-MCNC: 7.7 G/DL (ref 6–8.5)
RBC # BLD AUTO: 4.37 10*6/MM3 (ref 4.14–5.8)
SODIUM SERPL-SCNC: 137 MMOL/L (ref 136–145)
TROPONIN T SERPL-MCNC: <0.01 NG/ML (ref 0–0.03)
WBC # BLD AUTO: 3.97 10*3/MM3 (ref 3.4–10.8)
WHOLE BLOOD HOLD SPECIMEN: NORMAL

## 2021-05-12 PROCEDURE — C9803 HOPD COVID-19 SPEC COLLECT: HCPCS

## 2021-05-12 PROCEDURE — A9270 NON-COVERED ITEM OR SERVICE: HCPCS | Performed by: EMERGENCY MEDICINE

## 2021-05-12 PROCEDURE — 87636 SARSCOV2 & INF A&B AMP PRB: CPT | Performed by: EMERGENCY MEDICINE

## 2021-05-12 PROCEDURE — 93005 ELECTROCARDIOGRAM TRACING: CPT

## 2021-05-12 PROCEDURE — 83690 ASSAY OF LIPASE: CPT | Performed by: EMERGENCY MEDICINE

## 2021-05-12 PROCEDURE — 80053 COMPREHEN METABOLIC PANEL: CPT | Performed by: EMERGENCY MEDICINE

## 2021-05-12 PROCEDURE — 93010 ELECTROCARDIOGRAM REPORT: CPT | Performed by: INTERNAL MEDICINE

## 2021-05-12 PROCEDURE — 83880 ASSAY OF NATRIURETIC PEPTIDE: CPT | Performed by: EMERGENCY MEDICINE

## 2021-05-12 PROCEDURE — 84484 ASSAY OF TROPONIN QUANT: CPT | Performed by: EMERGENCY MEDICINE

## 2021-05-12 PROCEDURE — 85025 COMPLETE CBC W/AUTO DIFF WBC: CPT | Performed by: EMERGENCY MEDICINE

## 2021-05-12 PROCEDURE — G0378 HOSPITAL OBSERVATION PER HR: HCPCS

## 2021-05-12 PROCEDURE — 63710000001 INSULIN REGULAR HUMAN PER 5 UNITS: Performed by: EMERGENCY MEDICINE

## 2021-05-12 PROCEDURE — 93005 ELECTROCARDIOGRAM TRACING: CPT | Performed by: EMERGENCY MEDICINE

## 2021-05-12 PROCEDURE — 63710000001 NITROGLYCERIN 0.4 MG SUBLINGUAL TABLET 25 EACH BOTTLE: Performed by: EMERGENCY MEDICINE

## 2021-05-12 PROCEDURE — 71045 X-RAY EXAM CHEST 1 VIEW: CPT

## 2021-05-12 PROCEDURE — 99284 EMERGENCY DEPT VISIT MOD MDM: CPT

## 2021-05-12 RX ORDER — PROBENECID 500 MG/1
500 TABLET, FILM COATED ORAL 2 TIMES DAILY
Status: DISCONTINUED | OUTPATIENT
Start: 2021-05-12 | End: 2021-05-14 | Stop reason: HOSPADM

## 2021-05-12 RX ORDER — DEXTROSE MONOHYDRATE 25 G/50ML
25 INJECTION, SOLUTION INTRAVENOUS
Status: DISCONTINUED | OUTPATIENT
Start: 2021-05-12 | End: 2021-05-14 | Stop reason: HOSPADM

## 2021-05-12 RX ORDER — CLOPIDOGREL BISULFATE 75 MG/1
75 TABLET ORAL DAILY
Status: DISCONTINUED | OUTPATIENT
Start: 2021-05-13 | End: 2021-05-13

## 2021-05-12 RX ORDER — HYDROCODONE BITARTRATE AND ACETAMINOPHEN 10; 325 MG/1; MG/1
1 TABLET ORAL EVERY 4 HOURS PRN
Status: DISCONTINUED | OUTPATIENT
Start: 2021-05-12 | End: 2021-05-14 | Stop reason: HOSPADM

## 2021-05-12 RX ORDER — ASPIRIN 81 MG/1
81 TABLET ORAL DAILY
Status: DISCONTINUED | OUTPATIENT
Start: 2021-05-13 | End: 2021-05-13

## 2021-05-12 RX ORDER — PANTOPRAZOLE SODIUM 40 MG/1
40 TABLET, DELAYED RELEASE ORAL DAILY
Status: DISCONTINUED | OUTPATIENT
Start: 2021-05-12 | End: 2021-05-14 | Stop reason: HOSPADM

## 2021-05-12 RX ORDER — ASPIRIN 81 MG/1
324 TABLET, CHEWABLE ORAL ONCE
Status: COMPLETED | OUTPATIENT
Start: 2021-05-12 | End: 2021-05-12

## 2021-05-12 RX ORDER — NITROGLYCERIN 0.4 MG/1
0.4 TABLET SUBLINGUAL
Status: DISCONTINUED | OUTPATIENT
Start: 2021-05-12 | End: 2021-05-12

## 2021-05-12 RX ORDER — SODIUM CHLORIDE 0.9 % (FLUSH) 0.9 %
10 SYRINGE (ML) INJECTION EVERY 12 HOURS SCHEDULED
Status: DISCONTINUED | OUTPATIENT
Start: 2021-05-12 | End: 2021-05-14 | Stop reason: HOSPADM

## 2021-05-12 RX ORDER — NITROGLYCERIN 0.4 MG/1
0.4 TABLET SUBLINGUAL
Status: DISCONTINUED | OUTPATIENT
Start: 2021-05-12 | End: 2021-05-14 | Stop reason: HOSPADM

## 2021-05-12 RX ORDER — LOSARTAN POTASSIUM 50 MG/1
50 TABLET ORAL DAILY
Status: DISCONTINUED | OUTPATIENT
Start: 2021-05-13 | End: 2021-05-14 | Stop reason: HOSPADM

## 2021-05-12 RX ORDER — ATORVASTATIN CALCIUM 10 MG/1
10 TABLET, FILM COATED ORAL NIGHTLY
Status: DISCONTINUED | OUTPATIENT
Start: 2021-05-12 | End: 2021-05-12

## 2021-05-12 RX ORDER — SODIUM CHLORIDE 0.9 % (FLUSH) 0.9 %
10 SYRINGE (ML) INJECTION AS NEEDED
Status: DISCONTINUED | OUTPATIENT
Start: 2021-05-12 | End: 2021-05-14 | Stop reason: HOSPADM

## 2021-05-12 RX ORDER — ONDANSETRON 2 MG/ML
4 INJECTION INTRAMUSCULAR; INTRAVENOUS EVERY 6 HOURS PRN
Status: DISCONTINUED | OUTPATIENT
Start: 2021-05-12 | End: 2021-05-13 | Stop reason: SDUPTHER

## 2021-05-12 RX ORDER — ACETAMINOPHEN 325 MG/1
650 TABLET ORAL EVERY 4 HOURS PRN
Status: DISCONTINUED | OUTPATIENT
Start: 2021-05-12 | End: 2021-05-13 | Stop reason: SDUPTHER

## 2021-05-12 RX ORDER — CARVEDILOL 25 MG/1
25 TABLET ORAL 2 TIMES DAILY WITH MEALS
Status: DISCONTINUED | OUTPATIENT
Start: 2021-05-12 | End: 2021-05-12

## 2021-05-12 RX ORDER — RANOLAZINE 500 MG/1
500 TABLET, EXTENDED RELEASE ORAL 2 TIMES DAILY
Status: DISCONTINUED | OUTPATIENT
Start: 2021-05-12 | End: 2021-05-14

## 2021-05-12 RX ORDER — NICOTINE POLACRILEX 4 MG
15 LOZENGE BUCCAL
Status: DISCONTINUED | OUTPATIENT
Start: 2021-05-12 | End: 2021-05-14 | Stop reason: HOSPADM

## 2021-05-12 RX ORDER — CARVEDILOL 25 MG/1
25 TABLET ORAL 2 TIMES DAILY WITH MEALS
Status: DISCONTINUED | OUTPATIENT
Start: 2021-05-13 | End: 2021-05-14 | Stop reason: HOSPADM

## 2021-05-12 RX ADMIN — HUMAN INSULIN 8 UNITS: 100 INJECTION, SOLUTION SUBCUTANEOUS at 23:23

## 2021-05-12 RX ADMIN — ASPIRIN 324 MG: 81 TABLET, CHEWABLE ORAL at 22:35

## 2021-05-12 RX ADMIN — NITROGLYCERIN 0.4 MG: 0.4 TABLET, ORALLY DISINTEGRATING SUBLINGUAL at 23:21

## 2021-05-13 ENCOUNTER — APPOINTMENT (OUTPATIENT)
Dept: INTERVENTIONAL RADIOLOGY/VASCULAR | Facility: HOSPITAL | Age: 76
End: 2021-05-13

## 2021-05-13 PROBLEM — I21.4 NSTEMI, INITIAL EPISODE OF CARE: Status: ACTIVE | Noted: 2021-05-13

## 2021-05-13 LAB
ALBUMIN SERPL-MCNC: 4 G/DL (ref 3.5–5.2)
ALBUMIN/GLOB SERPL: 1.5 G/DL
ALP SERPL-CCNC: 85 U/L (ref 39–117)
ALT SERPL W P-5'-P-CCNC: 18 U/L (ref 1–41)
ANION GAP SERPL CALCULATED.3IONS-SCNC: 12 MMOL/L (ref 5–15)
ANION GAP SERPL CALCULATED.3IONS-SCNC: 6 MMOL/L (ref 5–15)
AST SERPL-CCNC: 20 U/L (ref 1–40)
BASOPHILS # BLD AUTO: 0.03 10*3/MM3 (ref 0–0.2)
BASOPHILS # BLD AUTO: 0.03 10*3/MM3 (ref 0–0.2)
BASOPHILS NFR BLD AUTO: 0.4 % (ref 0–1.5)
BASOPHILS NFR BLD AUTO: 0.6 % (ref 0–1.5)
BILIRUB SERPL-MCNC: 0.2 MG/DL (ref 0–1.2)
BUN SERPL-MCNC: 36 MG/DL (ref 8–23)
BUN SERPL-MCNC: 38 MG/DL (ref 8–23)
BUN/CREAT SERPL: 18.5 (ref 7–25)
BUN/CREAT SERPL: 19.9 (ref 7–25)
CALCIUM SPEC-SCNC: 7.8 MG/DL (ref 8.6–10.5)
CALCIUM SPEC-SCNC: 9.5 MG/DL (ref 8.6–10.5)
CHLORIDE SERPL-SCNC: 102 MMOL/L (ref 98–107)
CHLORIDE SERPL-SCNC: 102 MMOL/L (ref 98–107)
CO2 SERPL-SCNC: 24 MMOL/L (ref 22–29)
CO2 SERPL-SCNC: 30 MMOL/L (ref 22–29)
CREAT SERPL-MCNC: 1.81 MG/DL (ref 0.76–1.27)
CREAT SERPL-MCNC: 2.05 MG/DL (ref 0.76–1.27)
DEPRECATED RDW RBC AUTO: 42.6 FL (ref 37–54)
DEPRECATED RDW RBC AUTO: 42.7 FL (ref 37–54)
DEPRECATED RDW RBC AUTO: 43.6 FL (ref 37–54)
EOSINOPHIL # BLD AUTO: 0.13 10*3/MM3 (ref 0–0.4)
EOSINOPHIL # BLD AUTO: 0.16 10*3/MM3 (ref 0–0.4)
EOSINOPHIL NFR BLD AUTO: 1.9 % (ref 0.3–6.2)
EOSINOPHIL NFR BLD AUTO: 3.4 % (ref 0.3–6.2)
ERYTHROCYTE [DISTWIDTH] IN BLOOD BY AUTOMATED COUNT: 13.7 % (ref 12.3–15.4)
ERYTHROCYTE [DISTWIDTH] IN BLOOD BY AUTOMATED COUNT: 13.7 % (ref 12.3–15.4)
ERYTHROCYTE [DISTWIDTH] IN BLOOD BY AUTOMATED COUNT: 13.8 % (ref 12.3–15.4)
FLUAV RNA RESP QL NAA+PROBE: NOT DETECTED
FLUBV RNA RESP QL NAA+PROBE: NOT DETECTED
GFR SERPL CREATININE-BSD FRML MDRD: 32 ML/MIN/1.73
GFR SERPL CREATININE-BSD FRML MDRD: 37 ML/MIN/1.73
GLOBULIN UR ELPH-MCNC: 2.7 GM/DL
GLUCOSE BLDC GLUCOMTR-MCNC: 117 MG/DL (ref 70–130)
GLUCOSE BLDC GLUCOMTR-MCNC: 143 MG/DL (ref 70–130)
GLUCOSE BLDC GLUCOMTR-MCNC: 146 MG/DL (ref 70–130)
GLUCOSE BLDC GLUCOMTR-MCNC: 203 MG/DL (ref 70–130)
GLUCOSE BLDC GLUCOMTR-MCNC: 221 MG/DL (ref 70–130)
GLUCOSE SERPL-MCNC: 181 MG/DL (ref 65–99)
GLUCOSE SERPL-MCNC: 244 MG/DL (ref 65–99)
HCT VFR BLD AUTO: 35.5 % (ref 37.5–51)
HCT VFR BLD AUTO: 36.6 % (ref 37.5–51)
HCT VFR BLD AUTO: 37.4 % (ref 37.5–51)
HGB BLD-MCNC: 12.2 G/DL (ref 13–17.7)
HGB BLD-MCNC: 12.3 G/DL (ref 13–17.7)
HGB BLD-MCNC: 12.3 G/DL (ref 13–17.7)
IMM GRANULOCYTES # BLD AUTO: 0.01 10*3/MM3 (ref 0–0.05)
IMM GRANULOCYTES # BLD AUTO: 0.02 10*3/MM3 (ref 0–0.05)
IMM GRANULOCYTES NFR BLD AUTO: 0.2 % (ref 0–0.5)
IMM GRANULOCYTES NFR BLD AUTO: 0.3 % (ref 0–0.5)
INR PPP: 1.08 (ref 0.8–1.2)
LYMPHOCYTES # BLD AUTO: 0.85 10*3/MM3 (ref 0.7–3.1)
LYMPHOCYTES # BLD AUTO: 0.94 10*3/MM3 (ref 0.7–3.1)
LYMPHOCYTES NFR BLD AUTO: 13.8 % (ref 19.6–45.3)
LYMPHOCYTES NFR BLD AUTO: 18.3 % (ref 19.6–45.3)
MCH RBC QN AUTO: 28.6 PG (ref 26.6–33)
MCH RBC QN AUTO: 28.6 PG (ref 26.6–33)
MCH RBC QN AUTO: 29.3 PG (ref 26.6–33)
MCHC RBC AUTO-ENTMCNC: 32.9 G/DL (ref 31.5–35.7)
MCHC RBC AUTO-ENTMCNC: 33.6 G/DL (ref 31.5–35.7)
MCHC RBC AUTO-ENTMCNC: 34.4 G/DL (ref 31.5–35.7)
MCV RBC AUTO: 85.1 FL (ref 79–97)
MCV RBC AUTO: 85.3 FL (ref 79–97)
MCV RBC AUTO: 87 FL (ref 79–97)
MONOCYTES # BLD AUTO: 0.39 10*3/MM3 (ref 0.1–0.9)
MONOCYTES # BLD AUTO: 0.45 10*3/MM3 (ref 0.1–0.9)
MONOCYTES NFR BLD AUTO: 6.6 % (ref 5–12)
MONOCYTES NFR BLD AUTO: 8.4 % (ref 5–12)
NEUTROPHILS NFR BLD AUTO: 3.21 10*3/MM3 (ref 1.7–7)
NEUTROPHILS NFR BLD AUTO: 5.26 10*3/MM3 (ref 1.7–7)
NEUTROPHILS NFR BLD AUTO: 69.1 % (ref 42.7–76)
NEUTROPHILS NFR BLD AUTO: 77 % (ref 42.7–76)
NRBC BLD AUTO-RTO: 0 /100 WBC (ref 0–0.2)
NRBC BLD AUTO-RTO: 0 /100 WBC (ref 0–0.2)
PLATELET # BLD AUTO: 102 10*3/MM3 (ref 140–450)
PLATELET # BLD AUTO: 115 10*3/MM3 (ref 140–450)
PLATELET # BLD AUTO: 130 10*3/MM3 (ref 140–450)
PMV BLD AUTO: 11 FL (ref 6–12)
PMV BLD AUTO: 11.6 FL (ref 6–12)
PMV BLD AUTO: 11.8 FL (ref 6–12)
POTASSIUM SERPL-SCNC: 4.4 MMOL/L (ref 3.5–5.2)
POTASSIUM SERPL-SCNC: 5.9 MMOL/L (ref 3.5–5.2)
PROT SERPL-MCNC: 6.7 G/DL (ref 6–8.5)
PROTHROMBIN TIME: 14.5 SECONDS (ref 11.1–15.3)
RBC # BLD AUTO: 4.16 10*6/MM3 (ref 4.14–5.8)
RBC # BLD AUTO: 4.3 10*6/MM3 (ref 4.14–5.8)
RBC # BLD AUTO: 4.3 10*6/MM3 (ref 4.14–5.8)
RBC MORPH BLD: NORMAL
SARS-COV-2 RNA RESP QL NAA+PROBE: NOT DETECTED
SMALL PLATELETS BLD QL SMEAR: ADEQUATE
SODIUM SERPL-SCNC: 138 MMOL/L (ref 136–145)
SODIUM SERPL-SCNC: 138 MMOL/L (ref 136–145)
TROPONIN T SERPL-MCNC: 0.02 NG/ML (ref 0–0.03)
TROPONIN T SERPL-MCNC: 0.14 NG/ML (ref 0–0.03)
WBC # BLD AUTO: 4.43 10*3/MM3 (ref 3.4–10.8)
WBC # BLD AUTO: 4.65 10*3/MM3 (ref 3.4–10.8)
WBC # BLD AUTO: 6.83 10*3/MM3 (ref 3.4–10.8)
WBC MORPH BLD: NORMAL
WHOLE BLOOD HOLD SPECIMEN: NORMAL

## 2021-05-13 PROCEDURE — A9270 NON-COVERED ITEM OR SERVICE: HCPCS | Performed by: INTERNAL MEDICINE

## 2021-05-13 PROCEDURE — 99153 MOD SED SAME PHYS/QHP EA: CPT | Performed by: INTERNAL MEDICINE

## 2021-05-13 PROCEDURE — 25010000002 FENTANYL CITRATE (PF) 50 MCG/ML SOLUTION: Performed by: INTERNAL MEDICINE

## 2021-05-13 PROCEDURE — 36415 COLL VENOUS BLD VENIPUNCTURE: CPT | Performed by: INTERNAL MEDICINE

## 2021-05-13 PROCEDURE — 25010000002 FENTANYL CITRATE (PF) 100 MCG/2ML SOLUTION: Performed by: INTERNAL MEDICINE

## 2021-05-13 PROCEDURE — C1894 INTRO/SHEATH, NON-LASER: HCPCS | Performed by: INTERNAL MEDICINE

## 2021-05-13 PROCEDURE — 84484 ASSAY OF TROPONIN QUANT: CPT | Performed by: INTERNAL MEDICINE

## 2021-05-13 PROCEDURE — 85610 PROTHROMBIN TIME: CPT | Performed by: INTERNAL MEDICINE

## 2021-05-13 PROCEDURE — 25010000002 BIVALIRUDIN TRIFLUOROACETATE 250 MG RECONSTITUTED SOLUTION 1 EACH VIAL: Performed by: INTERNAL MEDICINE

## 2021-05-13 PROCEDURE — 92937 PRQ TRLUML REVSC CAB GRF 1: CPT | Performed by: INTERNAL MEDICINE

## 2021-05-13 PROCEDURE — 63710000001 CARVEDILOL 25 MG TABLET: Performed by: INTERNAL MEDICINE

## 2021-05-13 PROCEDURE — 80053 COMPREHEN METABOLIC PANEL: CPT | Performed by: INTERNAL MEDICINE

## 2021-05-13 PROCEDURE — 63710000001 INSULIN ASPART PER 5 UNITS: Performed by: INTERNAL MEDICINE

## 2021-05-13 PROCEDURE — 82962 GLUCOSE BLOOD TEST: CPT

## 2021-05-13 PROCEDURE — 85007 BL SMEAR W/DIFF WBC COUNT: CPT | Performed by: INTERNAL MEDICINE

## 2021-05-13 PROCEDURE — 63710000001 NITROGLYCERIN 0.4 MG SUBLINGUAL TABLET: Performed by: INTERNAL MEDICINE

## 2021-05-13 PROCEDURE — G0378 HOSPITAL OBSERVATION PER HR: HCPCS

## 2021-05-13 PROCEDURE — 99214 OFFICE O/P EST MOD 30 MIN: CPT | Performed by: INTERNAL MEDICINE

## 2021-05-13 PROCEDURE — 63710000001 PANTOPRAZOLE 40 MG TABLET DELAYED-RELEASE: Performed by: INTERNAL MEDICINE

## 2021-05-13 PROCEDURE — C1887 CATHETER, GUIDING: HCPCS | Performed by: INTERNAL MEDICINE

## 2021-05-13 PROCEDURE — 93459 L HRT ART/GRFT ANGIO: CPT | Performed by: INTERNAL MEDICINE

## 2021-05-13 PROCEDURE — 25010000002 EPTIFIBATIDE PER 5 MG: Performed by: INTERNAL MEDICINE

## 2021-05-13 PROCEDURE — 25010000002 EPTIFIBATIDE 20 MG/10ML SOLUTION: Performed by: INTERNAL MEDICINE

## 2021-05-13 PROCEDURE — 85027 COMPLETE CBC AUTOMATED: CPT | Performed by: INTERNAL MEDICINE

## 2021-05-13 PROCEDURE — C1769 GUIDE WIRE: HCPCS | Performed by: INTERNAL MEDICINE

## 2021-05-13 PROCEDURE — 25010000002 MIDAZOLAM PER 1 MG: Performed by: INTERNAL MEDICINE

## 2021-05-13 PROCEDURE — 25010000002 HEPARIN (PORCINE) PER 1000 UNITS: Performed by: INTERNAL MEDICINE

## 2021-05-13 PROCEDURE — 63710000001 CLOPIDOGREL 75 MG TABLET: Performed by: INTERNAL MEDICINE

## 2021-05-13 PROCEDURE — C1760 CLOSURE DEV, VASC: HCPCS

## 2021-05-13 PROCEDURE — 93010 ELECTROCARDIOGRAM REPORT: CPT | Performed by: INTERNAL MEDICINE

## 2021-05-13 PROCEDURE — 85025 COMPLETE CBC W/AUTO DIFF WBC: CPT | Performed by: INTERNAL MEDICINE

## 2021-05-13 PROCEDURE — 25010000002 ENOXAPARIN PER 10 MG: Performed by: INTERNAL MEDICINE

## 2021-05-13 PROCEDURE — 85347 COAGULATION TIME ACTIVATED: CPT

## 2021-05-13 PROCEDURE — C1751 CATH, INF, PER/CENT/MIDLINE: HCPCS

## 2021-05-13 PROCEDURE — 96372 THER/PROPH/DIAG INJ SC/IM: CPT

## 2021-05-13 PROCEDURE — C9604 PERC D-E COR REVASC T CABG S: HCPCS | Performed by: INTERNAL MEDICINE

## 2021-05-13 PROCEDURE — 63710000001 RANOLAZINE 500 MG TABLET SUSTAINED-RELEASE 12 HOUR: Performed by: INTERNAL MEDICINE

## 2021-05-13 PROCEDURE — 0 IOPAMIDOL PER 1 ML: Performed by: INTERNAL MEDICINE

## 2021-05-13 PROCEDURE — 93005 ELECTROCARDIOGRAM TRACING: CPT | Performed by: INTERNAL MEDICINE

## 2021-05-13 PROCEDURE — 63710000001: Performed by: INTERNAL MEDICINE

## 2021-05-13 PROCEDURE — 63710000001 HYDROCODONE-ACETAMINOPHEN 10-325 MG TABLET: Performed by: INTERNAL MEDICINE

## 2021-05-13 PROCEDURE — C1725 CATH, TRANSLUMIN NON-LASER: HCPCS | Performed by: INTERNAL MEDICINE

## 2021-05-13 PROCEDURE — 99152 MOD SED SAME PHYS/QHP 5/>YRS: CPT | Performed by: INTERNAL MEDICINE

## 2021-05-13 PROCEDURE — C1874 STENT, COATED/COV W/DEL SYS: HCPCS | Performed by: INTERNAL MEDICINE

## 2021-05-13 PROCEDURE — 25010000003 ATROPINE SULFATE: Performed by: INTERNAL MEDICINE

## 2021-05-13 PROCEDURE — 36410 VNPNXR 3YR/> PHY/QHP DX/THER: CPT

## 2021-05-13 PROCEDURE — C1769 GUIDE WIRE: HCPCS

## 2021-05-13 PROCEDURE — 63710000001 INSULIN DETEMIR PER 5 UNITS: Performed by: INTERNAL MEDICINE

## 2021-05-13 DEVICE — XIENCE SIERRA™ EVEROLIMUS ELUTING CORONARY STENT SYSTEM 4.00 MM X 33 MM / RAPID-EXCHANGE
Type: IMPLANTABLE DEVICE | Status: FUNCTIONAL
Brand: XIENCE SIERRA™

## 2021-05-13 RX ORDER — SODIUM CHLORIDE 0.9 % (FLUSH) 0.9 %
3 SYRINGE (ML) INJECTION EVERY 12 HOURS SCHEDULED
Status: DISCONTINUED | OUTPATIENT
Start: 2021-05-13 | End: 2021-05-13

## 2021-05-13 RX ORDER — BISACODYL 10 MG
10 SUPPOSITORY, RECTAL RECTAL DAILY PRN
Status: DISCONTINUED | OUTPATIENT
Start: 2021-05-13 | End: 2021-05-14 | Stop reason: HOSPADM

## 2021-05-13 RX ORDER — ISOSORBIDE MONONITRATE 30 MG/1
30 TABLET, EXTENDED RELEASE ORAL EVERY MORNING
Status: DISCONTINUED | OUTPATIENT
Start: 2021-05-14 | End: 2021-05-14 | Stop reason: HOSPADM

## 2021-05-13 RX ORDER — DIPHENHYDRAMINE HYDROCHLORIDE 50 MG/ML
25 INJECTION INTRAMUSCULAR; INTRAVENOUS EVERY 6 HOURS PRN
Status: DISCONTINUED | OUTPATIENT
Start: 2021-05-13 | End: 2021-05-14 | Stop reason: HOSPADM

## 2021-05-13 RX ORDER — NITROGLYCERIN 0.4 MG/1
0.4 TABLET SUBLINGUAL
Status: DISCONTINUED | OUTPATIENT
Start: 2021-05-13 | End: 2021-05-13 | Stop reason: SDUPTHER

## 2021-05-13 RX ORDER — NITROGLYCERIN 5 MG/ML
INJECTION, SOLUTION INTRAVENOUS AS NEEDED
Status: DISCONTINUED | OUTPATIENT
Start: 2021-05-13 | End: 2021-05-13 | Stop reason: HOSPADM

## 2021-05-13 RX ORDER — AMLODIPINE BESYLATE 5 MG/1
5 TABLET ORAL DAILY
Status: DISCONTINUED | OUTPATIENT
Start: 2021-05-13 | End: 2021-05-14 | Stop reason: HOSPADM

## 2021-05-13 RX ORDER — MIDAZOLAM HYDROCHLORIDE 1 MG/ML
INJECTION INTRAMUSCULAR; INTRAVENOUS AS NEEDED
Status: DISCONTINUED | OUTPATIENT
Start: 2021-05-13 | End: 2021-05-13 | Stop reason: HOSPADM

## 2021-05-13 RX ORDER — ACETAMINOPHEN 325 MG/1
650 TABLET ORAL EVERY 4 HOURS PRN
Status: DISCONTINUED | OUTPATIENT
Start: 2021-05-13 | End: 2021-05-14 | Stop reason: HOSPADM

## 2021-05-13 RX ORDER — FENTANYL CITRATE 50 UG/ML
INJECTION, SOLUTION INTRAMUSCULAR; INTRAVENOUS AS NEEDED
Status: DISCONTINUED | OUTPATIENT
Start: 2021-05-13 | End: 2021-05-13 | Stop reason: HOSPADM

## 2021-05-13 RX ORDER — SODIUM CHLORIDE 9 MG/ML
100 INJECTION, SOLUTION INTRAVENOUS CONTINUOUS
Status: DISCONTINUED | OUTPATIENT
Start: 2021-05-13 | End: 2021-05-14 | Stop reason: HOSPADM

## 2021-05-13 RX ORDER — LIDOCAINE HYDROCHLORIDE 20 MG/ML
INJECTION, SOLUTION INFILTRATION; PERINEURAL AS NEEDED
Status: DISCONTINUED | OUTPATIENT
Start: 2021-05-13 | End: 2021-05-13 | Stop reason: HOSPADM

## 2021-05-13 RX ORDER — CLOPIDOGREL BISULFATE 75 MG/1
TABLET ORAL AS NEEDED
Status: DISCONTINUED | OUTPATIENT
Start: 2021-05-13 | End: 2021-05-13 | Stop reason: HOSPADM

## 2021-05-13 RX ORDER — HYDROCODONE BITARTRATE AND ACETAMINOPHEN 5; 325 MG/1; MG/1
1 TABLET ORAL EVERY 4 HOURS PRN
Status: DISCONTINUED | OUTPATIENT
Start: 2021-05-13 | End: 2021-05-14 | Stop reason: HOSPADM

## 2021-05-13 RX ORDER — EPTIFIBATIDE 0.75 MG/ML
1 INJECTION, SOLUTION INTRAVENOUS CONTINUOUS
Status: DISPENSED | OUTPATIENT
Start: 2021-05-13 | End: 2021-05-14

## 2021-05-13 RX ORDER — NITROGLYCERIN 20 MG/100ML
5-200 INJECTION INTRAVENOUS
Status: DISCONTINUED | OUTPATIENT
Start: 2021-05-13 | End: 2021-05-14 | Stop reason: HOSPADM

## 2021-05-13 RX ORDER — SODIUM CHLORIDE 0.9 % (FLUSH) 0.9 %
10 SYRINGE (ML) INJECTION AS NEEDED
Status: DISCONTINUED | OUTPATIENT
Start: 2021-05-13 | End: 2021-05-13

## 2021-05-13 RX ORDER — EPTIFIBATIDE 2 MG/ML
INJECTION, SOLUTION INTRAVENOUS AS NEEDED
Status: DISCONTINUED | OUTPATIENT
Start: 2021-05-13 | End: 2021-05-13 | Stop reason: HOSPADM

## 2021-05-13 RX ORDER — FENTANYL CITRATE 50 UG/ML
25 INJECTION, SOLUTION INTRAMUSCULAR; INTRAVENOUS
Status: DISCONTINUED | OUTPATIENT
Start: 2021-05-13 | End: 2021-05-14 | Stop reason: HOSPADM

## 2021-05-13 RX ORDER — ALPRAZOLAM 0.25 MG/1
0.25 TABLET ORAL 3 TIMES DAILY PRN
Status: DISCONTINUED | OUTPATIENT
Start: 2021-05-13 | End: 2021-05-14 | Stop reason: HOSPADM

## 2021-05-13 RX ORDER — ASPIRIN 81 MG/1
81 TABLET ORAL DAILY
Status: DISCONTINUED | OUTPATIENT
Start: 2021-05-14 | End: 2021-05-14 | Stop reason: HOSPADM

## 2021-05-13 RX ORDER — CALCIUM CARBONATE 200(500)MG
1 TABLET,CHEWABLE ORAL 2 TIMES DAILY PRN
Status: DISCONTINUED | OUTPATIENT
Start: 2021-05-13 | End: 2021-05-14 | Stop reason: HOSPADM

## 2021-05-13 RX ORDER — ONDANSETRON 2 MG/ML
4 INJECTION INTRAMUSCULAR; INTRAVENOUS EVERY 6 HOURS PRN
Status: DISCONTINUED | OUTPATIENT
Start: 2021-05-13 | End: 2021-05-14 | Stop reason: HOSPADM

## 2021-05-13 RX ORDER — SODIUM CHLORIDE 9 MG/ML
250 INJECTION, SOLUTION INTRAVENOUS ONCE AS NEEDED
Status: DISCONTINUED | OUTPATIENT
Start: 2021-05-13 | End: 2021-05-14 | Stop reason: HOSPADM

## 2021-05-13 RX ORDER — SODIUM CHLORIDE 9 MG/ML
100 INJECTION, SOLUTION INTRAVENOUS CONTINUOUS
Status: DISCONTINUED | OUTPATIENT
Start: 2021-05-13 | End: 2021-05-13

## 2021-05-13 RX ORDER — NITROGLYCERIN 0.4 MG/1
TABLET SUBLINGUAL AS NEEDED
Status: DISCONTINUED | OUTPATIENT
Start: 2021-05-13 | End: 2021-05-13 | Stop reason: HOSPADM

## 2021-05-13 RX ORDER — ONDANSETRON 4 MG/1
4 TABLET, FILM COATED ORAL EVERY 6 HOURS PRN
Status: DISCONTINUED | OUTPATIENT
Start: 2021-05-13 | End: 2021-05-14 | Stop reason: HOSPADM

## 2021-05-13 RX ORDER — CLOPIDOGREL BISULFATE 75 MG/1
75 TABLET ORAL DAILY
Status: DISCONTINUED | OUTPATIENT
Start: 2021-05-14 | End: 2021-05-14 | Stop reason: HOSPADM

## 2021-05-13 RX ORDER — TEMAZEPAM 7.5 MG/1
7.5 CAPSULE ORAL NIGHTLY PRN
Status: DISCONTINUED | OUTPATIENT
Start: 2021-05-13 | End: 2021-05-14 | Stop reason: HOSPADM

## 2021-05-13 RX ORDER — NALOXONE HCL 0.4 MG/ML
0.4 VIAL (ML) INJECTION
Status: DISCONTINUED | OUTPATIENT
Start: 2021-05-13 | End: 2021-05-14 | Stop reason: HOSPADM

## 2021-05-13 RX ADMIN — ENOXAPARIN SODIUM 40 MG: 40 INJECTION SUBCUTANEOUS at 00:36

## 2021-05-13 RX ADMIN — INSULIN DETEMIR 60 UNITS: 100 INJECTION, SOLUTION SUBCUTANEOUS at 21:11

## 2021-05-13 RX ADMIN — RANOLAZINE 500 MG: 500 TABLET, FILM COATED, EXTENDED RELEASE ORAL at 00:36

## 2021-05-13 RX ADMIN — EPTIFIBATIDE 1 MCG/KG/MIN: 0.75 INJECTION, SOLUTION INTRAVENOUS at 15:20

## 2021-05-13 RX ADMIN — SODIUM CHLORIDE 100 ML/HR: 900 INJECTION, SOLUTION INTRAVENOUS at 11:47

## 2021-05-13 RX ADMIN — PANTOPRAZOLE SODIUM 40 MG: 40 TABLET, DELAYED RELEASE ORAL at 00:36

## 2021-05-13 RX ADMIN — RANOLAZINE 500 MG: 500 TABLET, FILM COATED, EXTENDED RELEASE ORAL at 21:07

## 2021-05-13 RX ADMIN — PANTOPRAZOLE SODIUM 40 MG: 40 TABLET, DELAYED RELEASE ORAL at 08:26

## 2021-05-13 RX ADMIN — CARVEDILOL 25 MG: 25 TABLET, FILM COATED ORAL at 18:40

## 2021-05-13 RX ADMIN — RANOLAZINE 500 MG: 500 TABLET, FILM COATED, EXTENDED RELEASE ORAL at 08:25

## 2021-05-13 RX ADMIN — INSULIN ASPART 4 UNITS: 100 INJECTION, SOLUTION INTRAVENOUS; SUBCUTANEOUS at 11:25

## 2021-05-13 RX ADMIN — CLOPIDOGREL BISULFATE 75 MG: 75 TABLET ORAL at 08:26

## 2021-05-13 RX ADMIN — LOSARTAN POTASSIUM 50 MG: 50 TABLET, FILM COATED ORAL at 08:25

## 2021-05-13 RX ADMIN — SODIUM CHLORIDE 100 ML/HR: 9 INJECTION, SOLUTION INTRAVENOUS at 19:51

## 2021-05-13 RX ADMIN — HYDROCODONE BITARTRATE AND ACETAMINOPHEN 1 TABLET: 10; 325 TABLET ORAL at 18:40

## 2021-05-13 RX ADMIN — CARVEDILOL 25 MG: 25 TABLET, FILM COATED ORAL at 08:24

## 2021-05-13 RX ADMIN — SODIUM CHLORIDE, PRESERVATIVE FREE 10 ML: 5 INJECTION INTRAVENOUS at 00:37

## 2021-05-13 RX ADMIN — ASPIRIN 81 MG: 81 TABLET, COATED ORAL at 11:02

## 2021-05-13 RX ADMIN — PROBENECID 500 MG: 500 TABLET, FILM COATED ORAL at 21:07

## 2021-05-13 RX ADMIN — NITROGLYCERIN 20 MCG/MIN: 20 INJECTION INTRAVENOUS at 15:25

## 2021-05-14 ENCOUNTER — READMISSION MANAGEMENT (OUTPATIENT)
Dept: CALL CENTER | Facility: HOSPITAL | Age: 76
End: 2021-05-14

## 2021-05-14 VITALS
SYSTOLIC BLOOD PRESSURE: 128 MMHG | OXYGEN SATURATION: 96 % | DIASTOLIC BLOOD PRESSURE: 72 MMHG | RESPIRATION RATE: 16 BRPM | WEIGHT: 205 LBS | HEART RATE: 59 BPM | BODY MASS INDEX: 27.17 KG/M2 | TEMPERATURE: 97.7 F | HEIGHT: 73 IN

## 2021-05-14 LAB
ACT BLD: 140 SECONDS (ref 82–152)
ACT BLD: 148 SECONDS (ref 82–152)
ACT BLD: 156 SECONDS (ref 82–152)
ACT BLD: 164 SECONDS (ref 82–152)
ACT BLD: 180 SECONDS (ref 82–152)
ACT BLD: 213 SECONDS (ref 82–152)
ACT BLD: 241 SECONDS (ref 82–152)
ANION GAP SERPL CALCULATED.3IONS-SCNC: 7 MMOL/L (ref 5–15)
BUN SERPL-MCNC: 31 MG/DL (ref 8–23)
BUN/CREAT SERPL: 18 (ref 7–25)
CALCIUM SPEC-SCNC: 8.5 MG/DL (ref 8.6–10.5)
CHLORIDE SERPL-SCNC: 106 MMOL/L (ref 98–107)
CHOLEST SERPL-MCNC: 137 MG/DL (ref 0–200)
CO2 SERPL-SCNC: 24 MMOL/L (ref 22–29)
CREAT SERPL-MCNC: 1.72 MG/DL (ref 0.76–1.27)
GFR SERPL CREATININE-BSD FRML MDRD: 39 ML/MIN/1.73
GLUCOSE BLDC GLUCOMTR-MCNC: 113 MG/DL (ref 70–130)
GLUCOSE BLDC GLUCOMTR-MCNC: 245 MG/DL (ref 70–130)
GLUCOSE BLDC GLUCOMTR-MCNC: 68 MG/DL (ref 70–130)
GLUCOSE BLDC GLUCOMTR-MCNC: 82 MG/DL (ref 70–130)
GLUCOSE BLDC GLUCOMTR-MCNC: 88 MG/DL (ref 70–130)
GLUCOSE SERPL-MCNC: 110 MG/DL (ref 65–99)
HBA1C MFR BLD: 7.9 % (ref 4.8–5.6)
HDLC SERPL-MCNC: 26 MG/DL (ref 40–60)
LDLC SERPL CALC-MCNC: 77 MG/DL (ref 0–100)
LDLC/HDLC SERPL: 2.71 {RATIO}
POTASSIUM SERPL-SCNC: 4.1 MMOL/L (ref 3.5–5.2)
SODIUM SERPL-SCNC: 137 MMOL/L (ref 136–145)
TRIGL SERPL-MCNC: 203 MG/DL (ref 0–150)
VLDLC SERPL-MCNC: 34 MG/DL (ref 5–40)

## 2021-05-14 PROCEDURE — A9270 NON-COVERED ITEM OR SERVICE: HCPCS | Performed by: INTERNAL MEDICINE

## 2021-05-14 PROCEDURE — 63710000001 PANTOPRAZOLE 40 MG TABLET DELAYED-RELEASE: Performed by: INTERNAL MEDICINE

## 2021-05-14 PROCEDURE — 63710000001 HYDROCODONE-ACETAMINOPHEN 5-325 MG TABLET: Performed by: INTERNAL MEDICINE

## 2021-05-14 PROCEDURE — 85347 COAGULATION TIME ACTIVATED: CPT

## 2021-05-14 PROCEDURE — 63710000001 ISOSORBIDE MONONITRATE 30 MG TABLET SUSTAINED-RELEASE 24 HOUR: Performed by: INTERNAL MEDICINE

## 2021-05-14 PROCEDURE — 82962 GLUCOSE BLOOD TEST: CPT

## 2021-05-14 PROCEDURE — A9270 NON-COVERED ITEM OR SERVICE: HCPCS

## 2021-05-14 PROCEDURE — 80061 LIPID PANEL: CPT | Performed by: INTERNAL MEDICINE

## 2021-05-14 PROCEDURE — 63710000001: Performed by: INTERNAL MEDICINE

## 2021-05-14 PROCEDURE — 63710000001 LOSARTAN 50 MG TABLET: Performed by: INTERNAL MEDICINE

## 2021-05-14 PROCEDURE — 99214 OFFICE O/P EST MOD 30 MIN: CPT | Performed by: INTERNAL MEDICINE

## 2021-05-14 PROCEDURE — 63710000001 ASPIRIN 81 MG TABLET DELAYED-RELEASE

## 2021-05-14 PROCEDURE — 63710000001 CLOPIDOGREL 75 MG TABLET

## 2021-05-14 PROCEDURE — 63710000001 AMLODIPINE 5 MG TABLET: Performed by: INTERNAL MEDICINE

## 2021-05-14 PROCEDURE — 80048 BASIC METABOLIC PNL TOTAL CA: CPT | Performed by: INTERNAL MEDICINE

## 2021-05-14 PROCEDURE — 83036 HEMOGLOBIN GLYCOSYLATED A1C: CPT | Performed by: INTERNAL MEDICINE

## 2021-05-14 PROCEDURE — 63710000001 ACETAMINOPHEN 325 MG TABLET: Performed by: INTERNAL MEDICINE

## 2021-05-14 PROCEDURE — G0378 HOSPITAL OBSERVATION PER HR: HCPCS

## 2021-05-14 PROCEDURE — 63710000001 RANOLAZINE 500 MG TABLET SUSTAINED-RELEASE 12 HOUR: Performed by: INTERNAL MEDICINE

## 2021-05-14 PROCEDURE — 25010000002 EPTIFIBATIDE PER 5 MG: Performed by: INTERNAL MEDICINE

## 2021-05-14 RX ORDER — RANOLAZINE 1000 MG/1
1000 TABLET, EXTENDED RELEASE ORAL 2 TIMES DAILY
Qty: 60 TABLET | Refills: 0 | Status: SHIPPED | OUTPATIENT
Start: 2021-05-14 | End: 2021-06-10 | Stop reason: SDUPTHER

## 2021-05-14 RX ORDER — RANOLAZINE 500 MG/1
1000 TABLET, EXTENDED RELEASE ORAL 2 TIMES DAILY
Status: DISCONTINUED | OUTPATIENT
Start: 2021-05-14 | End: 2021-05-14 | Stop reason: HOSPADM

## 2021-05-14 RX ORDER — ISOSORBIDE MONONITRATE 30 MG/1
30 TABLET, EXTENDED RELEASE ORAL EVERY MORNING
Qty: 30 TABLET | Refills: 0 | Status: SHIPPED | OUTPATIENT
Start: 2021-05-15 | End: 2021-06-10 | Stop reason: SDUPTHER

## 2021-05-14 RX ADMIN — ISOSORBIDE MONONITRATE 30 MG: 30 TABLET, EXTENDED RELEASE ORAL at 06:21

## 2021-05-14 RX ADMIN — CLOPIDOGREL BISULFATE 75 MG: 75 TABLET ORAL at 09:33

## 2021-05-14 RX ADMIN — ASPIRIN 81 MG: 81 TABLET, COATED ORAL at 09:32

## 2021-05-14 RX ADMIN — SODIUM CHLORIDE, PRESERVATIVE FREE 10 ML: 5 INJECTION INTRAVENOUS at 09:37

## 2021-05-14 RX ADMIN — PROBENECID 500 MG: 500 TABLET, FILM COATED ORAL at 09:33

## 2021-05-14 RX ADMIN — SODIUM CHLORIDE 100 ML/HR: 9 INJECTION, SOLUTION INTRAVENOUS at 06:26

## 2021-05-14 RX ADMIN — PANTOPRAZOLE SODIUM 40 MG: 40 TABLET, DELAYED RELEASE ORAL at 09:33

## 2021-05-14 RX ADMIN — RANOLAZINE 1000 MG: 500 TABLET, FILM COATED, EXTENDED RELEASE ORAL at 09:33

## 2021-05-14 RX ADMIN — HYDROCODONE BITARTRATE AND ACETAMINOPHEN 1 TABLET: 5; 325 TABLET ORAL at 00:20

## 2021-05-14 RX ADMIN — EPTIFIBATIDE 1 MCG/KG/MIN: 0.75 INJECTION, SOLUTION INTRAVENOUS at 01:38

## 2021-05-14 RX ADMIN — LOSARTAN POTASSIUM 50 MG: 50 TABLET, FILM COATED ORAL at 09:33

## 2021-05-14 RX ADMIN — ACETAMINOPHEN 650 MG: 325 TABLET, FILM COATED ORAL at 06:22

## 2021-05-14 RX ADMIN — AMLODIPINE BESYLATE 5 MG: 5 TABLET ORAL at 09:33

## 2021-05-17 ENCOUNTER — OFFICE VISIT (OUTPATIENT)
Dept: ENDOCRINOLOGY | Facility: CLINIC | Age: 76
End: 2021-05-17

## 2021-05-17 ENCOUNTER — TRANSITIONAL CARE MANAGEMENT TELEPHONE ENCOUNTER (OUTPATIENT)
Dept: CALL CENTER | Facility: HOSPITAL | Age: 76
End: 2021-05-17

## 2021-05-17 ENCOUNTER — TELEPHONE (OUTPATIENT)
Dept: CARDIOLOGY | Facility: CLINIC | Age: 76
End: 2021-05-17

## 2021-05-17 VITALS
OXYGEN SATURATION: 94 % | HEIGHT: 73 IN | HEART RATE: 54 BPM | BODY MASS INDEX: 26.92 KG/M2 | SYSTOLIC BLOOD PRESSURE: 116 MMHG | DIASTOLIC BLOOD PRESSURE: 58 MMHG | WEIGHT: 203.1 LBS

## 2021-05-17 DIAGNOSIS — E11.649 TYPE 2 DIABETES MELLITUS WITH HYPOGLYCEMIA WITHOUT COMA, WITH LONG-TERM CURRENT USE OF INSULIN (HCC): ICD-10-CM

## 2021-05-17 DIAGNOSIS — Z79.4 TYPE 2 DIABETES MELLITUS WITH HYPOGLYCEMIA WITHOUT COMA, WITH LONG-TERM CURRENT USE OF INSULIN (HCC): ICD-10-CM

## 2021-05-17 DIAGNOSIS — I10 ESSENTIAL HYPERTENSION: Primary | ICD-10-CM

## 2021-05-17 DIAGNOSIS — E55.9 VITAMIN D DEFICIENCY, UNSPECIFIED: ICD-10-CM

## 2021-05-17 DIAGNOSIS — E78.2 MIXED HYPERLIPIDEMIA: ICD-10-CM

## 2021-05-17 PROCEDURE — 99214 OFFICE O/P EST MOD 30 MIN: CPT | Performed by: NURSE PRACTITIONER

## 2021-05-17 NOTE — OUTREACH NOTE
Call Center TCM Note      Responses   [facility] patient discharged from?  [facility]   Does the patient have one of the following disease processes/diagnoses(primary or secondary)?  Acute MI (STEMI,NSTEMI)   TCM attempt successful?  Yes   Call start time  1729   Call end time  1739   Discharge diagnosis  chest pain, NSTEMI, heart cath & stent x 2, Hx CABG   Is patient permission given to speak with other caregiver?  Yes   List who call center can speak with  Daphnie Galindo, spouse   Person spoke with today (if not patient) and relationship  spouse   Meds reviewed with patient/caregiver?  Yes   Is the patient having any side effects they believe may be caused by any medication additions or changes?  No   Does the patient have all prescriptions related to this admission filled (includes statins,anticoagulants,HTN meds,anti-arrhythmia meds)  Yes   Is the patient taking all medications as directed (includes completed medication regime)?  Yes   Does the patient have a primary care provider?   Yes   Does the patient have an appointment with their PCP,cardiologist,or clinic within 7 days of discharge?  Yes   Comments regarding PCP  PCP Dr Raymond. Hospital follow up scheduled at 5/21/21 930am   Has the patient kept scheduled appointments due by today?  N/A   Comments  Cardiology f/u 6/18/21   Has home health visited the patient within 72 hours of discharge?  N/A   Psychosocial issues?  No   Did the patient receive a copy of their discharge instructions?  Yes   Nursing interventions  Reviewed instructions with patient   What is the patient's perception of their health status since discharge?  Improving   Nursing interventions  Nurse provided patient education   Is the patient/caregiver able to teach back signs and symptoms of when to call for help immediately:  Sudden discomfort in arms, back, neck or jaw, Sudden chest discomfort, Sudden sweating or clammy skin, Nausea or vomiting, Dizziness or lightheadedness,  Irregular or rapid heart rate   Nursing interventions  Nurse provided patient education   Is the patient/caregiver able to teach back lifestyle changes to help prevent MIs  Heart healthy diet, Maintaining a healthy weight, Managing diabetes   Is the patient/caregiver able to teach back ways to prevent a second heart attack:  Take medications, Follow up with MD, Manage risk factors   If the patient is a current smoker, are they able to teach back resources for cessation?  Not a smoker [Former smoker/ many years ago]   Is the patient/caregiver able to teach back the hierarchy of who to call/visit for symptoms/problems? PCP, Specialist, Home health nurse, Urgent Care, ED, 911  Yes   TCM call completed?  Yes          Natacha García RN    5/17/2021, 17:39 EDT

## 2021-05-17 NOTE — PROGRESS NOTES
"Chief Complaint  Diabetes    Subjective          Zachary Galindo presents to [facility] ENDOCRINOLOGY  History of Present Illness       In office visit     75 year old male presents for follow up     Reason diabetes mellitus type 2     Timing constant       Duration diagnosed at age 37     Quality not controlled         Severity -  High due to associated complications    Macrovascular complications CAD     Microvascular complications nephropathy            Current symptoms/problems    hyperglycemia     Alleviating Factors: Compliance       Side Effects  cramping from Trulicity , metformin on diarrhea      Current diet  low carb      Current exercise none      Current monitoring regimen: home blood tests - checking 4 x daily before nichole     Now has nichole personal use and is able to monitor more frequently and having less hypoglycemia         Lab Results   Component Value Date    HGBA1C 7.90 (H) 05/14/2021             Home blood sugar records:      NICHOLE USING THE PERSONAL SYSTEM       He is using the nichole but he forgot the reader      This am was 112     2 hours after meal 182     Lowest 52                Hypoglycemia nocturnal and if skipped meals         Review of Systems - General ROS: positive for  - fatigue      Objective   Vital Signs:   /58   Pulse 54   Ht 185.4 cm (73\")   Wt 92.1 kg (203 lb 1.6 oz)   SpO2 94%   BMI 26.80 kg/m²     Physical Exam  Constitutional:       Appearance: Normal appearance.   Cardiovascular:      Rate and Rhythm: Regular rhythm.      Heart sounds: Normal heart sounds.   Pulmonary:      Breath sounds: Normal breath sounds.   Musculoskeletal:         General: Normal range of motion.      Cervical back: Normal range of motion.   Skin:     Coloration: Skin is not pale.   Neurological:      General: No focal deficit present.      Mental Status: He is alert.   Psychiatric:         Mood and Affect: Mood normal.         Thought Content: Thought content " normal.         Judgment: Judgment normal.        Result Review :   The following data was reviewed by: ROCCO Ratliff on 05/17/2021:  Common labs    Common Labsle 5/12/21 5/12/21 5/13/21 5/13/21 5/13/21 5/13/21 5/13/21 5/14/21 5/14/21 5/14/21    2203 2203 0059 0059 1258 1258 1753 [id number] [id number] 0448   Glucose  507 (A)  244 (A)  181 (A)    110 (A)   BUN  38 (A)  38 (A)  36 (A)    31 (A)   Creatinine  2.14 (A)  2.05 (A)  1.81 (A)    1.72 (A)   eGFR Non African Am  30 (A)  32 (A)  37 (A)    39 (A)   Sodium  137  138  138    137   Potassium  4.5  5.9 (A)  4.4    4.1   Chloride  99  102  102    106   Calcium  9.3  7.8 (A)  9.5    8.5 (A)   Albumin  4.10    4.00       Total Bilirubin  0.3    0.2       Alkaline Phosphatase  98    85       AST (SGOT)  16    20       ALT (SGPT)  20    18       WBC 3.97  4.65  4.43  6.83      Hemoglobin 12.4 (A)  12.3 (A)  12.3 (A)  12.2 (A)      Hematocrit 38.2  36.6 (A)  37.4 (A)  35.5 (A)      Platelets 115 (A)  115 (A)  102 (A)  130 (A)      Total Cholesterol         137    Triglycerides         203 (A)    HDL Cholesterol         26 (A)    LDL Cholesterol          77    Hemoglobin A1C        7.90 (A)     (A) Abnormal value                      Assessment and Plan    Diagnoses and all orders for this visit:    1. Essential hypertension (Primary)  -     CBC & Differential; Future  -     Comprehensive Metabolic Panel; Future  -     Hemoglobin A1c; Future  -     Lipid Panel; Future  -     Microalbumin / Creatinine Urine Ratio - Urine, Clean Catch; Future  -     Protein / Creatinine Ratio, Urine - Urine, Clean Catch; Future  -     TSH; Future  -     Vitamin B12; Future  -     Vitamin D 25 Hydroxy; Future    2. Mixed hyperlipidemia  -     CBC & Differential; Future  -     Comprehensive Metabolic Panel; Future  -     Hemoglobin A1c; Future  -     Lipid Panel; Future  -     Microalbumin / Creatinine Urine Ratio - Urine, Clean Catch; Future  -     Protein / Creatinine Ratio, Urine -  Urine, Clean Catch; Future  -     TSH; Future  -     Vitamin B12; Future  -     Vitamin D 25 Hydroxy; Future    3. Type 2 diabetes mellitus with hypoglycemia without coma, with long-term current use of insulin (CMS/[facility])  -     CBC & Differential; Future  -     Comprehensive Metabolic Panel; Future  -     Hemoglobin A1c; Future  -     Lipid Panel; Future  -     Microalbumin / Creatinine Urine Ratio - Urine, Clean Catch; Future  -     Protein / Creatinine Ratio, Urine - Urine, Clean Catch; Future  -     TSH; Future  -     Vitamin B12; Future  -     Vitamin D 25 Hydroxy; Future    4. Vitamin D deficiency, unspecified   -     Vitamin D 25 Hydroxy; Future             Diabetes complicated by stage III ckd and CAD         Lab Results   Component Value Date    HGBA1C 7.90 (H) 05/14/2021           kimi freestyle personal-- using but forgot reader               Discontinued Medications      Trulicity 0.75 mg weekly- stopped - cramping    invokana is expensive , jardiance , side effects     Present regimen      Tresiba taking  10 units            Morning range 130-180         =====      Metformin 500 mg , 2 with supper ,intially  GI upset but now resolved     Metformin  mg one with supper  ---stopped due to side effects      =====     Taking novolog 4 units if high carb meal               Self adjusting explained         He will keep current doses since blood sugar levels are now back at goal he is instructed to call if having hyper or hypoglycemia           Kimi  has allowed the patient to minimize hypoglycemia as alarms have predicted and avoided them     She also uses the arrows on the kimi  as follows:     If arrow is horizontal , she uses the predicted bolus     If the arrow is slanted up or down-- she increase or decrease by 4  units     If the arrow is vertical up or down - she increases or decreases by 4 unit s        Microvascular complications            Last eye exam -- July 2020 , following with           Follows with       positive proteinuria         Neuropathy none            Hyperlipidemia         taking lipitor 10 mg , 5 days per week     Taking zetia 10 mg daily                  Hypertension     Taking norvasc 5 mg one daily     Taking losartan 50 mg daily            Bone health     Vitamin D deficiency     Taking MVI daily                Follow Up   Return in about 3 months (around 8/17/2021) for Recheck.  Patient was given instructions and counseling regarding his condition or for health maintenance advice. Please see specific information pulled into the AVS if appropriate.

## 2021-05-17 NOTE — TELEPHONE ENCOUNTER
Spoke with   Dr. Aragon its ok to add him on 6/18 at noon.. ----- Message from Eloina Damon MA sent at 5/14/2021  2:29 PM CDT -----  Please talk with mahesh     Thanks     ----- Message -----  From: Esthela Gregory RegSched Rep  Sent: 5/14/2021   9:09 AM CDT  [place] [facility]    Is there a day and time we can put this pt on for this follow up, week of 6/15 will be right after Mahesh comes back from vacation.       thanks  ----- Message -----  From: Elaine Johnson APRN  Sent: 5/14/2021   8:40 AM CDT  To: Shannon Hickman    Needs follow up with Dr. Clark in 3-4 weeks post cath. Cancel other appt

## 2021-05-18 RX ORDER — INSULIN DETEMIR 100 [IU]/ML
30 INJECTION, SOLUTION SUBCUTANEOUS DAILY
Qty: 3 PEN | Refills: 11 | Status: SHIPPED | OUTPATIENT
Start: 2021-05-18 | End: 2021-08-17

## 2021-05-21 ENCOUNTER — OFFICE VISIT (OUTPATIENT)
Dept: FAMILY MEDICINE CLINIC | Facility: CLINIC | Age: 76
End: 2021-05-21

## 2021-05-21 VITALS
OXYGEN SATURATION: 98 % | DIASTOLIC BLOOD PRESSURE: 68 MMHG | SYSTOLIC BLOOD PRESSURE: 132 MMHG | BODY MASS INDEX: 26.37 KG/M2 | HEIGHT: 73 IN | HEART RATE: 58 BPM | WEIGHT: 199 LBS

## 2021-05-21 DIAGNOSIS — I25.709 CORONARY ARTERY DISEASE INVOLVING CORONARY BYPASS GRAFT OF NATIVE HEART WITH ANGINA PECTORIS (HCC): ICD-10-CM

## 2021-05-21 DIAGNOSIS — E78.2 MIXED HYPERLIPIDEMIA: ICD-10-CM

## 2021-05-21 DIAGNOSIS — N18.32 STAGE 3B CHRONIC KIDNEY DISEASE (HCC): Chronic | ICD-10-CM

## 2021-05-21 DIAGNOSIS — I10 ESSENTIAL HYPERTENSION: Chronic | ICD-10-CM

## 2021-05-21 DIAGNOSIS — Z98.61 S/P PTCA (PERCUTANEOUS TRANSLUMINAL CORONARY ANGIOPLASTY): ICD-10-CM

## 2021-05-21 DIAGNOSIS — Z79.4 TYPE 2 DIABETES MELLITUS WITH COMPLICATION, WITH LONG-TERM CURRENT USE OF INSULIN (HCC): Chronic | ICD-10-CM

## 2021-05-21 DIAGNOSIS — I21.4 NSTEMI (NON-ST ELEVATED MYOCARDIAL INFARCTION) (HCC): Primary | ICD-10-CM

## 2021-05-21 DIAGNOSIS — E11.8 TYPE 2 DIABETES MELLITUS WITH COMPLICATION, WITH LONG-TERM CURRENT USE OF INSULIN (HCC): Chronic | ICD-10-CM

## 2021-05-21 PROCEDURE — 99496 TRANSJ CARE MGMT HIGH F2F 7D: CPT | Performed by: GENERAL PRACTICE

## 2021-05-21 RX ORDER — ATORVASTATIN CALCIUM 20 MG/1
20 TABLET, FILM COATED ORAL DAILY
Qty: 90 TABLET | Refills: 3 | Status: SHIPPED | OUTPATIENT
Start: 2021-05-21 | End: 2022-02-21

## 2021-05-21 NOTE — PROGRESS NOTES
Transitional Care Follow Up Visit  Subjective     Zachary Galindo is a 75 y.o. male who presents for a transitional care management visit.    Within 48 business hours after discharge our office contacted him via telephone to coordinate his care and needs.      I reviewed and discussed the details of that call along with the discharge summary, hospital problems, inpatient lab results, inpatient diagnostic studies, and consultation reports with Zachary.     Current outpatient and discharge medications have been reconciled for the patient.  Reviewed by: Halley Raymond MD      Date of TCM Phone Call 5/14/2021   [facility]   Date of Admission 5/12/2021   Date of Discharge 5/14/2021   Discharge Disposition Home or Self Care     Risk for Readmission (LACE) Score: 8 (5/14/2021  5:01 AM)      History of Present Illness   Course During Hospital Stay:  Recent hospitalization, labs, xrays reviewed and medications reconciled.  Was admitted with severe chest pain.  Has a history of CAD status post CABG in 1998.  Troponin was elevated it was felt that he had a non-STEMI.  Was taken to the Cath Lab and found to have 90% occlusion at 2 places of the OM.  PTCA and stent placement was performed.  He has not had any further chest pain.    Copied from hospital record:     Hospital Course:  75 year old male with a history of CAD s/p CABG , multiple stents, CKD 4, DM and HTN who presents to the hospital with chest pain which started after he had his supper. He has been having having chest pains for the last 2 months intermittently for which he has been following with cardiology. Cardiology was consulted. He had LHC w/ PCI stent x 2 to OM. Tolerated the procedure well. He was stable for discharge with PCP and cardiology follow up.      The following portions of the patient's history were reviewed and updated as appropriate: allergies, current medications, past family history, past medical history, past social history,  "past surgical history and problem list.  Outpatient Medications Prior to Visit   Medication Sig Dispense Refill   • amLODIPine (NORVASC) 5 MG tablet TAKE 1 TABLET EVERY DAY (Patient taking differently: 10 mg.) 90 tablet 0   • alfuzosin (UROXATRAL) 10 MG 24 hr tablet Take 10 mg by mouth Daily.     • aspirin 81 MG tablet Take 1 tablet by mouth Daily. 30 tablet 11   • carvedilol (Coreg) 25 MG tablet Take 1 tablet by mouth 2 (Two) Times a Day With Meals. 180 tablet 3   • clopidogrel (PLAVIX) 75 MG tablet TAKE 1 TABLET BY MOUTH EVERY DAY 90 tablet 2   • ezetimibe (ZETIA) 10 MG tablet TAKE 1 TABLET EVERY DAY 90 tablet 0   • gabapentin (NEURONTIN) 300 MG capsule 1 qam and 2 qhs 90 capsule 2   • glucose blood test strip OneTouch Ultra Test strips  -  Test sugars 3-4 times a day as directed by provider.     • insulin aspart (NovoLOG FlexPen) 100 UNIT/ML solution pen-injector sc pen 12 up to 20 units with meals 5 pen 11   • Insulin Degludec (Tresiba FlexTouch) 200 UNIT/ML solution pen-injector pen injection Inject 60 Units under the skin into the appropriate area as directed Every Night. (Patient taking differently: Inject 56 Units under the skin into the appropriate area as directed Every Night.) 6 pen 11   • insulin detemir (Levemir FlexTouch) 100 UNIT/ML injection Inject 30 Units under the skin into the appropriate area as directed Daily. 3 pen 11   • Insulin Pen Needle (Pen Needles 3/16\") 31G X 5 MM misc 100 each Daily. 100 each 3   • isosorbide mononitrate (IMDUR) 30 MG 24 hr tablet Take 1 tablet by mouth Every Morning. 30 tablet 0   • Lancets (ONETOUCH ULTRASOFT) lancets Test sugars 3-4 times daily as instructed by physician.     • losartan (COZAAR) 50 MG tablet Take 50 mg by mouth Daily.     • magnesium oxide (MAG-OX) 400 MG tablet Take 1 tablet by mouth 2 (Two) Times a Day. (Patient taking differently: Take 400 mg by mouth Daily.) 180 tablet 3   • nitroglycerin (Nitrostat) 0.4 MG SL tablet Place 1 tablet under the " "tongue Every 5 (Five) Minutes As Needed for Chest Pain. Max 3 doses. Go to ER if no relief 90 tablet 3   • probenecid (BENEMID) 500 MG tablet TAKE 1 TABLET BY MOUTH TWICE DAILY 180 tablet 2   • ranolazine (RANEXA) 1000 MG 12 hr tablet Take 1 tablet by mouth 2 (Two) Times a Day. 60 tablet 0   • atorvastatin (LIPITOR) 10 MG tablet TAKE 1 TABLET BY MOUTH EVERY NIGHT 90 tablet 2     No facility-administered medications prior to visit.       Review of Systems   Constitutional: Negative.  Negative for chills, fatigue, fever and unexpected weight change.   HENT: Negative.  Negative for congestion, ear pain, hearing loss, nosebleeds, rhinorrhea, sneezing, sore throat and tinnitus.    Eyes: Negative.  Negative for discharge.   Respiratory: Negative.  Negative for cough, shortness of breath and wheezing.    Cardiovascular: Negative.  Negative for chest pain and palpitations.   Gastrointestinal: Negative.  Negative for abdominal pain, constipation, diarrhea, nausea and vomiting.   Endocrine: Negative.    Genitourinary: Negative.  Negative for dysuria, frequency and urgency.   Musculoskeletal: Negative.  Negative for arthralgias, back pain, joint swelling, myalgias and neck pain.   Skin: Negative.  Negative for rash.   Allergic/Immunologic: Negative.    Neurological: Negative.  Negative for dizziness, weakness, numbness and headaches.   Hematological: Negative.  Negative for adenopathy.   Psychiatric/Behavioral: Negative.  Negative for dysphoric mood and sleep disturbance. The patient is not nervous/anxious.        Objective   Visit Vitals  /68 (BP Location: Left arm)   Pulse 58   Ht 185.4 cm (73\")   Wt 90.3 kg (199 lb)   SpO2 98%   BMI 26.25 kg/m²         Physical Exam  Vitals and nursing note reviewed.   Constitutional:       General: He is not in acute distress.     Appearance: He is well-developed.   HENT:      Head: Normocephalic.      Nose: Nose normal.   Eyes:      General:         Right eye: No discharge.         " Left eye: No discharge.      Conjunctiva/sclera: Conjunctivae normal.      Pupils: Pupils are equal, round, and reactive to light.   Neck:      Thyroid: No thyromegaly.   Cardiovascular:      Rate and Rhythm: Normal rate and regular rhythm.      Heart sounds: Normal heart sounds. No murmur heard.     Pulmonary:      Effort: Pulmonary effort is normal.      Breath sounds: Normal breath sounds.   Lymphadenopathy:      Cervical: No cervical adenopathy.   Skin:     General: Skin is warm and dry.   Neurological:      Mental Status: He is alert and oriented to person, place, and time.       Assessment/Plan   Diagnoses and all orders for this visit:    1. NSTEMI (non-ST elevated myocardial infarction) (CMS/[facility]) (Primary)    2. Coronary artery disease involving coronary bypass graft of native heart with angina pectoris (CMS/[facility])  -     atorvastatin (Lipitor) 20 MG tablet; Take 1 tablet by mouth Daily.  Dispense: 90 tablet; Refill: 3    3. Mixed hyperlipidemia  -     atorvastatin (Lipitor) 20 MG tablet; Take 1 tablet by mouth Daily.  Dispense: 90 tablet; Refill: 3    4. Essential hypertension    5. S/P PTCA (percutaneous transluminal coronary angioplasty)    6. Type 2 diabetes mellitus with complication, with long-term current use of insulin (CMS/[facility])    7. Stage 3b chronic kidney disease (CMS/[facility])      Continue current treatment.  Recommending increased dose of atorvastatin due to progression of CAD.  Follow-up with cardiology as scheduled.    New Medications Ordered This Visit   Medications   • atorvastatin (Lipitor) 20 MG tablet     Sig: Take 1 tablet by mouth Daily.     Dispense:  90 tablet     Refill:  3         Return if symptoms worsen or fail to improve, for Next scheduled follow up.

## 2021-05-24 DIAGNOSIS — E78.2 MIXED HYPERLIPIDEMIA: Chronic | ICD-10-CM

## 2021-05-25 ENCOUNTER — READMISSION MANAGEMENT (OUTPATIENT)
Dept: CALL CENTER | Facility: HOSPITAL | Age: 76
End: 2021-05-25

## 2021-05-25 RX ORDER — EZETIMIBE 10 MG/1
TABLET ORAL
Qty: 90 TABLET | Refills: 1 | Status: SHIPPED | OUTPATIENT
Start: 2021-05-25 | End: 2021-11-24

## 2021-05-27 ENCOUNTER — TELEPHONE (OUTPATIENT)
Dept: ENDOCRINOLOGY | Facility: CLINIC | Age: 76
End: 2021-05-27

## 2021-05-28 ENCOUNTER — TELEPHONE (OUTPATIENT)
Dept: ENDOCRINOLOGY | Facility: CLINIC | Age: 76
End: 2021-05-28

## 2021-05-28 NOTE — TELEPHONE ENCOUNTER
Called pt and let him know that he needed to call [facility] because that's where he gets from. And gave him the number.

## 2021-05-28 NOTE — TELEPHONE ENCOUNTER
PT called back saying he called the number and they said for Kris just to send a script in to [facility] and that he also needs a new monitor.

## 2021-05-31 LAB
QT INTERVAL: 390 MS
QT INTERVAL: 412 MS
QTC INTERVAL: 444 MS
QTC INTERVAL: 453 MS

## 2021-06-01 ENCOUNTER — TELEPHONE (OUTPATIENT)
Dept: ENDOCRINOLOGY | Facility: CLINIC | Age: 76
End: 2021-06-01

## 2021-06-02 RX ORDER — FLASH GLUCOSE SENSOR
1 KIT MISCELLANEOUS
Qty: 2 EACH | Refills: 11 | Status: SHIPPED | OUTPATIENT
Start: 2021-06-02 | End: 2022-01-03

## 2021-06-03 ENCOUNTER — READMISSION MANAGEMENT (OUTPATIENT)
Dept: CALL CENTER | Facility: HOSPITAL | Age: 76
End: 2021-06-03

## 2021-06-03 DIAGNOSIS — E11.8 TYPE 2 DIABETES MELLITUS WITH COMPLICATION, WITH LONG-TERM CURRENT USE OF INSULIN (HCC): Chronic | ICD-10-CM

## 2021-06-03 DIAGNOSIS — Z79.4 TYPE 2 DIABETES MELLITUS WITH COMPLICATION, WITH LONG-TERM CURRENT USE OF INSULIN (HCC): Chronic | ICD-10-CM

## 2021-06-03 RX ORDER — INSULIN DEGLUDEC 200 U/ML
60 INJECTION, SOLUTION SUBCUTANEOUS NIGHTLY
Qty: 6 PEN | Refills: 11 | Status: SHIPPED | OUTPATIENT
Start: 2021-06-03 | End: 2021-08-17

## 2021-06-03 NOTE — OUTREACH NOTE
AMI Week 3 Survey      Responses   [facility], [place], operated by [facility] patient discharged from?  [place]   Does the patient have one of the following disease processes/diagnoses(primary or secondary)?  Acute MI (STEMI,NSTEMI)   Week 3 attempt successful?  Yes   Call start time  1640   Call end time  1645   Person spoke with today (if not patient) and relationship  spouse   Meds reviewed with patient/caregiver?  Yes   Is the patient taking all medications as directed (includes completed medication regime)?  Yes   Medication comments  antibiotics started for prostate issues   Has the patient kept scheduled appointments due by today?  Yes   Comments  blood in urine saw Dr. Kumar, doing alright now, no chest pain sinc discharge    What is the patient's perception of their health status since discharge?  New symptoms unrelated to diagnosis   Week 3 call completed?  Yes   Wrap up additional comments  He was having blood in his urine yesterday saw Dr. Kumar started on antibiotics for prostatitis          Milvia Pineda RN

## 2021-06-07 ENCOUNTER — LAB (OUTPATIENT)
Dept: LAB | Facility: HOSPITAL | Age: 76
End: 2021-06-07

## 2021-06-07 ENCOUNTER — TELEPHONE (OUTPATIENT)
Dept: ENDOCRINOLOGY | Facility: CLINIC | Age: 76
End: 2021-06-07

## 2021-06-10 ENCOUNTER — OFFICE VISIT (OUTPATIENT)
Dept: FAMILY MEDICINE CLINIC | Facility: CLINIC | Age: 76
End: 2021-06-10

## 2021-06-10 VITALS
HEART RATE: 85 BPM | BODY MASS INDEX: 25.31 KG/M2 | SYSTOLIC BLOOD PRESSURE: 132 MMHG | HEIGHT: 73 IN | DIASTOLIC BLOOD PRESSURE: 62 MMHG | WEIGHT: 191 LBS | OXYGEN SATURATION: 96 %

## 2021-06-10 DIAGNOSIS — N18.32 STAGE 3B CHRONIC KIDNEY DISEASE (HCC): Chronic | ICD-10-CM

## 2021-06-10 DIAGNOSIS — E11.9 ENCOUNTER FOR DIABETIC FOOT EXAM (HCC): ICD-10-CM

## 2021-06-10 DIAGNOSIS — I25.810 CORONARY ARTERY DISEASE INVOLVING CORONARY BYPASS GRAFT OF NATIVE HEART WITHOUT ANGINA PECTORIS: ICD-10-CM

## 2021-06-10 DIAGNOSIS — I10 ESSENTIAL HYPERTENSION: Primary | Chronic | ICD-10-CM

## 2021-06-10 DIAGNOSIS — E78.2 MIXED HYPERLIPIDEMIA: Chronic | ICD-10-CM

## 2021-06-10 DIAGNOSIS — E11.8 TYPE 2 DIABETES MELLITUS WITH COMPLICATION, WITH LONG-TERM CURRENT USE OF INSULIN (HCC): Chronic | ICD-10-CM

## 2021-06-10 DIAGNOSIS — Z79.4 TYPE 2 DIABETES MELLITUS WITH COMPLICATION, WITH LONG-TERM CURRENT USE OF INSULIN (HCC): Chronic | ICD-10-CM

## 2021-06-10 PROCEDURE — 99214 OFFICE O/P EST MOD 30 MIN: CPT | Performed by: GENERAL PRACTICE

## 2021-06-10 RX ORDER — RANOLAZINE 1000 MG/1
1000 TABLET, EXTENDED RELEASE ORAL 2 TIMES DAILY
Qty: 180 TABLET | Refills: 1 | Status: SHIPPED | OUTPATIENT
Start: 2021-06-10 | End: 2021-11-24

## 2021-06-10 RX ORDER — ISOSORBIDE MONONITRATE 30 MG/1
30 TABLET, EXTENDED RELEASE ORAL EVERY MORNING
Qty: 90 TABLET | Refills: 1 | Status: SHIPPED | OUTPATIENT
Start: 2021-06-10 | End: 2021-11-24

## 2021-06-10 NOTE — PROGRESS NOTES
Subjective   Zachary Galindo is a 75 y.o. male.     Chief Complaint   Patient presents with   • Annual Exam   • Diabetes     For review and evaluation of management of chronic medical problems. Records reviewed. Recent labs, xrays reviewed and medications reconciled.    Diabetes  He presents for his follow-up diabetic visit. He has type 2 diabetes mellitus. His disease course has been improving. There are no hypoglycemic associated symptoms. Pertinent negatives for hypoglycemia include no dizziness, headaches or nervousness/anxiousness. There are no diabetic associated symptoms. Pertinent negatives for diabetes include no chest pain, no fatigue and no weakness. There are no hypoglycemic complications. Diabetic complications include nephropathy. Risk factors for coronary artery disease include diabetes mellitus, dyslipidemia, hypertension and male sex. Current diabetic treatment includes insulin injections. He is compliant with treatment all of the time. His weight is stable. He is following a generally healthy diet. Meal planning includes ADA exchanges. He participates in exercise intermittently. There is no change in his home blood glucose trend. An ACE inhibitor/angiotensin II receptor blocker is being taken. Eye exam is current.   Hypertension  This is a chronic problem. The current episode started more than 1 year ago. The problem is unchanged. The problem is controlled. Pertinent negatives include no chest pain, headaches, neck pain, palpitations or shortness of breath. There are no associated agents to hypertension. Risk factors for coronary artery disease include diabetes mellitus and dyslipidemia. Current antihypertension treatment includes angiotensin blockers, calcium channel blockers and beta blockers. The current treatment provides significant improvement. There are no compliance problems.    Hyperlipidemia  This is a chronic problem. The current episode started more than 1 year ago. The problem  is uncontrolled. Recent lipid tests were reviewed and are high. Exacerbating diseases include diabetes. There are no known factors aggravating his hyperlipidemia. Pertinent negatives include no chest pain, myalgias or shortness of breath. Current antihyperlipidemic treatment includes ezetimibe and statins. The current treatment provides significant improvement of lipids. There are no compliance problems.    Chronic Kidney Disease  This is a chronic problem. The current episode started more than 1 year ago. The problem occurs constantly. The problem has been gradually improving. Pertinent negatives include no abdominal pain, arthralgias, chest pain, chills, congestion, coughing, fatigue, fever, headaches, joint swelling, myalgias, nausea, neck pain, numbness, rash, sore throat, vomiting or weakness. Nothing aggravates the symptoms. Treatments tried: hydration.   Coronary Artery Disease  Presents for follow-up (recent PTCA and stents) visit. Pertinent negatives include no chest pain, chest pressure, chest tightness, dizziness, leg swelling, palpitations or shortness of breath. Risk factors include hyperlipidemia. The symptoms have been resolved. Compliance with diet is good. Compliance with exercise is good. Compliance with medications is good.        The following portions of the patient's history were reviewed and updated as appropriate: allergies, current medications, past family and social history and problem list.    Outpatient Medications Prior to Visit   Medication Sig Dispense Refill   • alfuzosin (UROXATRAL) 10 MG 24 hr tablet Take 10 mg by mouth Daily.     • amLODIPine (NORVASC) 5 MG tablet TAKE 1 TABLET EVERY DAY (Patient taking differently: 10 mg.) 90 tablet 0   • aspirin 81 MG tablet Take 1 tablet by mouth Daily. 30 tablet 11   • atorvastatin (Lipitor) 20 MG tablet Take 1 tablet by mouth Daily. 90 tablet 3   • carvedilol (Coreg) 25 MG tablet Take 1 tablet by mouth 2 (Two) Times a Day With Meals. 180  "tablet 3   • clopidogrel (PLAVIX) 75 MG tablet TAKE 1 TABLET BY MOUTH EVERY DAY 90 tablet 2   • Continuous Blood Gluc Sensor (FreeStyle Kimi 14 Day Sensor) misc 1 each Every 14 (Fourteen) Days. 2 each 11   • ezetimibe (ZETIA) 10 MG tablet TAKE 1 TABLET BY MOUTH EVERY DAY 90 tablet 1   • glucose blood test strip OneTouch Ultra Test strips  -  Test sugars 3-4 times a day as directed by provider.     • insulin aspart (NovoLOG FlexPen) 100 UNIT/ML solution pen-injector sc pen 12 up to 20 units with meals 5 pen 11   • Insulin Degludec (Tresiba FlexTouch) 200 UNIT/ML solution pen-injector pen injection Inject 60 Units under the skin into the appropriate area as directed Every Night. 6 pen 11   • insulin detemir (Levemir FlexTouch) 100 UNIT/ML injection Inject 30 Units under the skin into the appropriate area as directed Daily. 3 pen 11   • Insulin Pen Needle (Pen Needles 3/16\") 31G X 5 MM misc 100 each Daily. 100 each 3   • Lancets (ONETOUCH ULTRASOFT) lancets Test sugars 3-4 times daily as instructed by physician.     • losartan (COZAAR) 50 MG tablet Take 50 mg by mouth Daily.     • magnesium oxide (MAG-OX) 400 MG tablet Take 1 tablet by mouth 2 (Two) Times a Day. (Patient taking differently: Take 400 mg by mouth Daily.) 180 tablet 3   • nitroglycerin (Nitrostat) 0.4 MG SL tablet Place 1 tablet under the tongue Every 5 (Five) Minutes As Needed for Chest Pain. Max 3 doses. Go to ER if no relief 90 tablet 3   • probenecid (BENEMID) 500 MG tablet TAKE 1 TABLET BY MOUTH TWICE DAILY 180 tablet 2   • gabapentin (NEURONTIN) 300 MG capsule 1 qam and 2 qhs 90 capsule 2   • isosorbide mononitrate (IMDUR) 30 MG 24 hr tablet Take 1 tablet by mouth Every Morning. 30 tablet 0   • ranolazine (RANEXA) 1000 MG 12 hr tablet Take 1 tablet by mouth 2 (Two) Times a Day. 60 tablet 0     No facility-administered medications prior to visit.     I have reviewed 12 systems with patient. Findings were negative except what is noted below and/or in " "history of present illness.    Review of Systems   Constitutional: Negative for chills, fatigue and fever.   HENT: Negative for congestion and sore throat.    Respiratory: Negative for cough, chest tightness and shortness of breath.    Cardiovascular: Negative for chest pain, palpitations and leg swelling.   Gastrointestinal: Negative for abdominal pain, nausea and vomiting.   Musculoskeletal: Negative for arthralgias, joint swelling, myalgias and neck pain.   Skin: Negative for rash.   Neurological: Negative for dizziness, weakness, numbness and headaches.   Psychiatric/Behavioral: The patient is not nervous/anxious.        Objective     Visit Vitals  /62   Pulse 85   Ht 185.4 cm (73\")   Wt 86.6 kg (191 lb)   SpO2 96%   BMI 25.20 kg/m²     Physical Exam  Constitutional:       General: He is not in acute distress.     Appearance: He is well-developed.   HENT:      Head: Normocephalic and atraumatic.      Nose: Nose normal.   Eyes:      General:         Right eye: No discharge.         Left eye: No discharge.      Conjunctiva/sclera: Conjunctivae normal.      Pupils: Pupils are equal, round, and reactive to light.   Neck:      Thyroid: No thyromegaly.   Cardiovascular:      Rate and Rhythm: Normal rate and regular rhythm.      Heart sounds: Normal heart sounds. No murmur heard.     Pulmonary:      Effort: Pulmonary effort is normal. No respiratory distress.      Breath sounds: Normal breath sounds. No wheezing or rales.   Chest:      Chest wall: No tenderness.   Abdominal:      General: Bowel sounds are normal. There is no distension.      Palpations: Abdomen is soft. There is no mass.      Tenderness: There is no abdominal tenderness.      Hernia: No hernia is present.   Musculoskeletal:         General: No deformity. Normal range of motion.      Cervical back: Normal range of motion.      Right foot: Bunion present.   Feet:      Right foot:      Protective Sensation: 5 sites tested. 5 sites sensed.      Skin " integrity: Callus present.      Left foot:      Protective Sensation: 5 sites tested. 5 sites sensed.      Skin integrity: Callus present.   Lymphadenopathy:      Cervical: No cervical adenopathy.   Skin:     General: Skin is warm and dry.      Coloration: Skin is not pale.      Findings: No rash.   Neurological:      Mental Status: He is alert and oriented to person, place, and time.      Deep Tendon Reflexes: Reflexes are normal and symmetric.   Psychiatric:         Behavior: Behavior normal.         Thought Content: Thought content normal.         Judgment: Judgment normal.        Notes brought forward are reviewed and updated if indicated.     Assessment/Plan   Problems Addressed this Visit        Cardiac and Vasculature    Essential hypertension - Primary (Chronic)    Hyperlipidemia (Chronic)    Coronary artery disease involving coronary bypass graft of native heart without angina pectoris    Relevant Medications    isosorbide mononitrate (IMDUR) 30 MG 24 hr tablet    ranolazine (RANEXA) 1000 MG 12 hr tablet       Endocrine and Metabolic    Type 2 diabetes mellitus with complication, with long-term current use of insulin (CMS/[facility]) (Chronic)       Genitourinary and Reproductive     Stage 3b chronic kidney disease (CMS/[facility])      Other Visit Diagnoses     Encounter for diabetic foot exam (CMS/[facility])          Diagnoses       Codes Comments    Essential hypertension    -  Primary ICD-10-CM: I10  ICD-9-CM: 401.9     Coronary artery disease involving coronary bypass graft of native heart without angina pectoris     ICD-10-CM: I25.810  ICD-9-CM: 414.05     Mixed hyperlipidemia     ICD-10-CM: E78.2  ICD-9-CM: 272.2     Type 2 diabetes mellitus with complication, with long-term current use of insulin (CMS/[facility])     ICD-10-CM: E11.8, Z79.4  ICD-9-CM: 250.90, V58.67     Stage 3b chronic kidney disease (CMS/[facility])     ICD-10-CM: N18.32  ICD-9-CM: 585.3     Encounter for diabetic foot exam (CMS/[facility])     ICD-10-CM:  E11.9  ICD-9-CM: 250.00           Continue current treatment. Follow up with cardiology, nephrology and endocrinology as scheduled.    New Medications Ordered This Visit   Medications   • isosorbide mononitrate (IMDUR) 30 MG 24 hr tablet     Sig: Take 1 tablet by mouth Every Morning.     Dispense:  90 tablet     Refill:  1   • ranolazine (RANEXA) 1000 MG 12 hr tablet     Sig: Take 1 tablet by mouth 2 (Two) Times a Day.     Dispense:  180 tablet     Refill:  1     Return in about 6 months (around 12/13/2021) for medicare wellness visit.        This document has been electronically signed by Halley Raymond MD on Kathy 10, 2021 17:28 CDT

## 2021-06-18 ENCOUNTER — OFFICE VISIT (OUTPATIENT)
Dept: CARDIOLOGY | Facility: CLINIC | Age: 76
End: 2021-06-18

## 2021-06-18 VITALS
TEMPERATURE: 97.5 F | BODY MASS INDEX: 24.65 KG/M2 | OXYGEN SATURATION: 100 % | HEART RATE: 65 BPM | HEIGHT: 73 IN | WEIGHT: 186 LBS | SYSTOLIC BLOOD PRESSURE: 132 MMHG | DIASTOLIC BLOOD PRESSURE: 72 MMHG

## 2021-06-18 DIAGNOSIS — I10 ESSENTIAL HYPERTENSION: Chronic | ICD-10-CM

## 2021-06-18 DIAGNOSIS — I25.810 CORONARY ARTERY DISEASE INVOLVING CORONARY BYPASS GRAFT OF NATIVE HEART WITHOUT ANGINA PECTORIS: ICD-10-CM

## 2021-06-18 DIAGNOSIS — E78.2 MIXED HYPERLIPIDEMIA: Chronic | ICD-10-CM

## 2021-06-18 DIAGNOSIS — Z95.1 S/P CABG (CORONARY ARTERY BYPASS GRAFT): Primary | ICD-10-CM

## 2021-06-18 PROCEDURE — 99214 OFFICE O/P EST MOD 30 MIN: CPT | Performed by: INTERNAL MEDICINE

## 2021-06-18 RX ORDER — LEVOFLOXACIN 500 MG/1
500 TABLET, FILM COATED ORAL DAILY
COMMUNITY
Start: 2021-06-02 | End: 2021-06-18

## 2021-06-18 RX ORDER — AMOXICILLIN 500 MG/1
CAPSULE ORAL
COMMUNITY
Start: 2021-04-13 | End: 2021-06-18

## 2021-06-18 NOTE — PROGRESS NOTES
Zachary Galindo  75 y.o. male    1. S/P CABG (coronary artery bypass graft)    2. Coronary artery disease involving coronary bypass graft of native heart without angina pectoris    3. Mixed hyperlipidemia    4. Essential hypertension        History of Present Illness  Mr. Galindo is a 75-year-old male with coronary artery disease, hypertension, diabetes mellitus, CKD. His history is remarkable for coronary artery disease status post coronary artery bypass surgery in 1998 by Dr. Flor and had subsequent PCI to SVG to OM and RCA in 2014.    He was admitted to [facility] with chest pain in May 2021 and troponins were elevated at 0.141. He underwent cardiac catheterization by Dr. Santos on 5/13/2021 and the findings are as follows:  · Mid LAD lesion is 90% stenosed post LIMA insertion site.Small LAD-1.6 to 1.8 mm  · Prox Cx lesion is 100% stenosed.  · Mid RCA lesion is 100% stenosed.  · SVG to RCA at Origin is 100% stenosed.  · OM prox Graft lesion is 90% stenosed-stented to 0%  · OM Mid Graft lesion -ISR-is 99% stenosed stented to 0%  · LV pressures (S/D/E) : 150/-1/21 mmHg    Prox Graft lesion (Graft To 1st Mrg)   Stent   Drug-eluting stent was successfully placed.   Supplies used: STNT XIENCE JUAN EVEROLIMUS MELCHOR 4X33MM   Post-Intervention Lesion Assessment   There is a 0% residual stenosis post intervention.   Mid Graft lesion (Graft To 1st Mrg)   Stent   Drug-eluting stent was successfully placed. The stent used was a STENT XIENCE JUAN EVEROLIMUS MELCHOR 4X33MM.   Supplies used: STENT XIENCE JUAN EVEROLIMUS MELCHOR 4X33MM   Post-Intervention Lesion Assessment   The intervention was successful. Embolic protection device was not deployed. Post-intervention JENNY flow is 3. Lesion had 30 mm of its length treated. There were no complications. FFR was not. Ultrasound (IVUS) was not performed. CFVR was not performed. Optical coherence tomography (OCT) was not performed post-intervention. There was a  heavy degenerative old graft disease burden in graft and multiple doses of Adenosine was given.There was JENNY 3 flow in 3 of 4 ti small distal Marginal graft. Integrilin and IV NTG was began. ACT was therapeutic.   There is a 0% residual stenosis post intervention.       From a symptom standpoint he has done very well since the procedure and has not had any recurrence of chest pain being able to perform his activities of daily living.  Clinical exam today that his heart rate and blood pressure were in the normal range and no signs of congestive heart failure was noted.  His history was reviewed in detail and cardiac catheterization images were reviewed.    Allergies   Allergen Reactions   • Advicor [Niacin-Lovastatin Er] Swelling     Swelling   • Nsaids Other (See Comments)     Kidney disease   • Statins Swelling     Swelling   • Lisinopril Cough     Cough         Past Medical History:   Diagnosis Date   • Allergic rhinitis    • Ankle joint pain    • Artificial lens present     Left   • Astigmatism    • Benign prostatic hyperplasia    • Cataract    • Closed fracture of medial malleolus    • Coronary arteriosclerosis    • Diabetes mellitus (CMS/HCC)     no retinopathy   • Elevated levels of transaminase & lactic acid dehydrogenase     improving    • Encounter for health maintenance examination     Individual health examination     • Encounter for health maintenance examination in adult     Adult health examination    • Essential hypertension    • Essential hypertension     Unspecified   • Flank pain     probably muscular    • GERD (gastroesophageal reflux disease)    • Gout    • Gout     unspecified     • Hemorrhoids    • History of artificial eye lens     Artificial lens in position - right    • History of colonoscopy 04/04/2010    Colon endoscopy  (72453)  (1)    • History of kidney stones    • Hyperlipidemia    • Hypermetropia    • Low blood pressure    • Malaise and fatigue    • Need for influenza vaccination      Needs influenza immunization    • Nuclear cataract of left eye    • Posterior subcapsular polar senile cataract     Left   • Renal impairment    • Special screening for malignant neoplasm of prostate    • Type 2 diabetes mellitus (CMS/HCC)     improving   • 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 edema (CMS/HCC)     without macular edema - mild OS    • Upper respiratory infection    • Urticaria     Drug-aggravated angioedema-urticaria   • Urticaria          Past Surgical History:   Procedure Laterality Date   • CARDIAC CATHETERIZATION  03/24/2014    (83330)  (2)  Reduction of stenoisis from 95% to less than 0% stenosis with evidence of good JENNY 3-flow. Distal RCA beyond the touchdown was seen filling the circumflex system   • CARDIAC CATHETERIZATION Right 5/13/2021    Procedure: Left Heart Cath;  Surgeon: Carissa Santos MD;  Location: [facility] CATH INVASIVE LOCATION;  Service: Cardiology;  Laterality: Right;   • CATARACT EXTRACTION  10/2015    Remove cataract, insert lens (2)    • CORONARY ARTERY BYPASS GRAFT      Arterial, two  (1)   -  3 - 12/1998   • HERNIA REPAIR      w/mesh  (1)   • INJECTION OF MEDICATION  04/11/2013    B12  (1)  - RAYMOND Raymond   • INJECTION OF MEDICATION  07/03/2013    Benadryl  (1) - DMajor Raymond   • INJECTION OF MEDICATION  10/22/2014    Kenalog  (2)   • OTHER SURGICAL HISTORY  07/10/2002    Fragmenting of kidney stone  -   ESWL, 2010 shocks. 1cm UP stone,right. Diabetes mellitus         Family History   Problem Relation Age of Onset   • Diabetes Other    • Hypertension Other    • Cancer Mother    • Heart disease Mother    • Hypertension Mother    • Hyperlipidemia Mother    • Diabetes Brother    • Diabetes Maternal Aunt    • Cancer Sister    • Diabetes Sister          Social History     Socioeconomic History   • Marital status:      Spouse name: Not on file   • Number of children: Not on file   • Years of education: Not on file   •  "Highest education level: Not on file   Tobacco Use   • Smoking status: Former Smoker   • Smokeless tobacco: Never Used   • Tobacco comment: Quit 1979   Substance and Sexual Activity   • Alcohol use: No   • Drug use: No   • Sexual activity: Defer         Current Outpatient Medications   Medication Sig Dispense Refill   • alfuzosin (UROXATRAL) 10 MG 24 hr tablet Take 10 mg by mouth Daily.     • amLODIPine (NORVASC) 5 MG tablet TAKE 1 TABLET EVERY DAY (Patient taking differently: 10 mg.) 90 tablet 0   • aspirin 81 MG tablet Take 1 tablet by mouth Daily. 30 tablet 11   • atorvastatin (Lipitor) 20 MG tablet Take 1 tablet by mouth Daily. 90 tablet 3   • carvedilol (Coreg) 25 MG tablet Take 1 tablet by mouth 2 (Two) Times a Day With Meals. 180 tablet 3   • clopidogrel (PLAVIX) 75 MG tablet TAKE 1 TABLET BY MOUTH EVERY DAY 90 tablet 2   • Continuous Blood Gluc Sensor (FreeStyle Kimi 14 Day Sensor) misc 1 each Every 14 (Fourteen) Days. 2 each 11   • ezetimibe (ZETIA) 10 MG tablet TAKE 1 TABLET BY MOUTH EVERY DAY 90 tablet 1   • glucose blood test strip OneTouch Ultra Test strips  -  Test sugars 3-4 times a day as directed by provider.     • insulin aspart (NovoLOG FlexPen) 100 UNIT/ML solution pen-injector sc pen 12 up to 20 units with meals 5 pen 11   • Insulin Degludec (Tresiba FlexTouch) 200 UNIT/ML solution pen-injector pen injection Inject 60 Units under the skin into the appropriate area as directed Every Night. 6 pen 11   • insulin detemir (Levemir FlexTouch) 100 UNIT/ML injection Inject 30 Units under the skin into the appropriate area as directed Daily. 3 pen 11   • Insulin Pen Needle (Pen Needles 3/16\") 31G X 5 MM misc 100 each Daily. 100 each 3   • isosorbide mononitrate (IMDUR) 30 MG 24 hr tablet Take 1 tablet by mouth Every Morning. 90 tablet 1   • Lancets (ONETOUCH ULTRASOFT) lancets Test sugars 3-4 times daily as instructed by physician.     • losartan (COZAAR) 50 MG tablet Take 50 mg by mouth Daily.     • " "magnesium oxide (MAG-OX) 400 MG tablet Take 1 tablet by mouth 2 (Two) Times a Day. (Patient taking differently: Take 400 mg by mouth Daily.) 180 tablet 3   • nitroglycerin (Nitrostat) 0.4 MG SL tablet Place 1 tablet under the tongue Every 5 (Five) Minutes As Needed for Chest Pain. Max 3 doses. Go to ER if no relief 90 tablet 3   • probenecid (BENEMID) 500 MG tablet TAKE 1 TABLET BY MOUTH TWICE DAILY 180 tablet 2   • ranolazine (RANEXA) 1000 MG 12 hr tablet Take 1 tablet by mouth 2 (Two) Times a Day. (Patient taking differently: Take 500 mg by mouth Daily.) 180 tablet 1     No current facility-administered medications for this visit.         OBJECTIVE    /72 (BP Location: Left arm, Patient Position: Sitting, Cuff Size: Adult)   Pulse 65   Temp 97.5 °F (36.4 °C)   Ht 185.4 cm (73\")   Wt 84.4 kg (186 lb)   SpO2 100%   BMI 24.54 kg/m²         Review of Systems: The following systems were reviewed and the changes noted as below and as described in history of present    Constitutional:  Denies recent weight loss, weight gain, fever or chills. fatigue     HENT:  Denies any hearing loss, epistaxis, hoarseness, or difficulty speaking.     Eyes: Wears eyeglasses or contact lenses     Respiratory: no dyspnea with exertion,no cough, wheezing, or hemoptysis.     Cardiovascular: See HPI    Gastrointestinal:  Denies change in bowel habits, dyspepsia, ulcer disease, hematochezia, or melena.     Endocrine: Negative for cold intolerance, heat intolerance, polydipsia, polyphagia and polyuria.     Genitourinary: CKD.  He is followed by nephrology.      Musculoskeletal: DJD    Neurological:  Denies any history of recurrent headaches, strokes, TIA, or seizure disorder.     Hematological: Denies any food allergies, seasonal allergies, bleeding disorders, or lymphadenopathy.     Physical Exam : The following systems were reassessed and no changes were noted    Constitutional: Cooperative, alert and oriented,  in no acute " distress.     HENT:   Head: Normocephalic, normal hair patterns, no masses or tenderness.  Ears, Nose, and Throat: No gross abnormalities. No pallor or cyanosis.   Eyes: EOMS intact, PERRL, conjunctivae and lids unremarkable. Fundoscopic exam and visual fields not performed.   Neck: No palpable masses or adenopathy, no thyromegaly, no JVD, carotid pulses are full and equal bilaterally and without  Bruits.     Cardiovascular: Regular rhythm, S1 and S2 normal, no S3 or S4.  No murmurs, gallops, or rubs detected.     Pulmonary/Chest: Chest: normal symmetry,  normal respiratory excursion, no intercostal retraction, no use of accessory muscles.            Pulmonary: Normal breath sounds. No rales or ronchi.    Abdominal: Abdomen soft, bowel sounds normoactive, no masses, no hepatosplenomegaly, non-tender, no bruits.     Musculoskeletal: No deformities, clubbing, cyanosis, erythema, or edema observed.     Neurological: No gross motor or sensory deficits noted, affect appropriate, oriented to time, person, place.     Psychiatric: He has a normal mood and affect. His behavior is normal. Judgment and thought content normal.         Lab Results   Component Value Date    WBC 6.83 05/13/2021    HGB 12.2 (L) 05/13/2021    HCT 35.5 (L) 05/13/2021    MCV 85.3 05/13/2021     (L) 05/13/2021     Lab Results   Component Value Date    GLUCOSE 110 (H) 05/14/2021    BUN 31 (H) 05/14/2021    CREATININE 1.72 (H) 05/14/2021    EGFRIFNONA 39 (L) 05/14/2021    BCR 18.0 05/14/2021    CO2 24.0 05/14/2021    CALCIUM 8.5 (L) 05/14/2021    ALBUMIN 4.00 05/13/2021    AST 20 05/13/2021    ALT 18 05/13/2021     Lab Results   Component Value Date    CHOL 137 05/14/2021    CHOL 148 04/29/2021    CHOL 117 02/09/2021     Lab Results   Component Value Date    TRIG 203 (H) 05/14/2021    TRIG 159 (H) 04/29/2021    TRIG 226 (H) 02/09/2021     Lab Results   Component Value Date    HDL 26 (L) 05/14/2021    HDL 29 (L) 04/29/2021    HDL 26 (L) 02/09/2021      No components found for: LDLCALC  Lab Results   Component Value Date    LDL 77 05/14/2021    LDL 91 04/29/2021    LDL 54 02/09/2021     No results found for: HDLLDLRATIO  No components found for: CHOLHDL  Lab Results   Component Value Date    HGBA1C 7.90 (H) 05/14/2021     Lab Results   Component Value Date    TSH 2.240 04/29/2021           ASSESSMENT AND PLAN  Mr. Galindo has multiple medical issues as discussed under history of present illness and progressing well following his intervention and May 2021.  No findings to suggest angina, arrhythmia or congestive heart failure noted.      He had several questions with regards to his long-term prognosis and possibility for further interventions and these were discussed with him.  I have continued antiplatelet therapy with aspirin and Plavix, antihypertensive therapy with amlodipine, carvedilol, losartan, lipid-lowering therapy with atorvastatin, Zetia.  Antianginal therapy with isosorbide mononitrate and Ranexa have been continued.    To assess his left ventricular function post non-ST segment elevation myocardial infarction, an echocardiogram is being arranged.  His lab results from his recent admission were reviewed.  His LDL was 77.  GFR was 39.    Diagnoses and all orders for this visit:    1. S/P CABG (coronary artery bypass graft) (Primary)  -     Adult Transthoracic Echo Complete w/ Color, Spectral and Contrast if Necessary Per Protocol; Future    2. Coronary artery disease involving coronary bypass graft of native heart without angina pectoris  -     Adult Transthoracic Echo Complete w/ Color, Spectral and Contrast if Necessary Per Protocol; Future    3. Mixed hyperlipidemia  -     Adult Transthoracic Echo Complete w/ Color, Spectral and Contrast if Necessary Per Protocol; Future    4. Essential hypertension  -     Adult Transthoracic Echo Complete w/ Color, Spectral and Contrast if Necessary Per Protocol; Future        Patient's Body mass index is 24.54  kg/m². BMI is within normal parameters. No follow-up required..  Patient is a non-smoker    Medhat Clark MD  6/18/2021  12:29 CDT

## 2021-07-26 ENCOUNTER — TELEPHONE (OUTPATIENT)
Dept: ENDOCRINOLOGY | Facility: CLINIC | Age: 76
End: 2021-07-26

## 2021-07-26 RX ORDER — INSULIN GLARGINE 300 U/ML
60 INJECTION, SOLUTION SUBCUTANEOUS DAILY
Qty: 5 PEN | Refills: 11 | Status: SHIPPED | OUTPATIENT
Start: 2021-07-26 | End: 2022-08-30

## 2021-07-26 NOTE — TELEPHONE ENCOUNTER
[facility] [place] [place] - [address] [phone] [facility] [phone] FX    Left a vm stating that they are needing his Tresiba to be changed due to non formulary, the pt is almost completely out.

## 2021-07-30 ENCOUNTER — TELEPHONE (OUTPATIENT)
Dept: CARDIOLOGY | Facility: CLINIC | Age: 76
End: 2021-07-30

## 2021-08-05 ENCOUNTER — DOCUMENTATION (OUTPATIENT)
Dept: ENDOCRINOLOGY | Facility: CLINIC | Age: 76
End: 2021-08-05

## 2021-08-05 ENCOUNTER — DOCUMENTATION (OUTPATIENT)
Dept: NEUROLOGY | Facility: TELEHEALTH | Age: 76
End: 2021-08-05

## 2021-08-05 NOTE — PROGRESS NOTES
PA for Tresiba FlexTouch (insulin degludec injection) 200 Units/mL solution has been submitted for approval      Key : [id number]

## 2021-08-11 ENCOUNTER — LAB (OUTPATIENT)
Dept: LAB | Facility: HOSPITAL | Age: 76
End: 2021-08-11

## 2021-08-11 DIAGNOSIS — E11.649 TYPE 2 DIABETES MELLITUS WITH HYPOGLYCEMIA WITHOUT COMA, WITH LONG-TERM CURRENT USE OF INSULIN (HCC): ICD-10-CM

## 2021-08-11 DIAGNOSIS — Z79.4 TYPE 2 DIABETES MELLITUS WITH HYPOGLYCEMIA WITHOUT COMA, WITH LONG-TERM CURRENT USE OF INSULIN (HCC): ICD-10-CM

## 2021-08-11 DIAGNOSIS — E55.9 VITAMIN D DEFICIENCY, UNSPECIFIED: ICD-10-CM

## 2021-08-11 DIAGNOSIS — I10 ESSENTIAL HYPERTENSION: ICD-10-CM

## 2021-08-11 DIAGNOSIS — E78.2 MIXED HYPERLIPIDEMIA: ICD-10-CM

## 2021-08-11 LAB
25(OH)D3 SERPL-MCNC: 30.9 NG/ML
ALBUMIN SERPL-MCNC: 4.1 G/DL (ref 3.5–5.2)
ALBUMIN UR-MCNC: 93.8 MG/DL
ALBUMIN/GLOB SERPL: 1.6 G/DL
ALP SERPL-CCNC: 84 U/L (ref 39–117)
ALT SERPL W P-5'-P-CCNC: 18 U/L (ref 1–41)
ANION GAP SERPL CALCULATED.3IONS-SCNC: 9.8 MMOL/L (ref 5–15)
AST SERPL-CCNC: 19 U/L (ref 1–40)
BASOPHILS # BLD AUTO: 0.04 10*3/MM3 (ref 0–0.2)
BASOPHILS NFR BLD AUTO: 0.9 % (ref 0–1.5)
BILIRUB SERPL-MCNC: 0.4 MG/DL (ref 0–1.2)
BUN SERPL-MCNC: 30 MG/DL (ref 8–23)
BUN/CREAT SERPL: 12.6 (ref 7–25)
CALCIUM SPEC-SCNC: 9.3 MG/DL (ref 8.6–10.5)
CHLORIDE SERPL-SCNC: 106 MMOL/L (ref 98–107)
CHOLEST SERPL-MCNC: 111 MG/DL (ref 0–200)
CO2 SERPL-SCNC: 27.2 MMOL/L (ref 22–29)
CREAT SERPL-MCNC: 2.39 MG/DL (ref 0.76–1.27)
CREAT UR-MCNC: 94.7 MG/DL
CREAT UR-MCNC: 94.7 MG/DL
DEPRECATED RDW RBC AUTO: 46.5 FL (ref 37–54)
EOSINOPHIL # BLD AUTO: 0.24 10*3/MM3 (ref 0–0.4)
EOSINOPHIL NFR BLD AUTO: 5.2 % (ref 0.3–6.2)
ERYTHROCYTE [DISTWIDTH] IN BLOOD BY AUTOMATED COUNT: 14.5 % (ref 12.3–15.4)
GFR SERPL CREATININE-BSD FRML MDRD: 27 ML/MIN/1.73
GLOBULIN UR ELPH-MCNC: 2.6 GM/DL
GLUCOSE SERPL-MCNC: 154 MG/DL (ref 65–99)
HBA1C MFR BLD: 7.3 % (ref 4.8–5.6)
HCT VFR BLD AUTO: 39.5 % (ref 37.5–51)
HDLC SERPL-MCNC: 30 MG/DL (ref 40–60)
HGB BLD-MCNC: 12.9 G/DL (ref 13–17.7)
IMM GRANULOCYTES # BLD AUTO: 0.02 10*3/MM3 (ref 0–0.05)
IMM GRANULOCYTES NFR BLD AUTO: 0.4 % (ref 0–0.5)
LDLC SERPL CALC-MCNC: 57 MG/DL (ref 0–100)
LDLC/HDLC SERPL: 1.81 {RATIO}
LYMPHOCYTES # BLD AUTO: 0.81 10*3/MM3 (ref 0.7–3.1)
LYMPHOCYTES NFR BLD AUTO: 17.7 % (ref 19.6–45.3)
MCH RBC QN AUTO: 29.1 PG (ref 26.6–33)
MCHC RBC AUTO-ENTMCNC: 32.7 G/DL (ref 31.5–35.7)
MCV RBC AUTO: 89 FL (ref 79–97)
MICROALBUMIN/CREAT UR: 990.5 MG/G
MONOCYTES # BLD AUTO: 0.38 10*3/MM3 (ref 0.1–0.9)
MONOCYTES NFR BLD AUTO: 8.3 % (ref 5–12)
NEUTROPHILS NFR BLD AUTO: 3.09 10*3/MM3 (ref 1.7–7)
NEUTROPHILS NFR BLD AUTO: 67.5 % (ref 42.7–76)
NRBC BLD AUTO-RTO: 0 /100 WBC (ref 0–0.2)
PLATELET # BLD AUTO: 166 10*3/MM3 (ref 140–450)
PMV BLD AUTO: 11.8 FL (ref 6–12)
POTASSIUM SERPL-SCNC: 5.5 MMOL/L (ref 3.5–5.2)
PROT SERPL-MCNC: 6.7 G/DL (ref 6–8.5)
PROT UR-MCNC: 151 MG/DL
PROT/CREAT UR: 1594.5 MG/G CREA (ref 0–200)
RBC # BLD AUTO: 4.44 10*6/MM3 (ref 4.14–5.8)
SODIUM SERPL-SCNC: 143 MMOL/L (ref 136–145)
TRIGL SERPL-MCNC: 134 MG/DL (ref 0–150)
VIT B12 BLD-MCNC: 415 PG/ML (ref 211–946)
VLDLC SERPL-MCNC: 24 MG/DL (ref 5–40)
WBC # BLD AUTO: 4.58 10*3/MM3 (ref 3.4–10.8)

## 2021-08-11 PROCEDURE — 82043 UR ALBUMIN QUANTITATIVE: CPT

## 2021-08-11 PROCEDURE — 82570 ASSAY OF URINE CREATININE: CPT

## 2021-08-11 PROCEDURE — 84156 ASSAY OF PROTEIN URINE: CPT

## 2021-08-11 PROCEDURE — 80061 LIPID PANEL: CPT

## 2021-08-11 PROCEDURE — 82306 VITAMIN D 25 HYDROXY: CPT

## 2021-08-11 PROCEDURE — 85025 COMPLETE CBC W/AUTO DIFF WBC: CPT

## 2021-08-11 PROCEDURE — 82607 VITAMIN B-12: CPT

## 2021-08-11 PROCEDURE — 83036 HEMOGLOBIN GLYCOSYLATED A1C: CPT

## 2021-08-11 PROCEDURE — 80053 COMPREHEN METABOLIC PANEL: CPT

## 2021-08-11 PROCEDURE — 84443 ASSAY THYROID STIM HORMONE: CPT

## 2021-08-11 PROCEDURE — 36415 COLL VENOUS BLD VENIPUNCTURE: CPT

## 2021-08-12 LAB — TSH SERPL DL<=0.05 MIU/L-ACNC: 2.92 UIU/ML (ref 0.27–4.2)

## 2021-08-17 ENCOUNTER — OFFICE VISIT (OUTPATIENT)
Dept: ENDOCRINOLOGY | Facility: CLINIC | Age: 76
End: 2021-08-17

## 2021-08-17 VITALS
BODY MASS INDEX: 25.83 KG/M2 | HEIGHT: 73 IN | HEART RATE: 72 BPM | WEIGHT: 194.9 LBS | OXYGEN SATURATION: 96 % | DIASTOLIC BLOOD PRESSURE: 70 MMHG | SYSTOLIC BLOOD PRESSURE: 160 MMHG

## 2021-08-17 DIAGNOSIS — E55.9 VITAMIN D DEFICIENCY: ICD-10-CM

## 2021-08-17 DIAGNOSIS — Z79.4 TYPE 2 DIABETES MELLITUS WITH HYPERGLYCEMIA, WITH LONG-TERM CURRENT USE OF INSULIN (HCC): ICD-10-CM

## 2021-08-17 DIAGNOSIS — E78.2 MIXED HYPERLIPIDEMIA: ICD-10-CM

## 2021-08-17 DIAGNOSIS — I10 ESSENTIAL HYPERTENSION: Primary | ICD-10-CM

## 2021-08-17 DIAGNOSIS — E11.65 TYPE 2 DIABETES MELLITUS WITH HYPERGLYCEMIA, WITH LONG-TERM CURRENT USE OF INSULIN (HCC): ICD-10-CM

## 2021-08-17 PROCEDURE — 99214 OFFICE O/P EST MOD 30 MIN: CPT | Performed by: NURSE PRACTITIONER

## 2021-08-17 NOTE — PROGRESS NOTES
"Chief Complaint  Diabetes and Hypertension    Subjective          Zachary Galindo presents to [facility]  History of Present Illness         75 year old male presents for follow up      Reason diabetes mellitus type 2      Timing constant         Duration diagnosed at age 37      Quality not controlled         Severity -  High due to associated complications     Macrovascular complications CAD      Microvascular complications nephropathy             Current symptoms/problems    hyperglycemia     Alleviating Factors: Compliance       Side Effects  cramping from Trulicity , metformin on diarrhea      Current diet  low carb      Current exercise none      Current monitoring regimen: home blood tests - checking 4 x daily before nichole     Now has nichole personal use and is able to monitor more frequently and having less hypoglycemia         Lab Results   Component Value Date    HGBA1C 7.30 (H) 08/11/2021             Home blood sugar records:      NICHOLE USING THE PERSONAL SYSTEM       He is using the nichole but he forgot the reader                         Hypoglycemia nocturnal and if skipped meals              Objective   Vital Signs:   /70   Pulse 72   Ht 185.4 cm (73\")   Wt 88.4 kg (194 lb 14.4 oz)   SpO2 96%   BMI 25.71 kg/m²     Physical Exam   Result Review :                 Assessment and Plan    Diagnoses and all orders for this visit:    1. Essential hypertension (Primary)  -     CBC & Differential; Future  -     Comprehensive Metabolic Panel; Future  -     Hemoglobin A1c; Future  -     Lipid Panel; Future  -     TSH; Future  -     Vitamin D 25 Hydroxy; Future    2. Mixed hyperlipidemia  -     CBC & Differential; Future  -     Comprehensive Metabolic Panel; Future  -     Hemoglobin A1c; Future  -     Lipid Panel; Future  -     TSH; Future  -     Vitamin D 25 Hydroxy; Future    3. Type 2 diabetes mellitus with hyperglycemia, with long-term current use of insulin " (CMS/[facility])  -     CBC & Differential; Future  -     Comprehensive Metabolic Panel; Future  -     Hemoglobin A1c; Future  -     Lipid Panel; Future  -     TSH; Future  -     Vitamin D 25 Hydroxy; Future    4. Vitamin D deficiency  -     CBC & Differential; Future  -     Comprehensive Metabolic Panel; Future  -     Hemoglobin A1c; Future  -     Lipid Panel; Future  -     TSH; Future  -     Vitamin D 25 Hydroxy; Future           Diabetes complicated by stage III ckd and CAD         Lab Results   Component Value Date    HGBA1C 7.30 (H) 08/11/2021           kimi freestyle personal-- using but forgot reader               Discontinued Medications      Trulicity 0.75 mg weekly- stopped - cramping    invokana is expensive , jardiance , side effects     Present regimen      Tresiba  taking  46 units  units            Morning range 130-180         =====      Metformin 500 mg , 2 with supper ,intially  GI upset but now resolved     Metformin XR 500 mg one with supper  ---stopped due to side effects      =====     Taking novolog 10 up to 12 units TID before each meal            Self adjusting explained         He will keep current doses since blood sugar levels are now back at goal he is instructed to call if having hyper or hypoglycemia           Kimi  has allowed the patient to minimize hypoglycemia as alarms have predicted and avoided them     She also uses the arrows on the kimi  as follows:     If arrow is horizontal , she uses the predicted bolus     If the arrow is slanted up or down-- she increase or decrease by 4  units     If the arrow is vertical up or down - she increases or decreases by 4 unit s        Microvascular complications            Last eye exam -- July 2020 , following with          Follows with       positive proteinuria         Neuropathy none            Hyperlipidemia         taking lipitor 10 mg , 5 days per week     Taking zetia 10 mg daily                  Hypertension     Taking  norvasc 5 mg one daily      Taking losartan 50 mg daily             Bone health     Vitamin D deficiency     Taking MVI daily                       Follow Up   No follow-ups on file.  Patient was given instructions and counseling regarding his condition or for health maintenance advice. Please see specific information pulled into the AVS if appropriate.         This document has been electronically signed by ROCCO Ratliff on August 17, 2021 11:46 CDT.

## 2021-08-27 DIAGNOSIS — I10 ESSENTIAL HYPERTENSION: Chronic | ICD-10-CM

## 2021-08-27 RX ORDER — PROBENECID 500 MG/1
TABLET, FILM COATED ORAL
Qty: 180 TABLET | Refills: 1 | Status: SHIPPED | OUTPATIENT
Start: 2021-08-27 | End: 2022-02-21

## 2021-08-27 RX ORDER — CARVEDILOL 25 MG/1
TABLET ORAL
Qty: 180 TABLET | Refills: 2 | Status: SHIPPED | OUTPATIENT
Start: 2021-08-27 | End: 2022-05-23

## 2021-08-27 RX ORDER — CLOPIDOGREL BISULFATE 75 MG/1
TABLET ORAL
Qty: 90 TABLET | Refills: 1 | Status: SHIPPED | OUTPATIENT
Start: 2021-08-27 | End: 2022-02-21

## 2021-09-07 ENCOUNTER — TELEPHONE (OUTPATIENT)
Dept: CARDIOLOGY | Facility: CLINIC | Age: 76
End: 2021-09-07

## 2021-09-07 ENCOUNTER — DOCUMENTATION (OUTPATIENT)
Dept: ENDOCRINOLOGY | Facility: CLINIC | Age: 76
End: 2021-09-07

## 2021-09-07 NOTE — TELEPHONE ENCOUNTER
Called pt to let him know to stop the Ranexa 1000 mg x 2 day to see if that helps with the swelling . Per Dr. Aragon call us to let us know if it helped ----- Message from Nevaeh Mcdonald sent at 2021  2:20 PM CDT -----  Regarding: CALLBACK  Zachary Galindo  45 stated his legs/feet are swollen, and he wanted a callback @ [phone] to talk about this. Thxs

## 2021-09-08 ENCOUNTER — DOCUMENTATION (OUTPATIENT)
Dept: ENDOCRINOLOGY | Facility: CLINIC | Age: 76
End: 2021-09-08

## 2021-09-10 ENCOUNTER — TELEPHONE (OUTPATIENT)
Dept: CARDIOLOGY | Facility: CLINIC | Age: 76
End: 2021-09-10

## 2021-09-10 NOTE — TELEPHONE ENCOUNTER
Will pass the message along to Dr. Aragon----- Message from Shannon Hickman sent at 9/10/2021  1:04 PM CDT -----  Regarding: paul pt  Pt said that stopping the Ranexa has helped with the swelling and that the swelling in his legs/feet has gone down. Said was till swollen but not near as bad as was.   Can reach pt at [phone] or his wife at [phone]

## 2021-10-15 ENCOUNTER — OFFICE VISIT (OUTPATIENT)
Dept: CARDIOLOGY | Facility: CLINIC | Age: 76
End: 2021-10-15

## 2021-10-15 VITALS
TEMPERATURE: 97.5 F | SYSTOLIC BLOOD PRESSURE: 138 MMHG | WEIGHT: 194 LBS | DIASTOLIC BLOOD PRESSURE: 76 MMHG | OXYGEN SATURATION: 96 % | HEART RATE: 60 BPM | BODY MASS INDEX: 25.71 KG/M2 | HEIGHT: 73 IN

## 2021-10-15 DIAGNOSIS — I25.810 CORONARY ARTERY DISEASE INVOLVING CORONARY BYPASS GRAFT OF NATIVE HEART WITHOUT ANGINA PECTORIS: Primary | ICD-10-CM

## 2021-10-15 DIAGNOSIS — I10 ESSENTIAL HYPERTENSION: Chronic | ICD-10-CM

## 2021-10-15 DIAGNOSIS — Z95.1 S/P CABG (CORONARY ARTERY BYPASS GRAFT): ICD-10-CM

## 2021-10-15 DIAGNOSIS — E78.2 MIXED HYPERLIPIDEMIA: Chronic | ICD-10-CM

## 2021-10-15 PROCEDURE — 99213 OFFICE O/P EST LOW 20 MIN: CPT | Performed by: INTERNAL MEDICINE

## 2021-10-15 RX ORDER — GABAPENTIN 300 MG/1
CAPSULE ORAL
COMMUNITY
Start: 2021-09-08 | End: 2021-12-17 | Stop reason: SDUPTHER

## 2021-10-15 NOTE — PROGRESS NOTES
Zachary Galindo  75 y.o. male    1. Coronary artery disease involving coronary bypass graft of native heart without angina pectoris    2. Mixed hyperlipidemia    3. Essential hypertension    4. S/P CABG (coronary artery bypass graft)        History of Present Illness  Mr. Galindo is a 75-year-old male with coronary artery disease, hypertension, hyperlipidemia, diabetes mellitus, CKD. His history is remarkable for coronary artery disease status post coronary artery bypass surgery in 1998 by Dr. Flor and had subsequent PCI to SVG to OM and RCA in 2014.    He was admitted to [facility] with chest pain in May 2021 and troponins were elevated at 0.141. He underwent cardiac catheterization by Dr. Santos on 5/13/2021 and the findings are as follows:  · Mid LAD lesion is 90% stenosed post LIMA insertion site.Small LAD-1.6 to 1.8 mm  · Prox Cx lesion is 100% stenosed.  · Mid RCA lesion is 100% stenosed.  · SVG to RCA at Origin is 100% stenosed.  · OM prox Graft lesion is 90% stenosed-stented to 0%  · OM Mid Graft lesion -ISR-is 99% stenosed stented to 0%  · LV pressures (S/D/E) : 150/-1/21 mmHg  He underwent PCI to SVG to OM with placement of a 4 x 33 mm Xience Itzel stent.    Echocardiogram in July 2021 showed:  · Estimated left ventricular EF = 57% Left ventricular ejection fraction appears to be 56 - 60%. Left ventricular systolic function is normal.  · Left ventricular diastolic function is consistent with (grade I) impaired relaxation.  · Left atrial volume is mildly increased.  · Saline test results are positive.  · Estimated right ventricular systolic pressure from tricuspid regurgitation is normal (<35 mmHg).     The patient denied any cardiac symptoms and no chest pain, shortness of breath or palpitation was reported.  He has been compliant with his medications.  He does follow-up with nephrology on a regular basis.  His LDL is 57 and HDL 30 in August 2011 and creatinine 2.39 and GFR  37.    Allergies   Allergen Reactions   • Advicor [Niacin-Lovastatin Er] Swelling     Swelling   • Nsaids Other (See Comments)     Kidney disease   • Statins Swelling     Swelling   • Lisinopril Cough     Cough         Past Medical History:   Diagnosis Date   • Allergic rhinitis    • Ankle joint pain    • Artificial lens present     Left   • Astigmatism    • Benign prostatic hyperplasia    • Cataract    • Closed fracture of medial malleolus    • Coronary arteriosclerosis    • Diabetes mellitus (HCC)     no retinopathy   • Elevated levels of transaminase & lactic acid dehydrogenase     improving    • Encounter for health maintenance examination     Individual health examination     • Encounter for health maintenance examination in adult     Adult health examination    • Essential hypertension    • Essential hypertension     Unspecified   • Flank pain     probably muscular    • GERD (gastroesophageal reflux disease)    • Gout    • Gout     unspecified     • Hemorrhoids    • History of artificial eye lens     Artificial lens in position - right    • History of colonoscopy 04/04/2010    Colon endoscopy  (88204)  (1)    • History of kidney stones    • Hyperlipidemia    • Hypermetropia    • Low blood pressure    • Malaise and fatigue    • Need for influenza vaccination     Needs influenza immunization    • Nuclear cataract of left eye    • Posterior subcapsular polar senile cataract     Left   • Renal impairment    • Special screening for malignant neoplasm of prostate    • Type 2 diabetes mellitus (HCC)     improving   • 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 edema (HCC)     without macular edema - mild OS    • Upper respiratory infection    • Urticaria     Drug-aggravated angioedema-urticaria   • Urticaria          Past Surgical History:   Procedure Laterality Date   • CARDIAC CATHETERIZATION  03/24/2014    (52242)  (2)  Reduction of stenoisis from 95% to less than 0%  stenosis with evidence of good JENNY 3-flow. Distal RCA beyond the touchdown was seen filling the circumflex system   • CARDIAC CATHETERIZATION Right 5/13/2021    Procedure: Left Heart Cath;  Surgeon: Carissa Santos MD;  Location: [facility] CATH INVASIVE LOCATION;  Service: Cardiology;  Laterality: Right;   • CATARACT EXTRACTION  10/2015    Remove cataract, insert lens (2)    • CORONARY ARTERY BYPASS GRAFT      Arterial, two  (1)   -  3 - 12/1998   • HERNIA REPAIR      w/mesh  (1)   • INJECTION OF MEDICATION  04/11/2013    B12  (1)  - RAYMOND Raymond   • INJECTION OF MEDICATION  07/03/2013    Benadryl  (1) - RAYMOND Raymond   • INJECTION OF MEDICATION  10/22/2014    Kenalog  (2)   • OTHER SURGICAL HISTORY  07/10/2002    Fragmenting of kidney stone  -   ESWL, 2010 shocks. 1cm UP stone,right. Diabetes mellitus         Family History   Problem Relation Age of Onset   • Diabetes Other    • Hypertension Other    • Cancer Mother    • Heart disease Mother    • Hypertension Mother    • Hyperlipidemia Mother    • Diabetes Brother    • Diabetes Maternal Aunt    • Cancer Sister    • Diabetes Sister          Social History     Socioeconomic History   • Marital status:    Tobacco Use   • Smoking status: Former Smoker   • Smokeless tobacco: Never Used   • Tobacco comment: Quit 1979   Substance and Sexual Activity   • Alcohol use: No   • Drug use: No   • Sexual activity: Defer         Current Outpatient Medications   Medication Sig Dispense Refill   • amLODIPine (NORVASC) 5 MG tablet TAKE 1 TABLET EVERY DAY (Patient taking differently: 10 mg.) 90 tablet 0   • aspirin 81 MG tablet Take 1 tablet by mouth Daily. 30 tablet 11   • atorvastatin (Lipitor) 20 MG tablet Take 1 tablet by mouth Daily. 90 tablet 3   • carvedilol (COREG) 25 MG tablet TAKE 1 TABLET BY MOUTH TWICE DAILY WITH MEALS 180 tablet 2   • clopidogrel (PLAVIX) 75 MG tablet TAKE 1 TABLET BY MOUTH EVERY DAY 90 tablet 1   • Continuous Blood Gluc Sensor (FreeStyle Kimi 14  "Day Sensor) misc 1 each Every 14 (Fourteen) Days. 2 each 11   • ezetimibe (ZETIA) 10 MG tablet TAKE 1 TABLET BY MOUTH EVERY DAY 90 tablet 1   • glucose blood test strip OneTouch Ultra Test strips  -  Test sugars 3-4 times a day as directed by provider.     • insulin aspart (NovoLOG FlexPen) 100 UNIT/ML solution pen-injector sc pen 12 up to 20 units with meals 5 pen 11   • Insulin Glargine, 2 Unit Dial, (Toujeo Max SoloStar) 300 UNIT/ML solution pen-injector injection Inject 60 Units under the skin into the appropriate area as directed Daily. (Patient taking differently: Inject 60 Units under the skin into the appropriate area as directed Daily. 46 un daily) 5 pen 11   • Insulin Pen Needle (Pen Needles 3/16\") 31G X 5 MM misc 100 each Daily. 100 each 3   • isosorbide mononitrate (IMDUR) 30 MG 24 hr tablet Take 1 tablet by mouth Every Morning. 90 tablet 1   • Lancets (ONETOUCH ULTRASOFT) lancets Test sugars 3-4 times daily as instructed by physician.     • losartan (COZAAR) 50 MG tablet Take 50 mg by mouth Daily.     • magnesium oxide (MAG-OX) 400 MG tablet Take 1 tablet by mouth 2 (Two) Times a Day. (Patient taking differently: Take 400 mg by mouth Daily.) 180 tablet 3   • nitroglycerin (Nitrostat) 0.4 MG SL tablet Place 1 tablet under the tongue Every 5 (Five) Minutes As Needed for Chest Pain. Max 3 doses. Go to ER if no relief 90 tablet 3   • probenecid (BENEMID) 500 MG tablet TAKE 1 TABLET BY MOUTH TWICE DAILY 180 tablet 1   • alfuzosin (UROXATRAL) 10 MG 24 hr tablet Take 10 mg by mouth Daily.     • gabapentin (NEURONTIN) 300 MG capsule TAKE 1 CAPSULE EVERY MORNING AND TAKE 2 CAPSULES AT BEDTIME     • ranolazine (RANEXA) 1000 MG 12 hr tablet Take 1 tablet by mouth 2 (Two) Times a Day. (Patient taking differently: Take 500 mg by mouth Daily.) 180 tablet 1     No current facility-administered medications for this visit.         OBJECTIVE    /76 (BP Location: Left arm, Patient Position: Sitting, Cuff Size: " "Adult)   Pulse 60   Temp 97.5 °F (36.4 °C)   Ht 185.4 cm (73\")   Wt 88 kg (194 lb)   SpO2 96%   BMI 25.60 kg/m²         Review of Systems: The following systems were reviewed and the changes noted as below and as described in history of present    Constitutional:  Denies recent weight loss, weight gain, fever or chills. fatigue     HENT:  Denies any hearing loss, epistaxis, hoarseness, or difficulty speaking.     Eyes: Wears eyeglasses or contact lenses     Respiratory: no dyspnea with exertion,no cough, wheezing, or hemoptysis.     Cardiovascular: No chest pain, palpitation or dizziness.    Gastrointestinal:  Denies change in bowel habits, dyspepsia, ulcer disease, hematochezia, or melena.     Endocrine: Negative for cold intolerance, heat intolerance, polydipsia, polyphagia and polyuria.     Genitourinary: CKD.  He is followed by nephrology.      Musculoskeletal: DJD    Neurological:  Denies any history of recurrent headaches, strokes, TIA, or seizure disorder.     Hematological: Denies any food allergies, seasonal allergies, bleeding disorders, or lymphadenopathy.     Physical Exam : The following systems were reassessed and no changes were noted    Constitutional: Cooperative, alert and oriented,  in no acute distress.     HENT:   Head: Normocephalic, normal hair patterns, no masses or tenderness.  Ears, Nose, and Throat: No gross abnormalities. No pallor or cyanosis.   Eyes: EOMS intact, PERRL, conjunctivae and lids unremarkable. Fundoscopic exam and visual fields not performed.   Neck: No palpable masses or adenopathy, no thyromegaly, no JVD, carotid pulses are full and equal bilaterally and without  Bruits.     Cardiovascular: Regular rhythm, S1 and S2 normal, no S3 or S4.  No murmurs, gallops, or rubs detected.     Pulmonary/Chest: Chest: normal symmetry,  normal respiratory excursion, no intercostal retraction, no use of accessory muscles.            Pulmonary: Normal breath sounds. No rales or " [place].    Abdominal: Abdomen soft, bowel sounds normoactive, no masses, no hepatosplenomegaly, non-tender, no bruits.     Musculoskeletal: No deformities, clubbing, cyanosis, erythema, or edema observed.     Neurological: No gross motor or sensory deficits noted, affect appropriate, oriented to time, person, place.     Psychiatric: He has a normal mood and affect. His behavior is normal. Judgment and thought content normal.         Lab Results   Component Value Date    WBC 4.58 08/11/2021    HGB 12.9 (L) 08/11/2021    HCT 39.5 08/11/2021    MCV 89.0 08/11/2021     08/11/2021     Lab Results   Component Value Date    GLUCOSE 154 (H) 08/11/2021    BUN 30 (H) 08/11/2021    CREATININE 2.39 (H) 08/11/2021    EGFRIFNONA 27 (L) 08/11/2021    BCR 12.6 08/11/2021    CO2 27.2 08/11/2021    CALCIUM 9.3 08/11/2021    ALBUMIN 4.10 08/11/2021    AST 19 08/11/2021    ALT 18 08/11/2021     Lab Results   Component Value Date    CHOL 111 08/11/2021    CHOL 137 05/14/2021    CHOL 148 04/29/2021     Lab Results   Component Value Date    TRIG 134 08/11/2021    TRIG 203 (H) 05/14/2021    TRIG 159 (H) 04/29/2021     Lab Results   Component Value Date    HDL 30 (L) 08/11/2021    HDL 26 (L) 05/14/2021    HDL 29 (L) 04/29/2021     No components found for: LDLCALC  Lab Results   Component Value Date    LDL 57 08/11/2021    LDL 77 05/14/2021    LDL 91 04/29/2021     No results found for: HDLLDLRATIO  No components found for: CHOLHDL  Lab Results   Component Value Date    HGBA1C 7.30 (H) 08/11/2021     Lab Results   Component Value Date    TSH 2.920 08/11/2021           ASSESSMENT AND PLAN  Mr. Galindo has multiple medical issues as discussed under history of present illness and progressing well following his intervention and May 2021.  No findings to suggest angina, arrhythmia or congestive heart failure noted.      I have continued antiplatelet therapy with aspirin and Plavix, antihypertensive therapy with amlodipine, carvedilol,  losartan, lipid-lowering therapy with atorvastatin, Zetia.  Antianginal therapy with isosorbide mononitrate and Ranexa have been continued.    Diagnoses and all orders for this visit:    1. Coronary artery disease involving coronary bypass graft of native heart without angina pectoris (Primary)    2. Mixed hyperlipidemia    3. Essential hypertension    4. S/P CABG (coronary artery bypass graft)        Patient's Body mass index is 25.6 kg/m². BMI is within normal parameters. No follow-up required..  Patient is a non-smoker    Medhat Clark MD  10/15/2021  11:19 CDT

## 2021-10-28 ENCOUNTER — TRANSCRIBE ORDERS (OUTPATIENT)
Dept: LAB | Facility: HOSPITAL | Age: 76
End: 2021-10-28

## 2021-10-28 ENCOUNTER — LAB (OUTPATIENT)
Dept: LAB | Facility: HOSPITAL | Age: 76
End: 2021-10-28

## 2021-10-28 DIAGNOSIS — E55.9 VITAMIN D DEFICIENCY: ICD-10-CM

## 2021-10-28 DIAGNOSIS — I10 ESSENTIAL HYPERTENSION: ICD-10-CM

## 2021-10-28 DIAGNOSIS — N18.30 STAGE 3 CHRONIC KIDNEY DISEASE, UNSPECIFIED WHETHER STAGE 3A OR 3B CKD (HCC): ICD-10-CM

## 2021-10-28 DIAGNOSIS — I10 ESSENTIAL HYPERTENSION, BENIGN: ICD-10-CM

## 2021-10-28 DIAGNOSIS — E11.9 DIABETES MELLITUS WITHOUT COMPLICATION (HCC): ICD-10-CM

## 2021-10-28 DIAGNOSIS — E11.65 TYPE 2 DIABETES MELLITUS WITH HYPERGLYCEMIA, WITH LONG-TERM CURRENT USE OF INSULIN (HCC): ICD-10-CM

## 2021-10-28 DIAGNOSIS — M10.9 GOUT, UNSPECIFIED CAUSE, UNSPECIFIED CHRONICITY, UNSPECIFIED SITE: ICD-10-CM

## 2021-10-28 DIAGNOSIS — Z79.4 TYPE 2 DIABETES MELLITUS WITH HYPOGLYCEMIA WITHOUT COMA, WITH LONG-TERM CURRENT USE OF INSULIN (HCC): ICD-10-CM

## 2021-10-28 DIAGNOSIS — I10 BENIGN ESSENTIAL HYPERTENSION: ICD-10-CM

## 2021-10-28 DIAGNOSIS — Z79.4 TYPE 2 DIABETES MELLITUS WITH HYPERGLYCEMIA, WITH LONG-TERM CURRENT USE OF INSULIN (HCC): ICD-10-CM

## 2021-10-28 DIAGNOSIS — I10 ESSENTIAL HYPERTENSION: Primary | ICD-10-CM

## 2021-10-28 DIAGNOSIS — E78.2 MIXED HYPERLIPIDEMIA: ICD-10-CM

## 2021-10-28 DIAGNOSIS — E11.649 TYPE 2 DIABETES MELLITUS WITH HYPOGLYCEMIA WITHOUT COMA, WITH LONG-TERM CURRENT USE OF INSULIN (HCC): ICD-10-CM

## 2021-10-28 DIAGNOSIS — E55.9 VITAMIN D DEFICIENCY, UNSPECIFIED: ICD-10-CM

## 2021-10-28 LAB
25(OH)D3 SERPL-MCNC: 35.8 NG/ML
ALBUMIN SERPL-MCNC: 4.1 G/DL (ref 3.5–5.2)
ALBUMIN/GLOB SERPL: 1.4 G/DL
ALP SERPL-CCNC: 78 U/L (ref 39–117)
ALT SERPL W P-5'-P-CCNC: 19 U/L (ref 1–41)
ANION GAP SERPL CALCULATED.3IONS-SCNC: 8.7 MMOL/L (ref 5–15)
AST SERPL-CCNC: 18 U/L (ref 1–40)
BASOPHILS # BLD AUTO: 0.05 10*3/MM3 (ref 0–0.2)
BASOPHILS NFR BLD AUTO: 0.9 % (ref 0–1.5)
BILIRUB SERPL-MCNC: 0.4 MG/DL (ref 0–1.2)
BUN SERPL-MCNC: 33 MG/DL (ref 8–23)
BUN/CREAT SERPL: 18.4 (ref 7–25)
CALCIUM SPEC-SCNC: 9.6 MG/DL (ref 8.6–10.5)
CHLORIDE SERPL-SCNC: 104 MMOL/L (ref 98–107)
CHOLEST SERPL-MCNC: 123 MG/DL (ref 0–200)
CO2 SERPL-SCNC: 26.3 MMOL/L (ref 22–29)
CREAT SERPL-MCNC: 1.79 MG/DL (ref 0.76–1.27)
DEPRECATED RDW RBC AUTO: 42.9 FL (ref 37–54)
EOSINOPHIL # BLD AUTO: 0.18 10*3/MM3 (ref 0–0.4)
EOSINOPHIL NFR BLD AUTO: 3.4 % (ref 0.3–6.2)
ERYTHROCYTE [DISTWIDTH] IN BLOOD BY AUTOMATED COUNT: 13.7 % (ref 12.3–15.4)
GFR SERPL CREATININE-BSD FRML MDRD: 37 ML/MIN/1.73
GLOBULIN UR ELPH-MCNC: 2.9 GM/DL
GLUCOSE SERPL-MCNC: 234 MG/DL (ref 65–99)
HBA1C MFR BLD: 9.29 % (ref 4.8–5.6)
HCT VFR BLD AUTO: 43.6 % (ref 37.5–51)
HDLC SERPL-MCNC: 26 MG/DL (ref 40–60)
HGB BLD-MCNC: 14.7 G/DL (ref 13–17.7)
IMM GRANULOCYTES # BLD AUTO: 0.01 10*3/MM3 (ref 0–0.05)
IMM GRANULOCYTES NFR BLD AUTO: 0.2 % (ref 0–0.5)
LDLC SERPL CALC-MCNC: 60 MG/DL (ref 0–100)
LDLC/HDLC SERPL: 2 {RATIO}
LYMPHOCYTES # BLD AUTO: 0.8 10*3/MM3 (ref 0.7–3.1)
LYMPHOCYTES NFR BLD AUTO: 15 % (ref 19.6–45.3)
MCH RBC QN AUTO: 29.3 PG (ref 26.6–33)
MCHC RBC AUTO-ENTMCNC: 33.7 G/DL (ref 31.5–35.7)
MCV RBC AUTO: 86.9 FL (ref 79–97)
MONOCYTES # BLD AUTO: 0.36 10*3/MM3 (ref 0.1–0.9)
MONOCYTES NFR BLD AUTO: 6.7 % (ref 5–12)
NEUTROPHILS NFR BLD AUTO: 3.94 10*3/MM3 (ref 1.7–7)
NEUTROPHILS NFR BLD AUTO: 73.8 % (ref 42.7–76)
NRBC BLD AUTO-RTO: 0 /100 WBC (ref 0–0.2)
PHOSPHATE SERPL-MCNC: 2.7 MG/DL (ref 2.5–4.5)
PLATELET # BLD AUTO: 133 10*3/MM3 (ref 140–450)
PMV BLD AUTO: 12.5 FL (ref 6–12)
POTASSIUM SERPL-SCNC: 4.2 MMOL/L (ref 3.5–5.2)
PROT SERPL-MCNC: 7 G/DL (ref 6–8.5)
RBC # BLD AUTO: 5.02 10*6/MM3 (ref 4.14–5.8)
SODIUM SERPL-SCNC: 139 MMOL/L (ref 136–145)
TRIGL SERPL-MCNC: 225 MG/DL (ref 0–150)
TSH SERPL DL<=0.05 MIU/L-ACNC: 2.79 UIU/ML (ref 0.27–4.2)
VLDLC SERPL-MCNC: 37 MG/DL (ref 5–40)
WBC # BLD AUTO: 5.34 10*3/MM3 (ref 3.4–10.8)

## 2021-10-28 PROCEDURE — 84443 ASSAY THYROID STIM HORMONE: CPT

## 2021-10-28 PROCEDURE — 83036 HEMOGLOBIN GLYCOSYLATED A1C: CPT

## 2021-10-28 PROCEDURE — 36415 COLL VENOUS BLD VENIPUNCTURE: CPT

## 2021-10-28 PROCEDURE — 82306 VITAMIN D 25 HYDROXY: CPT

## 2021-10-28 PROCEDURE — 84100 ASSAY OF PHOSPHORUS: CPT

## 2021-10-28 PROCEDURE — 80053 COMPREHEN METABOLIC PANEL: CPT

## 2021-10-28 PROCEDURE — 80061 LIPID PANEL: CPT

## 2021-10-28 PROCEDURE — 85025 COMPLETE CBC W/AUTO DIFF WBC: CPT

## 2021-11-24 DIAGNOSIS — E78.2 MIXED HYPERLIPIDEMIA: Chronic | ICD-10-CM

## 2021-11-24 DIAGNOSIS — I25.810 CORONARY ARTERY DISEASE INVOLVING CORONARY BYPASS GRAFT OF NATIVE HEART WITHOUT ANGINA PECTORIS: ICD-10-CM

## 2021-11-24 RX ORDER — RANOLAZINE 1000 MG/1
TABLET, EXTENDED RELEASE ORAL
Qty: 180 TABLET | Refills: 0 | Status: SHIPPED | OUTPATIENT
Start: 2021-11-24 | End: 2021-12-17

## 2021-11-24 RX ORDER — EZETIMIBE 10 MG/1
TABLET ORAL
Qty: 90 TABLET | Refills: 0 | Status: SHIPPED | OUTPATIENT
Start: 2021-11-24 | End: 2022-02-21

## 2021-11-24 RX ORDER — ISOSORBIDE MONONITRATE 30 MG/1
30 TABLET, EXTENDED RELEASE ORAL EVERY MORNING
Qty: 90 TABLET | Refills: 0 | Status: SHIPPED | OUTPATIENT
Start: 2021-11-24 | End: 2022-02-21

## 2021-12-17 ENCOUNTER — OFFICE VISIT (OUTPATIENT)
Dept: FAMILY MEDICINE CLINIC | Facility: CLINIC | Age: 76
End: 2021-12-17

## 2021-12-17 ENCOUNTER — OFFICE VISIT (OUTPATIENT)
Dept: ENDOCRINOLOGY | Facility: CLINIC | Age: 76
End: 2021-12-17

## 2021-12-17 VITALS
WEIGHT: 198 LBS | HEART RATE: 64 BPM | SYSTOLIC BLOOD PRESSURE: 130 MMHG | OXYGEN SATURATION: 98 % | BODY MASS INDEX: 26.24 KG/M2 | DIASTOLIC BLOOD PRESSURE: 75 MMHG | HEIGHT: 73 IN

## 2021-12-17 VITALS
DIASTOLIC BLOOD PRESSURE: 70 MMHG | HEART RATE: 74 BPM | HEIGHT: 73 IN | OXYGEN SATURATION: 100 % | WEIGHT: 198.4 LBS | BODY MASS INDEX: 26.29 KG/M2 | SYSTOLIC BLOOD PRESSURE: 156 MMHG

## 2021-12-17 DIAGNOSIS — E11.8 TYPE 2 DIABETES MELLITUS WITH COMPLICATION, WITH LONG-TERM CURRENT USE OF INSULIN (HCC): Primary | ICD-10-CM

## 2021-12-17 DIAGNOSIS — I10 ESSENTIAL HYPERTENSION: ICD-10-CM

## 2021-12-17 DIAGNOSIS — E78.2 MIXED HYPERLIPIDEMIA: ICD-10-CM

## 2021-12-17 DIAGNOSIS — Z79.4 TYPE 2 DIABETES MELLITUS WITH HYPERGLYCEMIA, WITH LONG-TERM CURRENT USE OF INSULIN (HCC): ICD-10-CM

## 2021-12-17 DIAGNOSIS — M51.36 DDD (DEGENERATIVE DISC DISEASE), LUMBAR: ICD-10-CM

## 2021-12-17 DIAGNOSIS — E11.65 TYPE 2 DIABETES MELLITUS WITH HYPERGLYCEMIA, WITH LONG-TERM CURRENT USE OF INSULIN (HCC): ICD-10-CM

## 2021-12-17 DIAGNOSIS — G62.9 NEUROPATHY: ICD-10-CM

## 2021-12-17 DIAGNOSIS — Z79.4 TYPE 2 DIABETES MELLITUS WITH COMPLICATION, WITH LONG-TERM CURRENT USE OF INSULIN (HCC): Primary | ICD-10-CM

## 2021-12-17 DIAGNOSIS — Z00.00 MEDICARE ANNUAL WELLNESS VISIT, SUBSEQUENT: Primary | ICD-10-CM

## 2021-12-17 DIAGNOSIS — E55.9 VITAMIN D DEFICIENCY: ICD-10-CM

## 2021-12-17 LAB — GLUCOSE BLDC GLUCOMTR-MCNC: 223 MG/DL (ref 70–130)

## 2021-12-17 PROCEDURE — 1159F MED LIST DOCD IN RCRD: CPT | Performed by: GENERAL PRACTICE

## 2021-12-17 PROCEDURE — 1170F FXNL STATUS ASSESSED: CPT | Performed by: GENERAL PRACTICE

## 2021-12-17 PROCEDURE — G0439 PPPS, SUBSEQ VISIT: HCPCS | Performed by: GENERAL PRACTICE

## 2021-12-17 PROCEDURE — 99214 OFFICE O/P EST MOD 30 MIN: CPT | Performed by: NURSE PRACTITIONER

## 2021-12-17 PROCEDURE — 1125F AMNT PAIN NOTED PAIN PRSNT: CPT | Performed by: GENERAL PRACTICE

## 2021-12-17 PROCEDURE — 82962 GLUCOSE BLOOD TEST: CPT | Performed by: NURSE PRACTITIONER

## 2021-12-17 RX ORDER — GABAPENTIN 300 MG/1
300 CAPSULE ORAL 2 TIMES DAILY
Qty: 180 CAPSULE | Refills: 1 | Status: SHIPPED | OUTPATIENT
Start: 2021-12-17 | End: 2022-12-20 | Stop reason: SDUPTHER

## 2021-12-17 NOTE — PROGRESS NOTES
The ABCs of the Annual Wellness Visit  Subsequent Medicare Wellness Visit    Chief Complaint   Patient presents with   • Medicare Wellness-subsequent   • Hypertension   • Back Pain      Subjective    History of Present Illness:  Zachary Galindo is a 75 y.o. male who presents for a Subsequent Medicare Wellness Visit.    The following portions of the patient's history were reviewed and   updated as appropriate: allergies, current medications, past family history, past medical history, past social history, past surgical history and problem list.    Compared to one year ago, the patient feels his physical   health is the same.    Compared to one year ago, the patient feels his mental   health is the same.    Recent Hospitalizations:  He was admitted to the [facility] during the last year.       Current Medical Providers:  Patient Care Team:  Halley Raymond MD as PCP - General  Aime Stiles MD as Consulting Physician (Cardiology)  Vicente Hsu MD as Consulting Physician (Nephrology)  Khadar Ruiz MD (Inactive) as Consulting Physician (Cardiology)  Brodie Patterson DMD as Consulting Physician (Dental General Practice)    Outpatient Medications Prior to Visit   Medication Sig Dispense Refill   • alfuzosin (UROXATRAL) 10 MG 24 hr tablet Take 10 mg by mouth Daily.     • amLODIPine (NORVASC) 5 MG tablet TAKE 1 TABLET EVERY DAY (Patient taking differently: 10 mg.) 90 tablet 0   • aspirin 81 MG tablet Take 1 tablet by mouth Daily. 30 tablet 11   • atorvastatin (Lipitor) 20 MG tablet Take 1 tablet by mouth Daily. 90 tablet 3   • carvedilol (COREG) 25 MG tablet TAKE 1 TABLET BY MOUTH TWICE DAILY WITH MEALS 180 tablet 2   • clopidogrel (PLAVIX) 75 MG tablet TAKE 1 TABLET BY MOUTH EVERY DAY 90 tablet 1   • Continuous Blood Gluc Sensor (FreeStyle Kimi 14 Day Sensor) misc 1 each Every 14 (Fourteen) Days. 2 each 11   • ezetimibe (ZETIA) 10 MG tablet TAKE 1 TABLET EVERY DAY 90 tablet 0   • glucose blood  "test strip OneTouch Ultra Test strips  -  Test sugars 3-4 times a day as directed by provider.     • insulin aspart (NovoLOG FlexPen) 100 UNIT/ML solution pen-injector sc pen 12 up to 20 units with meals 5 pen 11   • Insulin Glargine, 2 Unit Dial, (Toujeo Max SoloStar) 300 UNIT/ML solution pen-injector injection Inject 60 Units under the skin into the appropriate area as directed Daily. (Patient taking differently: Inject 60 Units under the skin into the appropriate area as directed Daily. 46 un daily) 5 pen 11   • Insulin Pen Needle (Pen Needles 3/16\") 31G X 5 MM misc 100 each Daily. 100 each 3   • isosorbide mononitrate (IMDUR) 30 MG 24 hr tablet TAKE 1 TABLET BY MOUTH EVERY MORNING 90 tablet 0   • Lancets (ONETOUCH ULTRASOFT) lancets Test sugars 3-4 times daily as instructed by physician.     • losartan (COZAAR) 50 MG tablet Take 50 mg by mouth Daily.     • magnesium oxide (MAG-OX) 400 MG tablet Take 1 tablet by mouth 2 (Two) Times a Day. (Patient taking differently: Take 400 mg by mouth Daily.) 180 tablet 3   • nitroglycerin (Nitrostat) 0.4 MG SL tablet Place 1 tablet under the tongue Every 5 (Five) Minutes As Needed for Chest Pain. Max 3 doses. Go to ER if no relief 90 tablet 3   • probenecid (BENEMID) 500 MG tablet TAKE 1 TABLET BY MOUTH TWICE DAILY 180 tablet 1   • gabapentin (NEURONTIN) 300 MG capsule TAKE 1 CAPSULE EVERY MORNING AND TAKE 2 CAPSULES AT BEDTIME     • ranolazine (RANEXA) 1000 MG 12 hr tablet TAKE 1 TABLET BY MOUTH TWICE DAILY 180 tablet 0     No facility-administered medications prior to visit.       No opioid medication identified on active medication list. I have reviewed chart for other potential  high risk medication/s and harmful drug interactions in the elderly.          Aspirin is on active medication list. Aspirin use is indicated based on review of current medical condition/s. Pros and cons of this therapy have been discussed today. Benefits of this medication outweigh potential harm. " " Patient has been encouraged to continue taking this medication.  .      Patient Active Problem List   Diagnosis   • Essential hypertension   • Gout   • Hyperlipidemia   • Type 2 diabetes mellitus with complication, with long-term current use of insulin ([facility])   • Coronary artery disease involving coronary bypass graft of native heart without angina pectoris   • Pure hypercholesterolemia   • Stage 3b chronic kidney disease ([facility])   • Right knee pain   • Primary osteoarthritis of right knee   • Precordial pain   • Coronary arteriosclerosis   • Type 2 diabetes mellitus with hyperglycemia, with long-term current use of insulin ([facility])   • Otalgia   • S/P CABG (coronary artery bypass graft)   • Chronic midline thoracic back pain   • Neuropathy   • NSTEMI, initial episode of care ([facility])     Advance Care Planning  Advance Directive is on file.  ACP discussion was held with the patient during this visit. Patient has an advance directive in EMR which is still valid.           Objective    Vitals:    12/17/21 1321   BP: 130/75   BP Location: Left arm   Pulse: 64   SpO2: 98%   Weight: 89.8 kg (198 lb)   Height: 185.4 cm (73\")   PainSc:   5   PainLoc: Back     BMI Readings from Last 1 Encounters:   12/17/21 26.12 kg/m²   BMI is above normal parameters. Recommendations include: exercise counseling and nutrition counseling    Does the patient have evidence of cognitive impairment? No    Physical Exam  Vitals and nursing note reviewed.   Constitutional:       General: He is not in acute distress.     Appearance: He is well-developed.   HENT:      Head: Normocephalic.      Nose: Nose normal.   Eyes:      General:         Right eye: No discharge.         Left eye: No discharge.      Conjunctiva/sclera: Conjunctivae normal.      Pupils: Pupils are equal, round, and reactive to light.   Neck:      Thyroid: No thyromegaly.   Cardiovascular:      Rate and Rhythm: Normal rate and regular rhythm.      Heart sounds: Normal heart sounds. No " murmur heard.      Pulmonary:      Effort: Pulmonary effort is normal.      Breath sounds: Normal breath sounds.   Lymphadenopathy:      Cervical: No cervical adenopathy.   Skin:     General: Skin is warm and dry.   Neurological:      Mental Status: He is alert and oriented to person, place, and time.       Lab Results   Component Value Date    TRIG 225 (H) 10/28/2021    HDL 26 (L) 10/28/2021    LDL 60 10/28/2021    VLDL 37 10/28/2021    HGBA1C 9.29 (H) 10/28/2021            HEALTH RISK ASSESSMENT    Smoking Status:  Social History     Tobacco Use   Smoking Status Former Smoker   Smokeless Tobacco Never Used   Tobacco Comment    Quit 1979     Alcohol Consumption:  Social History     Substance and Sexual Activity   Alcohol Use No     Fall Risk Screen:    [facility], and patient is at LOW risk for falls.Assessment completed on:12/17/2021    Depression Screening:  PHQ-2/PHQ-9 Depression Screening 12/17/2021   Little interest or pleasure in doing things 0   Feeling down, depressed, or hopeless 0   Trouble falling or staying asleep, or sleeping too much 0   Feeling tired or having little energy 0   Poor appetite or overeating 0   Feeling bad about yourself - or that you are a failure or have let yourself or your family down 0   Trouble concentrating on things, such as reading the newspaper or watching television 0   Moving or speaking so slowly that other people could have noticed. Or the opposite - being so fidgety or restless that you have been moving around a lot more than usual 0   Thoughts that you would be better off dead, or of hurting yourself in some way 0   Total Score 0   If you checked off any problems, how difficult have these problems made it for you to do your work, take care of things at home, or get along with other people? -       Health Habits and Functional and Cognitive Screening:  Functional & Cognitive Status 12/17/2021   Do you have difficulty preparing food and  eating? No   Do you have difficulty bathing yourself, getting dressed or grooming yourself? No   Do you have difficulty using the toilet? No   Do you have difficulty moving around from place to place? No   Do you have trouble with steps or getting out of a bed or a chair? No   Current Diet Well Balanced Diet   Dental Exam Up to date   Eye Exam Up to date   Exercise (times per week) 2 times per week   Current Exercises Include -   Current Exercise Activities Include -   Do you need help using the phone?  No   Are you deaf or do you have serious difficulty hearing?  No   Do you need help with transportation? No   Do you need help shopping? No   Do you need help preparing meals?  No   Do you need help with housework?  No   Do you need help with laundry? No   Do you need help taking your medications? No   Do you need help managing money? No   Do you ever drive or ride in a car without wearing a seat belt? No   Have you felt unusual stress, anger or loneliness in the last month? No   Who do you live with? Spouse   If you need help, do you have trouble finding someone available to you? No   Have you been bothered in the last four weeks by sexual problems? No   Do you have difficulty concentrating, remembering or making decisions? No       Age-appropriate Screening Schedule:  Refer to the list below for future screening recommendations based on patient's age, sex and/or medical conditions. Orders for these recommended tests are listed in the plan section. The patient has been provided with a written plan.    Health Maintenance   Topic Date Due   • HEMOGLOBIN A1C  04/28/2022   • DIABETIC FOOT EXAM  06/10/2022   • URINE MICROALBUMIN  08/11/2022   • DIABETIC EYE EXAM  08/20/2022   • LIPID PANEL  10/28/2022   • TDAP/TD VACCINES (2 - Td or Tdap) 07/03/2027   • INFLUENZA VACCINE  Completed   • ZOSTER VACCINE  Addressed              Assessment/Plan   CMS Preventative Services Quick Reference  Risk Factors Identified During  Encounter  Immunizations Discussed/Encouraged (specific Immunizations; COVID19  Obesity/Overweight   The above risks/problems have been discussed with the patient.  Follow up actions/plans if indicated are seen below in the Assessment/Plan Section.  Pertinent information has been shared with the patient in the After Visit Summary.    Diagnoses and all orders for this visit:    1. Medicare annual wellness visit, subsequent (Primary)    2. Neuropathy  -     gabapentin (NEURONTIN) 300 MG capsule; Take 1 capsule by mouth 2 (Two) Times a Day.  Dispense: 180 capsule; Refill: 1    3. DDD (degenerative disc disease), lumbar  -     gabapentin (NEURONTIN) 300 MG capsule; Take 1 capsule by mouth 2 (Two) Times a Day.  Dispense: 180 capsule; Refill: 1        Follow Up:   Return in about 6 months (around 6/17/2022) for Annual physical. is having more back pain. Had stopped his gabapentin and got worse. Will restart. Shaq reviewed and appropriate.      Information has been scanned into chart. Discussed importance of taking medications as prescribed. Encouraged healthy eating habits with low fat, low salt choices and working towards maintaining a healthy weight. Recommended regular exercise if able as well as care to prevent falls including avoiding anything on the floor that they could slip or trip on such as throw rugs, making sure they have a bathmat to step onto when their feet are wet and having grab bars and railings where needed.    An After Visit Summary and PPPS were made available to the patient.

## 2021-12-17 NOTE — PROGRESS NOTES
"Chief Complaint  Diabetes    Subjective          Zachary Galindo presents to [facility]  History of Present Illness         In office visit    75-year-old male presents for follow-up    Reason diabetes mellitus type 2    Diagnosed at age 37    Timing constant    Quality not controlled      Lab Results   Component Value Date    HGBA1C 9.29 (H) 10/28/2021         Severity is high    Macrovascular complications CAD    Microvascular complications neuropathy    Side effects Trulicity --cramping, metformin diarrhea     Current diabetes regimen     Insulin     Current diabetes monitoring     Checks 4 times daily       Kimi --- could not download     States running high     In pain he states                           Hypoglycemia nocturnal and if skipped meals               Review of Systems - General ROS: positive for  - back pain               Objective   Vital Signs:   /70   Pulse 74   Ht 185.4 cm (73\")   Wt 90 kg (198 lb 6.4 oz)   SpO2 100%   BMI 26.18 kg/m²     Physical Exam  Constitutional:       Appearance: Normal appearance.   Cardiovascular:      Rate and Rhythm: Regular rhythm.      Heart sounds: Normal heart sounds.   Pulmonary:      Breath sounds: Normal breath sounds.   Musculoskeletal:      Cervical back: Normal range of motion.   Neurological:      Mental Status: He is alert.        Result Review :   The following data was reviewed by: ROCCO Ratliff on 12/17/2021:  Common labs    Common Labsle 5/14/21 5/14/21 5/14/21 8/11/21 8/11/21 8/11/21 8/11/21 8/11/21 10/28/21 10/28/21 10/28/21 10/28/21    0448 [id number] 0448 0908 0918 0918 0918 0918 0843 [id number] [id number] [id number]   Glucose   110 (A)     154 (A)    234 (A)   BUN   31 (A)     30 (A)    33 (A)   Creatinine   1.72 (A)     2.39 (A)    1.79 (A)   eGFR Non  Am   39 (A)     27 (A)    37 (A)   Sodium   137     143    139   Potassium   4.1     5.5 (A)    4.2   Chloride   106     106    104 "   Calcium   8.5 (A)     9.3    9.6   Albumin        4.10    4.10   Total Bilirubin        0.4    0.4   Alkaline Phosphatase        84    78   AST (SGOT)        19    18   ALT (SGPT)        18    19   WBC     4.58    5.34      Hemoglobin     12.9 (A)    14.7      Hematocrit     39.5    43.6      Platelets     166    133 (A)      Total Cholesterol  137     111    123    Triglycerides  203 (A)     134    225 (A)    HDL Cholesterol  26 (A)     30 (A)    26 (A)    LDL Cholesterol   77     57    60    Hemoglobin A1C 7.90 (A)     7.30 (A)    9.29 (A)     Microalbumin, Urine    93.8           (A) Abnormal value                      Assessment and Plan    Diagnoses and all orders for this visit:    1. Type 2 diabetes mellitus with complication, with long-term current use of insulin (HCC) (Primary)  -     POC Glucose Fingerstick    2. Type 2 diabetes mellitus with hyperglycemia, with long-term current use of insulin (HCC)  -     CBC & Differential; Future  -     Comprehensive Metabolic Panel; Future  -     Hemoglobin A1c; Future  -     Lipid Panel; Future  -     Microalbumin / Creatinine Urine Ratio - Urine, Clean Catch; Future  -     Protein / Creatinine Ratio, Urine - Urine, Clean Catch; Future  -     TSH; Future  -     Vitamin B12; Future  -     Vitamin D 25 Hydroxy; Future    3. Mixed hyperlipidemia  -     CBC & Differential; Future  -     Comprehensive Metabolic Panel; Future  -     Hemoglobin A1c; Future  -     Lipid Panel; Future  -     Microalbumin / Creatinine Urine Ratio - Urine, Clean Catch; Future  -     Protein / Creatinine Ratio, Urine - Urine, Clean Catch; Future  -     TSH; Future  -     Vitamin B12; Future  -     Vitamin D 25 Hydroxy; Future    4. Essential hypertension  -     CBC & Differential; Future  -     Comprehensive Metabolic Panel; Future  -     Hemoglobin A1c; Future  -     Lipid Panel; Future  -     Microalbumin / Creatinine Urine Ratio - Urine, Clean Catch; Future  -     Protein / Creatinine  Ratio, Urine - Urine, Clean Catch; Future  -     TSH; Future  -     Vitamin B12; Future  -     Vitamin D 25 Hydroxy; Future    5. Vitamin D deficiency  -     CBC & Differential; Future  -     Comprehensive Metabolic Panel; Future  -     Hemoglobin A1c; Future  -     Lipid Panel; Future  -     Microalbumin / Creatinine Urine Ratio - Urine, Clean Catch; Future  -     Protein / Creatinine Ratio, Urine - Urine, Clean Catch; Future  -     TSH; Future  -     Vitamin B12; Future  -     Vitamin D 25 Hydroxy; Future             Diabetes complicated by stage III ckd and CAD         Lab Results   Component Value Date    HGBA1C 9.29 (H) 10/28/2021           kimi freestyle personal-- using but forgot reader               Discontinued Medications      Trulicity 0.75 mg weekly- stopped - cramping    invokana is expensive , jardiance , side effects     Present regimen          Tresiba  taking  46 units - increase to 50 untis            Morning range 130-180               Taking novolog 10 up to 12 units TID before each meal ---increase up to 20 units            Self adjusting explained         He will keep current doses since blood sugar levels are now back at goal he is instructed to call if having hyper or hypoglycemia           Kimi  has allowed the patient to minimize hypoglycemia as alarms have predicted and avoided them     She also uses the arrows on the kimi  as follows:     If arrow is horizontal , she uses the predicted bolus     If the arrow is slanted up or down-- she increase or decrease by 4  units     If the arrow is vertical up or down - she increases or decreases by 4 unit s         Metformin 500 mg , 2 with supper ,intially  GI upset but now resolved     Metformin XR 500 mg one with supper  ---stopped due to side effect        Microvascular complications            Last eye exam -- July 2021 , following with          Follows with       positive proteinuria         Neuropathy  none            Hyperlipidemia         taking lipitor 10 mg , 5 days per week     Taking zetia 10 mg daily            Total Cholesterol   Date Value Ref Range Status   10/28/2021 123 0 - 200 mg/dL Final     Triglycerides   Date Value Ref Range Status   10/28/2021 225 (H) 0 - 150 mg/dL Final     HDL Cholesterol   Date Value Ref Range Status   10/28/2021 26 (L) 40 - 60 mg/dL Final     LDL Cholesterol    Date Value Ref Range Status   10/28/2021 60 0 - 100 mg/dL Final           Hypertension     Taking norvasc 5 mg one daily      Taking losartan 50 mg daily             Bone health     Vitamin D deficiency     Taking MVI daily                   Follow Up   No follow-ups on file.  Patient was given instructions and counseling regarding his condition or for health maintenance advice. Please see specific information pulled into the AVS if appropriate.         This document has been electronically signed by ROCCO Ratliff on December 17, 2021 08:42 CST.

## 2021-12-17 NOTE — PATIENT INSTRUCTIONS
Medicare Wellness  Personal Prevention Plan of Service     Date of Office Visit:  2021  Encounter Provider:  Halley Raymond MD  Place of Service:  [facility] - [place]  Patient Name: Zachary Galindo  :  1945    As part of the Medicare Wellness portion of your visit today, we are providing you with this personalized preventive plan of services (PPPS). This plan is based upon recommendations of the [place] Preventive Services Task Force (USPSTF) and the Advisory Committee on Immunization Practices (ACIP).    This lists the preventive care services that should be considered, and provides dates of when you are due. Items listed as completed are up-to-date and do not require any further intervention.    Health Maintenance   Topic Date Due   • COVID-19 Vaccine (3 - Booster for Pfizer series) 2021   • ANNUAL WELLNESS VISIT  12/10/2021   • HEMOGLOBIN A1C  2022   • DIABETIC FOOT EXAM  06/10/2022   • URINE MICROALBUMIN  2022   • DIABETIC EYE EXAM  2022   • LIPID PANEL  10/28/2022   • COLORECTAL CANCER SCREENING  2023   • TDAP/TD VACCINES (2 - Td or Tdap) 2027   • HEPATITIS C SCREENING  Completed   • INFLUENZA VACCINE  Completed   • Pneumococcal Vaccine 65+  Completed   • AAA SCREEN (ONE-TIME)  Completed   • ZOSTER VACCINE  Addressed       No orders of the defined types were placed in this encounter.      No follow-ups on file.          Calorie Counting for Weight Loss  Calories are units of energy. Your body needs a certain number of calories from food to keep going throughout the day. When you eat or drink more calories than your body needs, your body stores the extra calories mostly as fat. When you eat or drink fewer calories than your body needs, your body burns fat to get the energy it needs.  Calorie counting means keeping track of how many calories you eat and drink each day. Calorie counting can be  helpful if you need to lose weight. If you eat fewer calories than your body needs, you should lose weight. Ask your health care provider what a healthy weight is for you.  For calorie counting to work, you will need to eat the right number of calories each day to lose a healthy amount of weight per week. A dietitian can help you figure out how many calories you need in a day and will suggest ways to reach your calorie goal.  · A healthy amount of weight to lose each week is usually 1-2 lb (0.5-0.9 kg). This usually means that your daily calorie intake should be reduced by 500-750 calories.  · Eating 1,200-1,500 calories a day can help most women lose weight.  · Eating 1,500-1,800 calories a day can help most men lose weight.  What do I need to know about calorie counting?  Work with your health care provider or dietitian to determine how many calories you should get each day. To meet your daily calorie goal, you will need to:  · Find out how many calories are in each food that you would like to eat. Try to do this before you eat.  · Decide how much of the food you plan to eat.  · Keep a food log. Do this by writing down what you ate and how many calories it had.  To successfully lose weight, it is important to balance calorie counting with a healthy lifestyle that includes regular activity.  Where do I find calorie information?    The number of calories in a food can be found on a Nutrition Facts label. If a food does not have a Nutrition Facts label, try to look up the calories online or ask your dietitian for help.  Remember that calories are listed per serving. If you choose to have more than one serving of a food, you will have to multiply the calories per serving by the number of servings you plan to eat. For example, the label on a package of bread might say that a serving size is 1 slice and that there are 90 calories in a serving. If you eat 1 slice, you will have eaten 90 calories. If you eat 2 slices, you  will have eaten 180 calories.  How do I keep a food log?  After each time that you eat, record the following in your food log as soon as possible:  · What you ate. Be sure to include toppings, sauces, and other extras on the food.  · How much you ate. This can be measured in cups, ounces, or number of items.  · How many calories were in each food and drink.  · The total number of calories in the food you ate.  Keep your food log near you, such as in a pocket-sized notebook or on an emmanuel or website on your mobile phone. Some programs will calculate calories for you and show you how many calories you have left to meet your daily goal.  What are some portion-control tips?  · Know how many calories are in a serving. This will help you know how many servings you can have of a certain food.  · Use a measuring cup to measure serving sizes. You could also try weighing out portions on a kitchen scale. With time, you will be able to estimate serving sizes for some foods.  · Take time to put servings of different foods on your favorite plates or in your favorite bowls and cups so you know what a serving looks like.  · Try not to eat straight from a food's packaging, such as from a bag or box. Eating straight from the package makes it hard to see how much you are eating and can lead to overeating. Put the amount you would like to eat in a cup or on a plate to make sure you are eating the right portion.  · Use smaller plates, glasses, and bowls for smaller portions and to prevent overeating.  · Try not to multitask. For example, avoid watching TV or using your computer while eating. If it is time to eat, sit down at a table and enjoy your food. This will help you recognize when you are full. It will also help you be more mindful of what and how much you are eating.  What are tips for following this plan?  Reading food labels  · Check the calorie count compared with the serving size. The serving size may be smaller than what you  are used to eating.  · Check the source of the calories. Try to choose foods that are high in protein, fiber, and vitamins, and low in saturated fat, trans fat, and sodium.  Shopping  · Read nutrition labels while you shop. This will help you make healthy decisions about which foods to buy.  · Pay attention to nutrition labels for low-fat or fat-free foods. These foods sometimes have the same number of calories or more calories than the full-fat versions. They also often have added sugar, starch, or salt to make up for flavor that was removed with the fat.  · Make a grocery list of lower-calorie foods and stick to it.  Cooking  · Try to cook your favorite foods in a healthier way. For example, try baking instead of frying.  · Use low-fat dairy products.  Meal planning  · Use more fruits and vegetables. One-half of your plate should be fruits and vegetables.  · Include lean proteins, such as chicken, turkey, and fish.  Lifestyle  Each week, aim to do one of the following:  · 150 minutes of moderate exercise, such as walking.  · 75 minutes of vigorous exercise, such as running.  General information  · Know how many calories are in the foods you eat most often. This will help you calculate calorie counts faster.  · Find a way of tracking calories that works for you. Get creative. Try different apps or programs if writing down calories does not work for you.  What foods should I eat?    · Eat nutritious foods. It is better to have a nutritious, high-calorie food, such as an avocado, than a food with few nutrients, such as a bag of potato chips.  · Use your calories on foods and drinks that will fill you up and will not leave you hungry soon after eating.  ? Examples of foods that fill you up are nuts and nut butters, vegetables, lean proteins, and high-fiber foods such as whole grains. High-fiber foods are foods with more than 5 g of fiber per serving.  · Pay attention to calories in drinks. Low-calorie drinks include  water and unsweetened drinks.  The items listed above may not be a complete list of foods and beverages you can eat. Contact a dietitian for more information.  What foods should I limit?  Limit foods or drinks that are not good sources of vitamins, minerals, or protein or that are high in unhealthy fats. These include:  · Candy.  · Other sweets.  · Sodas, specialty coffee drinks, alcohol, and juice.  The items listed above may not be a complete list of foods and beverages you should avoid. Contact a dietitian for more information.  How do I count calories when eating out?  · Pay attention to portions. Often, portions are much larger when eating out. Try these tips to keep portions smaller:  ? Consider sharing a meal instead of getting your own.  ? If you get your own meal, eat only half of it. Before you start eating, ask for a container and put half of your meal into it.  ? When available, consider ordering smaller portions from the menu instead of full portions.  · Pay attention to your food and drink choices. Knowing the way food is cooked and what is included with the meal can help you eat fewer calories.  ? If calories are listed on the menu, choose the lower-calorie options.  ? Choose dishes that include vegetables, fruits, whole grains, low-fat dairy products, and lean proteins.  ? Choose items that are boiled, broiled, grilled, or steamed. Avoid items that are buttered, battered, fried, or served with cream sauce. Items labeled as crispy are usually fried, unless stated otherwise.  ? Choose water, low-fat milk, unsweetened iced tea, or other drinks without added sugar. If you want an alcoholic beverage, choose a lower-calorie option, such as a glass of wine or light beer.  ? Ask for dressings, sauces, and syrups on the side. These are usually high in calories, so you should limit the amount you eat.  ? If you want a salad, choose a garden salad and ask for grilled meats. Avoid extra toppings such as weston,  cheese, or fried items. Ask for the dressing on the side, or ask for olive oil and vinegar or lemon to use as dressing.  · Estimate how many servings of a food you are given. Knowing serving sizes will help you be aware of how much food you are eating at restaurants.  Where to find more information  · Centers for Disease Control and Prevention: www.cdc.gov  · [place] Department of Agriculture: myplate.gov  Summary  · Calorie counting means keeping track of how many calories you eat and drink each day. If you eat fewer calories than your body needs, you should lose weight.  · A healthy amount of weight to lose per week is usually 1-2 lb (0.5-0.9 kg). This usually means reducing your daily calorie intake by 500-750 calories.  · The number of calories in a food can be found on a Nutrition Facts label. If a food does not have a Nutrition Facts label, try to look up the calories online or ask your dietitian for help.  · Use smaller plates, glasses, and bowls for smaller portions and to prevent overeating.  · Use your calories on foods and drinks that will fill you up and not leave you hungry shortly after a meal.  This information is not intended to replace advice given to you by your health care provider. Make sure you discuss any questions you have with your health care provider.  Document Revised: 01/28/2021 Document Reviewed: 01/28/2021  Elsevier Patient Education © 2021 [employer]

## 2022-01-03 RX ORDER — FLASH GLUCOSE SENSOR
KIT MISCELLANEOUS
Qty: 2 EACH | Refills: 2 | Status: SHIPPED | OUTPATIENT
Start: 2022-01-03 | End: 2022-03-21

## 2022-01-13 PROCEDURE — 87635 SARS-COV-2 COVID-19 AMP PRB: CPT | Performed by: NURSE PRACTITIONER

## 2022-01-14 ENCOUNTER — TELEPHONE (OUTPATIENT)
Dept: FAMILY MEDICINE CLINIC | Facility: CLINIC | Age: 77
End: 2022-01-14

## 2022-01-14 NOTE — TELEPHONE ENCOUNTER
Spoke with patient's wife.  Advised that he and mother cases of COVID steroids are not recommended.  He also is not having any wheezing and therefore an inhaler is probably not going to be helpful.  Continue with symptomatic treatment and if he is not better in 3 to 4 days then recheck with me.  Advised that if symptoms are worsening instead that he should be seen in the emergency room.

## 2022-01-14 NOTE — TELEPHONE ENCOUNTER
Patient tested positive for COVID19 1/13/2022, Patients wife wants to know if a steroid pack and an inhaler can be called in. Patient is coughing and having a runny nose.     [facility]     Thanks

## 2022-02-21 DIAGNOSIS — I25.709 CORONARY ARTERY DISEASE INVOLVING CORONARY BYPASS GRAFT OF NATIVE HEART WITH ANGINA PECTORIS: ICD-10-CM

## 2022-02-21 DIAGNOSIS — E78.2 MIXED HYPERLIPIDEMIA: Chronic | ICD-10-CM

## 2022-02-21 DIAGNOSIS — I25.810 CORONARY ARTERY DISEASE INVOLVING CORONARY BYPASS GRAFT OF NATIVE HEART WITHOUT ANGINA PECTORIS: ICD-10-CM

## 2022-02-21 RX ORDER — ISOSORBIDE MONONITRATE 30 MG/1
30 TABLET, EXTENDED RELEASE ORAL EVERY MORNING
Qty: 90 TABLET | Refills: 0 | Status: SHIPPED | OUTPATIENT
Start: 2022-02-21 | End: 2022-05-23

## 2022-02-21 RX ORDER — ATORVASTATIN CALCIUM 20 MG/1
TABLET, FILM COATED ORAL
Qty: 90 TABLET | Refills: 2 | Status: SHIPPED | OUTPATIENT
Start: 2022-02-21 | End: 2022-03-25 | Stop reason: SDUPTHER

## 2022-02-21 RX ORDER — PROBENECID 500 MG/1
TABLET, FILM COATED ORAL
Qty: 180 TABLET | Refills: 0 | Status: SHIPPED | OUTPATIENT
Start: 2022-02-21 | End: 2022-05-02

## 2022-02-21 RX ORDER — EZETIMIBE 10 MG/1
TABLET ORAL
Qty: 90 TABLET | Refills: 0 | Status: SHIPPED | OUTPATIENT
Start: 2022-02-21 | End: 2022-05-23

## 2022-02-21 RX ORDER — CLOPIDOGREL BISULFATE 75 MG/1
TABLET ORAL
Qty: 90 TABLET | Refills: 0 | Status: SHIPPED | OUTPATIENT
Start: 2022-02-21 | End: 2022-05-23

## 2022-02-25 RX ORDER — INSULIN ASPART 100 [IU]/ML
INJECTION, SOLUTION INTRAVENOUS; SUBCUTANEOUS
Qty: 15 ML | Refills: 10 | Status: SHIPPED | OUTPATIENT
Start: 2022-02-25 | End: 2023-03-20

## 2022-03-11 ENCOUNTER — LAB (OUTPATIENT)
Dept: LAB | Facility: HOSPITAL | Age: 77
End: 2022-03-11

## 2022-03-11 DIAGNOSIS — Z79.4 TYPE 2 DIABETES MELLITUS WITH HYPERGLYCEMIA, WITH LONG-TERM CURRENT USE OF INSULIN: ICD-10-CM

## 2022-03-11 DIAGNOSIS — I10 ESSENTIAL HYPERTENSION: ICD-10-CM

## 2022-03-11 DIAGNOSIS — E78.2 MIXED HYPERLIPIDEMIA: ICD-10-CM

## 2022-03-11 DIAGNOSIS — E11.65 TYPE 2 DIABETES MELLITUS WITH HYPERGLYCEMIA, WITH LONG-TERM CURRENT USE OF INSULIN: ICD-10-CM

## 2022-03-11 DIAGNOSIS — E55.9 VITAMIN D DEFICIENCY: ICD-10-CM

## 2022-03-11 LAB
ALBUMIN SERPL-MCNC: 4.1 G/DL (ref 3.5–5.2)
ALBUMIN/GLOB SERPL: 1.3 G/DL
ALP SERPL-CCNC: 86 U/L (ref 39–117)
ALT SERPL W P-5'-P-CCNC: 23 U/L (ref 1–41)
ANION GAP SERPL CALCULATED.3IONS-SCNC: 13 MMOL/L (ref 5–15)
AST SERPL-CCNC: 19 U/L (ref 1–40)
BASOPHILS # BLD AUTO: 0.04 10*3/MM3 (ref 0–0.2)
BASOPHILS NFR BLD AUTO: 0.9 % (ref 0–1.5)
BILIRUB SERPL-MCNC: 0.4 MG/DL (ref 0–1.2)
BUN SERPL-MCNC: 33 MG/DL (ref 8–23)
BUN/CREAT SERPL: 16.4 (ref 7–25)
CALCIUM SPEC-SCNC: 9.3 MG/DL (ref 8.6–10.5)
CHLORIDE SERPL-SCNC: 105 MMOL/L (ref 98–107)
CO2 SERPL-SCNC: 23 MMOL/L (ref 22–29)
CREAT SERPL-MCNC: 2.01 MG/DL (ref 0.76–1.27)
DEPRECATED RDW RBC AUTO: 43.7 FL (ref 37–54)
EGFRCR SERPLBLD CKD-EPI 2021: 33.7 ML/MIN/1.73
EOSINOPHIL # BLD AUTO: 0.19 10*3/MM3 (ref 0–0.4)
EOSINOPHIL NFR BLD AUTO: 4.1 % (ref 0.3–6.2)
ERYTHROCYTE [DISTWIDTH] IN BLOOD BY AUTOMATED COUNT: 13.7 % (ref 12.3–15.4)
GLOBULIN UR ELPH-MCNC: 3.1 GM/DL
GLUCOSE SERPL-MCNC: 124 MG/DL (ref 65–99)
HCT VFR BLD AUTO: 42.8 % (ref 37.5–51)
HGB BLD-MCNC: 14.5 G/DL (ref 13–17.7)
IMM GRANULOCYTES # BLD AUTO: 0.01 10*3/MM3 (ref 0–0.05)
IMM GRANULOCYTES NFR BLD AUTO: 0.2 % (ref 0–0.5)
LYMPHOCYTES # BLD AUTO: 1.02 10*3/MM3 (ref 0.7–3.1)
LYMPHOCYTES NFR BLD AUTO: 21.7 % (ref 19.6–45.3)
MCH RBC QN AUTO: 29.7 PG (ref 26.6–33)
MCHC RBC AUTO-ENTMCNC: 33.9 G/DL (ref 31.5–35.7)
MCV RBC AUTO: 87.7 FL (ref 79–97)
MONOCYTES # BLD AUTO: 0.38 10*3/MM3 (ref 0.1–0.9)
MONOCYTES NFR BLD AUTO: 8.1 % (ref 5–12)
NEUTROPHILS NFR BLD AUTO: 3.05 10*3/MM3 (ref 1.7–7)
NEUTROPHILS NFR BLD AUTO: 65 % (ref 42.7–76)
NRBC BLD AUTO-RTO: 0 /100 WBC (ref 0–0.2)
PLATELET # BLD AUTO: 134 10*3/MM3 (ref 140–450)
PMV BLD AUTO: 11.8 FL (ref 6–12)
POTASSIUM SERPL-SCNC: 4.5 MMOL/L (ref 3.5–5.2)
PROT SERPL-MCNC: 7.2 G/DL (ref 6–8.5)
RBC # BLD AUTO: 4.88 10*6/MM3 (ref 4.14–5.8)
SODIUM SERPL-SCNC: 141 MMOL/L (ref 136–145)
WBC NRBC COR # BLD: 4.69 10*3/MM3 (ref 3.4–10.8)

## 2022-03-11 PROCEDURE — 84443 ASSAY THYROID STIM HORMONE: CPT

## 2022-03-11 PROCEDURE — 82306 VITAMIN D 25 HYDROXY: CPT

## 2022-03-11 PROCEDURE — 84156 ASSAY OF PROTEIN URINE: CPT

## 2022-03-11 PROCEDURE — 85025 COMPLETE CBC W/AUTO DIFF WBC: CPT

## 2022-03-11 PROCEDURE — 82043 UR ALBUMIN QUANTITATIVE: CPT

## 2022-03-11 PROCEDURE — 80061 LIPID PANEL: CPT

## 2022-03-11 PROCEDURE — 36415 COLL VENOUS BLD VENIPUNCTURE: CPT

## 2022-03-11 PROCEDURE — 82607 VITAMIN B-12: CPT

## 2022-03-11 PROCEDURE — 80053 COMPREHEN METABOLIC PANEL: CPT

## 2022-03-11 PROCEDURE — 82570 ASSAY OF URINE CREATININE: CPT

## 2022-03-11 PROCEDURE — 83036 HEMOGLOBIN GLYCOSYLATED A1C: CPT

## 2022-03-12 LAB
25(OH)D3 SERPL-MCNC: 34.4 NG/ML (ref 30–100)
ALBUMIN UR-MCNC: >440 MG/DL
CHOLEST SERPL-MCNC: 145 MG/DL (ref 0–200)
CREAT UR-MCNC: 196.9 MG/DL
CREAT UR-MCNC: 197.1 MG/DL
HBA1C MFR BLD: 9.7 % (ref 4.8–5.6)
HDLC SERPL-MCNC: 29 MG/DL (ref 40–60)
LDLC SERPL CALC-MCNC: 81 MG/DL (ref 0–100)
LDLC/HDLC SERPL: 2.59 {RATIO}
MICROALBUMIN/CREAT UR: NORMAL MG/G{CREAT}
PROT ?TM UR-MCNC: 792 MG/DL
PROT/CREAT UR: 4018.3 MG/G CREA (ref 0–200)
TRIGL SERPL-MCNC: 205 MG/DL (ref 0–150)
TSH SERPL DL<=0.05 MIU/L-ACNC: 2.3 UIU/ML (ref 0.27–4.2)
VIT B12 BLD-MCNC: 391 PG/ML (ref 211–946)
VLDLC SERPL-MCNC: 35 MG/DL (ref 5–40)

## 2022-03-21 ENCOUNTER — OFFICE VISIT (OUTPATIENT)
Dept: ENDOCRINOLOGY | Facility: CLINIC | Age: 77
End: 2022-03-21

## 2022-03-21 VITALS
OXYGEN SATURATION: 98 % | HEIGHT: 73 IN | WEIGHT: 197.5 LBS | DIASTOLIC BLOOD PRESSURE: 76 MMHG | SYSTOLIC BLOOD PRESSURE: 140 MMHG | HEART RATE: 69 BPM | BODY MASS INDEX: 26.17 KG/M2

## 2022-03-21 DIAGNOSIS — E78.2 MIXED HYPERLIPIDEMIA: Primary | ICD-10-CM

## 2022-03-21 DIAGNOSIS — E11.65 TYPE 2 DIABETES MELLITUS WITH HYPERGLYCEMIA, WITH LONG-TERM CURRENT USE OF INSULIN: ICD-10-CM

## 2022-03-21 DIAGNOSIS — I10 ESSENTIAL HYPERTENSION: ICD-10-CM

## 2022-03-21 DIAGNOSIS — Z79.4 TYPE 2 DIABETES MELLITUS WITH HYPERGLYCEMIA, WITH LONG-TERM CURRENT USE OF INSULIN: ICD-10-CM

## 2022-03-21 DIAGNOSIS — E55.9 VITAMIN D DEFICIENCY: ICD-10-CM

## 2022-03-21 PROCEDURE — 99214 OFFICE O/P EST MOD 30 MIN: CPT | Performed by: NURSE PRACTITIONER

## 2022-03-21 PROCEDURE — 95251 CONT GLUC MNTR ANALYSIS I&R: CPT | Performed by: NURSE PRACTITIONER

## 2022-03-21 RX ORDER — FLASH GLUCOSE SENSOR
KIT MISCELLANEOUS
Qty: 6 EACH | Refills: 2 | Status: SHIPPED | OUTPATIENT
Start: 2022-03-21 | End: 2022-11-28

## 2022-03-21 NOTE — PROGRESS NOTES
"Chief Complaint  Diabetes    Subjective          Zachary Galindo presents to [facility] ENDOCRINOLOGY  History of Present Illness     In office visit       75-year-old male presents for follow-up     Reason diabetes mellitus type 2     Diagnosed at age 37     Timing constant     Quality not controlled                Severity is high     Macrovascular complications CAD     Microvascular complications neuropathy     Side effects Trulicity --cramping, metformin diarrhea      Current diabetes regimen      Insulin      Current diabetes monitoring      Checks 4 times daily         Kimi ---        He forgots his mealtime                        Review of Systems - General ROS: negative          Objective   Vital Signs:   /76   Pulse 69   Ht 185.4 cm (73\")   Wt 89.6 kg (197 lb 8 oz)   SpO2 98%   BMI 26.06 kg/m²     Physical Exam  Constitutional:       Appearance: Normal appearance.   Cardiovascular:      Rate and Rhythm: Regular rhythm.      Heart sounds: Normal heart sounds.   Pulmonary:      Breath sounds: Normal breath sounds.   Musculoskeletal:      Cervical back: Normal range of motion.   Neurological:      Mental Status: He is alert.        Result Review :   The following data was reviewed by: ROCCO Ratliff on 03/21/2022:  Common labs    Common Labsle 8/11/21 8/11/21 8/11/21 8/11/21 8/11/21 10/28/21 10/28/21 10/28/21 10/28/21 3/11/22 3/11/22 3/11/22 3/11/22 3/11/22    0908 0918 0918 0918 0918 0843 0843 0843 0843 1155 1207 1207 1207 1207   Glucose     154 (A)    234 (A)   124 (A)     BUN     30 (A)    33 (A)   33 (A)     Creatinine     2.39 (A)    1.79 (A)   2.01 (A)     eGFR Non  Am     27 (A)    37 (A)        Sodium     143    139   141     Potassium     5.5 (A)    4.2   4.5     Chloride     106    104   105     Calcium     9.3    9.6   9.3     Albumin     4.10    4.10   4.10     Total Bilirubin     0.4    0.4   0.4     Alkaline Phosphatase     84    " 78   86     AST (SGOT)     19    18   19     ALT (SGPT)     18    19   23     WBC  4.58    5.34     4.69      Hemoglobin  12.9 (A)    14.7     14.5      Hematocrit  39.5    43.6     42.8      Platelets  166    133 (A)     134 (A)      Total Cholesterol    111    123      145   Triglycerides    134    225 (A)      205 (A)   HDL Cholesterol    30 (A)    26 (A)      29 (A)   LDL Cholesterol     57    60      81   Hemoglobin A1C   7.30 (A)    9.29 (A)      9.70 (A)    Microalbumin, Urine 93.8         >440.0       (A) Abnormal value                      Assessment and Plan    Diagnoses and all orders for this visit:    1. Mixed hyperlipidemia (Primary)  -     CBC & Differential; Future  -     Comprehensive Metabolic Panel; Future  -     Hemoglobin A1c; Future  -     Lipid Panel; Future  -     Microalbumin / Creatinine Urine Ratio - Urine, Clean Catch; Future  -     TSH; Future  -     Vitamin D 25 Hydroxy; Future    2. Type 2 diabetes mellitus with hyperglycemia, with long-term current use of insulin (HCC)  -     CBC & Differential; Future  -     Comprehensive Metabolic Panel; Future  -     Hemoglobin A1c; Future  -     Lipid Panel; Future  -     Microalbumin / Creatinine Urine Ratio - Urine, Clean Catch; Future  -     TSH; Future  -     Vitamin D 25 Hydroxy; Future    3. Essential hypertension  -     CBC & Differential; Future  -     Comprehensive Metabolic Panel; Future  -     Hemoglobin A1c; Future  -     Lipid Panel; Future  -     Microalbumin / Creatinine Urine Ratio - Urine, Clean Catch; Future  -     TSH; Future  -     Vitamin D 25 Hydroxy; Future    4. Vitamin D deficiency  -     CBC & Differential; Future  -     Comprehensive Metabolic Panel; Future  -     Hemoglobin A1c; Future  -     Lipid Panel; Future  -     Microalbumin / Creatinine Urine Ratio - Urine, Clean Catch; Future  -     TSH; Future  -     Vitamin D 25 Hydroxy; Future           Diabetes complicated by stage III ckd and CAD         Lab Results    Component Value Date    HGBA1C 9.70 (H) 03/11/2022            Kimi     Downloaded and reviewed    Dated from March 8 to March 21, 2022    Average bg 183    Target range 46%     High 46%     Very high 8%     Low  0 %     Very low 0 %     He forgot's to take his mealtime insulin     When he takes it the sugar is at goal     No change in dosage     Just  reminded to give insulin before meals                      Tresiba  taking  50 units       Taking novolog 10 up to 20  units TID before each meal           Self adjusting explained         He will keep current doses since blood sugar levels are now back at goal he is instructed to call if having hyper or hypoglycemia           Kimi  has allowed the patient to minimize hypoglycemia as alarms have predicted and avoided them     She also uses the arrows on the kimi  as follows:     If arrow is horizontal , she uses the predicted bolus     If the arrow is slanted up or down-- she increase or decrease by 4  units     If the arrow is vertical up or down - she increases or decreases by 4 unit s            Metformin 500 mg , 2 with supper ,intially  GI upset but now resolved     Metformin XR 500 mg one with supper  ---stopped due to side effect      Discontinued Medications      Trulicity 0.75 mg weekly- stopped - cramping    invokana is expensive , jardiance , side effects              Microvascular complications            Last eye exam -- July 2021 , following with          Follows with       positive proteinuria         Neuropathy none            Hyperlipidemia         taking lipitor 10 mg , 5 days per week     Taking zetia 10 mg daily            Total Cholesterol   Date Value Ref Range Status   03/11/2022 145 0 - 200 mg/dL Final     Triglycerides   Date Value Ref Range Status   03/11/2022 205 (H) 0 - 150 mg/dL Final     HDL Cholesterol   Date Value Ref Range Status   03/11/2022 29 (L) 40 - 60 mg/dL Final     LDL Cholesterol    Date Value Ref Range  Status   03/11/2022 81 0 - 100 mg/dL Final              Hypertension     Taking norvasc 5 mg one daily      Taking losartan 50 mg daily             Bone health     Vitamin D deficiency     Taking MVI daily                         Follow Up   Return in about 3 months (around 6/21/2022) for Recheck.  Patient was given instructions and counseling regarding his condition or for health maintenance advice. Please see specific information pulled into the AVS if appropriate.         This document has been electronically signed by ROCCO Ratliff on March 28, 2022 08:03 CDT.

## 2022-03-25 DIAGNOSIS — E78.2 MIXED HYPERLIPIDEMIA: Chronic | ICD-10-CM

## 2022-03-25 DIAGNOSIS — I10 ESSENTIAL HYPERTENSION: Primary | ICD-10-CM

## 2022-03-25 DIAGNOSIS — I25.709 CORONARY ARTERY DISEASE INVOLVING CORONARY BYPASS GRAFT OF NATIVE HEART WITH ANGINA PECTORIS: ICD-10-CM

## 2022-03-25 RX ORDER — LOSARTAN POTASSIUM 50 MG/1
50 TABLET ORAL DAILY
Qty: 100 TABLET | Refills: 0 | Status: SHIPPED | OUTPATIENT
Start: 2022-03-25 | End: 2022-06-28 | Stop reason: SDUPTHER

## 2022-03-25 RX ORDER — ATORVASTATIN CALCIUM 20 MG/1
20 TABLET, FILM COATED ORAL DAILY
Qty: 100 TABLET | Refills: 0 | Status: SHIPPED | OUTPATIENT
Start: 2022-03-25 | End: 2022-06-28 | Stop reason: SDUPTHER

## 2022-04-24 PROBLEM — I25.9 CHRONIC ISCHEMIC HEART DISEASE: Status: ACTIVE | Noted: 2021-03-22

## 2022-04-24 PROBLEM — N39.0 ACUTE UTI: Status: ACTIVE | Noted: 2021-06-22

## 2022-04-24 PROBLEM — I25.810 ATHEROSCLEROSIS OF CORONARY ARTERY BYPASS GRAFT: Status: ACTIVE | Noted: 2017-10-02

## 2022-04-24 PROBLEM — N41.0 ACUTE PROSTATITIS: Status: ACTIVE | Noted: 2021-07-15

## 2022-04-24 PROBLEM — R30.0 DIFFICULT OR PAINFUL URINATION: Status: ACTIVE | Noted: 2021-06-22

## 2022-04-26 ENCOUNTER — TRANSCRIBE ORDERS (OUTPATIENT)
Dept: LAB | Facility: HOSPITAL | Age: 77
End: 2022-04-26

## 2022-04-26 ENCOUNTER — LAB (OUTPATIENT)
Dept: LAB | Facility: HOSPITAL | Age: 77
End: 2022-04-26

## 2022-04-26 DIAGNOSIS — E11.65 TYPE 2 DIABETES MELLITUS WITH HYPERGLYCEMIA, WITH LONG-TERM CURRENT USE OF INSULIN: Primary | ICD-10-CM

## 2022-04-26 DIAGNOSIS — E11.65 TYPE 2 DIABETES MELLITUS WITH HYPERGLYCEMIA, WITH LONG-TERM CURRENT USE OF INSULIN: ICD-10-CM

## 2022-04-26 DIAGNOSIS — E78.2 MIXED HYPERLIPIDEMIA: ICD-10-CM

## 2022-04-26 DIAGNOSIS — E78.5 HYPERLIPIDEMIA, UNSPECIFIED HYPERLIPIDEMIA TYPE: ICD-10-CM

## 2022-04-26 DIAGNOSIS — M10.9 GOUT, UNSPECIFIED CAUSE, UNSPECIFIED CHRONICITY, UNSPECIFIED SITE: ICD-10-CM

## 2022-04-26 DIAGNOSIS — N18.30 STAGE 3 CHRONIC KIDNEY DISEASE, UNSPECIFIED WHETHER STAGE 3A OR 3B CKD: ICD-10-CM

## 2022-04-26 DIAGNOSIS — Z79.4 TYPE 2 DIABETES MELLITUS WITH HYPERGLYCEMIA, WITH LONG-TERM CURRENT USE OF INSULIN: Primary | ICD-10-CM

## 2022-04-26 DIAGNOSIS — E55.9 VITAMIN D DEFICIENCY: ICD-10-CM

## 2022-04-26 DIAGNOSIS — I10 ESSENTIAL HYPERTENSION: ICD-10-CM

## 2022-04-26 DIAGNOSIS — Z79.4 TYPE 2 DIABETES MELLITUS WITH HYPERGLYCEMIA, WITH LONG-TERM CURRENT USE OF INSULIN: ICD-10-CM

## 2022-04-26 PROCEDURE — 82570 ASSAY OF URINE CREATININE: CPT

## 2022-04-26 PROCEDURE — 36415 COLL VENOUS BLD VENIPUNCTURE: CPT

## 2022-04-26 PROCEDURE — 80048 BASIC METABOLIC PNL TOTAL CA: CPT

## 2022-04-26 PROCEDURE — 82306 VITAMIN D 25 HYDROXY: CPT

## 2022-04-26 PROCEDURE — 84100 ASSAY OF PHOSPHORUS: CPT

## 2022-04-26 PROCEDURE — 85027 COMPLETE CBC AUTOMATED: CPT

## 2022-04-26 PROCEDURE — 83735 ASSAY OF MAGNESIUM: CPT

## 2022-04-26 PROCEDURE — 84156 ASSAY OF PROTEIN URINE: CPT

## 2022-04-27 LAB
25(OH)D3 SERPL-MCNC: 30.9 NG/ML (ref 30–100)
ANION GAP SERPL CALCULATED.3IONS-SCNC: 13 MMOL/L (ref 5–15)
BUN SERPL-MCNC: 36 MG/DL (ref 8–23)
BUN/CREAT SERPL: 17 (ref 7–25)
CALCIUM SPEC-SCNC: 9 MG/DL (ref 8.6–10.5)
CHLORIDE SERPL-SCNC: 102 MMOL/L (ref 98–107)
CO2 SERPL-SCNC: 26 MMOL/L (ref 22–29)
CREAT SERPL-MCNC: 2.12 MG/DL (ref 0.76–1.27)
CREAT UR-MCNC: 120.6 MG/DL
DEPRECATED RDW RBC AUTO: 44 FL (ref 37–54)
EGFRCR SERPLBLD CKD-EPI 2021: 31.7 ML/MIN/1.73
ERYTHROCYTE [DISTWIDTH] IN BLOOD BY AUTOMATED COUNT: 13.5 % (ref 12.3–15.4)
GLUCOSE SERPL-MCNC: 252 MG/DL (ref 65–99)
HCT VFR BLD AUTO: 39.5 % (ref 37.5–51)
HGB BLD-MCNC: 12.8 G/DL (ref 13–17.7)
MAGNESIUM SERPL-MCNC: 2.2 MG/DL (ref 1.6–2.4)
MCH RBC QN AUTO: 29.2 PG (ref 26.6–33)
MCHC RBC AUTO-ENTMCNC: 32.4 G/DL (ref 31.5–35.7)
MCV RBC AUTO: 90 FL (ref 79–97)
PHOSPHATE SERPL-MCNC: 4 MG/DL (ref 2.5–4.5)
PLATELET # BLD AUTO: 120 10*3/MM3 (ref 140–450)
PMV BLD AUTO: 12.6 FL (ref 6–12)
POTASSIUM SERPL-SCNC: 5.1 MMOL/L (ref 3.5–5.2)
PROT ?TM UR-MCNC: 530 MG/DL
PROT/CREAT UR: 4394.7 MG/G CREA (ref 0–200)
RBC # BLD AUTO: 4.39 10*6/MM3 (ref 4.14–5.8)
SODIUM SERPL-SCNC: 141 MMOL/L (ref 136–145)
WBC NRBC COR # BLD: 4.57 10*3/MM3 (ref 3.4–10.8)

## 2022-05-02 ENCOUNTER — OFFICE VISIT (OUTPATIENT)
Dept: CARDIOLOGY | Facility: CLINIC | Age: 77
End: 2022-05-02

## 2022-05-02 VITALS
HEIGHT: 73 IN | WEIGHT: 189 LBS | TEMPERATURE: 97.1 F | HEART RATE: 53 BPM | DIASTOLIC BLOOD PRESSURE: 74 MMHG | OXYGEN SATURATION: 98 % | SYSTOLIC BLOOD PRESSURE: 140 MMHG | BODY MASS INDEX: 25.05 KG/M2

## 2022-05-02 DIAGNOSIS — I10 ESSENTIAL HYPERTENSION: Chronic | ICD-10-CM

## 2022-05-02 DIAGNOSIS — Z95.1 S/P CABG (CORONARY ARTERY BYPASS GRAFT): ICD-10-CM

## 2022-05-02 DIAGNOSIS — E78.2 MIXED HYPERLIPIDEMIA: Chronic | ICD-10-CM

## 2022-05-02 DIAGNOSIS — I25.810 CORONARY ARTERY DISEASE INVOLVING CORONARY BYPASS GRAFT OF NATIVE HEART WITHOUT ANGINA PECTORIS: Primary | ICD-10-CM

## 2022-05-02 DIAGNOSIS — Z79.4 TYPE 2 DIABETES MELLITUS WITH COMPLICATION, WITH LONG-TERM CURRENT USE OF INSULIN: Chronic | ICD-10-CM

## 2022-05-02 DIAGNOSIS — E11.8 TYPE 2 DIABETES MELLITUS WITH COMPLICATION, WITH LONG-TERM CURRENT USE OF INSULIN: Chronic | ICD-10-CM

## 2022-05-02 LAB
QT INTERVAL: 440 MS
QTC INTERVAL: 412 MS

## 2022-05-02 PROCEDURE — 93000 ELECTROCARDIOGRAM COMPLETE: CPT | Performed by: INTERNAL MEDICINE

## 2022-05-02 PROCEDURE — 99214 OFFICE O/P EST MOD 30 MIN: CPT | Performed by: INTERNAL MEDICINE

## 2022-05-02 NOTE — PROGRESS NOTES
Zachary Galindo  76 y.o. male    1. Coronary artery disease involving coronary bypass graft of native heart without angina pectoris    2. Essential hypertension    3. Mixed hyperlipidemia    4. S/P CABG (coronary artery bypass graft)    5. Type 2 diabetes mellitus with complication, with long-term current use of insulin (HCC)        History of Present Illness  Mr. Galindo is a 76-year-old male with coronary artery disease, hypertension, hyperlipidemia, diabetes mellitus, CKD. His history is remarkable for coronary artery disease status post coronary artery bypass surgery in 1998 by Dr. Flor and had subsequent PCI to SVG to OM and RCA in 2014.    He was admitted to [facility] with chest pain in May 2021 and troponins were elevated at 0.141. He underwent cardiac catheterization by Dr. Santos on 5/13/2021 and the findings are as follows:  · Mid LAD lesion is 90% stenosed post LIMA insertion site.Small LAD-1.6 to 1.8 mm  · Prox Cx lesion is 100% stenosed.  · Mid RCA lesion is 100% stenosed.  · SVG to RCA at Origin is 100% stenosed.  · OM prox Graft lesion is 90% stenosed-stented to 0%  · OM Mid Graft lesion -ISR-is 99% stenosed stented to 0%  · LV pressures (S/D/E) : 150/-1/21 mmHg  He underwent PCI to SVG to OM with placement of a 4 x 33 mm Xience Itzel stent.    Echocardiogram in July 2021 showed:  · Estimated left ventricular EF = 57% Left ventricular ejection fraction appears to be 56 - 60%. Left ventricular systolic function is normal.  · Left ventricular diastolic function is consistent with (grade I) impaired relaxation.  · Left atrial volume is mildly increased.  · Saline test results are positive.  · Estimated right ventricular systolic pressure from tricuspid regurgitation is normal (<35 mmHg).    He has progressed quite well and denied any chest pain or shortness of breath at this time.  He has been compliant with his medications.  He has an area of mild superficial tenderness in  the right upper quadrant of abdomen.  There is no change in bowel habits.  He has not had any history of gallstones.    EKG today showed sinus rhythm with heart rate of 53 bpm.  Mild IVCD.  Rightward axis.  Nonspecific T wave changes.    Allergies   Allergen Reactions   • Advicor [Niacin-Lovastatin Er] Swelling     Swelling   • Nsaids Other (See Comments)     Kidney disease   • Statins Swelling     Swelling   • Lisinopril Cough     Cough         Past Medical History:   Diagnosis Date   • Allergic rhinitis    • Ankle joint pain    • Artificial lens present     Left   • Astigmatism    • Benign prostatic hyperplasia    • Cataract    • Closed fracture of medial malleolus    • Coronary arteriosclerosis    • Diabetes mellitus (HCC)     no retinopathy   • Elevated levels of transaminase & lactic acid dehydrogenase     improving    • Encounter for health maintenance examination     Individual health examination     • Encounter for health maintenance examination in adult     Adult health examination    • Essential hypertension    • Essential hypertension     Unspecified   • Flank pain     probably muscular    • GERD (gastroesophageal reflux disease)    • Gout    • Gout     unspecified     • Hemorrhoids    • History of artificial eye lens     Artificial lens in position - right    • History of colonoscopy 04/04/2010    Colon endoscopy  ([id number])  (1)    • History of kidney stones    • Hyperlipidemia    • Hypermetropia    • Low blood pressure    • Malaise and fatigue    • Need for influenza vaccination     Needs influenza immunization    • Nuclear cataract of left eye    • Posterior subcapsular polar senile cataract     Left   • Renal impairment    • Special screening for malignant neoplasm of prostate    • Type 2 diabetes mellitus (HCC)     improving   • 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 edema (HCC)     without macular edema - mild OS    • Upper respiratory infection    •  Urticaria     Drug-aggravated angioedema-urticaria   • Urticaria          Past Surgical History:   Procedure Laterality Date   • CARDIAC CATHETERIZATION  03/24/2014    (71740)  (2)  Reduction of stenoisis from 95% to less than 0% stenosis with evidence of good JENNY 3-flow. Distal RCA beyond the touchdown was seen filling the circumflex system   • CARDIAC CATHETERIZATION Right 5/13/2021    Procedure: Left Heart Cath;  Surgeon: Carissa Santos MD;  Location: [facility] CATH INVASIVE LOCATION;  Service: Cardiology;  Laterality: Right;   • CATARACT EXTRACTION  10/2015    Remove cataract, insert lens (2)    • CORONARY ARTERY BYPASS GRAFT      Arterial, two  (1)   -  3 - 12/1998   • HERNIA REPAIR      w/mesh  (1)   • INJECTION OF MEDICATION  04/11/2013    B12  (1)  - RAYMOND Raymond   • INJECTION OF MEDICATION  07/03/2013    Benadryl  (1) - RAYMOND Raymond   • INJECTION OF MEDICATION  10/22/2014    Kenalog  (2)   • OTHER SURGICAL HISTORY  07/10/2002    Fragmenting of kidney stone  -   ESWL, 2010 shocks. 1cm UP stone,right. Diabetes mellitus         Family History   Problem Relation Age of Onset   • Diabetes Other    • Hypertension Other    • Cancer Mother    • Heart disease Mother    • Hypertension Mother    • Hyperlipidemia Mother    • Diabetes Brother    • Diabetes Maternal Aunt    • Cancer Sister    • Diabetes Sister          Social History     Socioeconomic History   • Marital status:    Tobacco Use   • Smoking status: Former Smoker   • Smokeless tobacco: Never Used   • Tobacco comment: Quit 1979   Substance and Sexual Activity   • Alcohol use: No   • Drug use: No   • Sexual activity: Defer         Current Outpatient Medications   Medication Sig Dispense Refill   • alfuzosin (UROXATRAL) 10 MG 24 hr tablet Take 10 mg by mouth Daily.     • amLODIPine (NORVASC) 10 MG tablet Take 1 tablet by mouth Daily.     • aspirin 81 MG tablet Take 1 tablet by mouth Daily. 30 tablet 11   • atorvastatin (LIPITOR) 20 MG tablet Take 1  "tablet by mouth Daily. 100 tablet 0   • carvedilol (COREG) 25 MG tablet TAKE 1 TABLET BY MOUTH TWICE DAILY WITH MEALS 180 tablet 2   • clopidogrel (PLAVIX) 75 MG tablet TAKE 1 TABLET BY MOUTH EVERY DAY 90 tablet 0   • Continuous Blood Gluc Sensor (FreeStyle Kimi 14 Day Sensor) [facility] USE as directed TO monitor blood glucose. replace every 14 DAYS. 6 each 2   • ezetimibe (ZETIA) 10 MG tablet TAKE 1 TABLET BY MOUTH EVERY DAY 90 tablet 0   • gabapentin (NEURONTIN) 300 MG capsule Take 1 capsule by mouth 2 (Two) Times a Day. 180 capsule 1   • glucose blood test strip OneTouch Ultra Test strips  -  Test sugars 3-4 times a day as directed by provider.     • insulin aspart (NovoLOG FlexPen) 100 UNIT/ML solution pen-injector sc pen INJECT 12 UP TO 20 UNITS SUBQ WITH MEALS 15 mL 10   • Insulin Glargine, 2 Unit Dial, (Toujeo Max SoloStar) 300 UNIT/ML solution pen-injector injection Inject 60 Units under the skin into the appropriate area as directed Daily. (Patient taking differently: Inject 60 Units under the skin into the appropriate area as directed Daily. 46 un daily) 5 pen 11   • Insulin Pen Needle (Pen Needles 3/16\") 31G X 5 MM misc 100 each Daily. 100 each 3   • isosorbide mononitrate (IMDUR) 30 MG 24 hr tablet TAKE 1 TABLET BY MOUTH EVERY MORNING 90 tablet 0   • Lancets (ONETOUCH ULTRASOFT) lancets Test sugars 3-4 times daily as instructed by physician.     • losartan (COZAAR) 50 MG tablet Take 1 tablet by mouth Daily. 100 tablet 0   • magnesium oxide (MAG-OX) 400 MG tablet Take 1 tablet by mouth Daily.     • nitroglycerin (Nitrostat) 0.4 MG SL tablet Place 1 tablet under the tongue Every 5 (Five) Minutes As Needed for Chest Pain. Max 3 doses. Go to ER if no relief 90 tablet 0     No current facility-administered medications for this visit.         OBJECTIVE    /74 (BP Location: Left arm, Patient Position: Sitting, Cuff Size: Adult)   Pulse 53   Temp 97.1 °F (36.2 °C)   Ht 185.4 cm (73\")   Wt 85.7 kg (189 lb)  "  SpO2 98%   BMI 24.94 kg/m²         Review of Systems: The following systems were reviewed and the changes noted as below and as described in history of present    Constitutional:  Denies recent weight loss, weight gain, fever or chills. fatigue     HENT:  Denies any hearing loss, epistaxis, hoarseness, or difficulty speaking.     Eyes: Wears eyeglasses or contact lenses     Respiratory: no dyspnea with exertion,no cough, wheezing, or hemoptysis.     Cardiovascular: No chest pain, palpitation or dizziness.    Gastrointestinal:  Denies change in bowel habits, dyspepsia, ulcer disease, hematochezia, or melena.     Endocrine: Negative for cold intolerance, heat intolerance, polydipsia, polyphagia and polyuria.     Genitourinary: CKD.  He is followed by nephrology.      Musculoskeletal: DJD    Neurological:  Denies any history of recurrent headaches, strokes, TIA, or seizure disorder.     Hematological: Denies any food allergies, seasonal allergies, bleeding disorders, or lymphadenopathy.     Physical Exam : The following systems were reassessed and no changes were noted    Constitutional: Cooperative, alert and oriented,  in no acute distress.     HENT:   Head: Normocephalic, normal hair patterns, no masses or tenderness.  Ears, Nose, and Throat: No gross abnormalities. No pallor or cyanosis.   Eyes: EOMS intact, PERRL, conjunctivae and lids unremarkable. Fundoscopic exam and visual fields not performed.   Neck: No palpable masses or adenopathy, no thyromegaly, no JVD, carotid pulses are full and equal bilaterally and without  Bruits.     Cardiovascular: Regular rhythm, S1 and S2 normal, no S3 or S4.  No murmurs, gallops, or rubs detected.     Pulmonary/Chest: Chest: normal symmetry,  normal respiratory excursion, no intercostal retraction, no use of accessory muscles.            Pulmonary: Normal breath sounds. No rales or ronchi.    Abdominal: Abdomen soft, bowel sounds normoactive, no masses, no  hepatosplenomegaly, non-tender, no bruits.     Musculoskeletal: No deformities, clubbing, cyanosis, erythema, or edema observed.     Neurological: No gross motor or sensory deficits noted, affect appropriate, oriented to time, person, place.     Psychiatric: He has a normal mood and affect. His behavior is normal. Judgment and thought content normal.         Lab Results   Component Value Date    WBC 4.57 04/26/2022    HGB 12.8 (L) 04/26/2022    HCT 39.5 04/26/2022    MCV 90.0 04/26/2022     (L) 04/26/2022     Lab Results   Component Value Date    GLUCOSE 252 (H) 04/26/2022    BUN 36 (H) 04/26/2022    CREATININE 2.12 (H) 04/26/2022    EGFRIFNONA 37 (L) 10/28/2021    BCR 17.0 04/26/2022    CO2 26.0 04/26/2022    CALCIUM 9.0 04/26/2022    ALBUMIN 4.10 03/11/2022    AST 19 03/11/2022    ALT 23 03/11/2022     Lab Results   Component Value Date    CHOL 145 03/11/2022    CHOL 123 10/28/2021    CHOL 111 08/11/2021     Lab Results   Component Value Date    TRIG 205 (H) 03/11/2022    TRIG 225 (H) 10/28/2021    TRIG 134 08/11/2021     Lab Results   Component Value Date    HDL 29 (L) 03/11/2022    HDL 26 (L) 10/28/2021    HDL 30 (L) 08/11/2021     No components found for: LDLCALC  Lab Results   Component Value Date    LDL 81 03/11/2022    LDL 60 10/28/2021    LDL 57 08/11/2021     No results found for: HDLLDLRATIO  No components found for: CHOLHDL  Lab Results   Component Value Date    HGBA1C 9.70 (H) 03/11/2022     Lab Results   Component Value Date    TSH 2.300 03/11/2022           ASSESSMENT AND PLAN  Mr. Galindo has multiple medical issues as discussed under history of present illness and progressing well following his intervention and May 2021.  He denied any cardiac symptoms at the present time.    His lab results from March 2022 were reviewed and LDL was 81 and HDL 29.  CBC showed a hemoglobin of 12.8 and hematocrit of 39.5 and BUN was 36 and creatinine 2.12.  He does follow-up with nephrology on a regular  basis.    I have continued antiplatelet therapy with aspirin and Plavix, antihypertensive therapy with amlodipine, carvedilol, losartan, lipid-lowering therapy with atorvastatin, Zetia.  Antianginal therapy with isosorbide mononitrate  have been continued.    Diagnoses and all orders for this visit:    1. Coronary artery disease involving coronary bypass graft of native heart without angina pectoris (Primary)  -     ECG 12 Lead  -     Cancel: ECG 12 Lead    2. Essential hypertension    3. Mixed hyperlipidemia    4. S/P CABG (coronary artery bypass graft)    5. Type 2 diabetes mellitus with complication, with long-term current use of insulin (HCC)        Patient's Body mass index is 24.94 kg/m². BMI is within normal parameters. No follow-up required..  Patient is a non-smoker    Medhat Clark MD  5/2/2022  09:00 CDT

## 2022-05-04 ENCOUNTER — OFFICE VISIT (OUTPATIENT)
Dept: ORTHOPEDIC SURGERY | Facility: CLINIC | Age: 77
End: 2022-05-04

## 2022-05-04 VITALS — HEIGHT: 73 IN | BODY MASS INDEX: 25.05 KG/M2 | WEIGHT: 189 LBS

## 2022-05-04 DIAGNOSIS — M25.522 LEFT ELBOW PAIN: Primary | ICD-10-CM

## 2022-05-04 DIAGNOSIS — M70.22 OLECRANON BURSITIS OF LEFT ELBOW: ICD-10-CM

## 2022-05-04 DIAGNOSIS — M25.722 OSTEOPHYTE OF LEFT ELBOW: ICD-10-CM

## 2022-05-04 PROCEDURE — 99213 OFFICE O/P EST LOW 20 MIN: CPT | Performed by: NURSE PRACTITIONER

## 2022-05-04 NOTE — PROGRESS NOTES
Zachary Galindo is a 76 y.o. male   Primary provider:  Halley Raymond MD       Chief Complaint   Patient presents with   • Left Elbow - Initial Evaluation, Pain       HISTORY OF PRESENT ILLNESS:    Patient is a 76-year-old male who presents today for evaluation of left elbow.  Patient reports falling 3 to 4 weeks ago.  He reports being seen at PCP office where x-rays were performed and showed he had fractured an olecranon osteophyte.  Patient reports his pain is very mild.  He reports he does not have increased pain with range of motion or lifting.  Rather he has pain with direct palpation of olecranon such as to rest on his elbows.  He rates pain as a 0 out of 10.        Arm Injury   The incident occurred more than 1 week ago. The pain is present in the left elbow. The pain is mild.        CONCURRENT MEDICAL HISTORY:    Past Medical History:   Diagnosis Date   • Allergic rhinitis    • Ankle joint pain    • Artificial lens present     Left   • Astigmatism    • Benign prostatic hyperplasia    • Cataract    • Closed fracture of medial malleolus    • Coronary arteriosclerosis    • Diabetes mellitus (HCC)     no retinopathy   • Elevated levels of transaminase & lactic acid dehydrogenase     improving    • Encounter for health maintenance examination     Individual health examination     • Encounter for health maintenance examination in adult     Adult health examination    • Essential hypertension    • Essential hypertension     Unspecified   • Flank pain     probably muscular    • GERD (gastroesophageal reflux disease)    • Gout    • Gout     unspecified     • Hemorrhoids    • History of artificial eye lens     Artificial lens in position - right    • History of colonoscopy 04/04/2010    Colon endoscopy  (19189)  (1)    • History of kidney stones    • Hyperlipidemia    • Hypermetropia    • Low blood pressure    • Malaise and fatigue    • Need for influenza vaccination     Needs influenza immunization    •  Nuclear cataract of left eye    • Posterior subcapsular polar senile cataract     Left   • Renal impairment    • Special screening for malignant neoplasm of prostate    • Type 2 diabetes mellitus (HCC)     improving   • 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 edema (HCC)     without macular edema - mild OS    • Upper respiratory infection    • Urticaria     Drug-aggravated angioedema-urticaria   • Urticaria        Allergies   Allergen Reactions   • Advicor [Niacin-Lovastatin Er] Swelling     Swelling   • Nsaids Other (See Comments)     Kidney disease   • Statins Swelling     Swelling   • Lisinopril Cough     Cough         Current Outpatient Medications:   •  alfuzosin (UROXATRAL) 10 MG 24 hr tablet, Take 10 mg by mouth Daily., Disp: , Rfl:   •  amLODIPine (NORVASC) 10 MG tablet, Take 1 tablet by mouth Daily., Disp: , Rfl:   •  aspirin 81 MG tablet, Take 1 tablet by mouth Daily., Disp: 30 tablet, Rfl: 11  •  atorvastatin (LIPITOR) 20 MG tablet, Take 1 tablet by mouth Daily., Disp: 100 tablet, Rfl: 0  •  carvedilol (COREG) 25 MG tablet, TAKE 1 TABLET BY MOUTH TWICE DAILY WITH MEALS, Disp: 180 tablet, Rfl: 2  •  clopidogrel (PLAVIX) 75 MG tablet, TAKE 1 TABLET BY MOUTH EVERY DAY, Disp: 90 tablet, Rfl: 0  •  Continuous Blood Gluc Sensor (FreeStyle Kimi 14 Day Sensor) [facility], USE as directed TO monitor blood glucose. replace every 14 DAYS., Disp: 6 each, Rfl: 2  •  ezetimibe (ZETIA) 10 MG tablet, TAKE 1 TABLET BY MOUTH EVERY DAY, Disp: 90 tablet, Rfl: 0  •  gabapentin (NEURONTIN) 300 MG capsule, Take 1 capsule by mouth 2 (Two) Times a Day., Disp: 180 capsule, Rfl: 1  •  glucose blood test strip, OneTouch Ultra Test strips  -  Test sugars 3-4 times a day as directed by provider., Disp: , Rfl:   •  insulin aspart (NovoLOG FlexPen) 100 UNIT/ML solution pen-injector sc pen, INJECT 12 UP TO 20 UNITS SUBQ WITH MEALS, Disp: 15 mL, Rfl: 10  •  Insulin Glargine, 2 Unit Dial, (Toujeo  "Max SoloStar) 300 UNIT/ML solution pen-injector injection, Inject 60 Units under the skin into the appropriate area as directed Daily. (Patient taking differently: Inject 60 Units under the skin into the appropriate area as directed Daily. 46 un daily), Disp: 5 pen, Rfl: 11  •  Insulin Pen Needle (Pen Needles 3/16\") 31G X 5 MM misc, 100 each Daily., Disp: 100 each, Rfl: 3  •  isosorbide mononitrate (IMDUR) 30 MG 24 hr tablet, TAKE 1 TABLET BY MOUTH EVERY MORNING, Disp: 90 tablet, Rfl: 0  •  Lancets (ONETOUCH ULTRASOFT) lancets, Test sugars 3-4 times daily as instructed by physician., Disp: , Rfl:   •  losartan (COZAAR) 50 MG tablet, Take 1 tablet by mouth Daily., Disp: 100 tablet, Rfl: 0  •  magnesium oxide (MAG-OX) 400 MG tablet, Take 1 tablet by mouth Daily., Disp: , Rfl:   •  nitroglycerin (Nitrostat) 0.4 MG SL tablet, Place 1 tablet under the tongue Every 5 (Five) Minutes As Needed for Chest Pain. Max 3 doses. Go to ER if no relief, Disp: 90 tablet, Rfl: 0    Past Surgical History:   Procedure Laterality Date   • CARDIAC CATHETERIZATION  03/24/2014    (98645)  (2)  Reduction of stenoisis from 95% to less than 0% stenosis with evidence of good JENNY 3-flow. Distal RCA beyond the touchdown was seen filling the circumflex system   • CARDIAC CATHETERIZATION Right 5/13/2021    Procedure: Left Heart Cath;  Surgeon: Carissa Santos MD;  Location: [facility] CATH INVASIVE LOCATION;  Service: Cardiology;  Laterality: Right;   • CATARACT EXTRACTION  10/2015    Remove cataract, insert lens (2)    • CORONARY ARTERY BYPASS GRAFT      Arterial, two  (1)   -  3 - 12/1998   • HERNIA REPAIR      w/mesh  (1)   • INJECTION OF MEDICATION  04/11/2013    B12  (1)  - RAYMOND Raymond   • INJECTION OF MEDICATION  07/03/2013    Benadryl  (1) - RAYMOND Raymond   • INJECTION OF MEDICATION  10/22/2014    Kenalog  (2)   • OTHER SURGICAL HISTORY  07/10/2002    Fragmenting of kidney stone  -   [facility], 2010 shocks. 1cm UP stone,right. Diabetes mellitus " "      Family History   Problem Relation Age of Onset   • Diabetes Other    • Hypertension Other    • Cancer Mother    • Heart disease Mother    • Hypertension Mother    • Hyperlipidemia Mother    • Diabetes Brother    • Diabetes Maternal Aunt    • Cancer Sister    • Diabetes Sister         Social History     Socioeconomic History   • Marital status:    Tobacco Use   • Smoking status: Former Smoker   • Smokeless tobacco: Never Used   • Tobacco comment: Quit 1979   Substance and Sexual Activity   • Alcohol use: No   • Drug use: No   • Sexual activity: Defer        Review of Systems   Constitutional: Negative.    HENT: Negative.    Eyes: Negative.    Respiratory: Negative.    Cardiovascular: Negative.    Gastrointestinal: Negative.    Endocrine: Negative.    Genitourinary: Negative.    Musculoskeletal: Negative.    Skin: Negative.    Allergic/Immunologic: Negative.    Neurological: Negative.    Hematological: Negative.    Psychiatric/Behavioral: Negative.        PHYSICAL EXAMINATION:       Ht 185.4 cm (73\")   Wt 85.7 kg (189 lb)   BMI 24.94 kg/m²     Physical Exam  Vitals and nursing note reviewed.   Constitutional:       General: He is not in acute distress.     Appearance: He is well-developed. He is not toxic-appearing.   HENT:      Head: Normocephalic.   Pulmonary:      Effort: Pulmonary effort is normal. No respiratory distress.   Skin:     General: Skin is warm and dry.   Neurological:      Mental Status: He is alert and oriented to person, place, and time.   Psychiatric:         Behavior: Behavior normal.         Thought Content: Thought content normal.         Judgment: Judgment normal.         GAIT:     []  Normal  []  Antalgic    Assistive device: [x]  None  []  Walker     []  Crutches  []  Cane     []  Wheelchair  []  Stretcher    Right Elbow Exam     Range of Motion   The patient has normal right elbow ROM.      Left Elbow Exam     Tenderness   The patient is experiencing no tenderness.     Range " of Motion   The patient has normal left elbow ROM.    Other   Erythema: absent  Sensation: normal  Pulse: present    Comments:  No evidence of infection.  No bony tenderness.  Normal range of motion without pain.  Mild bursitis.              XR Elbow 3+ View Left    Result Date: 4/24/2022  Narrative: Comparison: None Indication: Fell 3 weeks ago. Pain. Findings: Three views of the left elbow are obtained. There is normal alignment. Degeneration of the elbow. No significant elbow joint effusion. There is a prominent olecranon enthesophyte with age-indeterminate fracture. Posterior soft tissue swelling.     Impression: Impression: 1. There is a large enthesophyte at the olecranon with age-indeterminate fracture. There is posterior soft tissue swelling which may reflect hematoma or bursitis. 2. Degenerative joint disease elbow. Electronically signed by:  Hayden Saenz MD  4/24/2022 10:59 AM CDT Workstation: [id number]          ASSESSMENT:    Diagnoses and all orders for this visit:    Left elbow pain  -     XR Elbow 3+ View Left    Osteophyte of left elbow    Olecranon bursitis of left elbow          PLAN      X-rays reviewed, there is a small fracture of olecranon osteophyte.  Patient is having no pain with range of motion or strength or resistance activities.  Patient does have mild likely chronic bursitis.  No further follow-up required unless patient begins having issues.  We did discuss if recurrent bursitis or worsening bursitis becomes a problem for patient he can have bone spur or bursa removed.  Patient request to come back to see me in August for IM injection of Kenalog.  He reports chronic back pain that I am injection helps with.  Patient instructed to return as desired.  Signs and symptoms to report and when to seek care explained to patient.  Patient verbalized understanding of instructions.    Return in about 3 months (around 8/4/2022).      This document has been electronically signed by ROCCO Hood  on May 4, 2022 13:18 CDT

## 2022-05-23 DIAGNOSIS — I25.810 CORONARY ARTERY DISEASE INVOLVING CORONARY BYPASS GRAFT OF NATIVE HEART WITHOUT ANGINA PECTORIS: ICD-10-CM

## 2022-05-23 DIAGNOSIS — I10 ESSENTIAL HYPERTENSION: Chronic | ICD-10-CM

## 2022-05-23 DIAGNOSIS — E78.2 MIXED HYPERLIPIDEMIA: Chronic | ICD-10-CM

## 2022-05-23 RX ORDER — PROBENECID 500 MG/1
TABLET, FILM COATED ORAL
Qty: 180 TABLET | Refills: 0 | OUTPATIENT
Start: 2022-05-23

## 2022-05-23 RX ORDER — EZETIMIBE 10 MG/1
TABLET ORAL
Qty: 90 TABLET | Refills: 0 | Status: SHIPPED | OUTPATIENT
Start: 2022-05-23 | End: 2022-06-28 | Stop reason: SDUPTHER

## 2022-05-23 RX ORDER — ISOSORBIDE MONONITRATE 30 MG/1
30 TABLET, EXTENDED RELEASE ORAL EVERY MORNING
Qty: 90 TABLET | Refills: 0 | Status: SHIPPED | OUTPATIENT
Start: 2022-05-23 | End: 2022-06-28 | Stop reason: SDUPTHER

## 2022-05-23 RX ORDER — CARVEDILOL 25 MG/1
TABLET ORAL
Qty: 180 TABLET | Refills: 1 | Status: SHIPPED | OUTPATIENT
Start: 2022-05-23 | End: 2022-10-07

## 2022-05-23 RX ORDER — CLOPIDOGREL BISULFATE 75 MG/1
TABLET ORAL
Qty: 90 TABLET | Refills: 0 | Status: SHIPPED | OUTPATIENT
Start: 2022-05-23 | End: 2022-06-28 | Stop reason: SDUPTHER

## 2022-05-23 NOTE — TELEPHONE ENCOUNTER
The original prescription was discontinued on 5/2/2022 by Medhat Clark MD for the following reason: *Therapy completed. Renewing this prescription may not be appropriate.    No Refill on the Probenecid.  Medication DC by another Provider    Next Appt:   06/28/2022 - Halley Raymond MD

## 2022-05-26 RX ORDER — PROBENECID 500 MG/1
TABLET, FILM COATED ORAL
Qty: 180 TABLET | Refills: 0 | Status: SHIPPED | OUTPATIENT
Start: 2022-05-26 | End: 2022-08-22

## 2022-05-26 NOTE — TELEPHONE ENCOUNTER
Mr. Galindo is requesting a refill on his Probenecid 500 mg Take 1 BID.    I did refuse the refill on 05/23/2022.   Med List indicates Dr. Clark DC the medication on 05/02/2022 and marked it Therapy Completed.    I called Mr. Galindo and he states he is still taking it twice daily for gout.   Is this OK to refill?     Please adevise

## 2022-06-16 ENCOUNTER — LAB (OUTPATIENT)
Dept: LAB | Facility: HOSPITAL | Age: 77
End: 2022-06-16

## 2022-06-16 DIAGNOSIS — E11.65 TYPE 2 DIABETES MELLITUS WITH HYPERGLYCEMIA, WITH LONG-TERM CURRENT USE OF INSULIN: ICD-10-CM

## 2022-06-16 DIAGNOSIS — I10 ESSENTIAL HYPERTENSION: ICD-10-CM

## 2022-06-16 DIAGNOSIS — E78.2 MIXED HYPERLIPIDEMIA: ICD-10-CM

## 2022-06-16 DIAGNOSIS — Z79.4 TYPE 2 DIABETES MELLITUS WITH HYPERGLYCEMIA, WITH LONG-TERM CURRENT USE OF INSULIN: ICD-10-CM

## 2022-06-16 DIAGNOSIS — E55.9 VITAMIN D DEFICIENCY: ICD-10-CM

## 2022-06-16 LAB
ALBUMIN SERPL-MCNC: 4.6 G/DL (ref 3.5–5.2)
ALBUMIN/GLOB SERPL: 1.6 G/DL
ALP SERPL-CCNC: 97 U/L (ref 39–117)
ALT SERPL W P-5'-P-CCNC: 45 U/L (ref 1–41)
ANION GAP SERPL CALCULATED.3IONS-SCNC: 10 MMOL/L (ref 5–15)
AST SERPL-CCNC: 33 U/L (ref 1–40)
BASOPHILS # BLD AUTO: 0.03 10*3/MM3 (ref 0–0.2)
BASOPHILS NFR BLD AUTO: 0.6 % (ref 0–1.5)
BILIRUB SERPL-MCNC: 0.6 MG/DL (ref 0–1.2)
BUN SERPL-MCNC: 41 MG/DL (ref 8–23)
BUN/CREAT SERPL: 18.1 (ref 7–25)
CALCIUM SPEC-SCNC: 9.2 MG/DL (ref 8.6–10.5)
CHLORIDE SERPL-SCNC: 105 MMOL/L (ref 98–107)
CO2 SERPL-SCNC: 25 MMOL/L (ref 22–29)
CREAT SERPL-MCNC: 2.26 MG/DL (ref 0.76–1.27)
DEPRECATED RDW RBC AUTO: 42.7 FL (ref 37–54)
EGFRCR SERPLBLD CKD-EPI 2021: 29.3 ML/MIN/1.73
EOSINOPHIL # BLD AUTO: 0.15 10*3/MM3 (ref 0–0.4)
EOSINOPHIL NFR BLD AUTO: 3.1 % (ref 0.3–6.2)
ERYTHROCYTE [DISTWIDTH] IN BLOOD BY AUTOMATED COUNT: 13.4 % (ref 12.3–15.4)
GLOBULIN UR ELPH-MCNC: 2.9 GM/DL
GLUCOSE SERPL-MCNC: 127 MG/DL (ref 65–99)
HCT VFR BLD AUTO: 39.9 % (ref 37.5–51)
HGB BLD-MCNC: 13.2 G/DL (ref 13–17.7)
IMM GRANULOCYTES # BLD AUTO: 0.01 10*3/MM3 (ref 0–0.05)
IMM GRANULOCYTES NFR BLD AUTO: 0.2 % (ref 0–0.5)
LYMPHOCYTES # BLD AUTO: 1.19 10*3/MM3 (ref 0.7–3.1)
LYMPHOCYTES NFR BLD AUTO: 24.4 % (ref 19.6–45.3)
MCH RBC QN AUTO: 28.9 PG (ref 26.6–33)
MCHC RBC AUTO-ENTMCNC: 33.1 G/DL (ref 31.5–35.7)
MCV RBC AUTO: 87.3 FL (ref 79–97)
MONOCYTES # BLD AUTO: 0.43 10*3/MM3 (ref 0.1–0.9)
MONOCYTES NFR BLD AUTO: 8.8 % (ref 5–12)
NEUTROPHILS NFR BLD AUTO: 3.07 10*3/MM3 (ref 1.7–7)
NEUTROPHILS NFR BLD AUTO: 62.9 % (ref 42.7–76)
NRBC BLD AUTO-RTO: 0 /100 WBC (ref 0–0.2)
PLATELET # BLD AUTO: 116 10*3/MM3 (ref 140–450)
PMV BLD AUTO: 11.9 FL (ref 6–12)
POTASSIUM SERPL-SCNC: 4.8 MMOL/L (ref 3.5–5.2)
PROT SERPL-MCNC: 7.5 G/DL (ref 6–8.5)
RBC # BLD AUTO: 4.57 10*6/MM3 (ref 4.14–5.8)
SODIUM SERPL-SCNC: 140 MMOL/L (ref 136–145)
WBC NRBC COR # BLD: 4.88 10*3/MM3 (ref 3.4–10.8)

## 2022-06-16 PROCEDURE — 84443 ASSAY THYROID STIM HORMONE: CPT

## 2022-06-16 PROCEDURE — 80061 LIPID PANEL: CPT

## 2022-06-16 PROCEDURE — 82043 UR ALBUMIN QUANTITATIVE: CPT

## 2022-06-16 PROCEDURE — 36415 COLL VENOUS BLD VENIPUNCTURE: CPT

## 2022-06-16 PROCEDURE — 83036 HEMOGLOBIN GLYCOSYLATED A1C: CPT

## 2022-06-16 PROCEDURE — 82570 ASSAY OF URINE CREATININE: CPT

## 2022-06-16 PROCEDURE — 80053 COMPREHEN METABOLIC PANEL: CPT

## 2022-06-16 PROCEDURE — 85025 COMPLETE CBC W/AUTO DIFF WBC: CPT

## 2022-06-16 PROCEDURE — 82306 VITAMIN D 25 HYDROXY: CPT

## 2022-06-17 LAB
25(OH)D3 SERPL-MCNC: 38.3 NG/ML (ref 30–100)
ALBUMIN UR-MCNC: 277.9 MG/DL
CHOLEST SERPL-MCNC: 97 MG/DL (ref 0–200)
CREAT UR-MCNC: 172.8 MG/DL
HBA1C MFR BLD: 8.8 % (ref 4.8–5.6)
HDLC SERPL-MCNC: 33 MG/DL (ref 40–60)
LDLC SERPL CALC-MCNC: 49 MG/DL (ref 0–100)
LDLC/HDLC SERPL: 1.52 {RATIO}
MICROALBUMIN/CREAT UR: 1608.2 MG/G
TRIGL SERPL-MCNC: 69 MG/DL (ref 0–150)
TSH SERPL DL<=0.05 MIU/L-ACNC: 2.43 UIU/ML (ref 0.27–4.2)
VLDLC SERPL-MCNC: 15 MG/DL (ref 5–40)

## 2022-06-21 ENCOUNTER — OFFICE VISIT (OUTPATIENT)
Dept: ENDOCRINOLOGY | Facility: CLINIC | Age: 77
End: 2022-06-21

## 2022-06-21 VITALS
HEART RATE: 67 BPM | HEIGHT: 73 IN | WEIGHT: 192.8 LBS | OXYGEN SATURATION: 96 % | SYSTOLIC BLOOD PRESSURE: 124 MMHG | DIASTOLIC BLOOD PRESSURE: 76 MMHG | BODY MASS INDEX: 25.55 KG/M2

## 2022-06-21 DIAGNOSIS — E78.2 MIXED HYPERLIPIDEMIA: ICD-10-CM

## 2022-06-21 DIAGNOSIS — I10 ESSENTIAL HYPERTENSION: ICD-10-CM

## 2022-06-21 DIAGNOSIS — E11.65 TYPE 2 DIABETES MELLITUS WITH HYPERGLYCEMIA, WITH LONG-TERM CURRENT USE OF INSULIN: ICD-10-CM

## 2022-06-21 DIAGNOSIS — Z79.4 TYPE 2 DIABETES MELLITUS WITH COMPLICATION, WITH LONG-TERM CURRENT USE OF INSULIN: Primary | ICD-10-CM

## 2022-06-21 DIAGNOSIS — E11.8 TYPE 2 DIABETES MELLITUS WITH COMPLICATION, WITH LONG-TERM CURRENT USE OF INSULIN: Primary | ICD-10-CM

## 2022-06-21 DIAGNOSIS — E55.9 VITAMIN D DEFICIENCY: ICD-10-CM

## 2022-06-21 DIAGNOSIS — Z79.4 TYPE 2 DIABETES MELLITUS WITH HYPERGLYCEMIA, WITH LONG-TERM CURRENT USE OF INSULIN: ICD-10-CM

## 2022-06-21 DIAGNOSIS — N18.32 STAGE 3B CHRONIC KIDNEY DISEASE: ICD-10-CM

## 2022-06-21 PROCEDURE — 95251 CONT GLUC MNTR ANALYSIS I&R: CPT | Performed by: NURSE PRACTITIONER

## 2022-06-21 PROCEDURE — 99214 OFFICE O/P EST MOD 30 MIN: CPT | Performed by: NURSE PRACTITIONER

## 2022-06-21 NOTE — PROGRESS NOTES
"Chief Complaint  Diabetes    Subjective     {Problem List  Visit Diagnosis   Encounters  Notes  Medications  Labs  Result Review Imaging  Media :23}     Zachary Galindo presents to [facility]  History of Present Illness       In office visit     76-year-old male presents for follow-up     Reason diabetes mellitus type 2     Duration diagnosed at the age of 37           Timing constant     Quality not controlled           Severity -  High due to associated complications      Complications - nephropathy and CAD      Current symptoms/problems    hyperglycemia     Alleviating Factors: Compliance       Side Effects  cramping from Trulicity , metformin on diarrhea      Current diet  tries to watch carb intake      Current exercise none      Current monitoring regimen: home blood tests - checking 4 x daily before nichole     Now has nichole personal use and is able to monitor more frequently and having less hypoglycemia                Home blood sugar records:      NICHOLE USING THE PERSONAL SYSTEM           See beow    Hypoglycemia nocturnal and if skipped meals --- none documented since last visit        Review of Systems - General ROS: negative        Objective   Vital Signs:   /76   Pulse 67   Ht 185.4 cm (73\")   Wt 87.5 kg (192 lb 12.8 oz)   SpO2 96%   BMI 25.44 kg/m²     Physical Exam  Constitutional:       Appearance: Normal appearance.   Cardiovascular:      Rate and Rhythm: Regular rhythm.      Heart sounds: Normal heart sounds.   Musculoskeletal:      Cervical back: Normal range of motion.   Neurological:      Mental Status: He is alert.        Result Review :   The following data was reviewed by: ROCCO Ratliff on 06/21/2022:  Common labs    Common Labsle 3/11/22 3/11/22 3/11/22 3/11/22 3/11/22 4/26/22 4/26/22 6/16/22 6/16/22 6/16/22 6/16/22 6/16/22    1155 1207 1207 1207 1207 1151 1151 1632 1633 1633 1633 1633   Glucose   124 (A)    252 " (A)   127 (A)     BUN   33 (A)    36 (A)   41 (A)     Creatinine   2.01 (A)    2.12 (A)   2.26 (A)     Sodium   141    141   140     Potassium   4.5    5.1   4.8     Chloride   105    102   105     Calcium   9.3    9.0   9.2     Albumin   4.10       4.60     Total Bilirubin   0.4       0.6     Alkaline Phosphatase   86       97     AST (SGOT)   19       33     ALT (SGPT)   23       45 (A)     WBC  4.69    4.57   4.88      Hemoglobin  14.5    12.8 (A)   13.2      Hematocrit  42.8    39.5   39.9      Platelets  134 (A)    120 (A)   116 (A)      Total Cholesterol     145      97    Triglycerides     205 (A)      69    HDL Cholesterol     29 (A)      33 (A)    LDL Cholesterol      81      49    Hemoglobin A1C    9.70 (A)        8.80 (A)   Microalbumin, Urine >440.0       277.9       (A) Abnormal value                      Assessment and Plan    Diagnoses and all orders for this visit:    1. Type 2 diabetes mellitus with complication, with long-term current use of insulin (HCC) (Primary)    2. Essential hypertension  -     CBC & Differential; Future  -     Comprehensive Metabolic Panel; Future  -     Hemoglobin A1c; Future  -     Lipid Panel; Future  -     TSH; Future  -     Vitamin D 25 Hydroxy; Future    3. Mixed hyperlipidemia  -     CBC & Differential; Future  -     Comprehensive Metabolic Panel; Future  -     Hemoglobin A1c; Future  -     Lipid Panel; Future  -     TSH; Future  -     Vitamin D 25 Hydroxy; Future    4. Type 2 diabetes mellitus with hyperglycemia, with long-term current use of insulin (HCC)  -     CBC & Differential; Future  -     Comprehensive Metabolic Panel; Future  -     Hemoglobin A1c; Future  -     Lipid Panel; Future  -     TSH; Future  -     Vitamin D 25 Hydroxy; Future    5. Vitamin D deficiency  -     CBC & Differential; Future  -     Comprehensive Metabolic Panel; Future  -     Hemoglobin A1c; Future  -     Lipid Panel; Future  -     TSH; Future  -     Vitamin D 25 Hydroxy; Future    6.  Stage 3b chronic kidney disease (HCC)               Diabetes complicated by stage III ckd and CAD                 Lab Results   Component Value Date    HGBA1C 8.80 (H) 06/16/2022           kimi freestyle personal-       Download and reviewed     Dated June 8 up to June 21, 2022    Average bg 151    Time in target 69%     High 26%     Very high 1%     Low 2 %     Very low 2%       Lows occur overnight and when active     Decrease to basal to 52 units              Tresiba taking  56 units --decrease to 52 units            Morning range 130-180         =====      Metformin 500 mg , 2 with supper ,intially  GI upset but now resolved     Metformin  mg one with supper  ---stopped due to side effects      =====     Novolog taking 12 units --increase to 14 units up to 18 units depending on size of meal and cgm reading               Self adjusting explained         He will keep current doses since blood sugar levels are now back at goal he is instructed to call if having hyper or hypoglycemia           Kimi  has allowed the patient to minimize hypoglycemia as alarms have predicted and avoided them     She also uses the arrows on the kimi  as follows:     If arrow is horizontal , she uses the predicted bolus     If the arrow is slanted up or down-- she increase or decrease by 4  units     If the arrow is vertical up or down - she increases or decreases by 4 unit s          Discontinued Medications      Trulicity 0.75 mg weekly- stopped - cramping    invokana is expensive , jardiance , side effec          Microvascular complications            Last eye exam -- July 2020 , following with          Follows with      CKD stage III      positive proteinuria         Neuropathy none            Hyperlipidemia         taking lipitor 10 mg , 5 days per week     Taking zetia 10 mg daily                  Hypertension     Taking norvasc 5 mg one daily          Bone health     Vitamin D deficiency     Taking MVI  daily                Follow Up   Return in about 3 months (around 9/21/2022) for Recheck.  Patient was given instructions and counseling regarding his condition or for health maintenance advice. Please see specific information pulled into the AVS if appropriate.         This document has been electronically signed by ROCCO Ratliff on June 21, 2022 09:07 CDT.

## 2022-06-28 ENCOUNTER — OFFICE VISIT (OUTPATIENT)
Dept: FAMILY MEDICINE CLINIC | Facility: CLINIC | Age: 77
End: 2022-06-28

## 2022-06-28 VITALS
WEIGHT: 188 LBS | HEART RATE: 61 BPM | OXYGEN SATURATION: 96 % | HEIGHT: 73 IN | SYSTOLIC BLOOD PRESSURE: 170 MMHG | BODY MASS INDEX: 24.92 KG/M2 | DIASTOLIC BLOOD PRESSURE: 70 MMHG

## 2022-06-28 DIAGNOSIS — E11.8 TYPE 2 DIABETES MELLITUS WITH COMPLICATION, WITH LONG-TERM CURRENT USE OF INSULIN: Chronic | ICD-10-CM

## 2022-06-28 DIAGNOSIS — N18.32 STAGE 3B CHRONIC KIDNEY DISEASE: ICD-10-CM

## 2022-06-28 DIAGNOSIS — Z79.4 TYPE 2 DIABETES MELLITUS WITH COMPLICATION, WITH LONG-TERM CURRENT USE OF INSULIN: Chronic | ICD-10-CM

## 2022-06-28 DIAGNOSIS — E78.2 MIXED HYPERLIPIDEMIA: Chronic | ICD-10-CM

## 2022-06-28 DIAGNOSIS — I25.810 CORONARY ARTERY DISEASE INVOLVING CORONARY BYPASS GRAFT OF NATIVE HEART WITHOUT ANGINA PECTORIS: ICD-10-CM

## 2022-06-28 DIAGNOSIS — I10 ESSENTIAL HYPERTENSION: Primary | Chronic | ICD-10-CM

## 2022-06-28 PROCEDURE — 99214 OFFICE O/P EST MOD 30 MIN: CPT | Performed by: GENERAL PRACTICE

## 2022-06-28 RX ORDER — CLOPIDOGREL BISULFATE 75 MG/1
75 TABLET ORAL DAILY
Qty: 90 TABLET | Refills: 3 | Status: SHIPPED | OUTPATIENT
Start: 2022-06-28 | End: 2022-12-20 | Stop reason: SDUPTHER

## 2022-06-28 RX ORDER — LOSARTAN POTASSIUM 50 MG/1
50 TABLET ORAL DAILY
Qty: 100 TABLET | Refills: 1 | Status: SHIPPED | OUTPATIENT
Start: 2022-06-28 | End: 2022-12-20 | Stop reason: SDUPTHER

## 2022-06-28 RX ORDER — EZETIMIBE 10 MG/1
10 TABLET ORAL DAILY
Qty: 90 TABLET | Refills: 1 | Status: SHIPPED | OUTPATIENT
Start: 2022-06-28 | End: 2022-12-20 | Stop reason: SDUPTHER

## 2022-06-28 RX ORDER — ISOSORBIDE MONONITRATE 30 MG/1
30 TABLET, EXTENDED RELEASE ORAL EVERY MORNING
Qty: 90 TABLET | Refills: 1 | Status: SHIPPED | OUTPATIENT
Start: 2022-06-28 | End: 2022-12-20 | Stop reason: SDUPTHER

## 2022-06-28 RX ORDER — NITROGLYCERIN 0.4 MG/1
0.4 TABLET SUBLINGUAL
Qty: 25 TABLET | Refills: 0 | Status: SHIPPED | OUTPATIENT
Start: 2022-06-28

## 2022-06-28 RX ORDER — ATORVASTATIN CALCIUM 20 MG/1
20 TABLET, FILM COATED ORAL DAILY
Qty: 100 TABLET | Refills: 1 | Status: SHIPPED | OUTPATIENT
Start: 2022-06-28 | End: 2022-12-20 | Stop reason: SDUPTHER

## 2022-06-28 NOTE — PROGRESS NOTES
Subjective   Zachary Galindo is a 76 y.o. male.     Chief Complaint   Patient presents with   • Annual Exam     For review and evaluation of management of chronic medical problems. Records reviewed. Recent labs, xrays reviewed and medications reconciled.   Diabetes  He presents for his follow-up diabetic visit. He has type 2 diabetes mellitus. His disease course has been improving. There are no hypoglycemic associated symptoms. Pertinent negatives for hypoglycemia include no dizziness, headaches or nervousness/anxiousness. There are no diabetic associated symptoms. Pertinent negatives for diabetes include no chest pain, no fatigue and no weakness. There are no hypoglycemic complications. Diabetic complications include nephropathy. Risk factors for coronary artery disease include diabetes mellitus, dyslipidemia, hypertension and male sex. Current diabetic treatment includes insulin injections. He is compliant with treatment all of the time. His weight is stable. He is following a generally healthy diet. Meal planning includes ADA exchanges. He participates in exercise intermittently. There is no change in his home blood glucose trend. An ACE inhibitor/angiotensin II receptor blocker is being taken. Eye exam is current.   Hypertension  This is a chronic problem. The current episode started more than 1 year ago. The problem is unchanged. The problem is controlled. Pertinent negatives include no chest pain, headaches, neck pain, palpitations or shortness of breath. There are no associated agents to hypertension. Risk factors for coronary artery disease include diabetes mellitus and dyslipidemia. Current antihypertension treatment includes angiotensin blockers, calcium channel blockers and beta blockers. The current treatment provides significant improvement. There are no compliance problems.    Hyperlipidemia  This is a chronic problem. The current episode started more than 1 year ago. The problem is  uncontrolled. Recent lipid tests were reviewed and are high. Exacerbating diseases include diabetes. There are no known factors aggravating his hyperlipidemia. Pertinent negatives include no chest pain, myalgias or shortness of breath. Current antihyperlipidemic treatment includes ezetimibe and statins. The current treatment provides significant improvement of lipids. There are no compliance problems.    Chronic Kidney Disease  This is a chronic problem. The current episode started more than 1 year ago. The problem occurs constantly. The problem has been gradually improving. Pertinent negatives include no abdominal pain, arthralgias, chest pain, chills, congestion, coughing, fatigue, fever, headaches, joint swelling, myalgias, nausea, neck pain, numbness, rash, sore throat, vomiting or weakness. Nothing aggravates the symptoms. Treatments tried: hydration.   Coronary Artery Disease  Presents for follow-up (recent PTCA and stents) visit. Pertinent negatives include no chest pain, chest pressure, chest tightness, dizziness, leg swelling, palpitations or shortness of breath. Risk factors include hyperlipidemia. The symptoms have been resolved. Compliance with diet is good. Compliance with exercise is good. Compliance with medications is good.        The following portions of the patient's history were reviewed and updated as appropriate: allergies, current medications, past family and social history and problem list.    Outpatient Medications Prior to Visit   Medication Sig Dispense Refill   • alfuzosin (UROXATRAL) 10 MG 24 hr tablet Take 10 mg by mouth Daily.     • amLODIPine (NORVASC) 10 MG tablet Take 1 tablet by mouth Daily.     • aspirin 81 MG tablet Take 1 tablet by mouth Daily. 30 tablet 11   • carvedilol (COREG) 25 MG tablet TAKE 1 TABLET BY MOUTH TWICE DAILY WITH MEALS 180 tablet 1   • Continuous Blood Gluc Sensor (FreeStyle Kimi 14 Day Sensor) [facility] USE as directed TO monitor blood glucose. replace every 14  "DAYS. 6 each 2   • gabapentin (NEURONTIN) 300 MG capsule Take 1 capsule by mouth 2 (Two) Times a Day. 180 capsule 1   • glucose blood test strip OneTouch Ultra Test strips  -  Test sugars 3-4 times a day as directed by provider.     • insulin aspart (NovoLOG FlexPen) 100 UNIT/ML solution pen-injector sc pen INJECT 12 UP TO 20 UNITS SUBQ WITH MEALS 15 mL 10   • Insulin Glargine, 2 Unit Dial, (Toujeo Max SoloStar) 300 UNIT/ML solution pen-injector injection Inject 60 Units under the skin into the appropriate area as directed Daily. (Patient taking differently: Inject 60 Units under the skin into the appropriate area as directed Daily. 46 un daily) 5 pen 11   • Insulin Pen Needle (Pen Needles 3/16\") 31G X 5 MM misc 100 each Daily. 100 each 3   • Lancets (ONETOUCH ULTRASOFT) lancets Test sugars 3-4 times daily as instructed by physician.     • probenecid (BENEMID) 500 MG tablet TAKE 1 TABLET BY MOUTH TWICE DAILY 180 tablet 0   • atorvastatin (LIPITOR) 20 MG tablet Take 1 tablet by mouth Daily. 100 tablet 0   • clopidogrel (PLAVIX) 75 MG tablet TAKE 1 TABLET BY MOUTH EVERY DAY 90 tablet 0   • ezetimibe (ZETIA) 10 MG tablet TAKE 1 TABLET BY MOUTH EVERY DAY 90 tablet 0   • isosorbide mononitrate (IMDUR) 30 MG 24 hr tablet TAKE 1 TABLET BY MOUTH EVERY MORNING 90 tablet 0   • losartan (COZAAR) 50 MG tablet Take 1 tablet by mouth Daily. 100 tablet 0   • nitroglycerin (Nitrostat) 0.4 MG SL tablet Place 1 tablet under the tongue Every 5 (Five) Minutes As Needed for Chest Pain. Max 3 doses. Go to ER if no relief 90 tablet 0     No facility-administered medications prior to visit.       Review of Systems   Constitutional: Negative for chills, fatigue and fever.   HENT: Negative for congestion and sore throat.    Respiratory: Negative for cough, chest tightness and shortness of breath.    Cardiovascular: Negative for chest pain, palpitations and leg swelling.   Gastrointestinal: Negative for abdominal pain, nausea and vomiting. " "  Musculoskeletal: Negative for arthralgias, joint swelling, myalgias and neck pain.   Skin: Negative for rash.   Neurological: Negative for dizziness, weakness, numbness and headaches.   Psychiatric/Behavioral: The patient is not nervous/anxious.      I have reviewed 12 systems with patient. Findings were negative except what is noted below and/or in history of present illness.    Objective     Visit Vitals  /70 Comment: has not taken any of his medication   Pulse 61   Ht 185.4 cm (73\")   Wt 85.3 kg (188 lb)   SpO2 96%   BMI 24.80 kg/m²     Physical Exam  Constitutional:       General: He is not in acute distress.     Appearance: He is well-developed.   HENT:      Head: Normocephalic and atraumatic.      Nose: Nose normal.   Eyes:      General:         Right eye: No discharge.         Left eye: No discharge.      Conjunctiva/sclera: Conjunctivae normal.      Pupils: Pupils are equal, round, and reactive to light.   Neck:      Thyroid: No thyromegaly.   Cardiovascular:      Rate and Rhythm: Normal rate and regular rhythm.      Heart sounds: Normal heart sounds. No murmur heard.  Pulmonary:      Effort: Pulmonary effort is normal. No respiratory distress.      Breath sounds: Normal breath sounds. No wheezing or rales.   Chest:      Chest wall: No tenderness.   Abdominal:      General: Bowel sounds are normal. There is no distension.      Palpations: Abdomen is soft. There is no mass.      Tenderness: There is no abdominal tenderness.      Hernia: No hernia is present.   Musculoskeletal:         General: No deformity. Normal range of motion.      Cervical back: Normal range of motion.   Lymphadenopathy:      Cervical: No cervical adenopathy.   Skin:     General: Skin is warm and dry.      Coloration: Skin is not pale.      Findings: No rash.   Neurological:      Mental Status: He is alert and oriented to person, place, and time.      Deep Tendon Reflexes: Reflexes are normal and symmetric.   Psychiatric:         " Behavior: Behavior normal.         Thought Content: Thought content normal.         Judgment: Judgment normal.       Results for orders placed or performed in visit on 06/16/22   Microalbumin / Creatinine Urine Ratio - Urine, Clean Catch    Specimen: Urine, Clean Catch   Result Value Ref Range    Microalbumin/Creatinine Ratio 1,608.2 mg/g    Creatinine, Urine 172.8 mg/dL    Microalbumin, Urine 277.9 mg/dL   Comprehensive Metabolic Panel    Specimen: Blood   Result Value Ref Range    Glucose 127 (H) 65 - 99 mg/dL    BUN 41 (H) 8 - 23 mg/dL    Creatinine 2.26 (H) 0.76 - 1.27 mg/dL    Sodium 140 136 - 145 mmol/L    Potassium 4.8 3.5 - 5.2 mmol/L    Chloride 105 98 - 107 mmol/L    CO2 25.0 22.0 - 29.0 mmol/L    Calcium 9.2 8.6 - 10.5 mg/dL    Total Protein 7.5 6.0 - 8.5 g/dL    Albumin 4.60 3.50 - 5.20 g/dL    ALT (SGPT) 45 (H) 1 - 41 U/L    AST (SGOT) 33 1 - 40 U/L    Alkaline Phosphatase 97 39 - 117 U/L    Total Bilirubin 0.6 0.0 - 1.2 mg/dL    Globulin 2.9 gm/dL    A/G Ratio 1.6 g/dL    BUN/Creatinine Ratio 18.1 7.0 - 25.0    Anion Gap 10.0 5.0 - 15.0 mmol/L    eGFR 29.3 (L) >60.0 mL/min/1.73   Hemoglobin A1c    Specimen: Blood   Result Value Ref Range    Hemoglobin A1C 8.80 (H) 4.80 - 5.60 %   Lipid Panel    Specimen: Blood   Result Value Ref Range    Total Cholesterol 97 0 - 200 mg/dL    Triglycerides 69 0 - 150 mg/dL    HDL Cholesterol 33 (L) 40 - 60 mg/dL    LDL Cholesterol  49 0 - 100 mg/dL    VLDL Cholesterol 15 5 - 40 mg/dL    LDL/HDL Ratio 1.52    TSH    Specimen: Blood   Result Value Ref Range    TSH 2.430 0.270 - 4.200 uIU/mL   Vitamin D 25 Hydroxy    Specimen: Blood   Result Value Ref Range    25 Hydroxy, Vitamin D 38.3 30.0 - 100.0 ng/ml   CBC Auto Differential    Specimen: Blood   Result Value Ref Range    WBC 4.88 3.40 - 10.80 10*3/mm3    RBC 4.57 4.14 - 5.80 10*6/mm3    Hemoglobin 13.2 13.0 - 17.7 g/dL    Hematocrit 39.9 37.5 - 51.0 %    MCV 87.3 79.0 - 97.0 fL    MCH 28.9 26.6 - 33.0 pg    MCHC 33.1  31.5 - 35.7 g/dL    RDW 13.4 12.3 - 15.4 %    RDW-SD 42.7 37.0 - 54.0 fl    MPV 11.9 6.0 - 12.0 fL    Platelets 116 (L) 140 - 450 10*3/mm3    Neutrophil % 62.9 42.7 - 76.0 %    Lymphocyte % 24.4 19.6 - 45.3 %    Monocyte % 8.8 5.0 - 12.0 %    Eosinophil % 3.1 0.3 - 6.2 %    Basophil % 0.6 0.0 - 1.5 %    Immature Grans % 0.2 0.0 - 0.5 %    Neutrophils, Absolute 3.07 1.70 - 7.00 10*3/mm3    Lymphocytes, Absolute 1.19 0.70 - 3.10 10*3/mm3    Monocytes, Absolute 0.43 0.10 - 0.90 10*3/mm3    Eosinophils, Absolute 0.15 0.00 - 0.40 10*3/mm3    Basophils, Absolute 0.03 0.00 - 0.20 10*3/mm3    Immature Grans, Absolute 0.01 0.00 - 0.05 10*3/mm3    nRBC 0.0 0.0 - 0.2 /100 WBC      Notes brought forward are reviewed and updated if indicated.     Assessment & Plan   Problems Addressed this Visit        Cardiac and Vasculature    Essential hypertension - Primary (Chronic)    Relevant Medications    losartan (COZAAR) 50 MG tablet    Hyperlipidemia (Chronic)    Relevant Medications    atorvastatin (LIPITOR) 20 MG tablet    ezetimibe (ZETIA) 10 MG tablet    Coronary artery disease involving coronary bypass graft of native heart without angina pectoris    Relevant Medications    atorvastatin (LIPITOR) 20 MG tablet    clopidogrel (PLAVIX) 75 MG tablet    isosorbide mononitrate (IMDUR) 30 MG 24 hr tablet    nitroglycerin (Nitrostat) 0.4 MG SL tablet       Endocrine and Metabolic    Type 2 diabetes mellitus with complication, with long-term current use of insulin (HCC) (Chronic)       Genitourinary and Reproductive     Stage 3b chronic kidney disease (HCC)      Diagnoses       Codes Comments    Essential hypertension    -  Primary ICD-10-CM: I10  ICD-9-CM: 401.9     Mixed hyperlipidemia     ICD-10-CM: E78.2  ICD-9-CM: 272.2     Coronary artery disease involving coronary bypass graft of native heart without angina pectoris     ICD-10-CM: I25.810  ICD-9-CM: 414.05     Stage 3b chronic kidney disease (HCC)     ICD-10-CM: N18.32  ICD-9-CM:  585.3     Type 2 diabetes mellitus with complication, with long-term current use of insulin (HCC)     ICD-10-CM: E11.8, Z79.4  ICD-9-CM: 250.90, V58.67           Continue current medications. Follow up with specialists.     Age-appropriate counseling is provided.   New Medications Ordered This Visit   Medications   • atorvastatin (LIPITOR) 20 MG tablet     Sig: Take 1 tablet by mouth Daily.     Dispense:  100 tablet     Refill:  1   • clopidogrel (PLAVIX) 75 MG tablet     Sig: Take 1 tablet by mouth Daily.     Dispense:  90 tablet     Refill:  3   • ezetimibe (ZETIA) 10 MG tablet     Sig: Take 1 tablet by mouth Daily.     Dispense:  90 tablet     Refill:  1   • isosorbide mononitrate (IMDUR) 30 MG 24 hr tablet     Sig: Take 1 tablet by mouth Every Morning.     Dispense:  90 tablet     Refill:  1   • losartan (COZAAR) 50 MG tablet     Sig: Take 1 tablet by mouth Daily.     Dispense:  100 tablet     Refill:  1   • nitroglycerin (Nitrostat) 0.4 MG SL tablet     Sig: Place 1 tablet under the tongue Every 5 (Five) Minutes As Needed for Chest Pain. Max 3 doses. Go to ER if no relief     Dispense:  25 tablet     Refill:  0     Return in about 6 months (around 12/19/2022) for medicare wellness visit.        This document has been electronically signed by Halley Raymond MD on June 28, 2022 08:17 CDT

## 2022-08-04 ENCOUNTER — OFFICE VISIT (OUTPATIENT)
Dept: ORTHOPEDIC SURGERY | Facility: CLINIC | Age: 77
End: 2022-08-04

## 2022-08-04 VITALS — BODY MASS INDEX: 25.31 KG/M2 | HEIGHT: 73 IN | WEIGHT: 191 LBS

## 2022-08-04 DIAGNOSIS — G89.29 CHRONIC MIDLINE LOW BACK PAIN WITHOUT SCIATICA: Primary | ICD-10-CM

## 2022-08-04 DIAGNOSIS — M54.50 CHRONIC MIDLINE LOW BACK PAIN WITHOUT SCIATICA: Primary | ICD-10-CM

## 2022-08-04 PROBLEM — M25.722: Status: ACTIVE | Noted: 2022-08-04

## 2022-08-04 PROBLEM — M25.522 LEFT ELBOW PAIN: Status: ACTIVE | Noted: 2022-08-04

## 2022-08-04 PROBLEM — M70.22 OLECRANON BURSITIS OF LEFT ELBOW: Status: ACTIVE | Noted: 2022-08-04

## 2022-08-04 PROCEDURE — 96372 THER/PROPH/DIAG INJ SC/IM: CPT | Performed by: NURSE PRACTITIONER

## 2022-08-04 PROCEDURE — 99212 OFFICE O/P EST SF 10 MIN: CPT | Performed by: NURSE PRACTITIONER

## 2022-08-04 RX ORDER — TRIAMCINOLONE ACETONIDE 40 MG/ML
80 INJECTION, SUSPENSION INTRA-ARTICULAR; INTRAMUSCULAR ONCE
Status: COMPLETED | OUTPATIENT
Start: 2022-08-04 | End: 2022-08-04

## 2022-08-04 RX ADMIN — TRIAMCINOLONE ACETONIDE 80 MG: 40 INJECTION, SUSPENSION INTRA-ARTICULAR; INTRAMUSCULAR at 08:23

## 2022-08-04 NOTE — PROGRESS NOTES
"Zachary Galindo is a 76 y.o. male returns for     Chief Complaint   Patient presents with   • Left Elbow - Follow-up, Pain       HISTORY OF PRESENT ILLNESS:      Mr. Galindo is a 76-year-old male who presents today requesting IM injection for low back pain.  Patient reports chronic low back pain that does not have radicular symptoms.  He denies injury.  He denies fever, nausea, vomiting.  Pain is mild and midline.  He has tried IM injections in the past with good relief of symptoms.       CONCURRENT MEDICAL HISTORY:    The following portions of the patient's history were reviewed and updated as appropriate: allergies, current medications, past family history, past medical history, past social history, past surgical history and problem list.     ROS  No fevers or chills.  No chest pain or shortness of air.  No GI or  disturbances.  Low back pain.    PHYSICAL EXAMINATION:       Ht 185.4 cm (73\")   Wt 86.6 kg (191 lb)   BMI 25.20 kg/m²     Physical Exam  Vitals and nursing note reviewed.   Constitutional:       General: He is not in acute distress.     Appearance: He is well-developed. He is not toxic-appearing.   HENT:      Head: Normocephalic.   Pulmonary:      Effort: Pulmonary effort is normal. No respiratory distress.   Skin:     General: Skin is warm and dry.   Neurological:      Mental Status: He is alert and oriented to person, place, and time.   Psychiatric:         Behavior: Behavior normal.         Thought Content: Thought content normal.         Judgment: Judgment normal.         GAIT:     [x]  Normal  []  Antalgic    Assistive device: [x]  None  []  Walker     []  Crutches  []  Cane     []  Wheelchair  []  Stretcher    Back Exam     Tenderness   The patient is experiencing no tenderness.     Other   Sensation: normal  Gait: normal               No results found.          ASSESSMENT:    Diagnoses and all orders for this visit:    Chronic midline low back pain without sciatica  -     triamcinolone " acetonide (KENALOG-40) injection 80 mg          PLAN    Patient does not have any complaints of elbow today.  Patient does have some chronic bursitis, signs and symptoms to report when to seek care explained.  Patient verbalized understanding.      In regards to chronic low back pain, patient reports good relief with IM injection.  Patient is diabetic, most recent hemoglobin A1c was decreased from previous and at 8.8.  Risk, benefits, alternatives to IM injection explained.  Patient verbalized understanding and tolerated injection well.    Patient to return in 3 months or as needed.      Return if symptoms worsen or fail to improve.    EMR Dragon/Transciption Disclaimer: Some of this note may be an electronic transcription/translation of spoken language to printed text.  The electronic translation of spoken language may permit erroneous, or at times, nonsensical words or phrases to be inadvertently transcribed. Although I have reviewed the note for such errors, some may still exist.     Time spent of a minimum of 10 minutes including the face to face evaluation, reviewing of medical history and prior medial records, reviewing of diagnostic studies, ordering additional tests, documentation, patient education and coordination of care.         This document has been electronically signed by Gloria VALIENTE on August 4, 2022 08:31 CDT

## 2022-08-18 ENCOUNTER — DOCUMENTATION (OUTPATIENT)
Dept: ENDOCRINOLOGY | Facility: CLINIC | Age: 77
End: 2022-08-18

## 2022-08-22 RX ORDER — PROBENECID 500 MG/1
TABLET, FILM COATED ORAL
Qty: 180 TABLET | Refills: 0 | Status: SHIPPED | OUTPATIENT
Start: 2022-08-22 | End: 2022-12-20

## 2022-08-30 RX ORDER — INSULIN GLARGINE 300 U/ML
INJECTION, SOLUTION SUBCUTANEOUS
Qty: 12 ML | Refills: 10 | Status: SHIPPED | OUTPATIENT
Start: 2022-08-30

## 2022-09-21 ENCOUNTER — LAB (OUTPATIENT)
Dept: LAB | Facility: HOSPITAL | Age: 77
End: 2022-09-21

## 2022-09-21 DIAGNOSIS — Z79.4 TYPE 2 DIABETES MELLITUS WITH HYPERGLYCEMIA, WITH LONG-TERM CURRENT USE OF INSULIN: ICD-10-CM

## 2022-09-21 DIAGNOSIS — E11.65 TYPE 2 DIABETES MELLITUS WITH HYPERGLYCEMIA, WITH LONG-TERM CURRENT USE OF INSULIN: ICD-10-CM

## 2022-09-21 DIAGNOSIS — E78.2 MIXED HYPERLIPIDEMIA: ICD-10-CM

## 2022-09-21 DIAGNOSIS — I10 ESSENTIAL HYPERTENSION: ICD-10-CM

## 2022-09-21 DIAGNOSIS — E55.9 VITAMIN D DEFICIENCY: ICD-10-CM

## 2022-09-21 LAB
25(OH)D3 SERPL-MCNC: 33.2 NG/ML (ref 30–100)
ALBUMIN SERPL-MCNC: 4.1 G/DL (ref 3.5–5.2)
ALBUMIN/GLOB SERPL: 1.3 G/DL
ALP SERPL-CCNC: 87 U/L (ref 39–117)
ALT SERPL W P-5'-P-CCNC: 52 U/L (ref 1–41)
ANION GAP SERPL CALCULATED.3IONS-SCNC: 8 MMOL/L (ref 5–15)
AST SERPL-CCNC: 33 U/L (ref 1–40)
BASOPHILS # BLD AUTO: 0.03 10*3/MM3 (ref 0–0.2)
BASOPHILS NFR BLD AUTO: 0.5 % (ref 0–1.5)
BILIRUB SERPL-MCNC: 0.5 MG/DL (ref 0–1.2)
BUN SERPL-MCNC: 40 MG/DL (ref 8–23)
BUN/CREAT SERPL: 19.4 (ref 7–25)
CALCIUM SPEC-SCNC: 9.3 MG/DL (ref 8.6–10.5)
CHLORIDE SERPL-SCNC: 105 MMOL/L (ref 98–107)
CHOLEST SERPL-MCNC: 130 MG/DL (ref 0–200)
CO2 SERPL-SCNC: 27 MMOL/L (ref 22–29)
CREAT SERPL-MCNC: 2.06 MG/DL (ref 0.76–1.27)
DEPRECATED RDW RBC AUTO: 43.6 FL (ref 37–54)
EGFRCR SERPLBLD CKD-EPI 2021: 32.8 ML/MIN/1.73
EOSINOPHIL # BLD AUTO: 0.16 10*3/MM3 (ref 0–0.4)
EOSINOPHIL NFR BLD AUTO: 2.9 % (ref 0.3–6.2)
ERYTHROCYTE [DISTWIDTH] IN BLOOD BY AUTOMATED COUNT: 13.4 % (ref 12.3–15.4)
GLOBULIN UR ELPH-MCNC: 3.1 GM/DL
GLUCOSE SERPL-MCNC: 167 MG/DL (ref 65–99)
HBA1C MFR BLD: 9.3 % (ref 4.8–5.6)
HCT VFR BLD AUTO: 43.5 % (ref 37.5–51)
HDLC SERPL-MCNC: 32 MG/DL (ref 40–60)
HGB BLD-MCNC: 14.1 G/DL (ref 13–17.7)
IMM GRANULOCYTES # BLD AUTO: 0.02 10*3/MM3 (ref 0–0.05)
IMM GRANULOCYTES NFR BLD AUTO: 0.4 % (ref 0–0.5)
LDLC SERPL CALC-MCNC: 72 MG/DL (ref 0–100)
LDLC/HDLC SERPL: 2.12 {RATIO}
LYMPHOCYTES # BLD AUTO: 1.14 10*3/MM3 (ref 0.7–3.1)
LYMPHOCYTES NFR BLD AUTO: 20.8 % (ref 19.6–45.3)
MCH RBC QN AUTO: 28.7 PG (ref 26.6–33)
MCHC RBC AUTO-ENTMCNC: 32.4 G/DL (ref 31.5–35.7)
MCV RBC AUTO: 88.6 FL (ref 79–97)
MONOCYTES # BLD AUTO: 0.52 10*3/MM3 (ref 0.1–0.9)
MONOCYTES NFR BLD AUTO: 9.5 % (ref 5–12)
NEUTROPHILS NFR BLD AUTO: 3.62 10*3/MM3 (ref 1.7–7)
NEUTROPHILS NFR BLD AUTO: 65.9 % (ref 42.7–76)
NRBC BLD AUTO-RTO: 0 /100 WBC (ref 0–0.2)
PLATELET # BLD AUTO: 159 10*3/MM3 (ref 140–450)
PMV BLD AUTO: 11.4 FL (ref 6–12)
POTASSIUM SERPL-SCNC: 5.3 MMOL/L (ref 3.5–5.2)
PROT SERPL-MCNC: 7.2 G/DL (ref 6–8.5)
RBC # BLD AUTO: 4.91 10*6/MM3 (ref 4.14–5.8)
SODIUM SERPL-SCNC: 140 MMOL/L (ref 136–145)
TRIGL SERPL-MCNC: 151 MG/DL (ref 0–150)
TSH SERPL DL<=0.05 MIU/L-ACNC: 2.64 UIU/ML (ref 0.27–4.2)
VLDLC SERPL-MCNC: 26 MG/DL (ref 5–40)
WBC NRBC COR # BLD: 5.49 10*3/MM3 (ref 3.4–10.8)

## 2022-09-21 PROCEDURE — 80053 COMPREHEN METABOLIC PANEL: CPT

## 2022-09-21 PROCEDURE — 36415 COLL VENOUS BLD VENIPUNCTURE: CPT

## 2022-09-21 PROCEDURE — 85025 COMPLETE CBC W/AUTO DIFF WBC: CPT

## 2022-09-21 PROCEDURE — 83036 HEMOGLOBIN GLYCOSYLATED A1C: CPT

## 2022-09-21 PROCEDURE — 84443 ASSAY THYROID STIM HORMONE: CPT

## 2022-09-21 PROCEDURE — 80061 LIPID PANEL: CPT

## 2022-09-21 PROCEDURE — 82306 VITAMIN D 25 HYDROXY: CPT

## 2022-09-27 ENCOUNTER — OFFICE VISIT (OUTPATIENT)
Dept: ENDOCRINOLOGY | Facility: CLINIC | Age: 77
End: 2022-09-27

## 2022-09-27 ENCOUNTER — TELEPHONE (OUTPATIENT)
Dept: CARDIOLOGY | Facility: CLINIC | Age: 77
End: 2022-09-27

## 2022-09-27 VITALS
HEIGHT: 73 IN | HEART RATE: 66 BPM | SYSTOLIC BLOOD PRESSURE: 138 MMHG | RESPIRATION RATE: 18 BRPM | BODY MASS INDEX: 24.86 KG/M2 | OXYGEN SATURATION: 99 % | WEIGHT: 187.6 LBS | DIASTOLIC BLOOD PRESSURE: 72 MMHG

## 2022-09-27 DIAGNOSIS — I10 ESSENTIAL HYPERTENSION: ICD-10-CM

## 2022-09-27 DIAGNOSIS — N18.32 STAGE 3B CHRONIC KIDNEY DISEASE: ICD-10-CM

## 2022-09-27 DIAGNOSIS — E78.2 MIXED HYPERLIPIDEMIA: ICD-10-CM

## 2022-09-27 DIAGNOSIS — E11.65 TYPE 2 DIABETES MELLITUS WITH HYPERGLYCEMIA, WITH LONG-TERM CURRENT USE OF INSULIN: Primary | ICD-10-CM

## 2022-09-27 DIAGNOSIS — Z79.4 TYPE 2 DIABETES MELLITUS WITH HYPERGLYCEMIA, WITH LONG-TERM CURRENT USE OF INSULIN: Primary | ICD-10-CM

## 2022-09-27 PROCEDURE — 95251 CONT GLUC MNTR ANALYSIS I&R: CPT | Performed by: NURSE PRACTITIONER

## 2022-09-27 PROCEDURE — 99214 OFFICE O/P EST MOD 30 MIN: CPT | Performed by: NURSE PRACTITIONER

## 2022-09-27 NOTE — TELEPHONE ENCOUNTER
----- Message from Shannon Carroll sent at 9/27/2022 12:54 PM CDT -----  Regarding: callback  Contact: [phone]  Zachary Clayton called and stated his bp has been dropping. He said it was 38/72 this morning when he saw his diabetes doctor then later on today it was 95/57. He said he quit taking his carvedilol 25mg in the morning and started taking it at night, he's been doing this for 3 weeks. He would like a callback at [phone]. Thanks     Patient contacted and advised his message has been sent to dr. Barrios. Once dr. barrios has reviewed the patient will be contacted with recommendations.

## 2022-09-27 NOTE — PROGRESS NOTES
"Chief Complaint  Follow-up and Diabetes (3 mo)    Subjective          Zachary Galindo presents to [facility] END[facility]  History of Present Illness         In office visit     76-year-old male presents for follow-up     Reason diabetes mellitus type 2     Duration diagnosed at the age of 37           Timing constant     Quality not controlled           Severity -  High due to associated complications      Complications - nephropathy and CAD      Current symptoms/problems    hyperglycemia     Alleviating Factors: Compliance       Side Effects  cramping from Trulicity , metformin on diarrhea      Current diet  tries to watch carb intake      Current exercise none      Current monitoring regimen: home blood tests - checking 4 x daily before nichole     Now has nichole personal use and is able to monitor more frequently and having less hypoglycemia                Home blood sugar records:      NICHOLE USING THE PERSONAL SYSTEM         See below      He has had steriods since last visit and was running high 300            Objective   Vital Signs:   /72   Pulse 66   Resp 18   Ht 185.4 cm (73\")   Wt 85.1 kg (187 lb 9.6 oz)   SpO2 99%   BMI 24.75 kg/m²     Physical Exam  Constitutional:       Appearance: Normal appearance.   Cardiovascular:      Rate and Rhythm: Regular rhythm.      Heart sounds: Normal heart sounds.   Musculoskeletal:      Cervical back: Normal range of motion.   Neurological:      Mental Status: He is alert.        Result Review :   The following data was reviewed by: ROCCO Ratliff on 06/21/2022:  Common labs    Common Labs 6/16/22 6/16/22 6/16/22 6/16/22 6/16/22 7/7/22 9/21/22 9/21/22 9/21/22 9/21/22    1632 1633 1633 1633 1633  0909 0909 0909 0909   Glucose   127 (A)     167 (A)     BUN   41 (A)     40 (A)     Creatinine   2.26 (A)     2.06 (A)     Sodium   140     140     Potassium   4.8     5.3 (A)     Chloride   105     105     Calcium   9.2   "   9.3     Albumin   4.60     4.10     Total Bilirubin   0.6     0.5     Alkaline Phosphatase   97     87     AST (SGOT)   33     33     ALT (SGPT)   45 (A)     52 (A)     WBC  4.88     5.49      Hemoglobin  13.2     14.1      Hematocrit  39.9     43.5      Platelets  116 (A)     159      Total Cholesterol    97      130   Triglycerides    69      151 (A)   HDL Cholesterol    33 (A)      32 (A)   LDL Cholesterol     49      72   Hemoglobin A1C     8.80 (A)    9.30 (A)    Microalbumin, Urine 277.9            PSA      0.81       (A) Abnormal value       Comments are available for some flowsheets but are not being displayed.                       Assessment and Plan    Diagnoses and all orders for this visit:    1. Type 2 diabetes mellitus with hyperglycemia, with long-term current use of insulin (HCC) (Primary)  -     CBC & Differential; Future  -     Comprehensive Metabolic Panel; Future  -     Hemoglobin A1c; Future  -     Lipid Panel; Future  -     Microalbumin / Creatinine Urine Ratio - Urine, Clean Catch; Future  -     TSH; Future    2. Mixed hyperlipidemia  -     CBC & Differential; Future  -     Comprehensive Metabolic Panel; Future  -     Hemoglobin A1c; Future  -     Lipid Panel; Future  -     Microalbumin / Creatinine Urine Ratio - Urine, Clean Catch; Future  -     TSH; Future    3. Essential hypertension  -     CBC & Differential; Future  -     Comprehensive Metabolic Panel; Future  -     Hemoglobin A1c; Future  -     Lipid Panel; Future  -     Microalbumin / Creatinine Urine Ratio - Urine, Clean Catch; Future  -     TSH; Future    4. Stage 3b chronic kidney disease (HCC)  -     CBC & Differential; Future  -     Comprehensive Metabolic Panel; Future  -     Hemoglobin A1c; Future  -     Lipid Panel; Future  -     Microalbumin / Creatinine Urine Ratio - Urine, Clean Catch; Future  -     TSH; Future               Diabetes complicated by stage III ckd and CAD      Lab Results   Component Value Date    HGBA1C  9.30 (H) 09/21/2022           kimi freestyle personal-       Download and reviewed     Dated from Sept 14 to Sept 27, 2022    Average bg 139    Time in target 84%     High 13%     Very high 3%     Low 0 %     Very low 0 %     He is now in target 84% of the time with no low     No changes               Tresiba taking  56 units -           Morning range 130-180         =====      Metformin 500 mg , 2 with supper ,intially  GI upset but now resolved     Metformin  mg one with supper  ---stopped due to side effects      =====     Novolog taking 12 units --increase to 14 units up to 18 units depending on size of meal and cgm reading       He does not give insulin is sugar is normal and eating ---we discussed he needs to cover all food with novolog if eating carbs then give at least 4 up to 6 units               Self adjusting explained         He will keep current doses since blood sugar levels are now back at goal he is instructed to call if having hyper or hypoglycemia           Kimi  has allowed the patient to minimize hypoglycemia as alarms have predicted and avoided them     She also uses the arrows on the kimi  as follows:     If arrow is horizontal , she uses the predicted bolus     If the arrow is slanted up or down-- she increase or decrease by 4  units     If the arrow is vertical up or down - she increases or decreases by 4 unit s          Discontinued Medications      Trulicity 0.75 mg weekly- stopped - cramping    invokana is expensive , jardiance , side effec          Microvascular complications            Last eye exam -- July 2022 , following with          Follows with      CKD stage III      positive proteinuria         Neuropathy none            Hyperlipidemia         taking lipitor 10 mg , 5 days per week     Taking zetia 10 mg daily                  Hypertension     Taking norvasc 5 mg one daily          Bone health     Vitamin D deficiency     Taking MVI daily                 Follow Up   Return in about 3 months (around 12/27/2022).  Patient was given instructions and counseling regarding his condition or for health maintenance advice. Please see specific information pulled into the AVS if appropriate.         This document has been electronically signed by ROCCO Ratliff on September 27, 2022 08:21 CDT.

## 2022-09-28 ENCOUNTER — TELEPHONE (OUTPATIENT)
Dept: CARDIOLOGY | Facility: CLINIC | Age: 77
End: 2022-09-28

## 2022-09-28 NOTE — TELEPHONE ENCOUNTER
----- Message from Medhat Clark MD sent at 9/27/2022  4:20 PM CDT -----  Regarding: FW: callback  Contact: [phone]  He is on quite a few medicines that could potentially lower blood pressure and this includes, carvedilol, amlodipine, isosorbide mononitrate.  He also has renal failure.  It will not be possible to adjust medicines over the phone without actually seeing him.  His last blood pressure when I saw him in the office was normal.  ----- Message -----  From: Sheila Romero MA  Sent: 9/27/2022   4:03 PM CDT  To: Medhat Clark MD  Subject: RE: callback                                       ----- Message -----  From: Liza Patterson RegSched Rep  Sent: 9/27/2022  12:58 PM CDT  To: [facility] Cardiology [facility]  Subject: callback                                         Zachary Clayton called and stated his bp has been dropping. He said it was 38/72 this morning when he saw his diabetes doctor then later on today it was 95/57. He said he quit taking his carvedilol 25mg in the morning and started taking it at night, he's been doing this for 3 weeks. He would like a callback at [phone]. Thanks       Patient contacted. He was advised dr. clark will need to see him in the office prior to changing any of his blood pressure medications. He will be contacted regarding an appt.

## 2022-09-29 ENCOUNTER — TELEPHONE (OUTPATIENT)
Dept: CARDIOLOGY | Facility: CLINIC | Age: 77
End: 2022-09-29

## 2022-09-29 NOTE — TELEPHONE ENCOUNTER
Contacted patient spoke with wife, informed her that we will see him in office 10/7/2022 @ 11:30 am to discuss BP and medications. She voiced her understanding.     ----- Message -----  From: Sheila Romero MA  Sent: 9/29/2022  12:25 PM CDT  To: Kendall Epley, MA  Subject: RE: callback                                     Will you ask dr. Aragon if it is ok to put him on the schedule somewhere? He has an appt in November but is having a lot of issues with his low blood pressure and dr. Aragon said he needed to see him before changing any meds. Thank you  ----- Message -----  From: Medhat Clark MD  Sent: 9/27/2022   4:21 PM CDT  To: Sheila Romero MA  Subject: FW: callback                                     He is on quite a few medicines that could potentially lower blood pressure and this includes, carvedilol, amlodipine, isosorbide mononitrate.  He also has renal failure.  It will not be possible to adjust medicines over the phone without actually seeing him.  His last blood pressure when I saw him in the office was normal.  ----- Message -----  From: Sheila Romero MA  Sent: 9/27/2022   4:03 PM CDT  To: Medhat Clark MD  Subject: RE: callback                                       ----- Message -----  From: Liza Patterson RegSched Rep  Sent: 9/27/2022  12:58 PM CDT  To: [facility] Cardiology [facility]  Subject: callback                                         Zachary Clayton called and stated his bp has been dropping. He said it was 38/72 this morning when he saw his diabetes doctor then later on today it was 95/57. He said he quit taking his carvedilol 25mg in the morning and started taking it at night, he's been doing this for 3 weeks. He would like a callback at [phone]. Thanks

## 2022-10-07 ENCOUNTER — OFFICE VISIT (OUTPATIENT)
Dept: CARDIOLOGY | Facility: CLINIC | Age: 77
End: 2022-10-07

## 2022-10-07 VITALS
DIASTOLIC BLOOD PRESSURE: 88 MMHG | BODY MASS INDEX: 24.73 KG/M2 | HEART RATE: 78 BPM | OXYGEN SATURATION: 98 % | TEMPERATURE: 97.7 F | WEIGHT: 186.6 LBS | SYSTOLIC BLOOD PRESSURE: 142 MMHG | HEIGHT: 73 IN

## 2022-10-07 DIAGNOSIS — N18.32 STAGE 3B CHRONIC KIDNEY DISEASE: ICD-10-CM

## 2022-10-07 DIAGNOSIS — I10 ESSENTIAL HYPERTENSION: Chronic | ICD-10-CM

## 2022-10-07 DIAGNOSIS — E78.2 MIXED HYPERLIPIDEMIA: Chronic | ICD-10-CM

## 2022-10-07 DIAGNOSIS — E78.00 PURE HYPERCHOLESTEROLEMIA: ICD-10-CM

## 2022-10-07 DIAGNOSIS — Z95.1 S/P CABG (CORONARY ARTERY BYPASS GRAFT): Primary | ICD-10-CM

## 2022-10-07 PROCEDURE — 99214 OFFICE O/P EST MOD 30 MIN: CPT | Performed by: INTERNAL MEDICINE

## 2022-10-07 RX ORDER — CARVEDILOL 25 MG/1
12.5 TABLET ORAL 2 TIMES DAILY WITH MEALS
Qty: 180 TABLET | Refills: 1
Start: 2022-10-07 | End: 2022-11-28

## 2022-10-07 NOTE — PROGRESS NOTES
Zachary Galindo  76 y.o. male    1. S/P CABG (coronary artery bypass graft)    2. Pure hypercholesterolemia    3. Mixed hyperlipidemia    4. Essential hypertension    5. Stage 3b chronic kidney disease (HCC)        History of Present Illness  Mr. Galindo is a 76-year-old male with coronary artery disease, hypertension, hyperlipidemia, diabetes mellitus, CKD. His history is remarkable for coronary artery disease status post coronary artery bypass surgery in 1998 by Dr. Flor and had subsequent PCI to SVG to OM and RCA in 2014.    He was admitted to [facility] with chest pain in May 2021 and troponins were elevated at 0.141. He underwent cardiac catheterization by Dr. Santos on 5/13/2021 and the findings are as follows:  · Mid LAD lesion is 90% stenosed post LIMA insertion site.Small LAD-1.6 to 1.8 mm  · Prox Cx lesion is 100% stenosed.  · Mid RCA lesion is 100% stenosed.  · SVG to RCA at Origin is 100% stenosed.  · OM prox Graft lesion is 90% stenosed-stented to 0%  · OM Mid Graft lesion -ISR-is 99% stenosed stented to 0%  · LV pressures (S/D/E) : 150/-1/21 mmHg  He underwent PCI to SVG to OM with placement of a 4 x 33 mm Xience Itzel stent.    Echocardiogram in July 2021 showed:  · Estimated left ventricular EF = 57% Left ventricular ejection fraction appears to be 56 - 60%. Left ventricular systolic function is normal.  · Left ventricular diastolic function is consistent with (grade I) impaired relaxation.  · Left atrial volume is mildly increased.  · Saline test results are positive.  · Estimated right ventricular systolic pressure from tricuspid regurgitation is normal (<35 mmHg).      The patient called our office informing us that his blood pressure was running low and there were times when he felt dizzy.  He indicated that his blood pressure was at times less than 100 systolic.  He has not had any syncopal episodes.  He cut back on the dose of carvedilol to 25 mg in p.m. on his  own.    After the above changes, the patient did feel better and his blood pressure has improved.  He did report an episode of chest discomfort that occurred after he ate apple pie.  This is since resolved.  He denied any exertional chest discomfort.    His heart rate was 78 bpm and blood pressure 142/88 mmHg today.  Clinical exam was otherwise unremarkable.    Allergies   Allergen Reactions   • Advicor [Niacin-Lovastatin Er] Swelling     Swelling   • Nsaids Other (See Comments)     Kidney disease   • Statins Swelling     Swelling   • Lisinopril Cough     Cough         Past Medical History:   Diagnosis Date   • Allergic rhinitis    • Ankle joint pain    • Artificial lens present     Left   • Astigmatism    • Benign prostatic hyperplasia    • Cataract    • Closed fracture of medial malleolus    • Coronary arteriosclerosis    • Diabetes mellitus (HCC)     no retinopathy   • Elevated levels of transaminase & lactic acid dehydrogenase     improving    • Encounter for health maintenance examination     Individual health examination     • Encounter for health maintenance examination in adult     Adult health examination    • Essential hypertension    • Essential hypertension     Unspecified   • Flank pain     probably muscular    • GERD (gastroesophageal reflux disease)    • Gout    • Gout     unspecified     • Hemorrhoids    • History of artificial eye lens     Artificial lens in position - right    • History of colonoscopy 04/04/2010    Colon endoscopy  (63247)  (1)    • History of kidney stones    • Hyperlipidemia    • Hypermetropia    • Low blood pressure    • Malaise and fatigue    • Need for influenza vaccination     Needs influenza immunization    • Nuclear cataract of left eye    • Posterior subcapsular polar senile cataract     Left   • Renal impairment    • Special screening for malignant neoplasm of prostate    • Type 2 diabetes mellitus (HCC)     improving   • 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 Distal RCA beyond the touchdown was seen filling the circumflex system   • CARDIAC CATHETERIZATION Right 5/13/2021    Procedure: Left Heart Cath;  Surgeon: Carissa Santos MD;  Location: [facility] CATH INVASIVE LOCATION;  Service: Cardiology;  Laterality: Right;   • CATARACT EXTRACTION  10/2015    Remove cataract, insert lens (2)    • CORONARY ARTERY BYPASS GRAFT      Arterial, two  (1)   -  3 - 12/1998   • HERNIA REPAIR      w/mesh  (1)   • INJECTION OF MEDICATION  04/11/2013    B12  (1)  - RAYMOND Raymond   • INJECTION OF MEDICATION  07/03/2013    Benadryl  (1) - RAYMOND Raymond   • INJECTION OF MEDICATION  10/22/2014    Kenalog  (2)   • OTHER SURGICAL HISTORY  07/10/2002    Fragmenting of kidney stone  -   ESWL, 2010 shocks. 1cm UP stone,right. Diabetes mellitus         Family History   Problem Relation Age of Onset   • Diabetes Other    • Hypertension Other    • Cancer Mother    • Heart disease Mother    • Hypertension Mother    • Hyperlipidemia Mother    • Diabetes Brother    • Diabetes Maternal Aunt    • Cancer Sister    • Diabetes Sister          Social History     Socioeconomic History   • Marital status:    Tobacco Use   • Smoking status: Former Smoker   • Smokeless tobacco: Never Used   • Tobacco comment: Quit 1979   Substance and Sexual Activity   • Alcohol use: No   • Drug use: No   • Sexual activity: Defer         Current Outpatient Medications   Medication Sig Dispense Refill   • alfuzosin (UROXATRAL) 10 MG 24 hr tablet Take 10 mg by mouth Daily.     • amLODIPine (NORVASC) 10 MG tablet Take 1  "tablet by mouth Daily.     • aspirin 81 MG tablet Take 1 tablet by mouth Daily. 30 tablet 11   • atorvastatin (LIPITOR) 20 MG tablet Take 1 tablet by mouth Daily. 100 tablet 1   • carvedilol (COREG) 25 MG tablet Take 0.5 tablets by mouth 2 (Two) Times a Day With Meals. 180 tablet 1   • clopidogrel (PLAVIX) 75 MG tablet Take 1 tablet by mouth Daily. 90 tablet 3   • Continuous Blood Gluc Sensor (FreeStyle Kimi 14 Day Sensor) [facility] USE as directed TO monitor blood glucose. replace every 14 DAYS. 6 each 2   • ezetimibe (ZETIA) 10 MG tablet Take 1 tablet by mouth Daily. 90 tablet 1   • gabapentin (NEURONTIN) 300 MG capsule Take 1 capsule by mouth 2 (Two) Times a Day. (Patient taking differently: Take 300 mg by mouth 2 (Two) Times a Day. PRN) 180 capsule 1   • glucose blood test strip OneTouch Ultra Test strips  -  Test sugars 3-4 times a day as directed by provider.     • insulin aspart (NovoLOG FlexPen) 100 UNIT/ML solution pen-injector sc pen INJECT 12 UP TO 20 UNITS SUBQ WITH MEALS 15 mL 10   • Insulin Pen Needle (Pen Needles 3/16\") 31G X 5 MM misc 100 each Daily. 100 each 3   • isosorbide mononitrate (IMDUR) 30 MG 24 hr tablet Take 1 tablet by mouth Every Morning. 90 tablet 1   • Lancets (ONETOUCH ULTRASOFT) lancets Test sugars 3-4 times daily as instructed by physician.     • losartan (COZAAR) 50 MG tablet Take 1 tablet by mouth Daily. 100 tablet 1   • nitroglycerin (Nitrostat) 0.4 MG SL tablet Place 1 tablet under the tongue Every 5 (Five) Minutes As Needed for Chest Pain. Max 3 doses. Go to ER if no relief 25 tablet 0   • probenecid (BENEMID) 500 MG tablet TAKE 1 TABLET BY MOUTH TWICE DAILY 180 tablet 0   • Toujeo Max SoloStar 300 UNIT/ML solution pen-injector injection INJECT 60 UNITS SUBCUTANEOUSLY EVERY DAY 12 mL 10     No current facility-administered medications for this visit.         OBJECTIVE    /88 (BP Location: Left arm, Patient Position: Sitting, Cuff Size: Adult)   Pulse 78   Temp 97.7 °F (36.5 " "°C)   Ht 185.4 cm (73\")   Wt 84.6 kg (186 lb 9.6 oz)   SpO2 98%   BMI 24.62 kg/m²         Review of Systems: The following systems were reviewed and the changes noted as below and as described in history of present    Constitutional:  Denies recent weight loss, weight gain, fever or chills. fatigue     HENT:  Denies any hearing loss, epistaxis, hoarseness, or difficulty speaking.     Eyes: Wears eyeglasses or contact lenses     Respiratory: no dyspnea with exertion,no cough, wheezing, or hemoptysis.     Cardiovascular: No chest pain, palpitation or dizziness.  See HPI.  He has had low blood pressure.    Gastrointestinal:  Denies change in bowel habits, dyspepsia, ulcer disease, hematochezia, or melena.     Endocrine: Negative for cold intolerance, heat intolerance, polydipsia, polyphagia and polyuria.     Genitourinary: CKD.  He is followed by nephrology.      Musculoskeletal: DJD    Neurological:  Denies any history of recurrent headaches, strokes, TIA, or seizure disorder.     Hematological: Denies any food allergies, seasonal allergies, bleeding disorders, or lymphadenopathy.     Physical Exam : The following systems were reassessed and no changes were noted    Constitutional: Cooperative, alert and oriented,  in no acute distress.     HENT:   Head: Normocephalic, normal hair patterns, no masses or tenderness.  Ears, Nose, and Throat: No gross abnormalities. No pallor or cyanosis.   Eyes: EOMS intact, PERRL, conjunctivae and lids unremarkable. Fundoscopic exam and visual fields not performed.   Neck: No palpable masses or adenopathy, no thyromegaly, no JVD, carotid pulses are full and equal bilaterally and without  Bruits.     Cardiovascular: Regular rhythm, S1 and S2 normal, no S3 or S4.  No murmurs, gallops, or rubs detected.     Pulmonary/Chest: Chest: normal symmetry,  normal respiratory excursion, no intercostal retraction, no use of accessory muscles.            Pulmonary: Normal breath sounds. No rales " or anuchi.    Abdominal: Abdomen soft, bowel sounds normoactive, no masses, no hepatosplenomegaly, non-tender, no bruits.     Musculoskeletal: No deformities, clubbing, cyanosis, erythema, or edema observed.     Neurological: No gross motor or sensory deficits noted, affect appropriate, oriented to time, person, place.     Psychiatric: He has a normal mood and affect. His behavior is normal. Judgment and thought content normal.         Lab Results   Component Value Date    WBC 5.49 09/21/2022    HGB 14.1 09/21/2022    HCT 43.5 09/21/2022    MCV 88.6 09/21/2022     09/21/2022     Lab Results   Component Value Date    GLUCOSE 167 (H) 09/21/2022    BUN 40 (H) 09/21/2022    CREATININE 2.06 (H) 09/21/2022    EGFRIFNONA 37 (L) 10/28/2021    BCR 19.4 09/21/2022    CO2 27.0 09/21/2022    CALCIUM 9.3 09/21/2022    ALBUMIN 4.10 09/21/2022    AST 33 09/21/2022    ALT 52 (H) 09/21/2022     Lab Results   Component Value Date    CHOL 130 09/21/2022    CHOL 97 06/16/2022    CHOL 145 03/11/2022     Lab Results   Component Value Date    TRIG 151 (H) 09/21/2022    TRIG 69 06/16/2022    TRIG 205 (H) 03/11/2022     Lab Results   Component Value Date    HDL 32 (L) 09/21/2022    HDL 33 (L) 06/16/2022    HDL 29 (L) 03/11/2022     No components found for: LDLCALC  Lab Results   Component Value Date    LDL 72 09/21/2022    LDL 49 06/16/2022    LDL 81 03/11/2022     No results found for: HDLLDLRATIO  No components found for: CHOLHDL  Lab Results   Component Value Date    HGBA1C 9.30 (H) 09/21/2022     Lab Results   Component Value Date    TSH 2.640 09/21/2022           ASSESSMENT AND PLAN  Mr. Galindo has multiple medical issues as discussed under history of present illness and progressing well following his intervention and May 2021.  He denied any cardiac symptoms at the present time.    His lab results from Sept 2022 were reviewed and LDL was 72 and HDL 32.  Urine was 40 and creatinine 2.06.    Admitted to not drinking adequate  amount of fluids and have strongly urged him to maintain a high fluid intake.  This will also help his blood pressure.  After reviewing his medicines have advised him to continue antiplatelet therapy with aspirin and Plavix, I have decreased the dose of carvedilol to 12.5 mg twice a day.  Losartan, isosorbide mononitrate have been continued in a.m. and amlodipine 10 mg in p.m. has been continued.  If his blood pressure is still running low, he has been advised to cut back on the dose of amlodipine to 5 mg in p.m.  Lipid-lowering therapy with atorvastatin and Zetia have been continued.    He will be reassessed in 3 months    Diagnoses and all orders for this visit:    1. S/P CABG (coronary artery bypass graft) (Primary)    2. Pure hypercholesterolemia    3. Mixed hyperlipidemia    4. Essential hypertension  -     carvedilol (COREG) 25 MG tablet; Take 0.5 tablets by mouth 2 (Two) Times a Day With Meals.  Dispense: 180 tablet; Refill: 1    5. Stage 3b chronic kidney disease (HCC)        Patient's Body mass index is 24.62 kg/m². BMI is within normal parameters. No follow-up required..  Patient is a non-smoker    Medhat Clark MD  10/7/2022  12:06 CDT

## 2022-11-25 DIAGNOSIS — I10 ESSENTIAL HYPERTENSION: Chronic | ICD-10-CM

## 2022-11-28 RX ORDER — FLASH GLUCOSE SENSOR
KIT MISCELLANEOUS
Qty: 2 EACH | Refills: 2 | Status: SHIPPED | OUTPATIENT
Start: 2022-11-28 | End: 2023-02-10

## 2022-11-28 RX ORDER — CARVEDILOL 25 MG/1
TABLET ORAL
Qty: 180 TABLET | Refills: 0 | Status: SHIPPED | OUTPATIENT
Start: 2022-11-28 | End: 2022-12-20

## 2022-12-20 ENCOUNTER — OFFICE VISIT (OUTPATIENT)
Dept: FAMILY MEDICINE CLINIC | Facility: CLINIC | Age: 77
End: 2022-12-20

## 2022-12-20 VITALS
DIASTOLIC BLOOD PRESSURE: 80 MMHG | SYSTOLIC BLOOD PRESSURE: 140 MMHG | WEIGHT: 194 LBS | HEART RATE: 64 BPM | OXYGEN SATURATION: 99 % | HEIGHT: 73 IN | BODY MASS INDEX: 25.71 KG/M2 | RESPIRATION RATE: 18 BRPM

## 2022-12-20 DIAGNOSIS — M51.36 DDD (DEGENERATIVE DISC DISEASE), LUMBAR: ICD-10-CM

## 2022-12-20 DIAGNOSIS — I10 ESSENTIAL HYPERTENSION: Chronic | ICD-10-CM

## 2022-12-20 DIAGNOSIS — I25.810 CORONARY ARTERY DISEASE INVOLVING CORONARY BYPASS GRAFT OF NATIVE HEART WITHOUT ANGINA PECTORIS: ICD-10-CM

## 2022-12-20 DIAGNOSIS — Z00.00 MEDICARE ANNUAL WELLNESS VISIT, SUBSEQUENT: Primary | ICD-10-CM

## 2022-12-20 DIAGNOSIS — G62.9 NEUROPATHY: ICD-10-CM

## 2022-12-20 DIAGNOSIS — E78.2 MIXED HYPERLIPIDEMIA: Chronic | ICD-10-CM

## 2022-12-20 PROCEDURE — 1159F MED LIST DOCD IN RCRD: CPT | Performed by: GENERAL PRACTICE

## 2022-12-20 PROCEDURE — G0439 PPPS, SUBSEQ VISIT: HCPCS | Performed by: GENERAL PRACTICE

## 2022-12-20 PROCEDURE — 1170F FXNL STATUS ASSESSED: CPT | Performed by: GENERAL PRACTICE

## 2022-12-20 PROCEDURE — 1126F AMNT PAIN NOTED NONE PRSNT: CPT | Performed by: GENERAL PRACTICE

## 2022-12-20 RX ORDER — AMLODIPINE BESYLATE 10 MG/1
10 TABLET ORAL DAILY
Qty: 90 TABLET | Refills: 1 | Status: SHIPPED | OUTPATIENT
Start: 2022-12-20

## 2022-12-20 RX ORDER — CLOPIDOGREL BISULFATE 75 MG/1
75 TABLET ORAL DAILY
Qty: 90 TABLET | Refills: 3 | Status: SHIPPED | OUTPATIENT
Start: 2022-12-20

## 2022-12-20 RX ORDER — GABAPENTIN 300 MG/1
300 CAPSULE ORAL 2 TIMES DAILY PRN
Qty: 180 CAPSULE | Refills: 0 | Status: SHIPPED | OUTPATIENT
Start: 2022-12-20

## 2022-12-20 RX ORDER — EZETIMIBE 10 MG/1
10 TABLET ORAL DAILY
Qty: 90 TABLET | Refills: 1 | Status: SHIPPED | OUTPATIENT
Start: 2022-12-20

## 2022-12-20 RX ORDER — LOSARTAN POTASSIUM 50 MG/1
50 TABLET ORAL DAILY
Qty: 100 TABLET | Refills: 1 | Status: SHIPPED | OUTPATIENT
Start: 2022-12-20

## 2022-12-20 RX ORDER — CARVEDILOL 12.5 MG/1
12.5 TABLET ORAL 2 TIMES DAILY WITH MEALS
Qty: 180 TABLET | Refills: 1 | Status: SHIPPED | OUTPATIENT
Start: 2022-12-20

## 2022-12-20 RX ORDER — ATORVASTATIN CALCIUM 20 MG/1
20 TABLET, FILM COATED ORAL DAILY
Qty: 100 TABLET | Refills: 1 | Status: SHIPPED | OUTPATIENT
Start: 2022-12-20

## 2022-12-20 RX ORDER — ISOSORBIDE MONONITRATE 30 MG/1
30 TABLET, EXTENDED RELEASE ORAL EVERY MORNING
Qty: 90 TABLET | Refills: 1 | Status: SHIPPED | OUTPATIENT
Start: 2022-12-20

## 2022-12-20 NOTE — PATIENT INSTRUCTIONS
Medicare Wellness  Personal Prevention Plan of Service     Date of Office Visit:    Encounter Provider:  Halley Raymond MD  Place of Service:  [facility] PRIMARY CARE [facility]  Patient Name: Zachary Galidno  :  1945    As part of the Medicare Wellness portion of your visit today, we are providing you with this personalized preventive plan of services (PPPS). This plan is based upon recommendations of the [place] Preventive Services Task Force (USPSTF) and the Advisory Committee on Immunization Practices (ACIP).    This lists the preventive care services that should be considered, and provides dates of when you are due. Items listed as completed are up-to-date and do not require any further intervention.    Health Maintenance   Topic Date Due    COVID-19 Vaccine (3 - Booster for Pfizer series) 2021    ANNUAL WELLNESS VISIT  2022    HEMOGLOBIN A1C  2023    URINE MICROALBUMIN  2023    COLORECTAL CANCER SCREENING  2023    DIABETIC EYE EXAM  2023    LIPID PANEL  2023    TDAP/TD VACCINES (2 - Td or Tdap) 2027    HEPATITIS C SCREENING  Completed    INFLUENZA VACCINE  Completed    Pneumococcal Vaccine 65+  Completed    ZOSTER VACCINE  Addressed       No orders of the defined types were placed in this encounter.      No follow-ups on file.

## 2022-12-20 NOTE — PROGRESS NOTES
"The ABCs of the Annual Wellness Visit  Subsequent Medicare Wellness Visit    Subjective      Zachary Galindo is a 76 y.o. male who presents for a Subsequent Medicare Wellness Visit.    The following portions of the patient's history were reviewed and   updated as appropriate: allergies, current medications, past family history, past medical history, past social history, past surgical history and problem list.    Compared to one year ago, the patient feels his physical   health is the same.    Compared to one year ago, the patient feels his mental   health is the same.    Recent Hospitalizations:  He was not admitted to the hospital during the last year.       Current Medical Providers:  Patient Care Team:  Halley Raymond MD as PCP - General  Aime Stiles MD as Consulting Physician (Cardiology)  Vicente Hsu MD as Consulting Physician (Nephrology)  Khadar Ruiz MD (Inactive) as Consulting Physician (Cardiology)  Brodie Patterson DMD as Consulting Physician (Dental General Practice)    Outpatient Medications Prior to Visit   Medication Sig Dispense Refill   • alfuzosin (UROXATRAL) 10 MG 24 hr tablet Take 10 mg by mouth Daily.     • aspirin 81 MG tablet Take 1 tablet by mouth Daily. 30 tablet 11   • Continuous Blood Gluc Sensor (FreeStyle Kimi 14 Day Sensor) [facility] USE as directed TO monitor blood glucose. replace every 14 DAYS. 2 each 2   • glucose blood test strip OneTouch Ultra Test strips  -  Test sugars 3-4 times a day as directed by provider.     • insulin aspart (NovoLOG FlexPen) 100 UNIT/ML solution pen-injector sc pen INJECT 12 UP TO 20 UNITS SUBQ WITH MEALS 15 mL 10   • Insulin Pen Needle (Pen Needles 3/16\") 31G X 5 MM misc 100 each Daily. 100 each 3   • Lancets (ONETOUCH ULTRASOFT) lancets Test sugars 3-4 times daily as instructed by physician.     • nitroglycerin (Nitrostat) 0.4 MG SL tablet Place 1 tablet under the tongue Every 5 (Five) Minutes As Needed for Chest Pain. Max 3 " doses. Go to ER if no relief 25 tablet 0   • Toujeo Max SoloStar 300 UNIT/ML solution pen-injector injection INJECT 60 UNITS SUBCUTANEOUSLY EVERY DAY 12 mL 10   • amLODIPine (NORVASC) 10 MG tablet Take 1 tablet by mouth Daily.     • atorvastatin (LIPITOR) 20 MG tablet Take 1 tablet by mouth Daily. 100 tablet 1   • carvedilol (COREG) 25 MG tablet TAKE 1 TABLET TWICE DAILY WITH MEALS 180 tablet 0   • clopidogrel (PLAVIX) 75 MG tablet Take 1 tablet by mouth Daily. 90 tablet 3   • ezetimibe (ZETIA) 10 MG tablet Take 1 tablet by mouth Daily. 90 tablet 1   • gabapentin (NEURONTIN) 300 MG capsule Take 1 capsule by mouth 2 (Two) Times a Day. (Patient taking differently: Take 300 mg by mouth 2 (Two) Times a Day. PRN) 180 capsule 1   • isosorbide mononitrate (IMDUR) 30 MG 24 hr tablet Take 1 tablet by mouth Every Morning. 90 tablet 1   • losartan (COZAAR) 50 MG tablet Take 1 tablet by mouth Daily. 100 tablet 1   • probenecid (BENEMID) 500 MG tablet TAKE 1 TABLET BY MOUTH TWICE DAILY 180 tablet 0     No facility-administered medications prior to visit.       No opioid medication identified on active medication list. I have reviewed chart for other potential  high risk medication/s and harmful drug interactions in the elderly.          Aspirin is on active medication list. Aspirin use is indicated based on review of current medical condition/s. Pros and cons of this therapy have been discussed today. Benefits of this medication outweigh potential harm.  Patient has been encouraged to continue taking this medication.  .      Patient Active Problem List   Diagnosis   • Essential hypertension   • Gout   • Hyperlipidemia   • Type 2 diabetes mellitus with complication, with long-term current use of insulin (HCC)   • Coronary artery disease involving coronary bypass graft of native heart without angina pectoris   • Pure hypercholesterolemia   • Stage 3b chronic kidney disease (HCC)   • Right knee pain   • Primary osteoarthritis of  "right knee   • Precordial pain   • Coronary arteriosclerosis   • Type 2 diabetes mellitus with hyperglycemia, with long-term current use of insulin ([facility])   • Otalgia   • S/P CABG (coronary artery bypass graft)   • Chronic midline thoracic back pain   • Neuropathy   • NSTEMI, initial episode of care ([facility])   • Acute prostatitis   • Acute UTI   • Difficult or painful urination   • Atherosclerosis of coronary artery bypass graft   • Chronic ischemic heart disease   • Vitamin D deficiency   • Left elbow pain   • Osteophyte of left elbow   • Olecranon bursitis of left elbow     Advance Care Planning  Advance Directive is on file.  ACP discussion was held with the patient during this visit. Patient has an advance directive in EMR which is still valid.      Objective    Vitals:    12/20/22 0838   BP: 140/80   Pulse: 64   Resp: 18   SpO2: 99%   Weight: 88 kg (194 lb)   Height: 185.4 cm (73\")   PainSc: 0-No pain     Estimated body mass index is 25.6 kg/m² as calculated from the following:    Height as of this encounter: 185.4 cm (73\").    Weight as of this encounter: 88 kg (194 lb).    BMI is >= 25 and <30. (Overweight) The following options were offered after discussion;: none (medical contraindication)      Does the patient have evidence of cognitive impairment?   No            HEALTH RISK ASSESSMENT    Smoking Status:  Social History     Tobacco Use   Smoking Status Former   Smokeless Tobacco Never   Tobacco Comments    Quit 1979     Alcohol Consumption:  Social History     Substance and Sexual Activity   Alcohol Use No     Fall Risk Screen:    STEADI Fall Risk Assessment was completed, and patient is at LOW risk for falls.Assessment completed on:12/20/2022    Depression Screening:  PHQ-2/PHQ-9 Depression Screening 12/20/2022   Retired PHQ-9 Total Score -   Retired Total Score -   Little Interest or Pleasure in Doing Things 0-->not at all   Feeling Down, Depressed or Hopeless 0-->not at all   PHQ-9: Brief Depression " Severity Measure Score 0       Health Habits and Functional and Cognitive Screening:  Functional & Cognitive Status 12/20/2022   Do you have difficulty preparing food and eating? No   Do you have difficulty bathing yourself, getting dressed or grooming yourself? No   Do you have difficulty using the toilet? No   Do you have difficulty moving around from place to place? No   Do you have trouble with steps or getting out of a bed or a chair? No   Current Diet Well Balanced Diet   Dental Exam Up to date   Eye Exam Up to date   Exercise (times per week) 2 times per week   Current Exercises Include Walking;Treadmill   Current Exercise Activities Include -   Do you need help using the phone?  No   Are you deaf or do you have serious difficulty hearing?  No   Do you need help with transportation? No   Do you need help shopping? No   Do you need help preparing meals?  No   Do you need help with housework?  No   Do you need help with laundry? No   Do you need help taking your medications? No   Do you need help managing money? No   Do you ever drive or ride in a car without wearing a seat belt? No   Have you felt unusual stress, anger or loneliness in the last month? No   Who do you live with? Spouse   If you need help, do you have trouble finding someone available to you? No   Have you been bothered in the last four weeks by sexual problems? Yes   Do you have difficulty concentrating, remembering or making decisions? No       Age-appropriate Screening Schedule:  Refer to the list below for future screening recommendations based on patient's age, sex and/or medical conditions. Orders for these recommended tests are listed in the plan section. The patient has been provided with a written plan.    Health Maintenance   Topic Date Due   • HEMOGLOBIN A1C  03/21/2023   • URINE MICROALBUMIN  06/16/2023   • DIABETIC EYE EXAM  08/17/2023   • LIPID PANEL  09/21/2023   • TDAP/TD VACCINES (2 - Td or Tdap) 07/03/2027   • INFLUENZA VACCINE   Completed   • ZOSTER VACCINE  Addressed                CMS Preventative Services Quick Reference  Risk Factors Identified During Encounter:    Immunizations Discussed/Encouraged: COVID19    The above risks/problems have been discussed with the patient.  Pertinent information has been shared with the patient in the After Visit Summary.    Diagnoses and all orders for this visit:    1. Medicare annual wellness visit, subsequent (Primary)    2. Mixed hyperlipidemia  -     atorvastatin (LIPITOR) 20 MG tablet; Take 1 tablet by mouth Daily.  Dispense: 100 tablet; Refill: 1  -     ezetimibe (ZETIA) 10 MG tablet; Take 1 tablet by mouth Daily.  Dispense: 90 tablet; Refill: 1    3. Coronary artery disease involving coronary bypass graft of native heart without angina pectoris  -     atorvastatin (LIPITOR) 20 MG tablet; Take 1 tablet by mouth Daily.  Dispense: 100 tablet; Refill: 1  -     isosorbide mononitrate (IMDUR) 30 MG 24 hr tablet; Take 1 tablet by mouth Every Morning.  Dispense: 90 tablet; Refill: 1  -     carvedilol (Coreg) 12.5 MG tablet; Take 1 tablet by mouth 2 (Two) Times a Day With Meals.  Dispense: 180 tablet; Refill: 1  -     clopidogrel (PLAVIX) 75 MG tablet; Take 1 tablet by mouth Daily.  Dispense: 90 tablet; Refill: 3    4. Neuropathy  -     gabapentin (NEURONTIN) 300 MG capsule; Take 1 capsule by mouth 2 (Two) Times a Day As Needed (back pain). PRN  Dispense: 180 capsule; Refill: 0    5. DDD (degenerative disc disease), lumbar  -     gabapentin (NEURONTIN) 300 MG capsule; Take 1 capsule by mouth 2 (Two) Times a Day As Needed (back pain). PRN  Dispense: 180 capsule; Refill: 0    6. Essential hypertension  -     carvedilol (Coreg) 12.5 MG tablet; Take 1 tablet by mouth 2 (Two) Times a Day With Meals.  Dispense: 180 tablet; Refill: 1  -     amLODIPine (NORVASC) 10 MG tablet; Take 1 tablet by mouth Daily.  Dispense: 90 tablet; Refill: 1  -     losartan (COZAAR) 50 MG tablet; Take 1 tablet by mouth Daily.   Dispense: 100 tablet; Refill: 1        Follow Up:   Continue current medications. Return in about 6 months (around 6/29/2023) for Annual physical.    Next Medicare Wellness visit to be scheduled in 1 year.      An After Visit Summary and PPPS were made available to the patient.

## 2023-01-06 ENCOUNTER — LAB (OUTPATIENT)
Dept: LAB | Facility: HOSPITAL | Age: 78
End: 2023-01-06
Payer: MEDICARE

## 2023-01-06 DIAGNOSIS — I10 ESSENTIAL HYPERTENSION: ICD-10-CM

## 2023-01-06 DIAGNOSIS — E11.65 TYPE 2 DIABETES MELLITUS WITH HYPERGLYCEMIA, WITH LONG-TERM CURRENT USE OF INSULIN: ICD-10-CM

## 2023-01-06 DIAGNOSIS — Z79.4 TYPE 2 DIABETES MELLITUS WITH HYPERGLYCEMIA, WITH LONG-TERM CURRENT USE OF INSULIN: ICD-10-CM

## 2023-01-06 DIAGNOSIS — E78.2 MIXED HYPERLIPIDEMIA: ICD-10-CM

## 2023-01-06 DIAGNOSIS — N18.32 STAGE 3B CHRONIC KIDNEY DISEASE: ICD-10-CM

## 2023-01-06 LAB
ALBUMIN SERPL-MCNC: 4.3 G/DL (ref 3.5–5.2)
ALBUMIN UR-MCNC: 95.8 MG/DL
ALBUMIN/GLOB SERPL: 1.5 G/DL
ALP SERPL-CCNC: 90 U/L (ref 39–117)
ALT SERPL W P-5'-P-CCNC: 26 U/L (ref 1–41)
ANION GAP SERPL CALCULATED.3IONS-SCNC: 10 MMOL/L (ref 5–15)
AST SERPL-CCNC: 22 U/L (ref 1–40)
BASOPHILS # BLD AUTO: 0.06 10*3/MM3 (ref 0–0.2)
BASOPHILS NFR BLD AUTO: 1.2 % (ref 0–1.5)
BILIRUB SERPL-MCNC: 0.4 MG/DL (ref 0–1.2)
BUN SERPL-MCNC: 37 MG/DL (ref 8–23)
BUN/CREAT SERPL: 18.8 (ref 7–25)
CALCIUM SPEC-SCNC: 8.9 MG/DL (ref 8.6–10.5)
CHLORIDE SERPL-SCNC: 104 MMOL/L (ref 98–107)
CHOLEST SERPL-MCNC: 133 MG/DL (ref 0–200)
CO2 SERPL-SCNC: 25 MMOL/L (ref 22–29)
CREAT SERPL-MCNC: 1.97 MG/DL (ref 0.76–1.27)
CREAT UR-MCNC: 75.3 MG/DL
DEPRECATED RDW RBC AUTO: 43.6 FL (ref 37–54)
EGFRCR SERPLBLD CKD-EPI 2021: 34.4 ML/MIN/1.73
EOSINOPHIL # BLD AUTO: 0.18 10*3/MM3 (ref 0–0.4)
EOSINOPHIL NFR BLD AUTO: 3.7 % (ref 0.3–6.2)
ERYTHROCYTE [DISTWIDTH] IN BLOOD BY AUTOMATED COUNT: 13.4 % (ref 12.3–15.4)
GLOBULIN UR ELPH-MCNC: 2.9 GM/DL
GLUCOSE SERPL-MCNC: 185 MG/DL (ref 65–99)
HBA1C MFR BLD: 8.6 % (ref 4.8–5.6)
HCT VFR BLD AUTO: 41.9 % (ref 37.5–51)
HDLC SERPL-MCNC: 30 MG/DL (ref 40–60)
HGB BLD-MCNC: 13.4 G/DL (ref 13–17.7)
IMM GRANULOCYTES # BLD AUTO: 0.01 10*3/MM3 (ref 0–0.05)
IMM GRANULOCYTES NFR BLD AUTO: 0.2 % (ref 0–0.5)
LDLC SERPL CALC-MCNC: 74 MG/DL (ref 0–100)
LDLC/HDLC SERPL: 2.31 {RATIO}
LYMPHOCYTES # BLD AUTO: 1.15 10*3/MM3 (ref 0.7–3.1)
LYMPHOCYTES NFR BLD AUTO: 23.4 % (ref 19.6–45.3)
MCH RBC QN AUTO: 28.2 PG (ref 26.6–33)
MCHC RBC AUTO-ENTMCNC: 32 G/DL (ref 31.5–35.7)
MCV RBC AUTO: 88.2 FL (ref 79–97)
MICROALBUMIN/CREAT UR: 1272.2 MG/G
MONOCYTES # BLD AUTO: 0.39 10*3/MM3 (ref 0.1–0.9)
MONOCYTES NFR BLD AUTO: 7.9 % (ref 5–12)
NEUTROPHILS NFR BLD AUTO: 3.13 10*3/MM3 (ref 1.7–7)
NEUTROPHILS NFR BLD AUTO: 63.6 % (ref 42.7–76)
NRBC BLD AUTO-RTO: 0 /100 WBC (ref 0–0.2)
PLATELET # BLD AUTO: 144 10*3/MM3 (ref 140–450)
PMV BLD AUTO: 11.9 FL (ref 6–12)
POTASSIUM SERPL-SCNC: 4.1 MMOL/L (ref 3.5–5.2)
PROT SERPL-MCNC: 7.2 G/DL (ref 6–8.5)
RBC # BLD AUTO: 4.75 10*6/MM3 (ref 4.14–5.8)
SODIUM SERPL-SCNC: 139 MMOL/L (ref 136–145)
TRIGL SERPL-MCNC: 169 MG/DL (ref 0–150)
TSH SERPL DL<=0.05 MIU/L-ACNC: 3.69 UIU/ML (ref 0.27–4.2)
VLDLC SERPL-MCNC: 29 MG/DL (ref 5–40)
WBC NRBC COR # BLD: 4.92 10*3/MM3 (ref 3.4–10.8)

## 2023-01-06 PROCEDURE — 80053 COMPREHEN METABOLIC PANEL: CPT

## 2023-01-06 PROCEDURE — 36415 COLL VENOUS BLD VENIPUNCTURE: CPT

## 2023-01-06 PROCEDURE — 85025 COMPLETE CBC W/AUTO DIFF WBC: CPT

## 2023-01-06 PROCEDURE — 84443 ASSAY THYROID STIM HORMONE: CPT

## 2023-01-06 PROCEDURE — 83036 HEMOGLOBIN GLYCOSYLATED A1C: CPT

## 2023-01-06 PROCEDURE — 82043 UR ALBUMIN QUANTITATIVE: CPT

## 2023-01-06 PROCEDURE — 80061 LIPID PANEL: CPT

## 2023-01-06 PROCEDURE — 82570 ASSAY OF URINE CREATININE: CPT

## 2023-01-10 ENCOUNTER — OFFICE VISIT (OUTPATIENT)
Dept: CARDIOLOGY | Facility: CLINIC | Age: 78
End: 2023-01-10
Payer: MEDICARE

## 2023-01-10 VITALS
SYSTOLIC BLOOD PRESSURE: 136 MMHG | TEMPERATURE: 97.7 F | HEART RATE: 66 BPM | OXYGEN SATURATION: 94 % | DIASTOLIC BLOOD PRESSURE: 70 MMHG | HEIGHT: 73 IN | WEIGHT: 196.8 LBS | BODY MASS INDEX: 26.08 KG/M2

## 2023-01-10 DIAGNOSIS — Z79.4 TYPE 2 DIABETES MELLITUS WITH HYPERGLYCEMIA, WITH LONG-TERM CURRENT USE OF INSULIN: ICD-10-CM

## 2023-01-10 DIAGNOSIS — Z95.1 S/P CABG (CORONARY ARTERY BYPASS GRAFT): Primary | ICD-10-CM

## 2023-01-10 DIAGNOSIS — E11.65 TYPE 2 DIABETES MELLITUS WITH HYPERGLYCEMIA, WITH LONG-TERM CURRENT USE OF INSULIN: ICD-10-CM

## 2023-01-10 DIAGNOSIS — E78.2 MIXED HYPERLIPIDEMIA: Chronic | ICD-10-CM

## 2023-01-10 DIAGNOSIS — I10 ESSENTIAL HYPERTENSION: Chronic | ICD-10-CM

## 2023-01-10 PROCEDURE — 99214 OFFICE O/P EST MOD 30 MIN: CPT | Performed by: INTERNAL MEDICINE

## 2023-01-10 NOTE — PROGRESS NOTES
Zachary Galindo  77 y.o. male    1. S/P CABG (coronary artery bypass graft)    2. Essential hypertension    3. Mixed hyperlipidemia    4. Type 2 diabetes mellitus with hyperglycemia, with long-term current use of insulin ([facility])        History of Present Illness  Mr. Galindo is a 77-year-old male with coronary artery disease, hypertension, hyperlipidemia, diabetes mellitus, CKD. His history is remarkable for coronary artery disease status post coronary artery bypass surgery in 1998 by Dr. Flor and had subsequent PCI to SVG to OM and RCA in 2014.    He was admitted to [facility] with chest pain in May 2021 and troponins were elevated at 0.141. He underwent cardiac catheterization by Dr. Santos on 5/13/2021 and the findings are as follows:  · Mid LAD lesion is 90% stenosed post LIMA insertion site.Small LAD-1.6 to 1.8 mm  · Prox Cx lesion is 100% stenosed.  · Mid RCA lesion is 100% stenosed.  · SVG to RCA at Origin is 100% stenosed.  · OM prox Graft lesion is 90% stenosed-stented to 0%  · OM Mid Graft lesion -ISR-is 99% stenosed stented to 0%  · LV pressures (S/D/E) : 150/-1/21 mmHg  He underwent PCI to SVG to OM with placement of a 4 x 33 mm Xience Itzel stent.    Echocardiogram in July 2021 showed:  · Estimated left ventricular EF = 57% Left ventricular ejection fraction appears to be 56 - 60%. Left ventricular systolic function is normal.  · Left ventricular diastolic function is consistent with (grade I) impaired relaxation.  · Left atrial volume is mildly increased.  · Saline test results are positive.  · Estimated right ventricular systolic pressure from tricuspid regurgitation is normal (<35 mmHg).    The patient was progressed well and denied any cardiac symptoms at the present time with no chest pain, shortness of breath or palpitation.  His blood pressure has stabilized with decreasing the dose of carvedilol.  He denied any dizziness.      Allergies   Allergen Reactions   • Advicor  [Niacin-Lovastatin Er] Swelling     Swelling   • Nsaids Other (See Comments)     Kidney disease   • Statins Swelling     Swelling   • Lisinopril Cough     Cough         Past Medical History:   Diagnosis Date   • Allergic rhinitis    • Ankle joint pain    • Artificial lens present     Left   • Astigmatism    • Benign prostatic hyperplasia    • Cataract    • Closed fracture of medial malleolus    • Coronary arteriosclerosis    • Diabetes mellitus (HCC)     no retinopathy   • Elevated levels of transaminase & lactic acid dehydrogenase     improving    • Encounter for health maintenance examination     Individual health examination     • Encounter for health maintenance examination in adult     Adult health examination    • Essential hypertension    • Essential hypertension     Unspecified   • Flank pain     probably muscular    • GERD (gastroesophageal reflux disease)    • Gout    • Gout     unspecified     • Hemorrhoids    • History of artificial eye lens     Artificial lens in position - right    • History of colonoscopy 04/04/2010    Colon endoscopy  (52146)  (1)    • History of kidney stones    • Hyperlipidemia    • Hypermetropia    • Low blood pressure    • Malaise and fatigue    • Need for influenza vaccination     Needs influenza immunization    • Nuclear cataract of left eye    • Posterior subcapsular polar senile cataract     Left   • Renal impairment    • Special screening for malignant neoplasm of prostate    • Type 2 diabetes mellitus (HCC)     improving   • 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 edema (HCC)     without macular edema - mild OS    • Upper respiratory infection    • Urticaria     Drug-aggravated angioedema-urticaria   • Urticaria          Past Surgical History:   Procedure Laterality Date   • CARDIAC CATHETERIZATION  03/24/2014    (55988)  (2)  Reduction of stenoisis from 95% to less than 0% stenosis with evidence of good JENNY 3-flow. Distal  RCA beyond the touchdown was seen filling the circumflex system   • CARDIAC CATHETERIZATION Right 5/13/2021    Procedure: Left Heart Cath;  Surgeon: Carissa Santos MD;  Location: [facility] CATH INVASIVE LOCATION;  Service: Cardiology;  Laterality: Right;   • CATARACT EXTRACTION  10/2015    Remove cataract, insert lens (2)    • CORONARY ARTERY BYPASS GRAFT      Arterial, two  (1)   -  3 - 12/1998   • HERNIA REPAIR      w/mesh  (1)   • INJECTION OF MEDICATION  04/11/2013    B12  (1)  - RAYMOND Raymond   • INJECTION OF MEDICATION  07/03/2013    Benadryl  (1) - RAYMOND Raymond   • INJECTION OF MEDICATION  10/22/2014    Kenalog  (2)   • OTHER SURGICAL HISTORY  07/10/2002    Fragmenting of kidney stone  -   ESWL, 2010 shocks. 1cm UP stone,right. Diabetes mellitus         Family History   Problem Relation Age of Onset   • Diabetes Other    • Hypertension Other    • Cancer Mother    • Heart disease Mother    • Hypertension Mother    • Hyperlipidemia Mother    • Diabetes Brother    • Diabetes Maternal Aunt    • Cancer Sister    • Diabetes Sister          Social History     Socioeconomic History   • Marital status:    Tobacco Use   • Smoking status: Former   • Smokeless tobacco: Never   • Tobacco comments:     Quit 1979   Substance and Sexual Activity   • Alcohol use: No   • Drug use: No   • Sexual activity: Defer         Current Outpatient Medications   Medication Sig Dispense Refill   • alfuzosin (UROXATRAL) 10 MG 24 hr tablet Take 10 mg by mouth Daily.     • amLODIPine (NORVASC) 10 MG tablet Take 1 tablet by mouth Daily. 90 tablet 1   • aspirin 81 MG tablet Take 1 tablet by mouth Daily. 30 tablet 11   • atorvastatin (LIPITOR) 20 MG tablet Take 1 tablet by mouth Daily. 100 tablet 1   • carvedilol (Coreg) 12.5 MG tablet Take 1 tablet by mouth 2 (Two) Times a Day With Meals. 180 tablet 1   • clopidogrel (PLAVIX) 75 MG tablet Take 1 tablet by mouth Daily. 90 tablet 3   • Continuous Blood Gluc Sensor (FreeStyle Kimi 14 Day  Sensor) misc USE as directed TO monitor blood glucose. replace every 14 DAYS. 2 each 2   • ezetimibe (ZETIA) 10 MG tablet Take 1 tablet by mouth Daily. 90 tablet 1   • gabapentin (NEURONTIN) 300 MG capsule Take 1 capsule by mouth 2 (Two) Times a Day As Needed (back pain).  capsule 0   • glucose blood test strip OneTouch Ultra Test strips  -  Test sugars 3-4 times a day as directed by provider.     • insulin aspart (NovoLOG FlexPen) 100 UNIT/ML solution pen-injector sc pen INJECT 12 UP TO 20 UNITS SUBQ WITH MEALS 15 mL 10   • Insulin Pen Needle (Pen Needles 3/16\") 31G X 5 MM misc 100 each Daily. 100 each 3   • isosorbide mononitrate (IMDUR) 30 MG 24 hr tablet Take 1 tablet by mouth Every Morning. 90 tablet 1   • Lancets (ONETOUCH ULTRASOFT) lancets Test sugars 3-4 times daily as instructed by physician.     • losartan (COZAAR) 50 MG tablet Take 1 tablet by mouth Daily. (Patient taking differently: Take 25 mg by mouth Daily.) 100 tablet 1   • nitroglycerin (Nitrostat) 0.4 MG SL tablet Place 1 tablet under the tongue Every 5 (Five) Minutes As Needed for Chest Pain. Max 3 doses. Go to ER if no relief 25 tablet 0   • Toujeo Max SoloStar 300 UNIT/ML solution pen-injector injection INJECT 60 UNITS SUBCUTANEOUSLY EVERY DAY 12 mL 10     No current facility-administered medications for this visit.         OBJECTIVE    /70 (BP Location: Left arm, Patient Position: Sitting, Cuff Size: Adult)   Pulse 66   Temp 97.7 °F (36.5 °C)   Ht 185.4 cm (73\")   Wt 89.3 kg (196 lb 12.8 oz)   SpO2 94%   BMI 25.96 kg/m²         Review of Systems: The following systems were reviewed and the changes noted as discussed below.    Constitutional:  Denies recent weight loss, weight gain, fever or chills. fatigue     HENT:  Denies any hearing loss, epistaxis, hoarseness, or difficulty speaking.     Eyes: Wears eyeglasses or contact lenses     Respiratory: no dyspnea with exertion,no cough, wheezing, or hemoptysis.      Cardiovascular: No chest pain, palpitation or dizziness.      Gastrointestinal:  Denies change in bowel habits, dyspepsia, ulcer disease, hematochezia, or melena.     Endocrine: Negative for cold intolerance, heat intolerance, polydipsia, polyphagia and polyuria.     Genitourinary: CKD.  He is followed by nephrology.      Musculoskeletal: DJD    Neurological:  Denies any history of recurrent headaches, strokes, TIA, or seizure disorder.     Hematological: Denies any food allergies, seasonal allergies, bleeding disorders, or lymphadenopathy.     Physical Exam : The following systems were reassessed and no changes were noted    Constitutional: Cooperative, alert and oriented,  in no acute distress.     HENT:   Head: Normocephalic, normal hair patterns, no masses or tenderness.  Ears, Nose, and Throat: No gross abnormalities. No pallor or cyanosis.   Eyes: EOMS intact, PERRL, conjunctivae and lids unremarkable. Fundoscopic exam and visual fields not performed.   Neck: No palpable masses or adenopathy, no thyromegaly, no JVD, carotid pulses are full and equal bilaterally and without  Bruits.     Cardiovascular: Regular rhythm, S1 and S2 normal, no S3 or S4.  No murmurs, gallops, or rubs detected.     Pulmonary/Chest: Chest: normal symmetry,  normal respiratory excursion, no intercostal retraction, no use of accessory muscles.            Pulmonary: Normal breath sounds. No rales or ronchi.    Abdominal: Abdomen soft, bowel sounds normoactive, no masses, no hepatosplenomegaly, non-tender, no bruits.     Musculoskeletal: No deformities, clubbing, cyanosis, erythema, or edema observed.     Neurological: No gross motor or sensory deficits noted, affect appropriate, oriented to time, person, place.     Psychiatric: He has a normal mood and affect. His behavior is normal. Judgment and thought content normal.         Lab Results   Component Value Date    WBC 4.92 01/06/2023    HGB 13.4 01/06/2023    HCT 41.9 01/06/2023     MCV 88.2 01/06/2023     01/06/2023     Lab Results   Component Value Date    GLUCOSE 185 (H) 01/06/2023    BUN 37 (H) 01/06/2023    CREATININE 1.97 (H) 01/06/2023    EGFRIFNONA 37 (L) 10/28/2021    BCR 18.8 01/06/2023    CO2 25.0 01/06/2023    CALCIUM 8.9 01/06/2023    ALBUMIN 4.3 01/06/2023    AST 22 01/06/2023    ALT 26 01/06/2023     Lab Results   Component Value Date    CHOL 133 01/06/2023    CHOL 130 09/21/2022    CHOL 97 06/16/2022     Lab Results   Component Value Date    TRIG 169 (H) 01/06/2023    TRIG 151 (H) 09/21/2022    TRIG 69 06/16/2022     Lab Results   Component Value Date    HDL 30 (L) 01/06/2023    HDL 32 (L) 09/21/2022    HDL 33 (L) 06/16/2022     No components found for: LDLCALC  Lab Results   Component Value Date    LDL 74 01/06/2023    LDL 72 09/21/2022    LDL 49 06/16/2022     No results found for: HDLLDLRATIO  No components found for: CHOLHDL  Lab Results   Component Value Date    HGBA1C 8.60 (H) 01/06/2023     Lab Results   Component Value Date    TSH 3.690 01/06/2023           ASSESSMENT AND PLAN  Mr. Galindo has multiple medical issues as discussed under history of present illness and progressing well following his intervention and May 2021.  He denied any cardiac symptoms at the present time.    His lab results from 1/6/2023 were reviewed and CBC was within normal limits and BUN was 37 with a creatinine of 1.97 with GFR of 34.4.  Hemoglobin A1c was 8.6 with an LDL of 74 and HDL of 30.    After reviewing his medicines have advised him to continue antiplatelet therapy with aspirin and Plavix, Carvedilol 12.5 mg twice a day.  Losartan, isosorbide mononitrate have been continued in a.m. and amlodipine 10 mg in p.m. has been continued. Lipid-lowering therapy with atorvastatin and Zetia have been continued.  He has been advised to maintain high fluid intake.  Optimization of diabetes stressed.    Diagnoses and all orders for this visit:    1. S/P CABG (coronary artery bypass graft)  (Primary)    2. Essential hypertension    3. Mixed hyperlipidemia    4. Type 2 diabetes mellitus with hyperglycemia, with long-term current use of insulin (HCC)        Patient's Body mass index is 25.96 kg/m². BMI is within normal parameters. No follow-up required..  Patient is a non-smoker    Medhat Clark MD  1/10/2023  11:03 CST

## 2023-01-13 ENCOUNTER — OFFICE VISIT (OUTPATIENT)
Dept: ENDOCRINOLOGY | Facility: CLINIC | Age: 78
End: 2023-01-13
Payer: MEDICARE

## 2023-01-13 VITALS
BODY MASS INDEX: 26.69 KG/M2 | HEIGHT: 73 IN | SYSTOLIC BLOOD PRESSURE: 160 MMHG | OXYGEN SATURATION: 98 % | DIASTOLIC BLOOD PRESSURE: 64 MMHG | WEIGHT: 201.4 LBS | HEART RATE: 60 BPM

## 2023-01-13 DIAGNOSIS — N18.32 STAGE 3B CHRONIC KIDNEY DISEASE: ICD-10-CM

## 2023-01-13 DIAGNOSIS — E78.2 MIXED HYPERLIPIDEMIA: Chronic | ICD-10-CM

## 2023-01-13 DIAGNOSIS — E11.65 TYPE 2 DIABETES MELLITUS WITH HYPERGLYCEMIA, WITH LONG-TERM CURRENT USE OF INSULIN: Primary | ICD-10-CM

## 2023-01-13 DIAGNOSIS — I10 ESSENTIAL HYPERTENSION: Chronic | ICD-10-CM

## 2023-01-13 DIAGNOSIS — Z79.4 TYPE 2 DIABETES MELLITUS WITH HYPERGLYCEMIA, WITH LONG-TERM CURRENT USE OF INSULIN: Primary | ICD-10-CM

## 2023-01-13 PROCEDURE — 95251 CONT GLUC MNTR ANALYSIS I&R: CPT | Performed by: NURSE PRACTITIONER

## 2023-01-13 PROCEDURE — 99214 OFFICE O/P EST MOD 30 MIN: CPT | Performed by: NURSE PRACTITIONER

## 2023-01-13 RX ORDER — PANTOPRAZOLE SODIUM 40 MG/1
40 TABLET, DELAYED RELEASE ORAL DAILY
Qty: 30 TABLET | Refills: 3 | Status: SHIPPED | OUTPATIENT
Start: 2023-01-13 | End: 2024-01-13

## 2023-01-13 NOTE — PROGRESS NOTES
"Chief Complaint  Diabetes    Subjective          Zachary Galindo presents to [facility] ENDOCRINOLOGY  Diabetes           In office visit     76-year-old male presents for follow-up     Reason diabetes mellitus type 2     Duration diagnosed at the age of 37       Timing constant     Quality not controlled       Severity -  High due to associated complications      Complications - nephropathy and CAD         Side Effects  cramping from Trulicity , metformin on diarrhea          Current exercise none      Current monitoring regimen: home blood tests - checking 4 x daily before nichole     Now has nichole personal use and is able to monitor more frequently and having less hypoglycemia                Home blood sugar records:      NICHOLE USING THE PERSONAL SYSTEM       He has been drinking regular cokes -- he is trying to stop                 Objective   Vital Signs:   /64   Pulse 60   Ht 185.4 cm (73\")   Wt 91.4 kg (201 lb 6.4 oz)   SpO2 98%   BMI 26.57 kg/m²     Physical Exam  Constitutional:       Appearance: Normal appearance.   Cardiovascular:      Rate and Rhythm: Regular rhythm.      Heart sounds: Normal heart sounds.   Musculoskeletal:      Cervical back: Normal range of motion.   Neurological:      Mental Status: He is alert.        Result Review :   The following data was reviewed by: ROCCO Ratliff on 06/21/2022:  Common labs    Common Labs 7/7/22 9/21/22 9/21/22 9/21/22 9/21/22 1/6/23 1/6/23 1/6/23 1/6/23 1/6/23     0909 0909 0909 0909 0749 0749 0749 0749 0749   Glucose   167 (A)     185 (A)     BUN   40 (A)     37 (A)     Creatinine   2.06 (A)     1.97 (A)     Sodium   140     139     Potassium   5.3 (A)     4.1     Chloride   105     104     Calcium   9.3     8.9     Albumin   4.10     4.3     Total Bilirubin   0.5     0.4     Alkaline Phosphatase   87     90     AST (SGOT)   33     22     ALT (SGPT)   52 (A)     26     WBC  5.49    4.92     "   Hemoglobin  14.1    13.4       Hematocrit  43.5    41.9       Platelets  159    144       Total Cholesterol     130    133    Triglycerides     151 (A)    169 (A)    HDL Cholesterol     32 (A)    30 (A)    LDL Cholesterol      72    74    Hemoglobin A1C    9.30 (A)   8.60 (A)      Microalbumin, Urine          95.8   PSA 0.81            (A) Abnormal value       Comments are available for some flowsheets but are not being displayed.                       Assessment and Plan    Diagnoses and all orders for this visit:    1. Type 2 diabetes mellitus with hyperglycemia, with long-term current use of insulin (HCC) (Primary)  -     CBC & Differential; Future  -     Comprehensive Metabolic Panel; Future  -     Hemoglobin A1c; Future  -     Lipid Panel; Future  -     Microalbumin / Creatinine Urine Ratio - Urine, Clean Catch; Future  -     TSH; Future  -     Vitamin D,25-Hydroxy; Future    2. Stage 3b chronic kidney disease (HCC)  -     CBC & Differential; Future  -     Comprehensive Metabolic Panel; Future  -     Hemoglobin A1c; Future  -     Lipid Panel; Future  -     Microalbumin / Creatinine Urine Ratio - Urine, Clean Catch; Future  -     TSH; Future  -     Vitamin D,25-Hydroxy; Future    3. Mixed hyperlipidemia  -     CBC & Differential; Future  -     Comprehensive Metabolic Panel; Future  -     Hemoglobin A1c; Future  -     Lipid Panel; Future  -     Microalbumin / Creatinine Urine Ratio - Urine, Clean Catch; Future  -     TSH; Future  -     Vitamin D,25-Hydroxy; Future    4. Essential hypertension  -     CBC & Differential; Future  -     Comprehensive Metabolic Panel; Future  -     Hemoglobin A1c; Future  -     Lipid Panel; Future  -     Microalbumin / Creatinine Urine Ratio - Urine, Clean Catch; Future  -     TSH; Future  -     Vitamin D,25-Hydroxy; Future    Other orders  -     pantoprazole (Protonix) 40 MG EC tablet; Take 1 tablet by mouth Daily.  Dispense: 30 tablet; Refill: 3               Diabetes complicated  by stage III ckd and CAD      Lab Results   Component Value Date    HGBA1C 8.60 (H) 01/06/2023           kimi freestyle personal-       Download and reviewed     Dated from Dec. 31 to Jan. 13, 2023     Average bg 195    GMI 8%     Time in target 41%     High 45%     Very high 14%     Low 0 %     Very low 0%     He normalizes at night so no change in basal     He has postprandial hyperglycemia with high carb meals     Misses mealtime at times             Tresiba taking  56 units -keep                  Metformin  mg one with supper  ---stopped due to side effects      =====     Novolog taking 12 units -- 14 units - increase up to 16 units                Self adjusting explained         He will keep current doses since blood sugar levels are now back at goal he is instructed to call if having hyper or hypoglycemia           Kimi  has allowed the patient to minimize hypoglycemia as alarms have predicted and avoided them     he also uses the arrows on the kimi  as follows:     If arrow is horizontal , she uses the predicted bolus     If the arrow is slanted up or down-- she increase or decrease by 4  units     If the arrow is vertical up or down - she increases or decreases by 4 unit s          Discontinued Medications      Trulicity 0.75 mg weekly- stopped - cramping    invokana is expensive , jardiance , side effec          Microvascular complications            Last eye exam -- July 2022 , following with          Follows with      CKD stage III      positive proteinuria         Neuropathy none            Hyperlipidemia         taking lipitor 10 mg , 5 days per week     Taking zetia 10 mg daily                  Hypertension     Taking norvasc 5 mg one daily          Bone health     Vitamin D deficiency     Taking MVI daily                Follow Up   Return in about 3 months (around 4/13/2023) for Recheck.  Patient was given instructions and counseling regarding his condition or for health  maintenance advice. Please see specific information pulled into the AVS if appropriate.         This document has been electronically signed by ROCCO Ratliff on January 13, 2023 11:56 CST.

## 2023-02-10 RX ORDER — FLASH GLUCOSE SENSOR
1 KIT MISCELLANEOUS
Qty: 2 EACH | Refills: 2 | Status: SHIPPED | OUTPATIENT
Start: 2023-02-10

## 2023-03-20 RX ORDER — INSULIN ASPART 100 [IU]/ML
INJECTION, SOLUTION INTRAVENOUS; SUBCUTANEOUS
Qty: 15 ML | Refills: 9 | Status: SHIPPED | OUTPATIENT
Start: 2023-03-20

## 2023-03-23 ENCOUNTER — DOCUMENTATION (OUTPATIENT)
Dept: ENDOCRINOLOGY | Facility: CLINIC | Age: 78
End: 2023-03-23
Payer: MEDICARE

## 2023-04-21 ENCOUNTER — LAB (OUTPATIENT)
Dept: LAB | Facility: HOSPITAL | Age: 78
End: 2023-04-21
Payer: MEDICARE

## 2023-04-21 DIAGNOSIS — E11.65 TYPE 2 DIABETES MELLITUS WITH HYPERGLYCEMIA, WITH LONG-TERM CURRENT USE OF INSULIN: ICD-10-CM

## 2023-04-21 DIAGNOSIS — N18.32 STAGE 3B CHRONIC KIDNEY DISEASE: ICD-10-CM

## 2023-04-21 DIAGNOSIS — E78.2 MIXED HYPERLIPIDEMIA: ICD-10-CM

## 2023-04-21 DIAGNOSIS — I10 ESSENTIAL HYPERTENSION: ICD-10-CM

## 2023-04-21 DIAGNOSIS — Z79.4 TYPE 2 DIABETES MELLITUS WITH HYPERGLYCEMIA, WITH LONG-TERM CURRENT USE OF INSULIN: ICD-10-CM

## 2023-04-21 LAB
ALBUMIN SERPL-MCNC: 4.2 G/DL (ref 3.5–5.2)
ALBUMIN/GLOB SERPL: 1.4 G/DL
ALP SERPL-CCNC: 86 U/L (ref 39–117)
ALT SERPL W P-5'-P-CCNC: 21 U/L (ref 1–41)
ANION GAP SERPL CALCULATED.3IONS-SCNC: 9 MMOL/L (ref 5–15)
AST SERPL-CCNC: 18 U/L (ref 1–40)
BASOPHILS # BLD AUTO: 0.03 10*3/MM3 (ref 0–0.2)
BASOPHILS NFR BLD AUTO: 0.6 % (ref 0–1.5)
BILIRUB SERPL-MCNC: 0.5 MG/DL (ref 0–1.2)
BUN SERPL-MCNC: 40 MG/DL (ref 8–23)
BUN/CREAT SERPL: 20.2 (ref 7–25)
CALCIUM SPEC-SCNC: 9.3 MG/DL (ref 8.6–10.5)
CHLORIDE SERPL-SCNC: 107 MMOL/L (ref 98–107)
CHOLEST SERPL-MCNC: 133 MG/DL (ref 0–200)
CO2 SERPL-SCNC: 25 MMOL/L (ref 22–29)
CREAT SERPL-MCNC: 1.98 MG/DL (ref 0.76–1.27)
DEPRECATED RDW RBC AUTO: 46.5 FL (ref 37–54)
EGFRCR SERPLBLD CKD-EPI 2021: 34.2 ML/MIN/1.73
EOSINOPHIL # BLD AUTO: 0.18 10*3/MM3 (ref 0–0.4)
EOSINOPHIL NFR BLD AUTO: 3.4 % (ref 0.3–6.2)
ERYTHROCYTE [DISTWIDTH] IN BLOOD BY AUTOMATED COUNT: 14.6 % (ref 12.3–15.4)
GLOBULIN UR ELPH-MCNC: 3 GM/DL
GLUCOSE SERPL-MCNC: 174 MG/DL (ref 65–99)
HCT VFR BLD AUTO: 42 % (ref 37.5–51)
HDLC SERPL-MCNC: 36 MG/DL (ref 40–60)
HGB BLD-MCNC: 13.5 G/DL (ref 13–17.7)
IMM GRANULOCYTES # BLD AUTO: 0.02 10*3/MM3 (ref 0–0.05)
IMM GRANULOCYTES NFR BLD AUTO: 0.4 % (ref 0–0.5)
LDLC SERPL CALC-MCNC: 76 MG/DL (ref 0–100)
LDLC/HDLC SERPL: 2.06 {RATIO}
LYMPHOCYTES # BLD AUTO: 0.92 10*3/MM3 (ref 0.7–3.1)
LYMPHOCYTES NFR BLD AUTO: 17.6 % (ref 19.6–45.3)
MCH RBC QN AUTO: 27.8 PG (ref 26.6–33)
MCHC RBC AUTO-ENTMCNC: 32.1 G/DL (ref 31.5–35.7)
MCV RBC AUTO: 86.4 FL (ref 79–97)
MONOCYTES # BLD AUTO: 0.45 10*3/MM3 (ref 0.1–0.9)
MONOCYTES NFR BLD AUTO: 8.6 % (ref 5–12)
NEUTROPHILS NFR BLD AUTO: 3.63 10*3/MM3 (ref 1.7–7)
NEUTROPHILS NFR BLD AUTO: 69.4 % (ref 42.7–76)
NRBC BLD AUTO-RTO: 0 /100 WBC (ref 0–0.2)
PLATELET # BLD AUTO: 161 10*3/MM3 (ref 140–450)
PMV BLD AUTO: 11.5 FL (ref 6–12)
POTASSIUM SERPL-SCNC: 5.2 MMOL/L (ref 3.5–5.2)
PROT SERPL-MCNC: 7.2 G/DL (ref 6–8.5)
RBC # BLD AUTO: 4.86 10*6/MM3 (ref 4.14–5.8)
SODIUM SERPL-SCNC: 141 MMOL/L (ref 136–145)
TRIGL SERPL-MCNC: 115 MG/DL (ref 0–150)
TSH SERPL DL<=0.05 MIU/L-ACNC: 1.95 UIU/ML (ref 0.27–4.2)
VLDLC SERPL-MCNC: 21 MG/DL (ref 5–40)
WBC NRBC COR # BLD: 5.23 10*3/MM3 (ref 3.4–10.8)

## 2023-04-21 PROCEDURE — 80053 COMPREHEN METABOLIC PANEL: CPT

## 2023-04-21 PROCEDURE — 80061 LIPID PANEL: CPT

## 2023-04-21 PROCEDURE — 82043 UR ALBUMIN QUANTITATIVE: CPT

## 2023-04-21 PROCEDURE — 84443 ASSAY THYROID STIM HORMONE: CPT

## 2023-04-21 PROCEDURE — 85025 COMPLETE CBC W/AUTO DIFF WBC: CPT

## 2023-04-21 PROCEDURE — 83036 HEMOGLOBIN GLYCOSYLATED A1C: CPT

## 2023-04-21 PROCEDURE — 82306 VITAMIN D 25 HYDROXY: CPT

## 2023-04-21 PROCEDURE — 82570 ASSAY OF URINE CREATININE: CPT

## 2023-04-21 PROCEDURE — 36415 COLL VENOUS BLD VENIPUNCTURE: CPT

## 2023-04-22 LAB
25(OH)D3 SERPL-MCNC: 28.7 NG/ML (ref 30–100)
ALBUMIN UR-MCNC: 200.7 MG/DL
CREAT UR-MCNC: 94.5 MG/DL
HBA1C MFR BLD: 8.6 % (ref 4.8–5.6)
MICROALBUMIN/CREAT UR: 2123.8 MG/G

## 2023-04-26 ENCOUNTER — OFFICE VISIT (OUTPATIENT)
Dept: ENDOCRINOLOGY | Facility: CLINIC | Age: 78
End: 2023-04-26
Payer: MEDICARE

## 2023-04-26 VITALS
HEIGHT: 73 IN | HEART RATE: 59 BPM | SYSTOLIC BLOOD PRESSURE: 140 MMHG | DIASTOLIC BLOOD PRESSURE: 74 MMHG | OXYGEN SATURATION: 98 % | BODY MASS INDEX: 24.64 KG/M2 | WEIGHT: 185.9 LBS

## 2023-04-26 DIAGNOSIS — Z79.4 TYPE 2 DIABETES MELLITUS WITH HYPOGLYCEMIA WITHOUT COMA, WITH LONG-TERM CURRENT USE OF INSULIN: Primary | ICD-10-CM

## 2023-04-26 DIAGNOSIS — E11.649 TYPE 2 DIABETES MELLITUS WITH HYPOGLYCEMIA WITHOUT COMA, WITH LONG-TERM CURRENT USE OF INSULIN: Primary | ICD-10-CM

## 2023-04-26 DIAGNOSIS — E55.9 VITAMIN D DEFICIENCY: ICD-10-CM

## 2023-04-26 DIAGNOSIS — I10 ESSENTIAL HYPERTENSION: Chronic | ICD-10-CM

## 2023-04-26 DIAGNOSIS — E78.2 MIXED HYPERLIPIDEMIA: Chronic | ICD-10-CM

## 2023-04-26 DIAGNOSIS — N18.32 STAGE 3B CHRONIC KIDNEY DISEASE: ICD-10-CM

## 2023-04-26 RX ORDER — INSULIN GLARGINE 300 U/ML
56 INJECTION, SOLUTION SUBCUTANEOUS DAILY
Qty: 12 ML | Refills: 10 | Status: SHIPPED | OUTPATIENT
Start: 2023-04-26

## 2023-04-26 RX ORDER — FLASH GLUCOSE SENSOR
1 KIT MISCELLANEOUS
Qty: 2 EACH | Refills: 11 | Status: SHIPPED | OUTPATIENT
Start: 2023-04-26

## 2023-04-26 NOTE — PROGRESS NOTES
"Chief Complaint  Diabetes    Subjective          Zachary Galindo presents to [facility] ENDOCRINOLOGY  Diabetes           In office visit     Referring provider Halley Raymond MD     77 year old male presents for follow up     Diabetes mellitus type 2    Diagnosed at age 37     Timing constant     Quality not controlled       Severity high               Complications - nephropathy and CAD         Side Effects  cramping from Trulicity , metformin on diarrhea                 Current monitoring regimen: home blood tests -       checking 4 x daily before catherine                    Home blood sugar records:      CATHERINE USING THE PERSONAL SYSTEM       Having low     Review of Systems - General ROS: negative        Objective   Vital Signs:   /74   Pulse 59   Ht 185.4 cm (73\")   Wt 84.3 kg (185 lb 14.4 oz)   SpO2 98%   BMI 24.53 kg/m²     Physical Exam  Constitutional:       Appearance: Normal appearance.   Cardiovascular:      Rate and Rhythm: Regular rhythm.      Heart sounds: Normal heart sounds.   Musculoskeletal:      Cervical back: Normal range of motion.   Neurological:      Mental Status: He is alert.        Result Review :   The following data was reviewed by: ROCCO Ratliff on 06/21/2022:  Common labs        9/21/2022    09:09 1/6/2023    07:49 4/21/2023    10:42 4/21/2023    11:46   Common Labs   Glucose 167   185   174      BUN 40   37   40      Creatinine 2.06   1.97   1.98      Sodium 140   139   141      Potassium 5.3   4.1   5.2      Chloride 105   104   107      Calcium 9.3   8.9   9.3      Albumin 4.10   4.3   4.2      Total Bilirubin 0.5   0.4   0.5      Alkaline Phosphatase 87   90   86      AST (SGOT) 33   22   18      ALT (SGPT) 52   26   21      WBC 5.49   4.92   5.23      Hemoglobin 14.1   13.4   13.5      Hematocrit 43.5   41.9   42.0      Platelets 159   144   161      Total Cholesterol 130   133   133      Triglycerides 151   169   115    "   HDL Cholesterol 32   30   36      LDL Cholesterol  72   74   76      Hemoglobin A1C 9.30   8.60   8.60      Microalbumin, Urine  95.8    200.7                   Assessment and Plan    Diagnoses and all orders for this visit:    1. Type 2 diabetes mellitus with hypoglycemia without coma, with long-term current use of insulin (Primary)  -     CBC & Differential; Future  -     Comprehensive Metabolic Panel; Future  -     Hemoglobin A1c; Future  -     Lipid Panel; Future  -     Microalbumin / Creatinine Urine Ratio - Urine, Clean Catch; Future  -     TSH; Future  -     Vitamin B12; Future  -     Vitamin D,25-Hydroxy; Future    2. Mixed hyperlipidemia  -     CBC & Differential; Future  -     Comprehensive Metabolic Panel; Future  -     Hemoglobin A1c; Future  -     Lipid Panel; Future  -     Microalbumin / Creatinine Urine Ratio - Urine, Clean Catch; Future  -     TSH; Future  -     Vitamin B12; Future  -     Vitamin D,25-Hydroxy; Future    3. Essential hypertension  -     CBC & Differential; Future  -     Comprehensive Metabolic Panel; Future  -     Hemoglobin A1c; Future  -     Lipid Panel; Future  -     Microalbumin / Creatinine Urine Ratio - Urine, Clean Catch; Future  -     TSH; Future  -     Vitamin B12; Future  -     Vitamin D,25-Hydroxy; Future    4. Vitamin D deficiency  -     CBC & Differential; Future  -     Comprehensive Metabolic Panel; Future  -     Hemoglobin A1c; Future  -     Lipid Panel; Future  -     Microalbumin / Creatinine Urine Ratio - Urine, Clean Catch; Future  -     TSH; Future  -     Vitamin B12; Future  -     Vitamin D,25-Hydroxy; Future    5. Stage 3b chronic kidney disease  -     CBC & Differential; Future  -     Comprehensive Metabolic Panel; Future  -     Hemoglobin A1c; Future  -     Lipid Panel; Future  -     Microalbumin / Creatinine Urine Ratio - Urine, Clean Catch; Future  -     TSH; Future  -     Vitamin B12; Future  -     Vitamin D,25-Hydroxy; Future    Other orders  -      Continuous Blood Gluc Sensor (FreeStyle Kimi 14 Day Sensor) misc; 1 each by Other route Every 14 (Fourteen) Days. Dx code:E11.65  Dispense: 2 each; Refill: 11  -     Insulin Glargine, 2 Unit Dial, (Toujeo Max SoloStar) 300 UNIT/ML solution pen-injector injection; Inject 56 Units under the skin into the appropriate area as directed Daily.  Dispense: 12 mL; Refill: 10               Diabetes complicated by stage III ckd and CAD      Lab Results   Component Value Date    HGBA1C 8.60 (H) 04/21/2023           kimi freestyle personal-       Download and reviewed         Ambulatory Glucose Profile Report    Days Analyzed : 2 week period ending on 04/26/23      Continuous Glucose Monitory Device:  FreeStyle Kimi 2     - 2% very high target range  - 16% high target range  - 77% in target range  - 5% below target range  - GMI 6.7 %  - Average glucose 139 mg/dl    Interpretation : Diabetes Type 2 Uncontrolled       Low over night -- decrease basal                Tresiba taking  52 units decrease to 46 units                  Metformin  mg one with supper  ---stopped due to side effects      =====     Novolog taking 5 up to 10 units TID              Self adjusting explained         He will keep current doses since blood sugar levels are now back at goal he is instructed to call if having hyper or hypoglycemia           Kimi  has allowed the patient to minimize hypoglycemia as alarms have predicted and avoided them     he also uses the arrows on the kimi  as follows:     If arrow is horizontal , she uses the predicted bolus     If the arrow is slanted up or down-- she increase or decrease by 4  units     If the arrow is vertical up or down - she increases or decreases by 4 unit s          Discontinued Medications      Trulicity 0.75 mg weekly- stopped - cramping    invokana is expensive , jardiance , side effec          Microvascular complications            Last eye exam -- July 2022 , following with           Follows with      CKD stage III      positive proteinuria         Neuropathy none            Hyperlipidemia         taking lipitor 10 mg , 5 days per week     Taking zetia 10 mg daily                  Hypertension     Taking norvasc 5 mg one daily          Bone health     Vitamin D deficiency     Taking MVI daily                Follow Up   Return in about 3 months (around 7/26/2023) for Recheck, labs one week prior to next appointment.  Patient was given instructions and counseling regarding his condition or for health maintenance advice. Please see specific information pulled into the AVS if appropriate.         This document has been electronically signed by ROCCO Ratliff on April 26, 2023 11:55 CDT.

## 2023-05-16 DIAGNOSIS — I25.810 CORONARY ARTERY DISEASE INVOLVING CORONARY BYPASS GRAFT OF NATIVE HEART WITHOUT ANGINA PECTORIS: ICD-10-CM

## 2023-05-16 DIAGNOSIS — E78.2 MIXED HYPERLIPIDEMIA: Chronic | ICD-10-CM

## 2023-05-16 RX ORDER — ATORVASTATIN CALCIUM 20 MG/1
TABLET, FILM COATED ORAL
Qty: 90 TABLET | Refills: 1 | Status: SHIPPED | OUTPATIENT
Start: 2023-05-16

## 2023-06-15 PROCEDURE — 87086 URINE CULTURE/COLONY COUNT: CPT | Performed by: NURSE PRACTITIONER

## 2023-07-12 PROBLEM — R42 DIZZINESS: Status: ACTIVE | Noted: 2023-07-12

## 2023-08-01 ENCOUNTER — OFFICE VISIT (OUTPATIENT)
Dept: CARDIAC SURGERY | Facility: CLINIC | Age: 78
End: 2023-08-01
Payer: MEDICARE

## 2023-08-01 ENCOUNTER — LAB (OUTPATIENT)
Dept: LAB | Facility: HOSPITAL | Age: 78
End: 2023-08-01
Payer: MEDICARE

## 2023-08-01 ENCOUNTER — TRANSCRIBE ORDERS (OUTPATIENT)
Dept: LAB | Facility: HOSPITAL | Age: 78
End: 2023-08-01
Payer: MEDICARE

## 2023-08-01 VITALS
DIASTOLIC BLOOD PRESSURE: 76 MMHG | WEIGHT: 188 LBS | HEART RATE: 64 BPM | HEIGHT: 73 IN | BODY MASS INDEX: 24.92 KG/M2 | SYSTOLIC BLOOD PRESSURE: 144 MMHG | OXYGEN SATURATION: 97 %

## 2023-08-01 DIAGNOSIS — I10 ESSENTIAL HYPERTENSION: Chronic | ICD-10-CM

## 2023-08-01 DIAGNOSIS — Z79.4 TYPE 2 DIABETES MELLITUS WITH HYPOGLYCEMIA WITHOUT COMA, WITH LONG-TERM CURRENT USE OF INSULIN: ICD-10-CM

## 2023-08-01 DIAGNOSIS — E78.2 MIXED HYPERLIPIDEMIA: ICD-10-CM

## 2023-08-01 DIAGNOSIS — I10 ESSENTIAL HYPERTENSION: ICD-10-CM

## 2023-08-01 DIAGNOSIS — E55.9 VITAMIN D DEFICIENCY: ICD-10-CM

## 2023-08-01 DIAGNOSIS — N18.32 STAGE 3B CHRONIC KIDNEY DISEASE: ICD-10-CM

## 2023-08-01 DIAGNOSIS — E87.5 HYPERPOTASSEMIA: ICD-10-CM

## 2023-08-01 DIAGNOSIS — I65.23 CAROTID STENOSIS, ASYMPTOMATIC, BILATERAL: Primary | ICD-10-CM

## 2023-08-01 DIAGNOSIS — E11.649 TYPE 2 DIABETES MELLITUS WITH HYPOGLYCEMIA WITHOUT COMA, WITH LONG-TERM CURRENT USE OF INSULIN: ICD-10-CM

## 2023-08-01 DIAGNOSIS — E87.5 HYPERPOTASSEMIA: Primary | ICD-10-CM

## 2023-08-01 LAB
25(OH)D3 SERPL-MCNC: 27.6 NG/ML (ref 30–100)
ALBUMIN SERPL-MCNC: 4.3 G/DL (ref 3.5–5.2)
ALBUMIN UR-MCNC: 205.8 MG/DL
ALBUMIN/GLOB SERPL: 1.2 G/DL
ALP SERPL-CCNC: 86 U/L (ref 39–117)
ALT SERPL W P-5'-P-CCNC: 22 U/L (ref 1–41)
ANION GAP SERPL CALCULATED.3IONS-SCNC: 9 MMOL/L (ref 5–15)
AST SERPL-CCNC: 21 U/L (ref 1–40)
BASOPHILS # BLD AUTO: 0.07 10*3/MM3 (ref 0–0.2)
BASOPHILS NFR BLD AUTO: 1.3 % (ref 0–1.5)
BILIRUB SERPL-MCNC: 0.4 MG/DL (ref 0–1.2)
BUN SERPL-MCNC: 39 MG/DL (ref 8–23)
BUN/CREAT SERPL: 16.7 (ref 7–25)
CALCIUM SPEC-SCNC: 9.2 MG/DL (ref 8.6–10.5)
CHLORIDE SERPL-SCNC: 106 MMOL/L (ref 98–107)
CHOLEST SERPL-MCNC: 152 MG/DL (ref 0–200)
CO2 SERPL-SCNC: 26 MMOL/L (ref 22–29)
CREAT SERPL-MCNC: 2.34 MG/DL (ref 0.76–1.27)
CREAT UR-MCNC: 109.8 MG/DL
CREAT UR-MCNC: 109.8 MG/DL
DEPRECATED RDW RBC AUTO: 42.5 FL (ref 37–54)
EGFRCR SERPLBLD CKD-EPI 2021: 27.9 ML/MIN/1.73
EOSINOPHIL # BLD AUTO: 0.25 10*3/MM3 (ref 0–0.4)
EOSINOPHIL NFR BLD AUTO: 4.6 % (ref 0.3–6.2)
ERYTHROCYTE [DISTWIDTH] IN BLOOD BY AUTOMATED COUNT: 13.5 % (ref 12.3–15.4)
GLOBULIN UR ELPH-MCNC: 3.5 GM/DL
GLUCOSE SERPL-MCNC: 170 MG/DL (ref 65–99)
HBA1C MFR BLD: 8.3 % (ref 4.8–5.6)
HCT VFR BLD AUTO: 42.9 % (ref 37.5–51)
HDLC SERPL-MCNC: 33 MG/DL (ref 40–60)
HGB BLD-MCNC: 14.3 G/DL (ref 13–17.7)
IMM GRANULOCYTES # BLD AUTO: 0.02 10*3/MM3 (ref 0–0.05)
IMM GRANULOCYTES NFR BLD AUTO: 0.4 % (ref 0–0.5)
LDLC SERPL CALC-MCNC: 94 MG/DL (ref 0–100)
LDLC/HDLC SERPL: 2.75 {RATIO}
LYMPHOCYTES # BLD AUTO: 1.04 10*3/MM3 (ref 0.7–3.1)
LYMPHOCYTES NFR BLD AUTO: 19.2 % (ref 19.6–45.3)
MCH RBC QN AUTO: 28.8 PG (ref 26.6–33)
MCHC RBC AUTO-ENTMCNC: 33.3 G/DL (ref 31.5–35.7)
MCV RBC AUTO: 86.5 FL (ref 79–97)
MICROALBUMIN/CREAT UR: 1874.3 MG/G
MONOCYTES # BLD AUTO: 0.42 10*3/MM3 (ref 0.1–0.9)
MONOCYTES NFR BLD AUTO: 7.8 % (ref 5–12)
NEUTROPHILS NFR BLD AUTO: 3.61 10*3/MM3 (ref 1.7–7)
NEUTROPHILS NFR BLD AUTO: 66.7 % (ref 42.7–76)
NRBC BLD AUTO-RTO: 0 /100 WBC (ref 0–0.2)
PLATELET # BLD AUTO: 141 10*3/MM3 (ref 140–450)
PMV BLD AUTO: 12 FL (ref 6–12)
POTASSIUM SERPL-SCNC: 5 MMOL/L (ref 3.5–5.2)
PROT ?TM UR-MCNC: 325.4 MG/DL
PROT SERPL-MCNC: 7.8 G/DL (ref 6–8.5)
PROT/CREAT UR: 2963.6 MG/G CREA (ref 0–200)
RBC # BLD AUTO: 4.96 10*6/MM3 (ref 4.14–5.8)
SODIUM SERPL-SCNC: 141 MMOL/L (ref 136–145)
TRIGL SERPL-MCNC: 141 MG/DL (ref 0–150)
TSH SERPL DL<=0.05 MIU/L-ACNC: 2.27 UIU/ML (ref 0.27–4.2)
VIT B12 BLD-MCNC: 599 PG/ML (ref 211–946)
VLDLC SERPL-MCNC: 25 MG/DL (ref 5–40)
WBC NRBC COR # BLD: 5.41 10*3/MM3 (ref 3.4–10.8)

## 2023-08-01 PROCEDURE — 85025 COMPLETE CBC W/AUTO DIFF WBC: CPT

## 2023-08-01 PROCEDURE — 36415 COLL VENOUS BLD VENIPUNCTURE: CPT

## 2023-08-01 PROCEDURE — 82607 VITAMIN B-12: CPT

## 2023-08-01 PROCEDURE — 84156 ASSAY OF PROTEIN URINE: CPT

## 2023-08-01 PROCEDURE — 80061 LIPID PANEL: CPT

## 2023-08-01 PROCEDURE — 83036 HEMOGLOBIN GLYCOSYLATED A1C: CPT

## 2023-08-01 PROCEDURE — 82043 UR ALBUMIN QUANTITATIVE: CPT

## 2023-08-01 PROCEDURE — 82570 ASSAY OF URINE CREATININE: CPT

## 2023-08-01 PROCEDURE — 84443 ASSAY THYROID STIM HORMONE: CPT

## 2023-08-01 PROCEDURE — 80053 COMPREHEN METABOLIC PANEL: CPT

## 2023-08-01 PROCEDURE — 82306 VITAMIN D 25 HYDROXY: CPT

## 2023-08-08 ENCOUNTER — OFFICE VISIT (OUTPATIENT)
Dept: ENDOCRINOLOGY | Facility: CLINIC | Age: 78
End: 2023-08-08
Payer: MEDICARE

## 2023-08-08 VITALS
BODY MASS INDEX: 24.34 KG/M2 | HEIGHT: 73 IN | DIASTOLIC BLOOD PRESSURE: 66 MMHG | OXYGEN SATURATION: 99 % | SYSTOLIC BLOOD PRESSURE: 150 MMHG | WEIGHT: 183.7 LBS | HEART RATE: 61 BPM

## 2023-08-08 DIAGNOSIS — Z79.4 TYPE 2 DIABETES MELLITUS WITH HYPERGLYCEMIA, WITH LONG-TERM CURRENT USE OF INSULIN: Primary | ICD-10-CM

## 2023-08-08 DIAGNOSIS — E55.9 VITAMIN D DEFICIENCY: ICD-10-CM

## 2023-08-08 DIAGNOSIS — E78.2 MIXED HYPERLIPIDEMIA: Chronic | ICD-10-CM

## 2023-08-08 DIAGNOSIS — I10 ESSENTIAL HYPERTENSION: Chronic | ICD-10-CM

## 2023-08-08 DIAGNOSIS — N18.32 STAGE 3B CHRONIC KIDNEY DISEASE: ICD-10-CM

## 2023-08-08 DIAGNOSIS — E11.65 TYPE 2 DIABETES MELLITUS WITH HYPERGLYCEMIA, WITH LONG-TERM CURRENT USE OF INSULIN: Primary | ICD-10-CM

## 2023-08-08 NOTE — PROGRESS NOTES
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   Cardiovascular:      Rate and Rhythm: Regular rhythm.      Heart sounds: Normal heart sounds.   Musculoskeletal:      Cervical back: Normal range of motion.   Neurological:      Mental Status: He is alert.      Result Review :   The following data was reviewed by: ROCCO Ratliff on 06/21/2022:  Common labs          4/21/2023    10:42 4/21/2023    11:46 7/18/2023    10:54 8/1/2023    09:45 8/1/2023    11:02   Common Labs   Glucose 174    170     BUN 40    39     Creatinine 1.98    2.34     Sodium 141    141     Potassium 5.2    5.0     Chloride 107    106     Calcium 9.3    9.2     Albumin 4.2    4.3     Total Bilirubin 0.5    0.4     Alkaline Phosphatase 86    86     AST (SGOT) 18    21     ALT (SGPT) 21    22     WBC 5.23    5.41     Hemoglobin 13.5    14.3     Hematocrit 42.0    42.9     Platelets 161    141     Total Cholesterol 133    152     Triglycerides 115    141     HDL Cholesterol 36    33     LDL Cholesterol  76    94   "   Hemoglobin A1C 8.60    8.30     Microalbumin, Urine  200.7    205.8    PSA   0.92            Details          This result is from an external source.                         Assessment and Plan    Diagnoses and all orders for this visit:    1. Type 2 diabetes mellitus with hyperglycemia, with long-term current use of insulin (Primary)  -     CBC & Differential; Future  -     Comprehensive Metabolic Panel; Future  -     Hemoglobin A1c; Future  -     Lipid Panel; Future  -     Microalbumin / Creatinine Urine Ratio - Urine, Clean Catch; Future  -     TSH; Future  -     Vitamin B12; Future  -     Vitamin D,25-Hydroxy; Future    2. Stage 3b chronic kidney disease  -     CBC & Differential; Future  -     Comprehensive Metabolic Panel; Future  -     Hemoglobin A1c; Future  -     Lipid Panel; Future  -     Microalbumin / Creatinine Urine Ratio - Urine, Clean Catch; Future  -     TSH; Future  -     Vitamin B12; Future  -     Vitamin D,25-Hydroxy; Future    3. Mixed hyperlipidemia  -     CBC & Differential; Future  -     Comprehensive Metabolic Panel; Future  -     Hemoglobin A1c; Future  -     Lipid Panel; Future  -     Microalbumin / Creatinine Urine Ratio - Urine, Clean Catch; Future  -     TSH; Future  -     Vitamin B12; Future  -     Vitamin D,25-Hydroxy; Future    4. Essential hypertension  -     CBC & Differential; Future  -     Comprehensive Metabolic Panel; Future  -     Hemoglobin A1c; Future  -     Lipid Panel; Future  -     Microalbumin / Creatinine Urine Ratio - Urine, Clean Catch; Future  -     TSH; Future  -     Vitamin B12; Future  -     Vitamin D,25-Hydroxy; Future    5. Vitamin D deficiency  -     CBC & Differential; Future  -     Comprehensive Metabolic Panel; Future  -     Hemoglobin A1c; Future  -     Lipid Panel; Future  -     Microalbumin / Creatinine Urine Ratio - Urine, Clean Catch; Future  -     TSH; Future  -     Vitamin B12; Future  -     Vitamin D,25-Hydroxy; Future                 Diabetes  complicated by stage III ckd and CAD      Lab Results   Component Value Date    HGBA1C 8.30 (H) 08/01/2023           kimi freestyle personal-       Download and reviewed     Ambulatory Glucose Profile Report    Days Analyzed : 2 week period ending on 08/08/23      Continuous Glucose Monitory Device:  FreeStyle Kimi 2     19% very high target range  32% high target range  48% in target range  1% below target range  GMI 7.8 %  Average glucose 188 mg/dl    Interpretation : Diabetes Type 2 Uncontrolled       He is high with meals         Decrease the carb intake , he does miss mealtime insulin at times     No change           Tresiba taking  52 units                  Metformin  mg one with supper  ---stopped due to side effects      =====     Novolog taking 16 units TID -- increase up to 18 units              Self adjusting explained         He will keep current doses since blood sugar levels are now back at goal he is instructed to call if having hyper or hypoglycemia           Kimi  has allowed the patient to minimize hypoglycemia as alarms have predicted and avoided them     he also uses the arrows on the kimi  as follows:     If arrow is horizontal , she uses the predicted bolus     If the arrow is slanted up or down-- she increase or decrease by 4  units     If the arrow is vertical up or down - she increases or decreases by 4 unit s          Discontinued Medications      Trulicity 0.75 mg weekly- stopped - cramping    invokana is expensive , jardiance , side effec          Microvascular complications            Last eye exam -- July 2023 , following with          Follows with      CKD stage III      positive proteinuria         Neuropathy none            Hyperlipidemia         taking lipitor 10 mg , 5 days per week     Taking zetia 10 mg daily            Total Cholesterol   Date Value Ref Range Status   08/01/2023 152 0 - 200 mg/dL Final     Triglycerides   Date Value Ref Range Status    08/01/2023 141 0 - 150 mg/dL Final     HDL Cholesterol   Date Value Ref Range Status   08/01/2023 33 (L) 40 - 60 mg/dL Final     LDL Cholesterol    Date Value Ref Range Status   08/01/2023 94 0 - 100 mg/dL Final            Hypertension     Taking norvasc 5 mg one daily          Bone health     Vitamin D deficiency     Taking MVI daily                Follow Up   Return in about 3 months (around 11/8/2023) for Recheck, labs one week prior to next appointment.  Patient was given instructions and counseling regarding his condition or for health maintenance advice. Please see specific information pulled into the AVS if appropriate.         This document has been electronically signed by ROCCO Ratliff on August 8, 2023 08:21 CDT.

## 2023-08-16 DIAGNOSIS — I10 ESSENTIAL HYPERTENSION: Chronic | ICD-10-CM

## 2023-08-16 DIAGNOSIS — I25.810 CORONARY ARTERY DISEASE INVOLVING CORONARY BYPASS GRAFT OF NATIVE HEART WITHOUT ANGINA PECTORIS: ICD-10-CM

## 2023-08-16 RX ORDER — LOSARTAN POTASSIUM 50 MG/1
50 TABLET ORAL DAILY
Qty: 100 TABLET | Refills: 0 | OUTPATIENT
Start: 2023-08-16

## 2023-08-16 RX ORDER — CLOPIDOGREL BISULFATE 75 MG/1
TABLET ORAL
Qty: 90 TABLET | Refills: 2 | Status: SHIPPED | OUTPATIENT
Start: 2023-08-16

## 2023-08-16 NOTE — TELEPHONE ENCOUNTER
UPCOMING APPTS  With Family Medicine (Halley Raymond MD)  01/03/2024 at 8:00 AM  LAST OFFICE VISIT - THIS DEPT  6/29/2023 Halley Raymond MD    The original prescription was discontinued on 6/29/2023 by Halley Raymond MD. Renewing this prescription may not be appropriate.

## 2023-08-21 DIAGNOSIS — I10 ESSENTIAL HYPERTENSION: Chronic | ICD-10-CM

## 2023-08-21 RX ORDER — LOSARTAN POTASSIUM 25 MG/1
25 TABLET ORAL DAILY
Qty: 90 TABLET | Refills: 1 | Status: SHIPPED | OUTPATIENT
Start: 2023-08-21

## 2023-08-21 RX ORDER — LOSARTAN POTASSIUM 50 MG/1
25 TABLET ORAL DAILY
Qty: 90 TABLET | Refills: 1 | OUTPATIENT
Start: 2023-08-21

## 2023-08-21 NOTE — TELEPHONE ENCOUNTER
UPCOMING APPTS  With Family Medicine (Halley Raymond MD)  01/03/2024 at 8:00 AM  LAST OFFICE VISIT - THIS DEPT  6/29/2023 Halley Raymond MD    CURRENTLY TAKING LOSARTAN 25 MG    LOSARTAN 50 MG, The original prescription was discontinued on 6/29/2023 by Halley Raymond MD. Renewing this prescription may not be appropriate.

## 2023-08-23 ENCOUNTER — TELEPHONE (OUTPATIENT)
Dept: FAMILY MEDICINE CLINIC | Facility: CLINIC | Age: 78
End: 2023-08-23
Payer: MEDICARE

## 2023-08-23 DIAGNOSIS — Z12.11 SCREENING FOR COLON CANCER: Primary | ICD-10-CM

## 2023-08-23 NOTE — TELEPHONE ENCOUNTER
Patient called and he keeps getting reminders about its time for his Colonoscopy. He wanted to see if you could order the Cologuard kit. Phone is [phone].

## 2023-08-23 NOTE — TELEPHONE ENCOUNTER
Notified patient of order being placed, and clarified to him if it was negative he would just have to repeat it in 3 years and if positive he would have to have a colonoscopy. Patient verbally understood.

## 2023-09-21 ENCOUNTER — DOCUMENTATION (OUTPATIENT)
Dept: ENDOCRINOLOGY | Facility: CLINIC | Age: 78
End: 2023-09-21
Payer: MEDICARE

## 2023-10-11 NOTE — PROGRESS NOTES
The ABCs of the Annual Wellness Visit  Subsequent Medicare Wellness Visit    Chief Complaint   Patient presents with   • Hypertension     medicare wellness   • Hyperlipidemia       Subjective   History of Present Illness:  Zachary Galindo is a 74 y.o. male who presents for a Subsequent Medicare Wellness Visit.    HEALTH RISK ASSESSMENT    Recent Hospitalizations:  No hospitalization(s) within the last year.    Current Medical Providers:  Patient Care Team:  Halley Raymond MD as PCP - General  Aime Stiles MD as Consulting Physician (Cardiology)  Vicente Hsu MD as Consulting Physician (Nephrology)  Khadar Ruiz MD (Inactive) as Consulting Physician (Cardiology)  Brodie Patterson DMD as Consulting Physician (Dental General Practice)    Smoking Status:  Social History     Tobacco Use   Smoking Status Former Smoker   Smokeless Tobacco Never Used   Tobacco Comment    Quit 1979       Alcohol Consumption:  Social History     Substance and Sexual Activity   Alcohol Use No       Depression Screen:   PHQ-2/PHQ-9 Depression Screening 12/10/2020   Little interest or pleasure in doing things 0   Feeling down, depressed, or hopeless 0   Trouble falling or staying asleep, or sleeping too much 0   Feeling tired or having little energy 1   Poor appetite or overeating 0   Feeling bad about yourself - or that you are a failure or have let yourself or your family down 0   Trouble concentrating on things, such as reading the newspaper or watching television 0   Moving or speaking so slowly that other people could have noticed. Or the opposite - being so fidgety or restless that you have been moving around a lot more than usual 0   Thoughts that you would be better off dead, or of hurting yourself in some way 0   Total Score 1   If you checked off any problems, how difficult have these problems made it for you to do your work, take care of things at home, or get along with other people? Somewhat difficult        Fall Risk Screen:  KEVINADI Fall Risk Assessment was completed, and patient is at MODERATE risk for falls. Assessment completed on:12/10/2020    Health Habits and Functional and Cognitive Screening:  Functional & Cognitive Status 12/10/2020   Do you have difficulty preparing food and eating? No   Do you have difficulty bathing yourself, getting dressed or grooming yourself? No   Do you have difficulty using the toilet? No   Do you have difficulty moving around from place to place? No   Do you have trouble with steps or getting out of a bed or a chair? No   Current Diet Well Balanced Diet   Dental Exam Up to date   Eye Exam Up to date   Exercise (times per week) 0 times per week   Current Exercises Include No Regular Exercise   Current Exercise Activities Include -   Do you need help using the phone?  No   Are you deaf or do you have serious difficulty hearing?  No   Do you need help with transportation? No   Do you need help shopping? No   Do you need help preparing meals?  No   Do you need help with housework?  No   Do you need help with laundry? No   Do you need help taking your medications? No   Do you need help managing money? No   Do you ever drive or ride in a car without wearing a seat belt? No   Have you felt unusual stress, anger or loneliness in the last month? No   Who do you live with? Spouse   If you need help, do you have trouble finding someone available to you? No   Have you been bothered in the last four weeks by sexual problems? No   Do you have difficulty concentrating, remembering or making decisions? No         Does the patient have evidence of cognitive impairment? No    Asprin use counseling:Taking ASA appropriately as indicated    Age-appropriate Screening Schedule:  Refer to the list below for future screening recommendations based on patient's age, sex and/or medical conditions. Orders for these recommended tests are listed in the plan section. The patient has been provided with a  "written plan.    Health Maintenance   Topic Date Due   • HEMOGLOBIN A1C  05/10/2021   • DIABETIC FOOT EXAM  06/03/2021   • LIPID PANEL  07/06/2021   • DIABETIC EYE EXAM  07/24/2021   • URINE MICROALBUMIN  08/04/2021   • TDAP/TD VACCINES (2 - Td) 07/03/2027   • INFLUENZA VACCINE  Completed   • ZOSTER VACCINE  Addressed          The following portions of the patient's history were reviewed and updated as appropriate: allergies, current medications, past family history, past medical history, past social history, past surgical history and problem list.    Outpatient Medications Prior to Visit   Medication Sig Dispense Refill   • alfuzosin (UROXATRAL) 10 MG 24 hr tablet Take 10 mg by mouth Daily.     • amLODIPine (NORVASC) 5 MG tablet Take 1 tablet by mouth Daily. 90 tablet 3   • aspirin 81 MG tablet Take 1 tablet by mouth Daily. 30 tablet 11   • atorvastatin (LIPITOR) 10 MG tablet TAKE 1 TABLET BY MOUTH EVERY NIGHT 90 tablet 2   • clopidogrel (PLAVIX) 75 MG tablet TAKE 1 TABLET BY MOUTH EVERY DAY 90 tablet 2   • ezetimibe (ZETIA) 10 MG tablet Take 1 tablet by mouth Daily. 90 tablet 3   • gabapentin (NEURONTIN) 300 MG capsule 1 qam and 2 qhs 30 capsule 0   • glucose blood test strip OneTouch Ultra Test strips  -  Test sugars 3-4 times a day as directed by provider.     • insulin aspart (NovoLOG FlexPen) 100 UNIT/ML solution pen-injector sc pen 4-8 units with meals (Patient taking differently: 8 units with meals) 3 pen 11   • Insulin Degludec (TRESIBA FLEXTOUCH) 200 UNIT/ML solution pen-injector pen injection Inject 50 Units under the skin into the appropriate area as directed Every Night. (Patient taking differently: Inject 52 Units under the skin into the appropriate area as directed Every Night.) 4 pen 11   • Insulin Pen Needle (PEN NEEDLES 3/16\") 31G X 5 MM misc 100 each Daily. 100 each 3   • isosorbide mononitrate (IMDUR) 30 MG 24 hr tablet Take 1 tablet by mouth Every Morning. 30 tablet 6   • Lancets (ONETOUCH " ULTRASOFT) lancets Test sugars 3-4 times daily as instructed by physician.     • losartan (COZAAR) 50 MG tablet Take 50 mg by mouth Daily.     • magnesium oxide (MAG-OX) 400 MG tablet Take 1 tablet by mouth 2 (Two) Times a Day. 180 tablet 3   • nitroglycerin (NITROSTAT) 0.4 MG SL tablet Place 1 tablet under the tongue Every 5 (Five) Minutes As Needed for Chest Pain. Max 3 doses. Go to ER if no relief 25 tablet 0   • probenecid (BENEMID) 500 MG tablet TAKE 1 TABLET BY MOUTH TWICE DAILY 180 tablet 2   • carvedilol (COREG) 12.5 MG tablet TAKE 1 TABLET BY MOUTH TWICE DAILY WITH MEALS 180 tablet 1   • losartan (COZAAR) 100 MG tablet Take 1 tablet by mouth Daily. (Patient taking differently: Take 50 mg by mouth Daily.) 90 tablet 3   • ondansetron ODT (ZOFRAN-ODT) 4 MG disintegrating tablet Take 1 tablet by mouth Every 6 (Six) Hours As Needed for Nausea or Vomiting. 45 tablet 5     No facility-administered medications prior to visit.        Patient Active Problem List   Diagnosis   • Essential hypertension   • Gout   • Hyperlipidemia   • Type 2 diabetes mellitus with complication, with long-term current use of insulin (CMS/HCC)   • Coronary artery disease involving coronary bypass graft of native heart without angina pectoris   • Pure hypercholesterolemia   • Stage 3 chronic kidney disease   • Right knee pain   • Primary osteoarthritis of right knee   • Precordial pain   • Coronary arteriosclerosis   • Type 2 diabetes mellitus with hyperglycemia, with long-term current use of insulin (CMS/HCC)   • Otalgia       Advanced Care Planning:  ACP discussion was held with the patient during this visit. Patient has an advance directive in EMR which is still valid.     Review of Systems   Constitutional: Negative.  Negative for chills, fatigue, fever and unexpected weight change.   HENT: Negative.  Negative for congestion, ear pain, hearing loss, nosebleeds, rhinorrhea, sneezing, sore throat and tinnitus.    Eyes: Negative.   "Negative for discharge.   Respiratory: Negative.  Negative for cough, shortness of breath and wheezing.    Cardiovascular: Negative.  Negative for chest pain and palpitations.   Gastrointestinal: Negative.  Negative for abdominal pain, constipation, diarrhea, nausea and vomiting.   Endocrine: Negative.    Genitourinary: Negative.  Negative for dysuria, frequency and urgency.   Musculoskeletal: Negative.  Negative for arthralgias, back pain, joint swelling, myalgias and neck pain.   Skin: Negative.  Negative for rash.   Allergic/Immunologic: Negative.    Neurological: Negative.  Negative for dizziness, weakness, numbness and headaches.   Hematological: Negative.  Negative for adenopathy.   Psychiatric/Behavioral: Negative.  Negative for dysphoric mood and sleep disturbance. The patient is not nervous/anxious.        Compared to one year ago, the patient feels his physical health is the same.  Compared to one year ago, the patient feels his mental health is the same.    Reviewed chart for potential of high risk medication in the elderly: yes  Reviewed chart for potential of harmful drug interactions in the elderly:yes    Objective         Vitals:    12/10/20 0944   BP: 174/62   Pulse: 90   SpO2: 97%   Weight: 90.3 kg (199 lb)   Height: 188 cm (74\")   PainSc: 0-No pain       Body mass index is 25.55 kg/m².  Discussed the patient's BMI with him. The BMI is in the acceptable range.    Physical Exam  Vitals signs and nursing note reviewed.   Constitutional:       General: He is not in acute distress.     Appearance: He is well-developed.   HENT:      Head: Normocephalic.      Nose: Nose normal.   Eyes:      General:         Right eye: No discharge.         Left eye: No discharge.      Conjunctiva/sclera: Conjunctivae normal.      Pupils: Pupils are equal, round, and reactive to light.   Neck:      Thyroid: No thyromegaly.   Cardiovascular:      Rate and Rhythm: Normal rate and regular rhythm.      Heart sounds: Normal " Size Of Lesion In Cm (Optional): 0 heart sounds. No murmur.   Pulmonary:      Effort: Pulmonary effort is normal.      Breath sounds: Normal breath sounds.   Lymphadenopathy:      Cervical: No cervical adenopathy.   Skin:     General: Skin is warm and dry.   Neurological:      Mental Status: He is alert and oriented to person, place, and time.         Lab Results   Component Value Date    HGBA1C 8.55 (H) 11/10/2020        Assessment/Plan   Medicare Risks and Personalized Health Plan  CMS Preventative Services Quick Reference  Advance Directive Discussion  Fall Risk    The above risks/problems have been discussed with the patient.  Pertinent information has been shared with the patient in the After Visit Summary.  Follow up plans and orders are seen below in the Assessment/Plan Section.    Diagnoses and all orders for this visit:    1. Medicare annual wellness visit, subsequent (Primary)    2. Abnormality of lung on CXR  -     Cancel: CT Chest With Contrast; Future  -     CT Chest Without Contrast; Future    3. 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[facility])  -     Cancel: CT Chest With Contrast; Future  -     CT Chest Without Contrast; Future    4. Essential hypertension  -     carvedilol (Coreg) 25 MG tablet; Take 1 tablet by mouth 2 (Two) Times a Day With Meals.  Dispense: 180 tablet; Refill: 3    5. Coronary arteriosclerosis    6. Type 2 diabetes mellitus with complication, with long-term current use of insulin (CMS/[facility])      Follow Up:  Return in about 6 months (around 6/10/2021) for Annual physical.     An After Visit Summary and PPPS were given to the patient.    Information has been scanned into chart. Discussed importance of taking medications as prescribed. Encouraged healthy eating habits with low fat, low salt choices and working towards maintaining a healthy weight. Recommended regular exercise if able as well as care to prevent falls including avoiding anything on the floor that they could slip or trip on such as throw rugs, making sure they have a bathmat  to step onto when their feet are wet and having grab bars and railings where needed.

## 2023-10-27 NOTE — TELEPHONE ENCOUNTER
I spoke with him and told him heartburn can be a side effect of Metformin. I suggested taking OTC omeprazole since he said he was only taking Mylanta. Told him to take that for a week and if nothing changes then give us a call back. He also said his morning sugars are between 160-235 and wanted to know if he needed to increase Tresiba.  Thanks,  Flores  
Pt says the metformin and is causing heartburn and heart dr said its not his heart so can the metformin be changed . Can't rest at night and just wanted to see if there is an alternative. Uses [facility] .Saw Luana   Thank You   
PROVIDER:[TOKEN:[[id number]],FOLLOWUP:[1-3 Days]]

## (undated) DEVICE — Device

## (undated) DEVICE — GW PERIPH GUIDERIGHT STD/J/TP PTFE/PCOAT SS 0.038IN 5X150CM

## (undated) DEVICE — CATH DIAG IMPULSE M/ PK 145 5FR

## (undated) DEVICE — ELECTRODE,RT,STRESS,FOAM,50PK: Brand: MEDLINE

## (undated) DEVICE — INTRO SHEATH ULTIMUM ACT 5F

## (undated) DEVICE — KT INTRO MINISTICK MAX W/GW NITNL/TUNG ECHO 4F 21G 7CM

## (undated) DEVICE — PINNACLE INTRODUCER SHEATH: Brand: PINNACLE

## (undated) DEVICE — CATH GUIDE LAUNCHER JR4.0 6F 100CM

## (undated) DEVICE — COPILOT KIT INCLUDES BLEEDBACK CONTROL VALVE / GUIDE WIRE INTRODUCER / TORQUE DEVICE: Brand: ACCESSORIES

## (undated) DEVICE — ST CVR PROB PULLUP ULTRASND 5X48IN

## (undated) DEVICE — PK CATH LAB 60

## (undated) DEVICE — MODEL BT2000 P/N 700287-012KIT CONTENTS: MANIFOLD WITH SALINE AND CONTRAST PORTS, SALINE TUBING WITH SPIKE AND HAND SYRINGE, TRANSDUCER: Brand: BT2000 AUTOMATED MANIFOLD KIT

## (undated) DEVICE — TREK CORONARY DILATATION CATHETER 3.0 MM X 30 MM / RAPID-EXCHANGE: Brand: TREK

## (undated) DEVICE — DEV INFL MONARCH 20ML